# Patient Record
Sex: FEMALE | Race: WHITE | NOT HISPANIC OR LATINO | Employment: OTHER | URBAN - METROPOLITAN AREA
[De-identification: names, ages, dates, MRNs, and addresses within clinical notes are randomized per-mention and may not be internally consistent; named-entity substitution may affect disease eponyms.]

---

## 2017-01-12 ENCOUNTER — OFFICE VISIT (OUTPATIENT)
Dept: PSYCHIATRY | Facility: HOSPITAL | Age: 47
End: 2017-01-12

## 2017-01-12 DIAGNOSIS — F33.1 MAJOR DEPRESSIVE DISORDER, RECURRENT EPISODE, MODERATE (HCC): Primary | ICD-10-CM

## 2017-01-12 PROCEDURE — 90834 PSYTX W PT 45 MINUTES: CPT | Performed by: SOCIAL WORKER

## 2017-01-12 NOTE — PROGRESS NOTES
Session 10:00-10:45.  Pt reports the day after our last session her father in law .  It was very quick after his cancer diagnosis.  Pt reports the holidays were very stressful.  Discussed grief process and how to take care of herself, her  and their daughter.  Pt is overwhelmed by the negative things that have gone on in this past year.  She is hopeful this year will be better.  She hopes to feel better physically starting with possibly getting her knee surgery done.  Pt c/o not sleeping well.  Some is due to grieving.  Will continue to explore.

## 2017-01-18 RX ORDER — SERTRALINE HYDROCHLORIDE 100 MG/1
TABLET, FILM COATED ORAL
Qty: 30 TABLET | Refills: 1 | OUTPATIENT
Start: 2017-01-18

## 2017-01-23 ENCOUNTER — OFFICE VISIT (OUTPATIENT)
Dept: FAMILY MEDICINE CLINIC | Facility: CLINIC | Age: 47
End: 2017-01-23

## 2017-01-23 VITALS
SYSTOLIC BLOOD PRESSURE: 118 MMHG | WEIGHT: 290 LBS | HEIGHT: 64 IN | TEMPERATURE: 97.6 F | DIASTOLIC BLOOD PRESSURE: 68 MMHG | BODY MASS INDEX: 49.51 KG/M2 | HEART RATE: 84 BPM | OXYGEN SATURATION: 96 %

## 2017-01-23 DIAGNOSIS — R73.01 IMPAIRED FASTING GLUCOSE: ICD-10-CM

## 2017-01-23 DIAGNOSIS — F41.9 ANXIETY: ICD-10-CM

## 2017-01-23 DIAGNOSIS — I10 ESSENTIAL HYPERTENSION: Primary | ICD-10-CM

## 2017-01-23 DIAGNOSIS — E78.01 ESSENTIAL FAMILIAL HYPERCHOLESTEROLEMIA: ICD-10-CM

## 2017-01-23 DIAGNOSIS — F33.1 MODERATE EPISODE OF RECURRENT MAJOR DEPRESSIVE DISORDER (HCC): ICD-10-CM

## 2017-01-23 DIAGNOSIS — H57.02 ANISOCORIA: ICD-10-CM

## 2017-01-23 PROCEDURE — 99214 OFFICE O/P EST MOD 30 MIN: CPT | Performed by: PHYSICIAN ASSISTANT

## 2017-01-23 RX ORDER — NEBIVOLOL 5 MG/1
5 TABLET ORAL DAILY
Qty: 30 TABLET | Refills: 5 | Status: SHIPPED | OUTPATIENT
Start: 2017-01-23 | End: 2017-03-17 | Stop reason: SDUPTHER

## 2017-01-23 RX ORDER — AMLODIPINE AND VALSARTAN 10; 320 MG/1; MG/1
1 TABLET ORAL DAILY
Qty: 30 TABLET | Refills: 5 | Status: SHIPPED | OUTPATIENT
Start: 2017-01-23 | End: 2017-03-17 | Stop reason: SDUPTHER

## 2017-01-23 RX ORDER — SERTRALINE HYDROCHLORIDE 100 MG/1
TABLET, FILM COATED ORAL
Qty: 45 TABLET | Refills: 2 | Status: SHIPPED | OUTPATIENT
Start: 2017-01-23 | End: 2017-03-17 | Stop reason: SDUPTHER

## 2017-01-23 NOTE — PROGRESS NOTES
Subjective   Marisol Carrillo is a 47 y.o. female.     History of Present Illness   Marisol Carrillo 47 y.o. female who presents today for routine follow up check and medication refills.  she has a history of   Patient Active Problem List   Diagnosis   • Essential familial hypercholesterolemia   • Hypertension   • Hypothyroidism   • Adiposity   • Vitamin deficiency   • Impaired fasting glucose   • Vitamin D deficiency   • Lumbosacral radiculopathy   • Gastroesophageal reflux disease   • Constipation   • Diarrhea   • Dysphagia   • Fatigue   • Heartburn   • Lumbar disc herniation with radiculopathy   • Nocturnal cough   • Pain in extremity   • Regurgitation   • Vomiting   • Anxiety   • Depression   • Degeneration of lumbar intervertebral disc   • Spinal headache   .  Since the last visit, she has overall felt depressed.  She has Hypertenision and is well controlled on medication and hypothyroidism and is well controlled on Rx.  Labs are in desired treatment range.  she has been compliant with current medications have reviewed them.  The patient denies medication side effects.    PHQ-9 Depression Screening 1/23/2017   Little interest or pleasure in doing things 3   Feeling down, depressed, or hopeless 3   Trouble falling or staying asleep, or sleeping too much 3   Feeling tired or having little energy 3   Poor appetite or overeating 3   Feeling bad about yourself - or that you are a failure or have let yourself or your family down 3   Trouble concentrating on things, such as reading the newspaper or watching television 3   Moving or speaking so slowly that other people could have noticed. Or the opposite - being so fidgety or restless that you have been moving around a lot more than usual 3   Thoughts that you would be better off dead, or of hurting yourself in some way 3   PHQ-9 Total Score 27   If you checked off any problems, how difficult have these problems made it for you to do your work, take care of things at home,  or get along with other people? Extremely dIfficult     I added Bysolic last visit and bp has been controlled and at goal.  Average pulse <=90    Due for knee replacment soon.    Still needs labs   Marisol Carrillo female 47 y.o. who presents today for follow up of Depression and Anxiety.  She reports depressed mood, crying spells, anhedonia, impaired concentration, depressed mood and anxiety and sleep disturbance. Onset of symptoms was approximately several years ago.  She denies current suicidal and homicidal ideation. Risk factors are family history of anxiety and or depression, lifestyle of multiple roles and grief.  Previous treatment includes current Rx.  She complains of the following medication side effects: none.  The patient is currently in counseling..        The following portions of the patient's history were reviewed and updated as appropriate: allergies, current medications, past family history, past medical history, past social history, past surgical history and problem list.    Review of Systems   Constitutional: Negative for activity change, appetite change and unexpected weight change.   HENT: Negative for nosebleeds and trouble swallowing.    Eyes: Negative for pain and visual disturbance.   Respiratory: Negative for chest tightness, shortness of breath and wheezing.    Cardiovascular: Negative for chest pain and palpitations.   Gastrointestinal: Negative for abdominal pain and blood in stool.   Endocrine: Negative.    Genitourinary: Negative for difficulty urinating and hematuria.   Musculoskeletal: Positive for arthralgias and back pain. Negative for joint swelling.   Skin: Negative for color change and rash.   Allergic/Immunologic: Negative.    Neurological: Negative for syncope and speech difficulty.   Hematological: Negative for adenopathy.   Psychiatric/Behavioral: Positive for decreased concentration, dysphoric mood and sleep disturbance. Negative for agitation and confusion. The patient is  nervous/anxious.    All other systems reviewed and are negative.      Objective   Physical Exam   Constitutional: She is oriented to person, place, and time. She appears well-developed and well-nourished. No distress.   HENT:   Head: Normocephalic and atraumatic.   Eyes: Conjunctivae are normal. Right eye exhibits no discharge. Left eye exhibits no discharge. No scleral icterus.   Reactive to light;  Right pupil larger than left;  Need to look at old photos to see if new;  Consider opthalmology;      .   Neck: Normal range of motion. Neck supple. No tracheal deviation present. No thyromegaly present.   Cardiovascular: Normal rate, regular rhythm, normal heart sounds, intact distal pulses and normal pulses.  Exam reveals no gallop.    No murmur heard.  Pulmonary/Chest: Effort normal and breath sounds normal. No respiratory distress. She has no wheezes. She has no rales.   Musculoskeletal: Normal range of motion.   Neurological: She is alert and oriented to person, place, and time. She exhibits normal muscle tone. Coordination normal.   Skin: Skin is warm. No rash noted. No erythema. No pallor.   Psychiatric: She has a normal mood and affect. Her behavior is normal. Judgment and thought content normal.   Nursing note and vitals reviewed.      Assessment/Plan   Problems Addressed this Visit        Cardiovascular and Mediastinum    Essential familial hypercholesterolemia    Hypertension - Primary    Relevant Medications    nebivolol (BYSTOLIC) 5 MG tablet    amLODIPine-valsartan (EXFORGE)  MG per tablet       Endocrine    Impaired fasting glucose       Other    Anxiety    Depression    Relevant Medications    sertraline (ZOLOFT) 100 MG tablet      Other Visit Diagnoses     Anisocoria

## 2017-01-23 NOTE — PATIENT INSTRUCTIONS
Low glycemic index diet  Exercise 30 minutes most days of the week  Make sure you get results on any labs or tests we ordered today  We discussed medications and how to take them as prescribed  Sleep 6-8 hours each night if possible  If you have not signed up for [email or web address], please activate your code ASAP from your After Visit Summary today    LDL goal <100  LDL goal if heart disease <70  HDL goal >60  Triglyceride goal <150  BP goal =<130/80  Fasting glucose <100    Contact office if your depression worsens   Go to ER if start to develop feelings for suicide  Take medication as prescribed  If you are having trouble tolerating your medication, contact office before abruptly stopping it    She just had eye exam and will call and ask if right pupil is always slightly larger then left

## 2017-01-23 NOTE — MR AVS SNAPSHOT
Marisol Carrillo   1/23/2017 9:30 AM   Office Visit    Provider:  Tomeka Linares PA-C   Department:  [facility]   Dept Phone:  [phone]                Your Full Care Plan              Today's Medication Changes          These changes are accurate as of: 1/23/17 10:01 AM.  If you have any questions, ask your nurse or doctor.               Medication(s)that have changed:     amLODIPine-valsartan  MG per tablet   Commonly known as:  EXFORGE   Take 1 tablet by mouth Daily. For bp   What changed:  additional instructions   Changed by:  Tomeka Linares PA-C       sertraline 100 MG tablet   Commonly known as:  ZOLOFT   1.5 tabs PO daily for depression and anxiety (new dose)   What changed:  additional instructions   Changed by:  Tomeka Linares PA-C            Where to Get Your Medications      These medications were sent to [address] - [address] [facility] - [phone] [facility] [phone] [address] [address]    Hours:  24-hours Phone:  [phone]     amLODIPine-valsartan  MG per tablet    nebivolol 5 MG tablet    sertraline 100 MG tablet                  Your Updated Medication List          This list is accurate as of: 1/23/17 10:01 AM.  Always use your most recent med list.                amLODIPine-valsartan  MG per tablet   Commonly known as:  EXFORGE   Take 1 tablet by mouth Daily. For bp       atorvastatin 40 MG tablet   Commonly known as:  LIPITOR   Take 1 tablet by mouth Daily. For cholesterol       levothyroxine 50 MCG tablet   Commonly known as:  SYNTHROID, LEVOTHROID   Take 1 tablet by mouth daily. For thyroid (before breakfast)       MULTI VITAMIN DAILY PO       nebivolol 5 MG tablet   Commonly known as:  BYSTOLIC   Take 1 tablet by mouth Daily. Take with Exforge for BP       sertraline 100 MG tablet   Commonly known as:  ZOLOFT   1.5 tabs PO daily for depression  and anxiety (new dose)       Vitamin D (Cholecalciferol) 1000 UNITS capsule               You Were Diagnosed With        Codes Comments    Essential hypertension    -  Primary ICD-10-CM: I10  ICD-9-CM: 401.9     Impaired fasting glucose     ICD-10-CM: R73.01  ICD-9-CM: 790.21     Anxiety     ICD-10-CM: F41.9  ICD-9-CM: 300.00     Essential familial hypercholesterolemia     ICD-10-CM: E78.01  ICD-9-CM: 272.0     Moderate episode of recurrent major depressive disorder     ICD-10-CM: F33.1  ICD-9-CM: 296.32     Anisocoria     ICD-10-CM: H57.02  ICD-9-CM: 379.41       Instructions    Low glycemic index diet  Exercise 30 minutes most days of the week  Make sure you get results on any labs or tests we ordered today  We discussed medications and how to take them as prescribed  Sleep 6-8 hours each night if possible  If you have not signed up for [employer], please activate your code ASAP from your After Visit Summary today    LDL goal <100  LDL goal if heart disease <70  HDL goal >60  Triglyceride goal <150  BP goal =<130/80  Fasting glucose <100    Contact office if your depression worsens   Go to ER if start to develop feelings for suicide  Take medication as prescribed  If you are having trouble tolerating your medication, contact office before abruptly stopping it    She just had eye exam and will call and ask if right pupil is always slightly larger then left     Patient Instructions History      [employer] Signup     Our records indicate that you have an active [employer] account.    You can view your After Visit Summary by going to [employer] and logging in with your [employer] username and password.  If you don't have a [employer] username and password but a parent or guardian has access to your record, the parent or guardian should login with their own [employer] username and password and access your record to view the After Visit Summary.    If you have questions, you can email  [email or web address] or call [phone] to talk to our MyChart staff.  Remember, MyChart is NOT to be used for urgent needs.  For medical emergencies, dial 911.               Other Info from Your Visit           Your Appointments     Feb 09, 2017 10:00 AM EST   Office Visit with Nadine Elizalde LCSW   [facility] (--)    [address] [facility] [address] [phone]           Arrive 15 minutes prior to appointment.            Feb 10, 2017 10:30 AM EST   Follow Up with Rakesh Dumas MD   [facility] NEUROSURGERY (--)    [address]  [facility] [address] [phone]           Arrive 15 minutes prior to appointment.            Feb 23, 2017  9:30 AM EST   Follow Up with Tomeka Linares PA-C   [facility] (--)    [address]  [facility] [address] [phone]           Arrive 15 minutes prior to appointment.            Apr 10, 2017  9:30 AM EDT   Office Visit with Tomeka Linares PA-C   [facility] (--)    [address]  [facility] [address] [phone]           Arrive 15 minutes prior to appointment.              Allergies     Tirosint [Levothyroxine]  Itching    Ciprofloxacin      Lisinopril  Cough    Propacetamol      Propoxyphene-acetaminophen        Reason for Visit     Hypertension           Vital Signs     Blood Pressure Pulse Temperature Height Weight Last Menstrual Period    118/68 (BP Location: Left arm, Patient Position: Sitting, Cuff Size: Large Adult) 84 97.6 °F (36.4 °C) (Oral) 64\" (162.6 cm) 290 lb (132 kg) 01/02/2017    Oxygen Saturation Body Mass Index Smoking Status             96% 49.78 kg/m2 Never Smoker         Problems and Diagnoses Noted     Anxiety problem    Depression    High cholesterol    High blood pressure    Increased fasting blood sugar    Unequal pupils          "

## 2017-01-31 ENCOUNTER — OFFICE VISIT (OUTPATIENT)
Dept: PSYCHIATRY | Facility: HOSPITAL | Age: 47
End: 2017-01-31

## 2017-01-31 DIAGNOSIS — F33.1 MAJOR DEPRESSIVE DISORDER, RECURRENT EPISODE, MODERATE (HCC): Primary | ICD-10-CM

## 2017-01-31 PROCEDURE — 90834 PSYTX W PT 45 MINUTES: CPT | Performed by: SOCIAL WORKER

## 2017-02-01 NOTE — PROGRESS NOTES
Session 7:00-7:45.  As planned, pt was accompanied by her  to discuss how he can support her in her mental health goals.  Pt shared that she has been drinking alcohol a lot lately and he has asked her to stop.  She has not had any alcohol for the past one week.  She says she doesn't need it but admitted she was drinking a bottle of wine many nights of the week.  Also discussed her worry and anxiety.  Discussed cognitive strategies to address those things she has no control over.  Discussed how the couple can focus on their relationship and improve the intimacy.  Pt reported that she is excited that she is having her knee replacement surgery at the end of March.  She is hopeful that it will lead to her improving her physical health by getting back to her exercise routine.

## 2017-02-09 ENCOUNTER — OFFICE VISIT (OUTPATIENT)
Dept: PSYCHIATRY | Facility: HOSPITAL | Age: 47
End: 2017-02-09

## 2017-02-09 DIAGNOSIS — F33.1 MAJOR DEPRESSIVE DISORDER, RECURRENT EPISODE, MODERATE (HCC): Primary | ICD-10-CM

## 2017-02-09 PROCEDURE — 90834 PSYTX W PT 45 MINUTES: CPT | Performed by: SOCIAL WORKER

## 2017-02-09 NOTE — PROGRESS NOTES
Session 10:00-10:45.  Developed Treatment Plan and signed.  Pt is rating her anxiety and depression both an 8 out of 10 with 10 being worst.  She is looking forward to her knee replacement in late March.  Pt is considering filing for disability because she has been out of work for so long and it looks like it will continue to be longer before she is work ready.  Pt says she would like to be able to go back to work.  With her back pain, her knee pain and her depression and anxiety she is feeling very incapacitated.  Pt and  are doing their daily check ins.  Pt is going to the gym utilizing the pool as she prepares for her knee surgery.  Discussed addressing her lap band issues.  She might get a follow up appt with Dr. Hartman to discuss her options.

## 2017-02-10 ENCOUNTER — OFFICE VISIT (OUTPATIENT)
Dept: NEUROSURGERY | Facility: CLINIC | Age: 47
End: 2017-02-10

## 2017-02-10 VITALS
BODY MASS INDEX: 48.65 KG/M2 | WEIGHT: 285 LBS | SYSTOLIC BLOOD PRESSURE: 152 MMHG | HEIGHT: 64 IN | DIASTOLIC BLOOD PRESSURE: 89 MMHG | HEART RATE: 75 BPM

## 2017-02-10 DIAGNOSIS — M54.42 CHRONIC BILATERAL LOW BACK PAIN WITH BILATERAL SCIATICA: ICD-10-CM

## 2017-02-10 DIAGNOSIS — M54.41 CHRONIC BILATERAL LOW BACK PAIN WITH BILATERAL SCIATICA: ICD-10-CM

## 2017-02-10 DIAGNOSIS — M51.37 DISC DISEASE, DEGENERATIVE, LUMBAR OR LUMBOSACRAL: Primary | ICD-10-CM

## 2017-02-10 DIAGNOSIS — G89.29 CHRONIC BILATERAL LOW BACK PAIN WITH BILATERAL SCIATICA: ICD-10-CM

## 2017-02-10 PROBLEM — M51.379 DISC DISEASE, DEGENERATIVE, LUMBAR OR LUMBOSACRAL: Status: ACTIVE | Noted: 2017-02-10

## 2017-02-10 PROCEDURE — 99213 OFFICE O/P EST LOW 20 MIN: CPT | Performed by: NEUROLOGICAL SURGERY

## 2017-02-10 NOTE — PROGRESS NOTES
Subjective   Patient ID: Marisol Carrillo is a 47 y.o. female is here today for follow-up of back pain.    At her last visit, it was recommended that she proceed with blood patch for spinal headache and have a consult with Dr. Reeves for second opinion of her knee pain.    Today, she notes that after 2 blood patches, her headache finally resolved.    She notes that she was evaluated by Dr. Reeves and she is scheduled for left knee replacement on 3/29/17.    She notes that she has been favoring her right knee and now she may be facing surgery for that as well in the future.    Back Pain   This is a chronic problem. The current episode started more than 1 month ago. The problem occurs daily. The problem has been gradually improving since onset. Pertinent negatives include no fever.       The following portions of the patient's history were reviewed and updated as appropriate: allergies, current medications, past family history, past medical history, past social history, past surgical history and problem list.    Review of Systems   Constitutional: Negative for fever.   Musculoskeletal: Positive for back pain.   Neurological: Negative for syncope.   All other systems reviewed and are negative.    She spinal over the spinal headaches.  She got 2 blood patches and it helped.  She is going to have left knee replacement by Dr. Reeves.  Possibly also the right at some point.  She still has her back and referred leg pain.  From my standpoint nothing surgical.  We discussed this last time and I reinforced it.  Weight loss and exercise are going to be the best means for her to help her back.  Hopefully she'll be able to do that more once her knee surgeries are done.  I'll see her in 6 months.      Objective   Physical Exam   Constitutional: She is oriented to person, place, and time. She appears well-developed and well-nourished.   HENT:   Head: Normocephalic and atraumatic.   Eyes: Conjunctivae and EOM are normal. Pupils are  equal, round, and reactive to light.   Fundoscopic exam:       The right eye shows no papilledema. The right eye shows venous pulsations.        The left eye shows no papilledema. The left eye shows venous pulsations.   Neck: Carotid bruit is not present.   Neurological: She is oriented to person, place, and time. She has a normal Finger-Nose-Finger Test and a normal Heel to Shin Test. Gait normal.   Reflex Scores:       Tricep reflexes are 2+ on the right side and 2+ on the left side.       Bicep reflexes are 2+ on the right side and 2+ on the left side.       Brachioradialis reflexes are 2+ on the right side and 2+ on the left side.       Patellar reflexes are 2+ on the right side and 2+ on the left side.       Achilles reflexes are 2+ on the right side and 2+ on the left side.  Psychiatric: Her speech is normal.     Neurologic Exam     Mental Status   Oriented to person, place, and time.   Registration of memory: Good recent and remote memory.   Attention: normal. Concentration: normal.   Speech: speech is normal   Level of consciousness: alert  Knowledge: consistent with education.     Cranial Nerves     CN II   Visual fields full to confrontation.   Visual acuity: normal    CN III, IV, VI   Pupils are equal, round, and reactive to light.  Extraocular motions are normal.     CN V   Facial sensation intact.   Right corneal reflex: normal  Left corneal reflex: normal    CN VII   Facial expression full, symmetric.   Right facial weakness: none  Left facial weakness: none    CN VIII   Hearing: intact    CN IX, X   Palate: symmetric    CN XI   Right sternocleidomastoid strength: normal  Left sternocleidomastoid strength: normal    CN XII   Tongue: not atrophic  Tongue deviation: none    Motor Exam   Muscle bulk: normal  Right arm tone: normal  Left arm tone: normal  Right leg tone: normal  Left leg tone: normal    Strength   Strength 5/5 except as noted.        Using a cane.  Diminished range of motion in lumbar  flexion and extension     Sensory Exam   Light touch normal.     Gait, Coordination, and Reflexes     Gait  Gait: normal    Coordination   Finger to nose coordination: normal  Heel to shin coordination: normal    Reflexes   Right brachioradialis: 2+  Left brachioradialis: 2+  Right biceps: 2+  Left biceps: 2+  Right triceps: 2+  Left triceps: 2+  Right patellar: 2+  Left patellar: 2+  Right achilles: 2+  Left achilles: 2+  Right : 2+  Left : 2+      Assessment/Plan   Independent Review of Radiographic Studies:    I reviewed the myelogram done on 12/2/16 and again there is some mild disc bulge at L4-L5 and a fused segment L5-S1 but nothing over the dramatic.      Medical Decision Making:    She will get her knee surgery and come back and see us in 6 months.  The treatment from my standpoint really is more focusing on weight loss and therapy and exercise.  We can get back on track with that more next time.  I did fill out a disabled parking sticker application for her.      Marisol was seen today for back pain.    Diagnoses and all orders for this visit:    Disc disease, degenerative, lumbar or lumbosacral    Chronic bilateral low back pain with bilateral sciatica    Return in about 6 months (around 8/10/2017).

## 2017-02-10 NOTE — PATIENT INSTRUCTIONS
Obesity  Obesity is defined as having too much total body fat and a body mass index (BMI) of 30 or more. BMI is an estimate of body fat and is calculated from your height and weight. BMI is typically calculated by your health care provider during regular wellness visits. Obesity happens when you consume more calories than you can burn by exercising or performing daily physical tasks. Prolonged obesity can cause major illnesses or emergencies, such as:  · Stroke.  · Heart disease.  · Diabetes.  · Cancer.  · Arthritis.  · High blood pressure (hypertension).  · High cholesterol.  · Sleep apnea.  · Erectile dysfunction.  · Infertility problems.  CAUSES   · Regularly eating unhealthy foods.  · Physical inactivity.  · Certain disorders, such as an underactive thyroid (hypothyroidism), Cushing's syndrome, and polycystic ovarian syndrome.  · Certain medicines, such as steroids, some depression medicines, and antipsychotics.  · Genetics.  · Lack of sleep.  DIAGNOSIS  A health care provider can diagnose obesity after calculating your BMI. Obesity will be diagnosed if your BMI is 30 or higher.  There are other methods of measuring obesity levels. Some other methods include measuring your skinfold thickness, your waist circumference, and comparing your hip circumference to your waist circumference.  TREATMENT   A healthy treatment program includes some or all of the following:  · Long-term dietary changes.  · Exercise and physical activity.  · Behavioral and lifestyle changes.  · Medicine only under the supervision of your health care provider. Medicines may help, but only if they are used with diet and exercise programs.  If your BMI is 40 or higher, your health care provider may recommend specialized surgery or programs to help with weight loss.  An unhealthy treatment program includes:  · Fasting.  · Fad diets.  · Supplements and drugs.  These choices do not succeed in long-term weight control.  HOME CARE  INSTRUCTIONS  · Exercise and perform physical activity as directed by your health care provider. To increase physical activity, try the following:    Use stairs instead of elevators.    Park farther away from store entrances.    Garden, bike, or walk instead of watching television or using the computer.  · Eat healthy, low-calorie foods and drinks on a regular basis. Eat more fruits and vegetables. Use low-calorie cookbooks or take healthy cooking classes.  · Limit fast food, sweets, and processed snack foods.  · Eat smaller portions.  · Keep a daily journal of everything you eat. There are many free websites to help you with this. It may be helpful to measure your foods so you can determine if you are eating the correct portion sizes.  · Avoid drinking alcohol. Drink more water and drinks without calories.  · Take vitamins and supplements only as recommended by your health care provider.  · Weight-loss support groups, registered dietitians, counselors, and stress reduction education can also be very helpful.  SEEK IMMEDIATE MEDICAL CARE IF:  · You have chest pain or tightness.  · You have trouble breathing or feel short of breath.  · You have weakness or leg numbness.  · You feel confused or have trouble talking.  · You have sudden changes in your vision.     This information is not intended to replace advice given to you by your health care provider. Make sure you discuss any questions you have with your health care provider.     Document Released: 01/25/2006 Document Revised: 01/08/2016 Document Reviewed: 01/23/2013  [employer] Patient Education ©2016 [employer]

## 2017-02-28 ENCOUNTER — APPOINTMENT (OUTPATIENT)
Dept: PSYCHIATRY | Facility: HOSPITAL | Age: 47
End: 2017-02-28

## 2017-03-17 ENCOUNTER — OFFICE VISIT (OUTPATIENT)
Dept: FAMILY MEDICINE CLINIC | Facility: CLINIC | Age: 47
End: 2017-03-17

## 2017-03-17 VITALS
HEART RATE: 78 BPM | WEIGHT: 288 LBS | OXYGEN SATURATION: 99 % | RESPIRATION RATE: 16 BRPM | BODY MASS INDEX: 49.17 KG/M2 | SYSTOLIC BLOOD PRESSURE: 110 MMHG | DIASTOLIC BLOOD PRESSURE: 80 MMHG | HEIGHT: 64 IN | TEMPERATURE: 98.3 F

## 2017-03-17 DIAGNOSIS — E78.01 ESSENTIAL FAMILIAL HYPERCHOLESTEROLEMIA: ICD-10-CM

## 2017-03-17 DIAGNOSIS — E03.9 HYPOTHYROIDISM, UNSPECIFIED TYPE: ICD-10-CM

## 2017-03-17 DIAGNOSIS — F33.1 MODERATE EPISODE OF RECURRENT MAJOR DEPRESSIVE DISORDER (HCC): ICD-10-CM

## 2017-03-17 DIAGNOSIS — R73.01 IMPAIRED FASTING GLUCOSE: ICD-10-CM

## 2017-03-17 DIAGNOSIS — E55.9 VITAMIN D DEFICIENCY: ICD-10-CM

## 2017-03-17 DIAGNOSIS — I10 ESSENTIAL HYPERTENSION: Primary | ICD-10-CM

## 2017-03-17 DIAGNOSIS — F41.9 ANXIETY: ICD-10-CM

## 2017-03-17 PROCEDURE — 99213 OFFICE O/P EST LOW 20 MIN: CPT | Performed by: PHYSICIAN ASSISTANT

## 2017-03-17 RX ORDER — NEBIVOLOL 5 MG/1
5 TABLET ORAL DAILY
Qty: 30 TABLET | Refills: 5 | Status: SHIPPED | OUTPATIENT
Start: 2017-03-17 | End: 2017-08-10 | Stop reason: SDUPTHER

## 2017-03-17 RX ORDER — AMLODIPINE AND VALSARTAN 10; 320 MG/1; MG/1
1 TABLET ORAL DAILY
Qty: 30 TABLET | Refills: 5 | Status: SHIPPED | OUTPATIENT
Start: 2017-03-17 | End: 2017-08-10 | Stop reason: SDUPTHER

## 2017-03-17 RX ORDER — SERTRALINE HYDROCHLORIDE 100 MG/1
TABLET, FILM COATED ORAL
Qty: 60 TABLET | Refills: 5 | Status: SHIPPED | OUTPATIENT
Start: 2017-03-17 | End: 2017-08-10 | Stop reason: SDUPTHER

## 2017-03-17 NOTE — PATIENT INSTRUCTIONS
Low glycemic index diet  Exercise 30 minutes most days of the week  Make sure you get results on any labs or tests we ordered today  We discussed medications and how to take them as prescribed  Sleep 6-8 hours each night if possible  If you have not signed up for [employer], please activate your code ASAP from your After Visit Summary today    LDL goal <100  LDL goal if heart disease <70  HDL goal >60  Triglyceride goal <150  BP goal =<130/80  Fasting glucose <100    Raise dose Zoloft  Contact office if your depression worsens   Go to ER if start to develop feelings for suicide  Take medication as prescribed  If you are having trouble tolerating your medication, contact office before abruptly stopping it    Get labs

## 2017-03-21 ENCOUNTER — APPOINTMENT (OUTPATIENT)
Dept: PREADMISSION TESTING | Facility: HOSPITAL | Age: 47
End: 2017-03-21

## 2017-03-21 ENCOUNTER — HOSPITAL ENCOUNTER (OUTPATIENT)
Dept: GENERAL RADIOLOGY | Facility: HOSPITAL | Age: 47
Discharge: HOME | End: 2017-03-21
Admitting: ORTHOPAEDIC SURGERY

## 2017-03-21 VITALS
BODY MASS INDEX: 49 KG/M2 | HEIGHT: 64 IN | WEIGHT: 287 LBS | SYSTOLIC BLOOD PRESSURE: 124 MMHG | OXYGEN SATURATION: 100 % | DIASTOLIC BLOOD PRESSURE: 84 MMHG | HEART RATE: 70 BPM | TEMPERATURE: 98.3 F | RESPIRATION RATE: 16 BRPM

## 2017-03-21 DIAGNOSIS — E55.9 VITAMIN D DEFICIENCY: ICD-10-CM

## 2017-03-21 DIAGNOSIS — E78.01 ESSENTIAL FAMILIAL HYPERCHOLESTEROLEMIA: ICD-10-CM

## 2017-03-21 DIAGNOSIS — R73.01 IMPAIRED FASTING GLUCOSE: ICD-10-CM

## 2017-03-21 DIAGNOSIS — F33.1 MODERATE EPISODE OF RECURRENT MAJOR DEPRESSIVE DISORDER (HCC): ICD-10-CM

## 2017-03-21 DIAGNOSIS — E03.9 UNSPECIFIED HYPOTHYROIDISM: ICD-10-CM

## 2017-03-21 DIAGNOSIS — I10 ESSENTIAL HYPERTENSION: Primary | ICD-10-CM

## 2017-03-21 DIAGNOSIS — F41.9 ANXIETY: ICD-10-CM

## 2017-03-21 LAB
25(OH)D3 SERPL-MCNC: 26.6 NG/ML (ref 30–100)
ABO GROUP BLD: NORMAL
ALBUMIN SERPL-MCNC: 4.1 G/DL (ref 3.5–5.2)
ALBUMIN/GLOB SERPL: 1.1 G/DL
ALP SERPL-CCNC: 98 U/L (ref 39–117)
ALT SERPL W P-5'-P-CCNC: 21 U/L (ref 1–33)
ANION GAP SERPL CALCULATED.3IONS-SCNC: 14.2 MMOL/L
AST SERPL-CCNC: 24 U/L (ref 1–32)
BASOPHILS # BLD AUTO: 0.05 10*3/MM3 (ref 0–0.2)
BASOPHILS NFR BLD AUTO: 0.5 % (ref 0–1.5)
BILIRUB SERPL-MCNC: 0.5 MG/DL (ref 0.1–1.2)
BILIRUB UR QL STRIP: NEGATIVE
BLD GP AB SCN SERPL QL: NEGATIVE
BUN BLD-MCNC: 14 MG/DL (ref 6–20)
BUN/CREAT SERPL: 18.2 (ref 7–25)
CALCIUM SPEC-SCNC: 10.2 MG/DL (ref 8.6–10.5)
CHLORIDE SERPL-SCNC: 100 MMOL/L (ref 98–107)
CHOLEST SERPL-MCNC: 176 MG/DL (ref 0–200)
CLARITY UR: CLEAR
CO2 SERPL-SCNC: 24.8 MMOL/L (ref 22–29)
COLOR UR: YELLOW
CREAT BLD-MCNC: 0.77 MG/DL (ref 0.57–1)
DEPRECATED RDW RBC AUTO: 57.4 FL (ref 37–54)
EOSINOPHIL # BLD AUTO: 0.11 10*3/MM3 (ref 0–0.7)
EOSINOPHIL NFR BLD AUTO: 1.1 % (ref 0.3–6.2)
ERYTHROCYTE [DISTWIDTH] IN BLOOD BY AUTOMATED COUNT: 17 % (ref 11.7–13)
GFR SERPL CREATININE-BSD FRML MDRD: 81 ML/MIN/1.73
GLOBULIN UR ELPH-MCNC: 3.6 GM/DL
GLUCOSE BLD-MCNC: 120 MG/DL (ref 65–99)
GLUCOSE UR STRIP-MCNC: NEGATIVE MG/DL
HBA1C MFR BLD: 5.6 % (ref 4.8–5.6)
HCT VFR BLD AUTO: 42.9 % (ref 35.6–45.5)
HDLC SERPL-MCNC: 59 MG/DL (ref 40–60)
HGB BLD-MCNC: 13.8 G/DL (ref 11.9–15.5)
HGB UR QL STRIP.AUTO: NEGATIVE
IMM GRANULOCYTES # BLD: 0.02 10*3/MM3 (ref 0–0.03)
IMM GRANULOCYTES NFR BLD: 0.2 % (ref 0–0.5)
INR PPP: 1.08 (ref 0.9–1.1)
KETONES UR QL STRIP: NEGATIVE
LDLC SERPL CALC-MCNC: 91 MG/DL (ref 0–100)
LDLC/HDLC SERPL: 1.54 {RATIO}
LEUKOCYTE ESTERASE UR QL STRIP.AUTO: NEGATIVE
LYMPHOCYTES # BLD AUTO: 1.75 10*3/MM3 (ref 0.9–4.8)
LYMPHOCYTES NFR BLD AUTO: 17.7 % (ref 19.6–45.3)
MCH RBC QN AUTO: 29.5 PG (ref 26.9–32)
MCHC RBC AUTO-ENTMCNC: 32.2 G/DL (ref 32.4–36.3)
MCV RBC AUTO: 91.7 FL (ref 80.5–98.2)
MONOCYTES # BLD AUTO: 0.64 10*3/MM3 (ref 0.2–1.2)
MONOCYTES NFR BLD AUTO: 6.5 % (ref 5–12)
NEUTROPHILS # BLD AUTO: 7.33 10*3/MM3 (ref 1.9–8.1)
NEUTROPHILS NFR BLD AUTO: 74 % (ref 42.7–76)
NITRITE UR QL STRIP: NEGATIVE
PH UR STRIP.AUTO: 6 [PH] (ref 5–8)
PLATELET # BLD AUTO: 315 10*3/MM3 (ref 140–500)
PMV BLD AUTO: 9.9 FL (ref 6–12)
POTASSIUM BLD-SCNC: 4.8 MMOL/L (ref 3.5–5.2)
PROT SERPL-MCNC: 7.7 G/DL (ref 6–8.5)
PROT UR QL STRIP: NEGATIVE
PROTHROMBIN TIME: 13.6 SECONDS (ref 11.7–14.2)
RBC # BLD AUTO: 4.68 10*6/MM3 (ref 3.9–5.2)
RH BLD: POSITIVE
SODIUM BLD-SCNC: 139 MMOL/L (ref 136–145)
SP GR UR STRIP: 1.01 (ref 1–1.03)
T4 FREE SERPL-MCNC: 1.1 NG/DL (ref 0.93–1.7)
TRIGL SERPL-MCNC: 132 MG/DL (ref 0–150)
TSH SERPL DL<=0.05 MIU/L-ACNC: 1.79 MIU/ML (ref 0.27–4.2)
UROBILINOGEN UR QL STRIP: NORMAL
VLDLC SERPL-MCNC: 26.4 MG/DL (ref 5–40)
WBC NRBC COR # BLD: 9.9 10*3/MM3 (ref 4.5–10.7)

## 2017-03-21 PROCEDURE — 86901 BLOOD TYPING SEROLOGIC RH(D): CPT | Performed by: ORTHOPAEDIC SURGERY

## 2017-03-21 PROCEDURE — 71020 HC CHEST PA AND LATERAL: CPT

## 2017-03-21 PROCEDURE — 36415 COLL VENOUS BLD VENIPUNCTURE: CPT | Performed by: PHYSICIAN ASSISTANT

## 2017-03-21 PROCEDURE — 80053 COMPREHEN METABOLIC PANEL: CPT | Performed by: PHYSICIAN ASSISTANT

## 2017-03-21 PROCEDURE — 83036 HEMOGLOBIN GLYCOSYLATED A1C: CPT | Performed by: PHYSICIAN ASSISTANT

## 2017-03-21 PROCEDURE — 80061 LIPID PANEL: CPT | Performed by: PHYSICIAN ASSISTANT

## 2017-03-21 PROCEDURE — 93010 ELECTROCARDIOGRAM REPORT: CPT | Performed by: INTERNAL MEDICINE

## 2017-03-21 PROCEDURE — 93005 ELECTROCARDIOGRAM TRACING: CPT

## 2017-03-21 PROCEDURE — 86850 RBC ANTIBODY SCREEN: CPT | Performed by: ORTHOPAEDIC SURGERY

## 2017-03-21 PROCEDURE — 86900 BLOOD TYPING SEROLOGIC ABO: CPT | Performed by: ORTHOPAEDIC SURGERY

## 2017-03-21 PROCEDURE — 84443 ASSAY THYROID STIM HORMONE: CPT | Performed by: PHYSICIAN ASSISTANT

## 2017-03-21 PROCEDURE — 81003 URINALYSIS AUTO W/O SCOPE: CPT | Performed by: ORTHOPAEDIC SURGERY

## 2017-03-21 PROCEDURE — 82306 VITAMIN D 25 HYDROXY: CPT | Performed by: PHYSICIAN ASSISTANT

## 2017-03-21 PROCEDURE — 85025 COMPLETE CBC W/AUTO DIFF WBC: CPT | Performed by: PHYSICIAN ASSISTANT

## 2017-03-21 PROCEDURE — G8979 MOBILITY GOAL STATUS: HCPCS

## 2017-03-21 PROCEDURE — 84439 ASSAY OF FREE THYROXINE: CPT | Performed by: PHYSICIAN ASSISTANT

## 2017-03-21 PROCEDURE — 97161 PT EVAL LOW COMPLEX 20 MIN: CPT

## 2017-03-21 PROCEDURE — 85610 PROTHROMBIN TIME: CPT | Performed by: ORTHOPAEDIC SURGERY

## 2017-03-21 PROCEDURE — G8978 MOBILITY CURRENT STATUS: HCPCS

## 2017-03-21 PROCEDURE — G8980 MOBILITY D/C STATUS: HCPCS

## 2017-03-21 NOTE — DISCHARGE INSTRUCTIONS
Take the following medications the morning of surgery with a small sip of water. NONE        General Instructions:  • Do not eat or drink after midnight: includes water, mints, or gum. You may brush your teeth.  • Do not smoke, chew tobacco, or drink alcohol.  • The Pre-Admission Testing nurse will instruct you to bring medications if unable to obtain an accurate list in Pre-Admission Testing.    • If applicable bring your C-PAP/ BI-PAP machine.  • Bring any papers given to you in the doctor’s office.  • Wear clean comfortable clothes and socks.  • Do not wear contact lenses or make-up.  Bring a case for your glasses if applicable.   • Bring crutches or walker if applicable.  • Leave all other valuables and jewelry at home.    If you were given a blood bank ID arm band remember to bring it with you the day of surgery.    Preventing a Surgical Site Infection:  Shower on the morning of surgery using a fresh bar of anti-bacterial soap (such as Dial) and clean washcloth.  Dry with a clean towel and dress in clean clothing.  For 2 to 3 days before surgery, avoid shaving with a razor near where you will have surgery because the razor can irritate skin and make it easier to develop an infection  Ask your surgeon if you will be receiving antibiotics prior to surgery  Make sure you, your family, and all healthcare providers clean their hands with soap and water or an alcohol based hand  before caring for you or your wound  If at all possible, quit smoking as many days before surgery as you can.    Day of surgery: 3/29/2017. MAIN OR. ARRIVAL TIME 6AM  Upon arrival, a Pre-op nurse and Anesthesiologist will review your health history, obtain vital signs, and answer questions you may have.  The only belongings needed at this time will be your home medications and if applicable your C-PAP/BI-PAP machine.  If you are staying overnight your family can leave the rest of your belongings in the car and bring them to your room  later.  A Pre-op nurse will start an IV and you may receive medication in preparation for surgery, including something to help you relax.  Your family will be able to see you in the Pre-op area.  While you are in surgery your family should notify the waiting room  if they leave the waiting room area and provide a contact phone number.    Please be aware that surgery does come with discomfort.  We want to make every effort to control your discomfort so please discuss any uncontrolled symptoms with your nurse.   Your doctor will most likely have prescribed pain medications.          If you are staying overnight following surgery, you will be transported to your hospital room following the recovery period.  [facility] has all private rooms.    If you have any questions please call Pre-Admission Testing at [phone].  Deductibles and co-payments are collected on the day of service. Please be prepared to pay the required co-pay, deductible or deposit on the day of service as defined by your plan.          2% CHLORAHEXIDINE GLUCONATE* CLOTH  Preparing or “prepping” skin before surgery can reduce the risk of infection at the surgical site. To make the process easier, [facility] has chosen disposable cloths moistened with a rinse-free, 2% Chlorhexidine Gluconate (CHG) antiseptic solution. The steps below outline the prepping process and should be carefully followed.        Use the prep cloth on the area that is circled in the diagram             Directions Night before Surgery  1) Shower using a fresh bar of anti-bacterial soap (such as Dial) and clean washcloth.  Use a clean towel to completely dry your skin.  2) Do not use any lotions, oils or creams on your skin.  3) Open the package and remove 1 cloth, wipe your skin for 30 seconds in a circular motion.  Allow to dry for 3 minutes.  4) Repeat #3 with second cloth.  5) Do not touch your eyes, ears, or mouth with the prep  cloth.  6) Allow the wet prep solution to air dry.  7) Discard the prep cloth and wash your hands with soap and water.   8) Dress in clean bed clothes and sleep on fresh clean bed sheets.   9) You may experience some temporary itching after the prep.    Directions Day of Surgery  1) Repeat steps 1,2,3,4,5,6,7, and 9.   2) Dress in clean clothes before coming to the hospital.    BACTROBAN NASAL OINTMENT  There are many germs normally in your nose. Bactroban is an ointment that will help reduce these germs. Please follow these instructions for Bactroban use:        1____The day before surgery in the morning  Date__3/28______    2____The day before surgery in the evening              Date___3/28_____    _3___The day of surgery in the morning    Date__3/29______    **Squirt ½ package of Bactroban Ointment onto a cotton applicator and apply to inside of 1st nostril.  Squirt the remaining Bactroban and apply to the inside of the other nostril.

## 2017-03-21 NOTE — PROGRESS NOTES
Physical Therapy Outpatient Preoperative Total Joint Evaluation     Patient Name: Marisol Carrillo  : 1970  MRN: [id number]  Today's Date: 3/21/2017         Surgery Date: 17    Patient Active Problem List   Diagnosis   • Essential familial hypercholesterolemia   • Hypertension   • Hypothyroidism   • Adiposity   • Vitamin deficiency   • Impaired fasting glucose   • Vitamin D deficiency   • Lumbosacral radiculopathy   • Gastroesophageal reflux disease   • Constipation   • Diarrhea   • Dysphagia   • Fatigue   • Heartburn   • Lumbar disc herniation with radiculopathy   • Nocturnal cough   • Pain in extremity   • Regurgitation   • Vomiting   • Anxiety   • Depression   • Degeneration of lumbar intervertebral disc   • Spinal headache   • Chronic bilateral low back pain with bilateral sciatica   • Disc disease, degenerative, lumbar or lumbosacral        Past Medical History   Diagnosis Date   • Alopecia    • Anxiety and depression    • Arthritis      OSTEO   • Balance problem      FOOTDROP LEFT FOOT   • Bilateral knee pain    • Breast mass      RIGHT, MD WATCHING.   • Chronic low back pain    • Depression    • GERD (gastroesophageal reflux disease)    • Hyperlipidemia    • Hypertension    • Hypothyroidism    • Impaired fasting glucose    • Kidney calculus      HISTORY OF   • Pneumonia      2015 S/P LUMBAR FUSION   • Sciatica    • Spinal headache      AFTER MYELOGRAM. BLOOD PATCH X2   • Vitamin D deficiency         Past Surgical History   Procedure Laterality Date   •  section     • Dilation and curettage, diagnostic / therapeutic     • Lithotripsy     • Tonsillectomy     • Microdiscectomy lumbar     • Laparoscopic gastric banding     • Lumbar fusion       L5-S1. WITH HARDWARE   • Knee arthroscopy Left      REPAIR OF MENISUCS              TOTAL JOINT PREOP EVAL (last 72 hours)      Total Joint Preop Evaluation       17 3537                Preoperative Evaluation    Surgery TKR: Left  -TV         Surgery Date 03/29/17  -TV        Previous Total Joint No  -TV        Attended Class yes  -TV        Medical History HTN  -TV        Medical History Comment spinal fusion 1 year ago  -TV        Prior Activity Status    All Household independent  -TV        All Community independent  -TV        Gait independent  -TV        Transfers independent  -TV         Spouse  -TV        DC Plans Sub Acute   lai  -TV        Assist at home Spouse  -TV        Home Environment 1 story  -TV        Exterior steps --   0  -TV        Pain 0-10    Pain Level 3  -TV        Location knee pain; back 4  -TV        LE Measurements - ROM    Hip Flexion - Right AROM is WNL;Strength is grossly > or equal to 3/5  -TV        Hip Abduction - Right AROM is WNL;Strength is grossly > or equal to 3/5  -TV        Knee Flexion - Right Strength is grossly > or equal to 3/5   105  -TV        Knee Extension - Right Strength is grossly > or equal to 3/5   0  -TV        Hip Flexion - Left AROM is WNL;Strength is grossly > or equal to 3/5  -TV        Hip Abduction - Left AROM is WNL;Strength is grossly > or equal to 3/5  -TV        Knee Flexion - Left --   100; foot drop on left from back surgery;   -TV        Knee Extension - Left --   0  -TV        UE ROM/Strength    UE ROM/Strength adequate for walker use  -TV        Other Measurements    Sensation/Circulation altered (comment)  -TV        Sensation/Circulation Comment some numbness in both legs on lateral aspect  -TV        Gait Observation no device  -TV        Equipment    Equipment Patient Has Walker;Cane  -TV        ASSESSMENT    Goal Patient demonstrates good understanding of post-op P.T.;Exercises;Surgical Procedure  -TV        Goal Met? Yes  -TV        Anticipated Progress fair  -TV        Patient agrees with Plan of Treatment? Yes  -TV        Plan Will see for post op TJR protocol  -TV          User Key  (r) = Recorded By, (t) = Taken By, (c) = Cosigned By    Initials Name Effective  Dates    TV Lauren Gordon, PT 01/07/16 -              Time Calculation:          Therapy Charges for Today     Code Description Service Date Service Provider Modifiers Qty    48578080785 HC PT MOBILITY CURRENT 3/21/2017 Lauren Gordon, PT GP, CJ 1    44419192980 HC PT MOBILITY PROJECTED 3/21/2017 Lauren Gordon, PT GP, CJ 1    32556918040 HC PT MOBILITY DISCHARGE 3/21/2017 Lauren Gordon, PT GP, CJ 1    [id number] HC PAT-TOTAL JOINT CLASS 3/21/2017 Lauren Gordon, PT  1    [id number] HC PT EVAL LOW COMPLEXITY 1 3/21/2017 Lauren Gordon, PT GP 1            PT G-Codes  PT Professional Judgement Used?: Yes  Functional Limitation: Mobility: Walking and moving around  Mobility: Walking and Moving Around Current Status (): At least 20 percent but less than 40 percent impaired, limited or restricted  Mobility: Walking and Moving Around Goal Status (): At least 20 percent but less than 40 percent impaired, limited or restricted  Mobility: Walking and Moving Around Discharge Status (): At least 20 percent but less than 40 percent impaired, limited or restricted      Lauren Gordon, PT  3/21/2017

## 2017-03-22 ENCOUNTER — OFFICE VISIT (OUTPATIENT)
Dept: FAMILY MEDICINE CLINIC | Facility: CLINIC | Age: 47
End: 2017-03-22

## 2017-03-22 VITALS
BODY MASS INDEX: 48.49 KG/M2 | OXYGEN SATURATION: 96 % | DIASTOLIC BLOOD PRESSURE: 70 MMHG | SYSTOLIC BLOOD PRESSURE: 110 MMHG | RESPIRATION RATE: 16 BRPM | HEART RATE: 69 BPM | WEIGHT: 284 LBS | HEIGHT: 64 IN | TEMPERATURE: 98.1 F

## 2017-03-22 DIAGNOSIS — Z01.818 ENCOUNTER FOR PREOPERATIVE EXAMINATION FOR GENERAL SURGICAL PROCEDURE: Primary | ICD-10-CM

## 2017-03-22 PROCEDURE — 99213 OFFICE O/P EST LOW 20 MIN: CPT | Performed by: PHYSICIAN ASSISTANT

## 2017-03-22 NOTE — PATIENT INSTRUCTIONS
Low glycemic index diet  Exercise 30 minutes most days of the week  Make sure you get results on any labs or tests we ordered today  We discussed medications and how to take them as prescribed  Sleep 6-8 hours each night if possible  If you have not signed up for [employer], please activate your code ASAP from your After Visit Summary today    LDL goal <100  LDL goal if heart disease <70  HDL goal >60  Triglyceride goal <150  BP goal =<130/80  Fasting glucose <100    Clear for surgery

## 2017-03-22 NOTE — PROGRESS NOTES
Subjective   Marisol Carrillo is a 47 y.o. female.     History of Present Illness   Marisol Carrillo 47 y.o. female who presents today for surgical clearance.  I do now have her bp and pulse at tx goal.  She has listed Ketamine as an allergy and not to use d/t adverse effects last time.  Dr. Reeves is doing surgery.  I just reviewed preop EKG, CXR, labs and goals met for surgery.  ST Axis= 48 deg  SINUS RHYTHM  NO SIGNIFICANT CHANGE FROM PREVIOUS ECG  Electronically Signed by:  Ruben Bob ([facility]) 21-Mar-2017 20:06:51  Date and Time of Study: 2017-03-21 11:56:45      surgery is 3-29-17 left knee replacement    PA and lateral radiographic views of the chest demonstrate minor  atelectatic changes in the left base. Otherwise, the lungs are clear of  acute infiltrates. Lung volumes are relatively low. Cardiomediastinal  silhouette is unremarkable. Incidentally noted are degenerative  phenomena within the thoracic spine.    I did go over results with DR Coley    Lab Results   Component Value Date    GLUCOSE 120 (H) 03/21/2017    BUN 14 03/21/2017    CREATININE 0.77 03/21/2017    EGFRIFNONA 81 03/21/2017    BCR 18.2 03/21/2017    K 4.8 03/21/2017    CO2 24.8 03/21/2017    CALCIUM 10.2 03/21/2017    ALBUMIN 4.10 03/21/2017    LABIL2 1.1 03/21/2017    AST 24 03/21/2017    ALT 21 03/21/2017     Lab Results   Component Value Date    HGBA1C 5.60 03/21/2017     Lab Results   Component Value Date    WBC 9.90 03/21/2017    HGB 13.8 03/21/2017    HCT 42.9 03/21/2017    MCV 91.7 03/21/2017     03/21/2017   Thyroid labs are in range.  Vit D 26 and add 2,000 IU  Urine neg      she has a history of   Patient Active Problem List   Diagnosis   • Essential familial hypercholesterolemia   • Hypertension   • Hypothyroidism   • Adiposity   • Vitamin deficiency   • Impaired fasting glucose   • Vitamin D deficiency   • Lumbosacral radiculopathy   • Gastroesophageal reflux disease   • Constipation   • Diarrhea   • Dysphagia   •  Fatigue   • Heartburn   • Lumbar disc herniation with radiculopathy   • Nocturnal cough   • Pain in extremity   • Regurgitation   • Vomiting   • Anxiety   • Depression   • Degeneration of lumbar intervertebral disc   • Spinal headache   • Chronic bilateral low back pain with bilateral sciatica   • Disc disease, degenerative, lumbar or lumbosacral   .        The following portions of the patient's history were reviewed and updated as appropriate: allergies, current medications, past family history, past medical history, past social history, past surgical history and problem list.    Review of Systems   Constitutional: Positive for fatigue. Negative for activity change, appetite change and unexpected weight change.   HENT: Negative for nosebleeds and trouble swallowing.    Eyes: Negative for pain and visual disturbance.   Respiratory: Negative for chest tightness, shortness of breath and wheezing.    Cardiovascular: Negative for chest pain and palpitations.   Gastrointestinal: Negative for abdominal pain and blood in stool.   Endocrine: Negative.    Genitourinary: Negative for difficulty urinating and hematuria.   Musculoskeletal: Positive for arthralgias, back pain, gait problem and myalgias. Negative for joint swelling.   Skin: Negative for color change and rash.   Allergic/Immunologic: Negative.    Neurological: Negative for syncope and speech difficulty.   Hematological: Negative for adenopathy.   Psychiatric/Behavioral: Positive for decreased concentration, dysphoric mood and sleep disturbance. Negative for agitation and confusion. The patient is nervous/anxious.    All other systems reviewed and are negative.      Objective   Physical Exam   Constitutional: She is oriented to person, place, and time. She appears well-developed and well-nourished. No distress.   HENT:   Head: Normocephalic and atraumatic.   Eyes: Conjunctivae and EOM are normal. Pupils are equal, round, and reactive to light. Right eye exhibits  no discharge. Left eye exhibits no discharge. No scleral icterus.   Neck: Normal range of motion. Neck supple. No tracheal deviation present. No thyromegaly present.   Cardiovascular: Normal rate, regular rhythm, normal heart sounds, intact distal pulses and normal pulses.  Exam reveals no gallop.    No murmur heard.  Pulmonary/Chest: Effort normal and breath sounds normal. No respiratory distress. She has no wheezes. She has no rales.   Abdominal: Soft.   Musculoskeletal: Normal range of motion.   Neurological: She is alert and oriented to person, place, and time. She exhibits normal muscle tone. Coordination normal.   Skin: Skin is warm. No rash noted. No erythema. No pallor.   Psychiatric: She has a normal mood and affect. Her behavior is normal. Judgment and thought content normal.   Nursing note and vitals reviewed.      Assessment/Plan   Problems Addressed this Visit     None      Visit Diagnoses     Encounter for preoperative examination for general surgical procedure    -  Primary

## 2017-03-23 ENCOUNTER — OFFICE VISIT (OUTPATIENT)
Dept: PSYCHIATRY | Facility: HOSPITAL | Age: 47
End: 2017-03-23

## 2017-03-23 DIAGNOSIS — F33.1 MAJOR DEPRESSIVE DISORDER, RECURRENT EPISODE, MODERATE (HCC): Primary | ICD-10-CM

## 2017-03-23 PROCEDURE — 90834 PSYTX W PT 45 MINUTES: CPT | Performed by: SOCIAL WORKER

## 2017-03-23 NOTE — PROGRESS NOTES
Session 10:00-10:45.  Pt is very anxious about her upcoming knee replacement surgery.  Discussed events and how to mentally prepare.  Informed Pt of this office closing.  Pt will bring in a list of providers covered under her insurance and we will pick a couple together she can call.

## 2017-03-29 ENCOUNTER — ANESTHESIA (OUTPATIENT)
Dept: PERIOP | Facility: HOSPITAL | Age: 47
End: 2017-03-29

## 2017-03-29 ENCOUNTER — HOSPITAL ENCOUNTER (INPATIENT)
Facility: HOSPITAL | Age: 47
LOS: 3 days | Discharge: NURSING, REHAB OR LONG-TERM CARE | End: 2017-04-01
Attending: ORTHOPAEDIC SURGERY | Admitting: ORTHOPAEDIC SURGERY

## 2017-03-29 ENCOUNTER — ANESTHESIA EVENT (OUTPATIENT)
Dept: PERIOP | Facility: HOSPITAL | Age: 47
End: 2017-03-29

## 2017-03-29 DIAGNOSIS — I10 ESSENTIAL HYPERTENSION: ICD-10-CM

## 2017-03-29 DIAGNOSIS — M17.12 PRIMARY LOCALIZED OSTEOARTHRITIS OF LEFT KNEE: Primary | ICD-10-CM

## 2017-03-29 LAB
B-HCG UR QL: NEGATIVE
INTERNAL NEGATIVE CONTROL: NEGATIVE
INTERNAL POSITIVE CONTROL: POSITIVE
Lab: NORMAL

## 2017-03-29 PROCEDURE — 25010000002 HYDROMORPHONE PER 4 MG: Performed by: ANESTHESIOLOGY

## 2017-03-29 PROCEDURE — 97110 THERAPEUTIC EXERCISES: CPT

## 2017-03-29 PROCEDURE — C1776 JOINT DEVICE (IMPLANTABLE): HCPCS | Performed by: ORTHOPAEDIC SURGERY

## 2017-03-29 PROCEDURE — 25010000002 DIPHENHYDRAMINE PER 50 MG

## 2017-03-29 PROCEDURE — 25010000002 ONDANSETRON PER 1 MG: Performed by: ANESTHESIOLOGY

## 2017-03-29 PROCEDURE — 25010000002 FENTANYL CITRATE (PF) 100 MCG/2ML SOLUTION: Performed by: ANESTHESIOLOGY

## 2017-03-29 PROCEDURE — 25010000002 MIDAZOLAM PER 1 MG: Performed by: ANESTHESIOLOGY

## 2017-03-29 PROCEDURE — 25010000003 CEFAZOLIN IN DEXTROSE 2-4 GM/100ML-% SOLUTION: Performed by: ORTHOPAEDIC SURGERY

## 2017-03-29 PROCEDURE — 0SRD0J9 REPLACEMENT OF LEFT KNEE JOINT WITH SYNTHETIC SUBSTITUTE, CEMENTED, OPEN APPROACH: ICD-10-PCS | Performed by: ORTHOPAEDIC SURGERY

## 2017-03-29 PROCEDURE — 25010000002 DIPHENHYDRAMINE PER 50 MG: Performed by: ANESTHESIOLOGY

## 2017-03-29 PROCEDURE — 25010000002 ROPIVACAINE PER 1 MG: Performed by: ORTHOPAEDIC SURGERY

## 2017-03-29 PROCEDURE — C1713 ANCHOR/SCREW BN/BN,TIS/BN: HCPCS | Performed by: ORTHOPAEDIC SURGERY

## 2017-03-29 PROCEDURE — 25010000002 PROPOFOL 10 MG/ML EMULSION: Performed by: ANESTHESIOLOGY

## 2017-03-29 PROCEDURE — 25010000002 EPINEPHRINE PER 0.1 MG: Performed by: ORTHOPAEDIC SURGERY

## 2017-03-29 PROCEDURE — 25010000002 NEOSTIGMINE 10 MG/10ML SOLUTION: Performed by: ANESTHESIOLOGY

## 2017-03-29 PROCEDURE — 25010000002 KETOROLAC TROMETHAMINE PER 15 MG: Performed by: ORTHOPAEDIC SURGERY

## 2017-03-29 PROCEDURE — 25010000002 DEXAMETHASONE PER 1 MG: Performed by: ANESTHESIOLOGY

## 2017-03-29 PROCEDURE — 25010000003 CEFAZOLIN IN DEXTROSE 2-4 GM/100ML-% SOLUTION: Performed by: ANESTHESIOLOGY

## 2017-03-29 PROCEDURE — 97161 PT EVAL LOW COMPLEX 20 MIN: CPT

## 2017-03-29 PROCEDURE — 25010000002 CLONIDINE PER 1 MG: Performed by: ORTHOPAEDIC SURGERY

## 2017-03-29 DEVICE — P.F.C. SIGMA TIBIAL INSERT FIXED BEARING CURVED 4 (CVD) 10MM GVF
Type: IMPLANTABLE DEVICE | Site: KNEE | Status: FUNCTIONAL
Brand: P.F.C. SIGMA

## 2017-03-29 DEVICE — CMT BONE SIMPLEX/P 1/2DOSE 10PK: Type: IMPLANTABLE DEVICE | Site: KNEE | Status: FUNCTIONAL

## 2017-03-29 DEVICE — M.B.T. REVISION METAPHYSEAL SLEEVE POROUS 45MM: Type: IMPLANTABLE DEVICE | Site: KNEE | Status: FUNCTIONAL

## 2017-03-29 DEVICE — SIGMA FEMORAL CRUCIATE RETAINING POROCOAT SIZE 4N LEFT
Type: IMPLANTABLE DEVICE | Site: KNEE | Status: FUNCTIONAL
Brand: SIGMA

## 2017-03-29 DEVICE — P.F.C. SIGMA OVAL DOME PATELLA 3-PEG 35MM CEMENTED
Type: IMPLANTABLE DEVICE | Site: KNEE | Status: FUNCTIONAL
Brand: P.F.C. SIGMA

## 2017-03-29 DEVICE — TIBIAL TRAY ROTATING PLATFORM M.B.T. REVISION SIZE 3 CEMENTED: Type: IMPLANTABLE DEVICE | Site: KNEE | Status: FUNCTIONAL

## 2017-03-29 RX ORDER — PROMETHAZINE HYDROCHLORIDE 25 MG/1
25 SUPPOSITORY RECTAL ONCE AS NEEDED
Status: DISCONTINUED | OUTPATIENT
Start: 2017-03-29 | End: 2017-03-29 | Stop reason: HOSPADM

## 2017-03-29 RX ORDER — NEOSTIGMINE METHYLSULFATE 1 MG/ML
INJECTION, SOLUTION INTRAVENOUS AS NEEDED
Status: DISCONTINUED | OUTPATIENT
Start: 2017-03-29 | End: 2017-03-29 | Stop reason: SURG

## 2017-03-29 RX ORDER — FLUMAZENIL 0.1 MG/ML
0.2 INJECTION INTRAVENOUS AS NEEDED
Status: DISCONTINUED | OUTPATIENT
Start: 2017-03-29 | End: 2017-03-29 | Stop reason: HOSPADM

## 2017-03-29 RX ORDER — DIPHENOXYLATE HYDROCHLORIDE AND ATROPINE SULFATE 2.5; .025 MG/1; MG/1
1 TABLET ORAL EVERY EVENING
Status: DISCONTINUED | OUTPATIENT
Start: 2017-03-29 | End: 2017-04-01 | Stop reason: HOSPADM

## 2017-03-29 RX ORDER — AMLODIPINE BESYLATE 10 MG/1
10 TABLET ORAL
Status: DISCONTINUED | OUTPATIENT
Start: 2017-03-29 | End: 2017-04-01 | Stop reason: HOSPADM

## 2017-03-29 RX ORDER — CEFAZOLIN SODIUM 2 G/100ML
INJECTION, SOLUTION INTRAVENOUS AS NEEDED
Status: DISCONTINUED | OUTPATIENT
Start: 2017-03-29 | End: 2017-03-29 | Stop reason: SURG

## 2017-03-29 RX ORDER — LIDOCAINE HYDROCHLORIDE 20 MG/ML
INJECTION, SOLUTION INFILTRATION; PERINEURAL AS NEEDED
Status: DISCONTINUED | OUTPATIENT
Start: 2017-03-29 | End: 2017-03-29 | Stop reason: SURG

## 2017-03-29 RX ORDER — MIDAZOLAM HYDROCHLORIDE 1 MG/ML
2 INJECTION INTRAMUSCULAR; INTRAVENOUS
Status: DISCONTINUED | OUTPATIENT
Start: 2017-03-29 | End: 2017-03-29 | Stop reason: HOSPADM

## 2017-03-29 RX ORDER — LABETALOL HYDROCHLORIDE 5 MG/ML
5 INJECTION, SOLUTION INTRAVENOUS
Status: DISCONTINUED | OUTPATIENT
Start: 2017-03-29 | End: 2017-03-29 | Stop reason: HOSPADM

## 2017-03-29 RX ORDER — DIPHENHYDRAMINE HYDROCHLORIDE 50 MG/ML
INJECTION INTRAMUSCULAR; INTRAVENOUS
Status: COMPLETED
Start: 2017-03-29 | End: 2017-03-29

## 2017-03-29 RX ORDER — HYDROCODONE BITARTRATE AND ACETAMINOPHEN 7.5; 325 MG/1; MG/1
1 TABLET ORAL EVERY 4 HOURS PRN
Status: DISCONTINUED | OUTPATIENT
Start: 2017-03-29 | End: 2017-04-01 | Stop reason: HOSPADM

## 2017-03-29 RX ORDER — PROMETHAZINE HYDROCHLORIDE 25 MG/ML
12.5 INJECTION, SOLUTION INTRAMUSCULAR; INTRAVENOUS ONCE AS NEEDED
Status: DISCONTINUED | OUTPATIENT
Start: 2017-03-29 | End: 2017-03-29 | Stop reason: HOSPADM

## 2017-03-29 RX ORDER — FERROUS SULFATE 325(65) MG
325 TABLET ORAL
Status: DISCONTINUED | OUTPATIENT
Start: 2017-03-29 | End: 2017-04-01 | Stop reason: HOSPADM

## 2017-03-29 RX ORDER — SENNA AND DOCUSATE SODIUM 50; 8.6 MG/1; MG/1
2 TABLET, FILM COATED ORAL 2 TIMES DAILY
Status: DISCONTINUED | OUTPATIENT
Start: 2017-03-29 | End: 2017-04-01 | Stop reason: HOSPADM

## 2017-03-29 RX ORDER — HYDROMORPHONE HYDROCHLORIDE 1 MG/ML
0.5 INJECTION, SOLUTION INTRAMUSCULAR; INTRAVENOUS; SUBCUTANEOUS
Status: DISCONTINUED | OUTPATIENT
Start: 2017-03-29 | End: 2017-04-01 | Stop reason: HOSPADM

## 2017-03-29 RX ORDER — HYDROCODONE BITARTRATE AND ACETAMINOPHEN 7.5; 325 MG/1; MG/1
1 TABLET ORAL ONCE AS NEEDED
Status: DISCONTINUED | OUTPATIENT
Start: 2017-03-29 | End: 2017-03-29 | Stop reason: HOSPADM

## 2017-03-29 RX ORDER — SODIUM CHLORIDE, SODIUM LACTATE, POTASSIUM CHLORIDE, CALCIUM CHLORIDE 600; 310; 30; 20 MG/100ML; MG/100ML; MG/100ML; MG/100ML
100 INJECTION, SOLUTION INTRAVENOUS CONTINUOUS
Status: DISCONTINUED | OUTPATIENT
Start: 2017-03-29 | End: 2017-04-01 | Stop reason: HOSPADM

## 2017-03-29 RX ORDER — MELOXICAM 7.5 MG/1
7.5 TABLET ORAL DAILY
Status: DISCONTINUED | OUTPATIENT
Start: 2017-03-29 | End: 2017-04-01 | Stop reason: HOSPADM

## 2017-03-29 RX ORDER — SODIUM CHLORIDE 0.9 % (FLUSH) 0.9 %
1-10 SYRINGE (ML) INJECTION AS NEEDED
Status: DISCONTINUED | OUTPATIENT
Start: 2017-03-29 | End: 2017-04-01 | Stop reason: HOSPADM

## 2017-03-29 RX ORDER — FAMOTIDINE 10 MG/ML
20 INJECTION, SOLUTION INTRAVENOUS ONCE
Status: COMPLETED | OUTPATIENT
Start: 2017-03-29 | End: 2017-03-29

## 2017-03-29 RX ORDER — NALOXONE HCL 0.4 MG/ML
0.2 VIAL (ML) INJECTION AS NEEDED
Status: DISCONTINUED | OUTPATIENT
Start: 2017-03-29 | End: 2017-03-29 | Stop reason: HOSPADM

## 2017-03-29 RX ORDER — ATORVASTATIN CALCIUM 40 MG/1
40 TABLET, FILM COATED ORAL DAILY
Status: DISCONTINUED | OUTPATIENT
Start: 2017-03-29 | End: 2017-04-01 | Stop reason: HOSPADM

## 2017-03-29 RX ORDER — VALSARTAN 320 MG/1
320 TABLET ORAL
Status: DISCONTINUED | OUTPATIENT
Start: 2017-03-29 | End: 2017-04-01 | Stop reason: HOSPADM

## 2017-03-29 RX ORDER — OXYCODONE AND ACETAMINOPHEN 10; 325 MG/1; MG/1
2 TABLET ORAL EVERY 4 HOURS PRN
Status: DISCONTINUED | OUTPATIENT
Start: 2017-03-29 | End: 2017-04-01 | Stop reason: HOSPADM

## 2017-03-29 RX ORDER — DOCUSATE SODIUM 100 MG/1
100 CAPSULE, LIQUID FILLED ORAL 2 TIMES DAILY PRN
Status: DISCONTINUED | OUTPATIENT
Start: 2017-03-29 | End: 2017-04-01 | Stop reason: HOSPADM

## 2017-03-29 RX ORDER — CEFAZOLIN SODIUM IN 0.9 % NACL 3 G/100 ML
3 INTRAVENOUS SOLUTION, PIGGYBACK (ML) INTRAVENOUS ONCE
Status: DISCONTINUED | OUTPATIENT
Start: 2017-03-29 | End: 2017-03-29 | Stop reason: HOSPADM

## 2017-03-29 RX ORDER — FENTANYL CITRATE 50 UG/ML
INJECTION, SOLUTION INTRAMUSCULAR; INTRAVENOUS
Status: COMPLETED | OUTPATIENT
Start: 2017-03-29 | End: 2017-03-29

## 2017-03-29 RX ORDER — FENTANYL CITRATE 50 UG/ML
50 INJECTION, SOLUTION INTRAMUSCULAR; INTRAVENOUS
Status: DISCONTINUED | OUTPATIENT
Start: 2017-03-29 | End: 2017-03-29 | Stop reason: HOSPADM

## 2017-03-29 RX ORDER — DIPHENHYDRAMINE HCL 25 MG
25 CAPSULE ORAL EVERY 6 HOURS PRN
Status: DISCONTINUED | OUTPATIENT
Start: 2017-03-29 | End: 2017-04-01 | Stop reason: HOSPADM

## 2017-03-29 RX ORDER — HYDROMORPHONE HCL 110MG/55ML
PATIENT CONTROLLED ANALGESIA SYRINGE INTRAVENOUS AS NEEDED
Status: DISCONTINUED | OUTPATIENT
Start: 2017-03-29 | End: 2017-03-29 | Stop reason: SURG

## 2017-03-29 RX ORDER — TRANEXAMIC ACID 100 MG/ML
INJECTION, SOLUTION INTRAVENOUS AS NEEDED
Status: DISCONTINUED | OUTPATIENT
Start: 2017-03-29 | End: 2017-03-29 | Stop reason: SURG

## 2017-03-29 RX ORDER — PROMETHAZINE HYDROCHLORIDE 25 MG/1
12.5 TABLET ORAL ONCE AS NEEDED
Status: DISCONTINUED | OUTPATIENT
Start: 2017-03-29 | End: 2017-03-29 | Stop reason: HOSPADM

## 2017-03-29 RX ORDER — BISACODYL 5 MG/1
10 TABLET, DELAYED RELEASE ORAL DAILY PRN
Status: DISCONTINUED | OUTPATIENT
Start: 2017-03-29 | End: 2017-04-01 | Stop reason: HOSPADM

## 2017-03-29 RX ORDER — ONDANSETRON 2 MG/ML
4 INJECTION INTRAMUSCULAR; INTRAVENOUS ONCE AS NEEDED
Status: DISCONTINUED | OUTPATIENT
Start: 2017-03-29 | End: 2017-03-29 | Stop reason: HOSPADM

## 2017-03-29 RX ORDER — OMEGA-3S/DHA/EPA/FISH OIL/D3 300MG-1000
400 CAPSULE ORAL DAILY
Status: DISCONTINUED | OUTPATIENT
Start: 2017-03-29 | End: 2017-04-01 | Stop reason: HOSPADM

## 2017-03-29 RX ORDER — DIPHENHYDRAMINE HYDROCHLORIDE 50 MG/ML
12.5 INJECTION INTRAMUSCULAR; INTRAVENOUS
Status: COMPLETED | OUTPATIENT
Start: 2017-03-29 | End: 2017-03-29

## 2017-03-29 RX ORDER — PROPOFOL 10 MG/ML
VIAL (ML) INTRAVENOUS AS NEEDED
Status: DISCONTINUED | OUTPATIENT
Start: 2017-03-29 | End: 2017-03-29 | Stop reason: SURG

## 2017-03-29 RX ORDER — MIDAZOLAM HYDROCHLORIDE 5 MG/ML
INJECTION INTRAMUSCULAR; INTRAVENOUS
Status: COMPLETED | OUTPATIENT
Start: 2017-03-29 | End: 2017-03-29

## 2017-03-29 RX ORDER — SERTRALINE HYDROCHLORIDE 100 MG/1
200 TABLET, FILM COATED ORAL EVERY EVENING
Status: DISCONTINUED | OUTPATIENT
Start: 2017-03-29 | End: 2017-04-01 | Stop reason: HOSPADM

## 2017-03-29 RX ORDER — POVIDONE-IODINE 10 MG/G
OINTMENT TOPICAL AS NEEDED
Status: DISCONTINUED | OUTPATIENT
Start: 2017-03-29 | End: 2017-03-29 | Stop reason: HOSPADM

## 2017-03-29 RX ORDER — CEFAZOLIN SODIUM 2 G/100ML
2 INJECTION, SOLUTION INTRAVENOUS EVERY 8 HOURS
Status: COMPLETED | OUTPATIENT
Start: 2017-03-29 | End: 2017-03-29

## 2017-03-29 RX ORDER — ZOLPIDEM TARTRATE 5 MG/1
5 TABLET ORAL NIGHTLY PRN
Status: DISCONTINUED | OUTPATIENT
Start: 2017-03-29 | End: 2017-04-01 | Stop reason: HOSPADM

## 2017-03-29 RX ORDER — DEXAMETHASONE SODIUM PHOSPHATE 10 MG/ML
INJECTION INTRAMUSCULAR; INTRAVENOUS AS NEEDED
Status: DISCONTINUED | OUTPATIENT
Start: 2017-03-29 | End: 2017-03-29 | Stop reason: SURG

## 2017-03-29 RX ORDER — NALOXONE HCL 0.4 MG/ML
0.1 VIAL (ML) INJECTION
Status: DISCONTINUED | OUTPATIENT
Start: 2017-03-29 | End: 2017-04-01 | Stop reason: HOSPADM

## 2017-03-29 RX ORDER — OXYCODONE AND ACETAMINOPHEN 10; 325 MG/1; MG/1
1 TABLET ORAL EVERY 4 HOURS PRN
Status: DISCONTINUED | OUTPATIENT
Start: 2017-03-29 | End: 2017-04-01 | Stop reason: HOSPADM

## 2017-03-29 RX ORDER — HYDROMORPHONE HYDROCHLORIDE 1 MG/ML
0.5 INJECTION, SOLUTION INTRAMUSCULAR; INTRAVENOUS; SUBCUTANEOUS
Status: DISCONTINUED | OUTPATIENT
Start: 2017-03-29 | End: 2017-03-29 | Stop reason: HOSPADM

## 2017-03-29 RX ORDER — ASPIRIN 325 MG
325 TABLET ORAL ONCE
COMMUNITY
End: 2017-04-01 | Stop reason: HOSPADM

## 2017-03-29 RX ORDER — ASPIRIN 325 MG
325 TABLET, DELAYED RELEASE (ENTERIC COATED) ORAL 2 TIMES DAILY
Status: DISCONTINUED | OUTPATIENT
Start: 2017-03-29 | End: 2017-04-01 | Stop reason: HOSPADM

## 2017-03-29 RX ORDER — ONDANSETRON 2 MG/ML
INJECTION INTRAMUSCULAR; INTRAVENOUS AS NEEDED
Status: DISCONTINUED | OUTPATIENT
Start: 2017-03-29 | End: 2017-03-29 | Stop reason: SURG

## 2017-03-29 RX ORDER — MIDAZOLAM HYDROCHLORIDE 1 MG/ML
1 INJECTION INTRAMUSCULAR; INTRAVENOUS
Status: DISCONTINUED | OUTPATIENT
Start: 2017-03-29 | End: 2017-03-29 | Stop reason: HOSPADM

## 2017-03-29 RX ORDER — LEVOTHYROXINE SODIUM 0.05 MG/1
50 TABLET ORAL
Status: DISCONTINUED | OUTPATIENT
Start: 2017-03-29 | End: 2017-04-01 | Stop reason: HOSPADM

## 2017-03-29 RX ORDER — GLYCOPYRROLATE 0.2 MG/ML
INJECTION INTRAMUSCULAR; INTRAVENOUS AS NEEDED
Status: DISCONTINUED | OUTPATIENT
Start: 2017-03-29 | End: 2017-03-29 | Stop reason: SURG

## 2017-03-29 RX ORDER — NEBIVOLOL 5 MG/1
5 TABLET ORAL EVERY EVENING
Status: DISCONTINUED | OUTPATIENT
Start: 2017-03-29 | End: 2017-04-01 | Stop reason: HOSPADM

## 2017-03-29 RX ORDER — ACETAMINOPHEN 325 MG/1
325 TABLET ORAL EVERY 4 HOURS PRN
Status: DISCONTINUED | OUTPATIENT
Start: 2017-03-29 | End: 2017-04-01 | Stop reason: HOSPADM

## 2017-03-29 RX ORDER — FENTANYL CITRATE 50 UG/ML
INJECTION, SOLUTION INTRAMUSCULAR; INTRAVENOUS AS NEEDED
Status: DISCONTINUED | OUTPATIENT
Start: 2017-03-29 | End: 2017-03-29 | Stop reason: SURG

## 2017-03-29 RX ORDER — SODIUM CHLORIDE, SODIUM LACTATE, POTASSIUM CHLORIDE, CALCIUM CHLORIDE 600; 310; 30; 20 MG/100ML; MG/100ML; MG/100ML; MG/100ML
9 INJECTION, SOLUTION INTRAVENOUS CONTINUOUS
Status: DISCONTINUED | OUTPATIENT
Start: 2017-03-29 | End: 2017-03-29

## 2017-03-29 RX ORDER — ONDANSETRON 4 MG/1
4 TABLET, FILM COATED ORAL EVERY 6 HOURS PRN
Status: DISCONTINUED | OUTPATIENT
Start: 2017-03-29 | End: 2017-04-01 | Stop reason: HOSPADM

## 2017-03-29 RX ORDER — ROCURONIUM BROMIDE 10 MG/ML
INJECTION, SOLUTION INTRAVENOUS AS NEEDED
Status: DISCONTINUED | OUTPATIENT
Start: 2017-03-29 | End: 2017-03-29 | Stop reason: SURG

## 2017-03-29 RX ORDER — HYDRALAZINE HYDROCHLORIDE 20 MG/ML
5 INJECTION INTRAMUSCULAR; INTRAVENOUS
Status: DISCONTINUED | OUTPATIENT
Start: 2017-03-29 | End: 2017-03-29 | Stop reason: HOSPADM

## 2017-03-29 RX ORDER — BUPIVACAINE HYDROCHLORIDE AND EPINEPHRINE 5; 5 MG/ML; UG/ML
INJECTION, SOLUTION EPIDURAL; INTRACAUDAL; PERINEURAL AS NEEDED
Status: DISCONTINUED | OUTPATIENT
Start: 2017-03-29 | End: 2017-03-29 | Stop reason: SURG

## 2017-03-29 RX ORDER — HYDROXYZINE PAMOATE 25 MG/1
25 CAPSULE ORAL 4 TIMES DAILY PRN
Status: DISCONTINUED | OUTPATIENT
Start: 2017-03-29 | End: 2017-04-01 | Stop reason: HOSPADM

## 2017-03-29 RX ORDER — MAGNESIUM HYDROXIDE 1200 MG/15ML
LIQUID ORAL AS NEEDED
Status: DISCONTINUED | OUTPATIENT
Start: 2017-03-29 | End: 2017-03-29 | Stop reason: HOSPADM

## 2017-03-29 RX ORDER — PROMETHAZINE HYDROCHLORIDE 25 MG/1
25 TABLET ORAL ONCE AS NEEDED
Status: DISCONTINUED | OUTPATIENT
Start: 2017-03-29 | End: 2017-03-29 | Stop reason: HOSPADM

## 2017-03-29 RX ORDER — SODIUM CHLORIDE 0.9 % (FLUSH) 0.9 %
1-10 SYRINGE (ML) INJECTION AS NEEDED
Status: DISCONTINUED | OUTPATIENT
Start: 2017-03-29 | End: 2017-03-29 | Stop reason: HOSPADM

## 2017-03-29 RX ORDER — OXYCODONE AND ACETAMINOPHEN 7.5; 325 MG/1; MG/1
1 TABLET ORAL ONCE AS NEEDED
Status: DISCONTINUED | OUTPATIENT
Start: 2017-03-29 | End: 2017-03-29 | Stop reason: HOSPADM

## 2017-03-29 RX ADMIN — MELOXICAM 7.5 MG: 7.5 TABLET ORAL at 14:28

## 2017-03-29 RX ADMIN — CEFAZOLIN SODIUM 2 G: 2 INJECTION, SOLUTION INTRAVENOUS at 22:09

## 2017-03-29 RX ADMIN — CEFAZOLIN SODIUM 3 G: 2 INJECTION, SOLUTION INTRAVENOUS at 08:22

## 2017-03-29 RX ADMIN — SODIUM CHLORIDE, POTASSIUM CHLORIDE, SODIUM LACTATE AND CALCIUM CHLORIDE 100 ML/HR: 600; 310; 30; 20 INJECTION, SOLUTION INTRAVENOUS at 11:36

## 2017-03-29 RX ADMIN — OXYCODONE HYDROCHLORIDE AND ACETAMINOPHEN 2 TABLET: 10; 325 TABLET ORAL at 16:39

## 2017-03-29 RX ADMIN — HYDROMORPHONE HYDROCHLORIDE 0.4 MG: 2 INJECTION, SOLUTION INTRAMUSCULAR; INTRAVENOUS; SUBCUTANEOUS at 09:43

## 2017-03-29 RX ADMIN — HYDROMORPHONE HYDROCHLORIDE 0.4 MG: 2 INJECTION, SOLUTION INTRAMUSCULAR; INTRAVENOUS; SUBCUTANEOUS at 08:58

## 2017-03-29 RX ADMIN — FENTANYL CITRATE 100 MCG: 50 INJECTION INTRAMUSCULAR; INTRAVENOUS at 08:35

## 2017-03-29 RX ADMIN — SERTRALINE 200 MG: 100 TABLET, FILM COATED ORAL at 14:29

## 2017-03-29 RX ADMIN — DIPHENHYDRAMINE HYDROCHLORIDE 12.5 MG: 50 INJECTION INTRAMUSCULAR; INTRAVENOUS at 10:07

## 2017-03-29 RX ADMIN — SODIUM CHLORIDE, POTASSIUM CHLORIDE, SODIUM LACTATE AND CALCIUM CHLORIDE: 600; 310; 30; 20 INJECTION, SOLUTION INTRAVENOUS at 08:16

## 2017-03-29 RX ADMIN — CHOLECALCIFEROL TAB 10 MCG (400 UNIT) 400 UNITS: 10 TAB at 14:29

## 2017-03-29 RX ADMIN — FAMOTIDINE 20 MG: 10 INJECTION, SOLUTION INTRAVENOUS at 07:33

## 2017-03-29 RX ADMIN — HYDROMORPHONE HYDROCHLORIDE 0.4 MG: 2 INJECTION, SOLUTION INTRAMUSCULAR; INTRAVENOUS; SUBCUTANEOUS at 09:30

## 2017-03-29 RX ADMIN — OXYCODONE HYDROCHLORIDE AND ACETAMINOPHEN 1 TABLET: 10; 325 TABLET ORAL at 12:26

## 2017-03-29 RX ADMIN — ASPIRIN 325 MG: 325 TABLET, DELAYED RELEASE ORAL at 20:39

## 2017-03-29 RX ADMIN — HYDROMORPHONE HYDROCHLORIDE 0.4 MG: 2 INJECTION, SOLUTION INTRAMUSCULAR; INTRAVENOUS; SUBCUTANEOUS at 08:42

## 2017-03-29 RX ADMIN — CEFAZOLIN 3 G: 1 INJECTION, POWDER, FOR SOLUTION INTRAMUSCULAR; INTRAVENOUS; PARENTERAL at 07:00

## 2017-03-29 RX ADMIN — ASPIRIN 325 MG: 325 TABLET, DELAYED RELEASE ORAL at 12:26

## 2017-03-29 RX ADMIN — ZOLPIDEM TARTRATE 5 MG: 5 TABLET, FILM COATED ORAL at 23:23

## 2017-03-29 RX ADMIN — MIDAZOLAM HYDROCHLORIDE 2 MG: 1 INJECTION, SOLUTION INTRAMUSCULAR; INTRAVENOUS at 07:25

## 2017-03-29 RX ADMIN — NEOSTIGMINE METHYLSULFATE 2.5 MG: 1 INJECTION INTRAVENOUS at 09:24

## 2017-03-29 RX ADMIN — SODIUM CHLORIDE, POTASSIUM CHLORIDE, SODIUM LACTATE AND CALCIUM CHLORIDE 9 ML/HR: 600; 310; 30; 20 INJECTION, SOLUTION INTRAVENOUS at 07:36

## 2017-03-29 RX ADMIN — OXYCODONE HYDROCHLORIDE AND ACETAMINOPHEN 2 TABLET: 10; 325 TABLET ORAL at 20:40

## 2017-03-29 RX ADMIN — FENTANYL CITRATE 50 MCG: 50 INJECTION INTRAMUSCULAR; INTRAVENOUS at 07:26

## 2017-03-29 RX ADMIN — SODIUM CHLORIDE, POTASSIUM CHLORIDE, SODIUM LACTATE AND CALCIUM CHLORIDE 100 ML/HR: 600; 310; 30; 20 INJECTION, SOLUTION INTRAVENOUS at 22:10

## 2017-03-29 RX ADMIN — FENTANYL CITRATE 50 MCG: 50 INJECTION INTRAMUSCULAR; INTRAVENOUS at 10:25

## 2017-03-29 RX ADMIN — EPINEPHRINE: 1 INJECTION PARENTERAL at 08:00

## 2017-03-29 RX ADMIN — GLYCOPYRROLATE 0.3 MG: 0.2 INJECTION INTRAMUSCULAR; INTRAVENOUS at 09:24

## 2017-03-29 RX ADMIN — FENTANYL CITRATE 50 MCG: 50 INJECTION INTRAMUSCULAR; INTRAVENOUS at 10:49

## 2017-03-29 RX ADMIN — MIDAZOLAM HYDROCHLORIDE 2 MG: 5 INJECTION, SOLUTION INTRAMUSCULAR; INTRAVENOUS at 07:25

## 2017-03-29 RX ADMIN — NEBIVOLOL HYDROCHLORIDE 5 MG: 5 TABLET ORAL at 14:29

## 2017-03-29 RX ADMIN — DIPHENHYDRAMINE HYDROCHLORIDE 12.5 MG: 50 INJECTION INTRAMUSCULAR; INTRAVENOUS at 10:33

## 2017-03-29 RX ADMIN — HYDROMORPHONE HYDROCHLORIDE 0.5 MG: 1 INJECTION, SOLUTION INTRAMUSCULAR; INTRAVENOUS; SUBCUTANEOUS at 10:32

## 2017-03-29 RX ADMIN — DEXAMETHASONE SODIUM PHOSPHATE 4 MG: 10 INJECTION INTRAMUSCULAR; INTRAVENOUS at 08:36

## 2017-03-29 RX ADMIN — FENTANYL CITRATE 50 MCG: 50 INJECTION INTRAMUSCULAR; INTRAVENOUS at 07:27

## 2017-03-29 RX ADMIN — LEVOTHYROXINE SODIUM 50 MCG: 50 TABLET ORAL at 14:29

## 2017-03-29 RX ADMIN — BUPIVACAINE HYDROCHLORIDE AND EPINEPHRINE BITARTRATE 30 ML: 5; .0091 INJECTION, SOLUTION EPIDURAL; INTRACAUDAL; PERINEURAL at 07:30

## 2017-03-29 RX ADMIN — TRANEXAMIC ACID 1000 MG: 100 INJECTION, SOLUTION INTRAVENOUS at 09:13

## 2017-03-29 RX ADMIN — ROCURONIUM BROMIDE 50 MG: 10 INJECTION INTRAVENOUS at 08:16

## 2017-03-29 RX ADMIN — PROPOFOL 200 MG: 10 INJECTION, EMULSION INTRAVENOUS at 08:16

## 2017-03-29 RX ADMIN — Medication 1 TABLET: at 14:27

## 2017-03-29 RX ADMIN — DOCUSATE SODIUM,SENNOSIDES 2 TABLET: 50; 8.6 TABLET, FILM COATED ORAL at 14:27

## 2017-03-29 RX ADMIN — HYDROMORPHONE HYDROCHLORIDE 0.4 MG: 2 INJECTION, SOLUTION INTRAMUSCULAR; INTRAVENOUS; SUBCUTANEOUS at 08:45

## 2017-03-29 RX ADMIN — ATORVASTATIN CALCIUM 40 MG: 40 TABLET, FILM COATED ORAL at 14:30

## 2017-03-29 RX ADMIN — LIDOCAINE HYDROCHLORIDE 55 MG: 20 INJECTION, SOLUTION INFILTRATION; PERINEURAL at 08:16

## 2017-03-29 RX ADMIN — FENTANYL CITRATE 50 MCG: 50 INJECTION INTRAMUSCULAR; INTRAVENOUS at 07:25

## 2017-03-29 RX ADMIN — HYDROXYZINE PAMOATE 25 MG: 25 CAPSULE ORAL at 14:27

## 2017-03-29 RX ADMIN — ONDANSETRON 4 MG: 2 INJECTION INTRAMUSCULAR; INTRAVENOUS at 09:24

## 2017-03-29 RX ADMIN — HYDROXYZINE PAMOATE 25 MG: 25 CAPSULE ORAL at 20:39

## 2017-03-29 RX ADMIN — CEFAZOLIN SODIUM 2 G: 2 INJECTION, SOLUTION INTRAVENOUS at 14:38

## 2017-03-29 NOTE — ANESTHESIA POSTPROCEDURE EVALUATION
Patient: Marisol Carrillo    Procedure Summary     Date Anesthesia Start Anesthesia Stop Room / Location    03/29/17 0810 0953  [facility] / [facility]       Procedure Diagnosis Surgeon Provider    TOTAL KNEE ARTHROPLASTY (Left Knee) No diagnosis on file. MD Delmar Lim MD          Anesthesia Type: general, regional  Last vitals  /77 (03/29/17 1030)    Temp 36.8 °C (98.2 °F) (03/29/17 0950)    Pulse 97 (03/29/17 1030)   Resp 18 (03/29/17 1030)    SpO2 96 % (03/29/17 1030)      Post Anesthesia Care and Evaluation    Patient location during evaluation: bedside  Patient participation: complete - patient participated  Level of consciousness: awake  Pain management: adequate  Airway patency: patent  Anesthetic complications: No anesthetic complications    Cardiovascular status: acceptable  Respiratory status: acceptable  Hydration status: acceptable

## 2017-03-29 NOTE — ANESTHESIA PREPROCEDURE EVALUATION
Anesthesia Evaluation     Patient summary reviewed and Nursing notes reviewed   history of anesthetic complications (aggitation after ketamine, spinal headache after spinal):  NPO Status: > 8 hours   Airway   Mallampati: II  TM distance: >3 FB  Neck ROM: full  no difficulty expected  Dental - normal exam     Pulmonary - normal exam   Cardiovascular - normal exam  Exercise tolerance: good (4-7 METS)    ECG reviewed  PT is on anticoagulation therapy  Patient on routine beta blocker and Beta blocker given within 24 hours of surgery  Rhythm: regular  Rate: normal    (+) hypertension,       Neuro/Psych  (+) headaches, numbness, psychiatric history Anxiety and Depression,    GI/Hepatic/Renal/Endo    (+) morbid obesity, GERD, chronic renal disease stones, hypothyroidism,     Musculoskeletal     (+) back pain,   Abdominal  - normal exam   Substance History - negative use     OB/GYN          Other   (+) arthritis                                 Anesthesia Plan    ASA 3     general and regional     intravenous induction   Anesthetic plan and risks discussed with patient.

## 2017-03-29 NOTE — ANESTHESIA PROCEDURE NOTES
Peripheral Block    Patient location during procedure: pre-op  Reason for block: at surgeon's request and post-op pain management  Performed by  Anesthesiologist: CELSO BRENNER  Preanesthetic Checklist  Completed: patient identified, site marked, surgical consent, pre-op evaluation, timeout performed, IV checked, risks and benefits discussed and monitors and equipment checked  Peripheral Block Prep:  Sterile barriers:cap, gloves, sterile barriers and mask  Prep: ChloraPrep  Patient monitoring: continuous pulse oximetry, blood pressure monitoring and EKG  Peripheral Procedure  Sedation:yes  Guidance:ultrasound guided  Images:still images obtained    Laterality:left  Block Type:adductor canal block  Injection Technique:single-shot  Needle Type:echogenic  Needle Gauge:21 G  ULTRASOUND INTERPRETATION.  Using ultrasound guidance a 21 G gauge needle was placed in close proximity to the femoral nerve, at which point, under ultrasound guidance anesthetic was injected in the area of the nerve and spread of the anesthesia was seen on ultrasound in close proximity thereto.  There were no abnormalities seen on ultrasound; a digital image was taken; and the patient tolerated the procedure with no complications.   Medications  Local Injected:bupivacaine 0.5% with epinephrine Local Amount Injected:30mL  Post Assessment  Injection Assessment: negative aspiration for heme, no paresthesia on injection and incremental injection  Patient Tolerance:comfortable throughout block  Complications:no

## 2017-03-29 NOTE — ANESTHESIA PROCEDURE NOTES
Airway  Airway not difficult    General Information and Staff    Patient location during procedure: OR  Anesthesiologist: CELSO BRENNER    Indications and Patient Condition  Indications for airway management: airway protection    Preoxygenated: yes  MILS maintained throughout  Mask difficulty assessment: 2 - vent by mask + OA or adjuvant +/- NMBA    Final Airway Details  Final airway type: endotracheal airway      Successful airway: ETT  Cuffed: yes   Successful intubation technique: direct laryngoscopy  Facilitating devices/methods: cricoid pressure  Endotracheal tube insertion site: oral  Blade: Liza  Blade size: #3  ETT size: 7.5 mm  Cormack-Lehane Classification: grade IIa - partial view of glottis  Placement verified by: capnometry   Cuff volume (mL): 5  Measured from: lips  ETT to lips (cm): 20  Number of attempts at approach: 1

## 2017-03-30 PROBLEM — M17.12 PRIMARY LOCALIZED OSTEOARTHRITIS OF LEFT KNEE: Status: ACTIVE | Noted: 2017-03-30

## 2017-03-30 LAB
ANION GAP SERPL CALCULATED.3IONS-SCNC: 14 MMOL/L
BUN BLD-MCNC: 17 MG/DL (ref 6–20)
BUN/CREAT SERPL: 19.8 (ref 7–25)
CALCIUM SPEC-SCNC: 8.8 MG/DL (ref 8.6–10.5)
CHLORIDE SERPL-SCNC: 105 MMOL/L (ref 98–107)
CO2 SERPL-SCNC: 21 MMOL/L (ref 22–29)
CREAT BLD-MCNC: 0.86 MG/DL (ref 0.57–1)
GFR SERPL CREATININE-BSD FRML MDRD: 71 ML/MIN/1.73
GLUCOSE BLD-MCNC: 138 MG/DL (ref 65–99)
HCT VFR BLD AUTO: 34.2 % (ref 35.6–45.5)
HGB BLD-MCNC: 11 G/DL (ref 11.9–15.5)
POTASSIUM BLD-SCNC: 4.3 MMOL/L (ref 3.5–5.2)
SODIUM BLD-SCNC: 140 MMOL/L (ref 136–145)

## 2017-03-30 PROCEDURE — 85018 HEMOGLOBIN: CPT | Performed by: ORTHOPAEDIC SURGERY

## 2017-03-30 PROCEDURE — 85014 HEMATOCRIT: CPT | Performed by: ORTHOPAEDIC SURGERY

## 2017-03-30 PROCEDURE — 80048 BASIC METABOLIC PNL TOTAL CA: CPT | Performed by: ORTHOPAEDIC SURGERY

## 2017-03-30 PROCEDURE — 97110 THERAPEUTIC EXERCISES: CPT

## 2017-03-30 PROCEDURE — 63710000001 DIPHENHYDRAMINE PER 50 MG: Performed by: ORTHOPAEDIC SURGERY

## 2017-03-30 PROCEDURE — 25010000002 HYDROMORPHONE PER 4 MG: Performed by: ORTHOPAEDIC SURGERY

## 2017-03-30 PROCEDURE — 97150 GROUP THERAPEUTIC PROCEDURES: CPT

## 2017-03-30 RX ORDER — PANTOPRAZOLE SODIUM 40 MG/1
40 TABLET, DELAYED RELEASE ORAL
Status: DISCONTINUED | OUTPATIENT
Start: 2017-03-30 | End: 2017-04-01 | Stop reason: HOSPADM

## 2017-03-30 RX ADMIN — CHOLECALCIFEROL TAB 10 MCG (400 UNIT) 400 UNITS: 10 TAB at 10:16

## 2017-03-30 RX ADMIN — HYDROCODONE BITARTRATE AND ACETAMINOPHEN 1 TABLET: 7.5; 325 TABLET ORAL at 08:50

## 2017-03-30 RX ADMIN — PANTOPRAZOLE SODIUM 40 MG: 40 TABLET, DELAYED RELEASE ORAL at 10:17

## 2017-03-30 RX ADMIN — OXYCODONE HYDROCHLORIDE AND ACETAMINOPHEN 2 TABLET: 10; 325 TABLET ORAL at 13:15

## 2017-03-30 RX ADMIN — HYDROXYZINE PAMOATE 25 MG: 25 CAPSULE ORAL at 03:52

## 2017-03-30 RX ADMIN — ASPIRIN 325 MG: 325 TABLET, DELAYED RELEASE ORAL at 10:16

## 2017-03-30 RX ADMIN — HYDROCODONE BITARTRATE AND ACETAMINOPHEN 1 TABLET: 7.5; 325 TABLET ORAL at 08:56

## 2017-03-30 RX ADMIN — ZOLPIDEM TARTRATE 5 MG: 5 TABLET, FILM COATED ORAL at 23:28

## 2017-03-30 RX ADMIN — LEVOTHYROXINE SODIUM 50 MCG: 50 TABLET ORAL at 05:41

## 2017-03-30 RX ADMIN — HYDROXYZINE PAMOATE 25 MG: 25 CAPSULE ORAL at 21:07

## 2017-03-30 RX ADMIN — FERROUS SULFATE TAB 325 MG (65 MG ELEMENTAL FE) 325 MG: 325 (65 FE) TAB at 10:16

## 2017-03-30 RX ADMIN — AMLODIPINE BESYLATE 10 MG: 10 TABLET ORAL at 10:17

## 2017-03-30 RX ADMIN — MUPIROCIN: 20 OINTMENT TOPICAL at 09:00

## 2017-03-30 RX ADMIN — HYDROMORPHONE HYDROCHLORIDE 0.5 MG: 1 INJECTION, SOLUTION INTRAMUSCULAR; INTRAVENOUS; SUBCUTANEOUS at 03:52

## 2017-03-30 RX ADMIN — OXYCODONE HYDROCHLORIDE AND ACETAMINOPHEN 2 TABLET: 10; 325 TABLET ORAL at 06:00

## 2017-03-30 RX ADMIN — MELOXICAM 7.5 MG: 7.5 TABLET ORAL at 10:15

## 2017-03-30 RX ADMIN — OXYCODONE HYDROCHLORIDE AND ACETAMINOPHEN 2 TABLET: 10; 325 TABLET ORAL at 21:03

## 2017-03-30 RX ADMIN — MUPIROCIN: 20 OINTMENT TOPICAL at 10:17

## 2017-03-30 RX ADMIN — DIPHENHYDRAMINE HYDROCHLORIDE 25 MG: 25 CAPSULE ORAL at 10:17

## 2017-03-30 RX ADMIN — Medication 1 TABLET: at 16:45

## 2017-03-30 RX ADMIN — OXYCODONE HYDROCHLORIDE AND ACETAMINOPHEN 2 TABLET: 10; 325 TABLET ORAL at 00:39

## 2017-03-30 RX ADMIN — PANTOPRAZOLE SODIUM 40 MG: 40 TABLET, DELAYED RELEASE ORAL at 16:45

## 2017-03-30 RX ADMIN — OXYCODONE HYDROCHLORIDE AND ACETAMINOPHEN 2 TABLET: 10; 325 TABLET ORAL at 17:11

## 2017-03-30 RX ADMIN — ASPIRIN 325 MG: 325 TABLET, DELAYED RELEASE ORAL at 18:59

## 2017-03-30 RX ADMIN — HYDROMORPHONE HYDROCHLORIDE 0.5 MG: 1 INJECTION, SOLUTION INTRAMUSCULAR; INTRAVENOUS; SUBCUTANEOUS at 15:13

## 2017-03-30 RX ADMIN — DOCUSATE SODIUM,SENNOSIDES 2 TABLET: 50; 8.6 TABLET, FILM COATED ORAL at 10:14

## 2017-03-30 RX ADMIN — SERTRALINE 200 MG: 100 TABLET, FILM COATED ORAL at 16:45

## 2017-03-30 RX ADMIN — DOCUSATE SODIUM,SENNOSIDES 2 TABLET: 50; 8.6 TABLET, FILM COATED ORAL at 18:59

## 2017-03-30 RX ADMIN — MUPIROCIN: 20 OINTMENT TOPICAL at 18:59

## 2017-03-30 RX ADMIN — ATORVASTATIN CALCIUM 40 MG: 40 TABLET, FILM COATED ORAL at 10:16

## 2017-03-30 RX ADMIN — VALSARTAN 320 MG: 320 TABLET, FILM COATED ORAL at 10:17

## 2017-03-31 LAB
HCT VFR BLD AUTO: 32.6 % (ref 35.6–45.5)
HGB BLD-MCNC: 10 G/DL (ref 11.9–15.5)

## 2017-03-31 PROCEDURE — 25010000002 HYDROMORPHONE PER 4 MG: Performed by: ORTHOPAEDIC SURGERY

## 2017-03-31 PROCEDURE — 97150 GROUP THERAPEUTIC PROCEDURES: CPT

## 2017-03-31 PROCEDURE — 97110 THERAPEUTIC EXERCISES: CPT

## 2017-03-31 PROCEDURE — 85018 HEMOGLOBIN: CPT | Performed by: ORTHOPAEDIC SURGERY

## 2017-03-31 PROCEDURE — 63710000001 DIPHENHYDRAMINE PER 50 MG: Performed by: ORTHOPAEDIC SURGERY

## 2017-03-31 PROCEDURE — 85014 HEMATOCRIT: CPT | Performed by: ORTHOPAEDIC SURGERY

## 2017-03-31 RX ORDER — HYDROCODONE BITARTRATE AND ACETAMINOPHEN 10; 325 MG/1; MG/1
1 TABLET ORAL EVERY 4 HOURS PRN
Status: DISCONTINUED | OUTPATIENT
Start: 2017-03-31 | End: 2017-04-01 | Stop reason: HOSPADM

## 2017-03-31 RX ORDER — AMLODIPINE BESYLATE 10 MG/1
10 TABLET ORAL
Qty: 30 TABLET | Refills: 0 | Status: SHIPPED | OUTPATIENT
Start: 2017-03-31 | End: 2017-08-02

## 2017-03-31 RX ORDER — VALSARTAN 320 MG/1
320 TABLET ORAL
Qty: 30 TABLET | Refills: 0 | Status: SHIPPED | OUTPATIENT
Start: 2017-03-31 | End: 2017-08-02

## 2017-03-31 RX ORDER — OXYCODONE AND ACETAMINOPHEN 10; 325 MG/1; MG/1
2 TABLET ORAL EVERY 4 HOURS PRN
Qty: 56 TABLET | Refills: 0 | Status: SHIPPED | OUTPATIENT
Start: 2017-03-31 | End: 2017-04-08

## 2017-03-31 RX ADMIN — MELOXICAM 7.5 MG: 7.5 TABLET ORAL at 08:32

## 2017-03-31 RX ADMIN — ATORVASTATIN CALCIUM 40 MG: 40 TABLET, FILM COATED ORAL at 08:34

## 2017-03-31 RX ADMIN — OXYCODONE HYDROCHLORIDE AND ACETAMINOPHEN 2 TABLET: 10; 325 TABLET ORAL at 17:32

## 2017-03-31 RX ADMIN — PANTOPRAZOLE SODIUM 40 MG: 40 TABLET, DELAYED RELEASE ORAL at 17:33

## 2017-03-31 RX ADMIN — PANTOPRAZOLE SODIUM 40 MG: 40 TABLET, DELAYED RELEASE ORAL at 09:48

## 2017-03-31 RX ADMIN — OXYCODONE HYDROCHLORIDE AND ACETAMINOPHEN 2 TABLET: 10; 325 TABLET ORAL at 01:07

## 2017-03-31 RX ADMIN — HYDROMORPHONE HYDROCHLORIDE 0.5 MG: 1 INJECTION, SOLUTION INTRAMUSCULAR; INTRAVENOUS; SUBCUTANEOUS at 13:55

## 2017-03-31 RX ADMIN — CHOLECALCIFEROL TAB 10 MCG (400 UNIT) 400 UNITS: 10 TAB at 08:33

## 2017-03-31 RX ADMIN — OXYCODONE HYDROCHLORIDE AND ACETAMINOPHEN 2 TABLET: 10; 325 TABLET ORAL at 21:22

## 2017-03-31 RX ADMIN — ASPIRIN 325 MG: 325 TABLET, DELAYED RELEASE ORAL at 08:33

## 2017-03-31 RX ADMIN — SERTRALINE 200 MG: 100 TABLET, FILM COATED ORAL at 17:33

## 2017-03-31 RX ADMIN — LEVOTHYROXINE SODIUM 50 MCG: 50 TABLET ORAL at 05:08

## 2017-03-31 RX ADMIN — HYDROXYZINE PAMOATE 25 MG: 25 CAPSULE ORAL at 05:08

## 2017-03-31 RX ADMIN — OXYCODONE HYDROCHLORIDE AND ACETAMINOPHEN 2 TABLET: 10; 325 TABLET ORAL at 05:09

## 2017-03-31 RX ADMIN — DOCUSATE SODIUM,SENNOSIDES 2 TABLET: 50; 8.6 TABLET, FILM COATED ORAL at 17:34

## 2017-03-31 RX ADMIN — DOCUSATE SODIUM,SENNOSIDES 2 TABLET: 50; 8.6 TABLET, FILM COATED ORAL at 08:32

## 2017-03-31 RX ADMIN — ZOLPIDEM TARTRATE 5 MG: 5 TABLET, FILM COATED ORAL at 22:22

## 2017-03-31 RX ADMIN — DIPHENHYDRAMINE HYDROCHLORIDE 25 MG: 25 CAPSULE ORAL at 09:48

## 2017-03-31 RX ADMIN — HYDROXYZINE PAMOATE 25 MG: 25 CAPSULE ORAL at 21:22

## 2017-03-31 RX ADMIN — Medication 1 TABLET: at 17:34

## 2017-03-31 RX ADMIN — DIPHENHYDRAMINE HYDROCHLORIDE 25 MG: 25 CAPSULE ORAL at 17:39

## 2017-03-31 RX ADMIN — HYDROMORPHONE HYDROCHLORIDE 0.5 MG: 1 INJECTION, SOLUTION INTRAMUSCULAR; INTRAVENOUS; SUBCUTANEOUS at 20:01

## 2017-03-31 RX ADMIN — HYDROMORPHONE HYDROCHLORIDE 0.5 MG: 1 INJECTION, SOLUTION INTRAMUSCULAR; INTRAVENOUS; SUBCUTANEOUS at 08:10

## 2017-03-31 RX ADMIN — ASPIRIN 325 MG: 325 TABLET, DELAYED RELEASE ORAL at 17:34

## 2017-03-31 RX ADMIN — NEBIVOLOL HYDROCHLORIDE 5 MG: 5 TABLET ORAL at 17:34

## 2017-03-31 RX ADMIN — HYDROCODONE BITARTRATE AND ACETAMINOPHEN 1 TABLET: 10; 325 TABLET ORAL at 14:24

## 2017-03-31 RX ADMIN — FERROUS SULFATE TAB 325 MG (65 MG ELEMENTAL FE) 325 MG: 325 (65 FE) TAB at 08:32

## 2017-03-31 RX ADMIN — HYDROCODONE BITARTRATE AND ACETAMINOPHEN 1 TABLET: 10; 325 TABLET ORAL at 09:48

## 2017-04-01 VITALS
OXYGEN SATURATION: 96 % | RESPIRATION RATE: 16 BRPM | HEART RATE: 99 BPM | TEMPERATURE: 98.1 F | SYSTOLIC BLOOD PRESSURE: 111 MMHG | DIASTOLIC BLOOD PRESSURE: 69 MMHG | HEIGHT: 64 IN | BODY MASS INDEX: 48.65 KG/M2 | WEIGHT: 285 LBS

## 2017-04-01 LAB
HCT VFR BLD AUTO: 31 % (ref 35.6–45.5)
HGB BLD-MCNC: 9.8 G/DL (ref 11.9–15.5)

## 2017-04-01 PROCEDURE — 85014 HEMATOCRIT: CPT | Performed by: ORTHOPAEDIC SURGERY

## 2017-04-01 PROCEDURE — 25010000002 HYDROMORPHONE PER 4 MG: Performed by: ORTHOPAEDIC SURGERY

## 2017-04-01 PROCEDURE — 85018 HEMOGLOBIN: CPT | Performed by: ORTHOPAEDIC SURGERY

## 2017-04-01 PROCEDURE — 97110 THERAPEUTIC EXERCISES: CPT

## 2017-04-01 PROCEDURE — 97150 GROUP THERAPEUTIC PROCEDURES: CPT

## 2017-04-01 RX ADMIN — PANTOPRAZOLE SODIUM 40 MG: 40 TABLET, DELAYED RELEASE ORAL at 06:21

## 2017-04-01 RX ADMIN — HYDROXYZINE PAMOATE 25 MG: 25 CAPSULE ORAL at 06:21

## 2017-04-01 RX ADMIN — OXYCODONE HYDROCHLORIDE AND ACETAMINOPHEN 2 TABLET: 10; 325 TABLET ORAL at 01:30

## 2017-04-01 RX ADMIN — HYDROMORPHONE HYDROCHLORIDE 0.5 MG: 1 INJECTION, SOLUTION INTRAMUSCULAR; INTRAVENOUS; SUBCUTANEOUS at 09:27

## 2017-04-01 RX ADMIN — ATORVASTATIN CALCIUM 40 MG: 40 TABLET, FILM COATED ORAL at 08:09

## 2017-04-01 RX ADMIN — DOCUSATE SODIUM,SENNOSIDES 2 TABLET: 50; 8.6 TABLET, FILM COATED ORAL at 08:09

## 2017-04-01 RX ADMIN — OXYCODONE HYDROCHLORIDE AND ACETAMINOPHEN 2 TABLET: 10; 325 TABLET ORAL at 06:21

## 2017-04-01 RX ADMIN — MELOXICAM 7.5 MG: 7.5 TABLET ORAL at 08:09

## 2017-04-01 RX ADMIN — FERROUS SULFATE TAB 325 MG (65 MG ELEMENTAL FE) 325 MG: 325 (65 FE) TAB at 08:09

## 2017-04-01 RX ADMIN — OXYCODONE HYDROCHLORIDE AND ACETAMINOPHEN 2 TABLET: 10; 325 TABLET ORAL at 10:41

## 2017-04-01 RX ADMIN — HYDROMORPHONE HYDROCHLORIDE 0.5 MG: 1 INJECTION, SOLUTION INTRAMUSCULAR; INTRAVENOUS; SUBCUTANEOUS at 12:30

## 2017-04-01 RX ADMIN — OXYCODONE HYDROCHLORIDE AND ACETAMINOPHEN 2 TABLET: 10; 325 TABLET ORAL at 14:13

## 2017-04-01 RX ADMIN — ASPIRIN 325 MG: 325 TABLET, DELAYED RELEASE ORAL at 08:09

## 2017-04-01 RX ADMIN — CHOLECALCIFEROL TAB 10 MCG (400 UNIT) 400 UNITS: 10 TAB at 08:09

## 2017-04-01 RX ADMIN — LEVOTHYROXINE SODIUM 50 MCG: 50 TABLET ORAL at 06:32

## 2017-04-01 RX ADMIN — HYDROXYZINE PAMOATE 25 MG: 25 CAPSULE ORAL at 12:30

## 2017-04-01 NOTE — THERAPY DISCHARGE NOTE
Acute Care - Physical Therapy Discharge Summary  [facility]       Patient Name: Marisol Carrillo  : 1970  MRN: [id number]    Today's Date: 2017  Onset of Illness/Injury or Date of Surgery Date: 17              Admit Date: 3/29/2017      PT Recommendation and Plan    Visit Dx:    ICD-10-CM ICD-9-CM   1. Primary localized osteoarthritis of left knee M17.12 715.16   2. Essential hypertension I10 401.9             Outcome Measures       17 1100 17 1400 17 0838    How much help from another person do you currently need...    Turning from your back to your side while in flat bed without using bedrails? 3  -JANI 3  -KH (r) DR DANG dutta (t)) 3  -DANG (bhavesh) DR DANG dutta (t))    Moving from lying on back to sitting on the side of a flat bed without bedrails? 3  -JANI 3  -DANG (bhavesh) DR DANG dutta (t)) 3  -DANG (bhavesh) DR DANG dutta (t))    Moving to and from a bed to a chair (including a wheelchair)? 3  -JANI 3  -DANG (bhavesh) DR DANG dutta (t)) 3  -DANG peterson) DR DANG dutta (t))    Standing up from a chair using your arms (e.g., wheelchair, bedside chair)? 3  -JANI 3  -DANG (bhavesh) DR DANG dutta (t)) 3  -DANG peterson) DR DANG dutta (t))    Climbing 3-5 steps with a railing? 2  -JANI 2  -DANG (bhavesh) DR DANG dutta (t)) 2  -DANG (bhavesh) DR DANG dutta (t))    To walk in hospital room? 3  -JANI 3  -DANG (bhavesh) DR DANG dutta (t)) 3  -DANG (bhavesh) DR DANG dutta (t))    AM-PAC 6 Clicks Score 17  -JANI 17  -DANG ernst (r)) 17  -DANG ernst (r))    Functional Assessment    Outcome Measure Options AM-PAC 6 Clicks Basic Mobility (PT)  -JANI AM-PAC 6 Clicks Basic Mobility (PT)  -DANG dutta (r t)) AM-PAC 6 Clicks Basic Mobility (PT)  -DANG ernst (r)) KH (c)      17 1500 17 0853 17 1600    How much help from another person do you currently need...    Turning from your back to your side while in flat bed without using bedrails? 3  -DANG (bhavesh) DR ernst) DANG (josue) 3  -DANG (bhavesh) DR DANG dutta (t)) 3  -AR    Moving from lying on back to sitting on the side of a flat bed without bedrails? 3  -DANG (bhavesh) DR ernst) DANG dutta) 3  -DANG (bhavesh) DR ernst) DANG dutta) 3  -AR     Moving to and from a bed to a chair (including a wheelchair)? 3  -KH (r)  (t) DANG (c) 3  -DANG (r) DR RutherfordtMir dutta) 3  -AR    Standing up from a chair using your arms (e.g., wheelchair, bedside chair)? 3  -KH (r) DR Rutherfordt) DANG (josue) 3  -KH (r) DR Rutherfordt) DANG dutta) 3  -AR    Climbing 3-5 steps with a railing? 2  -DANG (bhavesh)  (t) DANG (josue) 2  -DANG (r) DR Rutherfordt) DANG dutta) 2  -AR    To walk in hospital room? 3  -DANG (r)  (t) DANG (josue) 3  -DANG (bhavesh)  (t) DANG (josue) 3  -AR    AM-PAC 6 Clicks Score 17  -DANG (bhavesh)  (t) 17  -DANG (bhavesh) DR ernst) 17  -AR    Functional Assessment    Outcome Measure Options AM-PAC 6 Clicks Basic Mobility (PT)  -DANG (bhavesh)  (t) DANG (josue) AM-PAC 6 Clicks Basic Mobility (PT)  -DANG (r)  (t) DANG (josue) AM-PAC 6 Clicks Basic Mobility (PT)  -AR      User Key  (r) = Recorded By, (t) = Taken By, (c) = Cosigned By    Initials Name Provider Type    DANG Aguilar, PT Physical Therapist    JANI Blanco, PT Physical Therapist    LETITIA Arvizu, PT Physical Therapist    DR You Kc, PT Student PT Student                PT Charges       04/01/17 1145          Time Calculation    Start Time 0930  -JANI      Stop Time 1030  -JANI      Time Calculation (min) 60 min  -JANI      PT Received On 04/01/17  -JANI        User Key  (r) = Recorded By, (t) = Taken By, (c) = Cosigned By    Initials Name Provider Type    JANI Blanco, PT Physical Therapist                  IP PT Goals       04/01/17 1329 03/29/17 1600       Bed Mobility PT LTG    Bed Mobility PT LTG, Date Established  03/29/17  -AR     Bed Mobility PT LTG, Time to Achieve  1 wk  -AR     Bed Mobility PT LTG, Activity Type  supine to sit/sit to supine  -AR     Bed Mobility PT LTG, [place] Level  supervision required  -AR     Bed Mobility PT LTG, Outcome goal not met  -JANI      Bed Mobility PT LTG, Reason Goal Not Met discharged from facility  -JANI      Transfer Training PT LTG    Transfer Training PT LTG, Date Established  03/29/17  -AR     Transfer Training PT LTG, Time to Achieve  1  wk  -AR     Transfer Training PT LTG, Activity Type  sit to stand/stand to sit;bed to chair /chair to bed  -AR     Transfer Training PT LTG, [place] Level  supervision required  -AR     Transfer Training PT LTG, Assist Device  walker, rolling  -AR     Transfer Training PT LTG, Outcome goal not met  -JANI      Transfer Training PT LTG, Reason Goal Not Met discharged from facility  -JANI      Gait Training PT LTG    Gait Training Goal PT LTG, Date Established  03/29/17  -AR     Gait Training Goal PT LTG, Time to Achieve  1 wk  -AR     Gait Training Goal PT LTG, [place] Level  supervision required  -AR     Gait Training Goal PT LTG, Assist Device  walker, rolling  -AR     Gait Training Goal PT LTG, Distance to Achieve  150  -AR     Gait Training Goal PT LTG, Outcome goal not met  -JANI      Gait Training Goal PT LTG, Reason Goal Not Met discharged from facility  -JANI      Range of Motion PT LTG    Range of Motion Goal PT LTG, Date Established  03/29/17  -AR     Range of Motion Goal PT LTG, Time to Achieve  1 wk  -AR     Range fo Motion Goal PT LTG, Joint  L knee  -AR     Range of Motion Goal PT LTG, AROM Measure  -5-90  -AR     Range of Motion Goal PT LTG, Outcome goal partially met  -JANI      Reason Goal Not Met (ROM) PT LTG discharged from facility  -        User Key  (r) = Recorded By, (t) = Taken By, (c) = Cosigned By    Initials Name Provider Type    JANI Blanco, PT Physical Therapist    LETITIA Arvizu PT Physical Therapist          Therapy Charges for Today     Code Description Service Date Service Provider Modifiers Qty    87523943233 HC PT THER PROC GROUP 4/1/2017 Heydi Blanco, PT GP 1    37115068727 HC PT THER PROC EA 15 MIN 4/1/2017 Heydi Blanco, PT GP 1          PT Discharge Summary  Reason for Discharge: Discharge from facility      Heydi Blanco PT   4/1/2017

## 2017-04-01 NOTE — PLAN OF CARE
Problem: Patient Care Overview (Adult)  Goal: Plan of Care Review  Outcome: Ongoing (interventions implemented as appropriate)    04/01/17 0257   Coping/Psychosocial Response Interventions   Plan Of Care Reviewed With patient;spouse   Outcome Evaluation   Outcome Summary/Follow up Plan PT was tearful at beginning of shift related to pain-rated at 10. Percocet given with effective results-pt reports feeling much better. Decreased pruritus also reported with administration of PRN Vistaril. Incision without s/sx complications. VSS. Requires assist of 2 staff for transfers and toileting. Moderately impaired ROM to Left knee continues. Neurovascular assessment WNL. Discharge to rehab pending precertifications.   Patient Care Overview   Progress improving         Problem: Perioperative Period (Adult)  Goal: Signs and Symptoms of Listed Potential Problems Will be Absent or Manageable (Perioperative Period)  Outcome: Ongoing (interventions implemented as appropriate)    04/01/17 0257   Perioperative Period   Problems Assessed (Perioperative Period) all   Problems Present (Perioperative Period) pain         Problem: Knee Replacement, Total (Adult)  Goal: Signs and Symptoms of Listed Potential Problems Will be Absent or Manageable (Knee Replacement, Total)  Outcome: Ongoing (interventions implemented as appropriate)    04/01/17 0257   Knee Replacement, Total   Problems Assessed (Total Knee Replacement) all   Problems Present (Total Knee Replacement) pain;decreased range of motion;functional decline/self care deficit         Problem: Fall Risk (Adult)  Goal: Absence of Falls  Outcome: Ongoing (interventions implemented as appropriate)    04/01/17 0257   Fall Risk (Adult)   Absence of Falls achieves outcome

## 2017-04-01 NOTE — DISCHARGE SUMMARY
Discharge Summary    Date of Admission: 3/29/2017  Date of Discharge:  4/1/2017    Discharge Diagnosis:   Primary localized osteoarthritis of left knee [M17.12]      PMHX:  Past Medical History:   Diagnosis Date   • Alopecia    • Anxiety and depression    • Arthritis     OSTEO   • Balance problem     FOOTDROP LEFT FOOT   • Bilateral knee pain    • Breast mass     RIGHT, MD WATCHING.   • Chronic low back pain    • Depression    • GERD (gastroesophageal reflux disease)    • Hyperlipidemia    • Hypertension    • Hypothyroidism    • Impaired fasting glucose    • Kidney calculus     HISTORY OF   • Pneumonia     2015 S/P LUMBAR FUSION   • Sciatica    • Spinal headache     AFTER MYELOGRAM. BLOOD PATCH X2   • Vitamin D deficiency        Discharge Disposition  Skilled Nursing Facility ([facility])    Procedures Performed  Procedure(s):  TOTAL KNEE ARTHROPLASTY       Indication for Admission  Patient is a 47 y.o. female admitted after undergoing the above surgical procedure. They were admitted for post-operative pain control, medical management and physical therapy.  They progressed with physical therapy.    [facility]      Consults:   Consults     No orders found from 2/28/2017 to 3/30/2017.          Discharge Instructions:  Patient is weight bearing as tolerated on the operative leg.  Patient is to progress range of motion and ambulation as tolerated.  Use walker as needed for stability and gait.  May progress to cane as tolerated.  The dressing should be changed daily until staples are removed.  Staples should be removed post-operative day 14.  Keep incision clean, dry and intact until staple removal then the patient may shower. The patient is being discharged on aspirin 325 mg bid for dvt prophylaxis.  Patient will follow-up in the office in 6 weeks.  Call the office at [phone] for any questions or concerns.      Discharge Medications   Marisol Carrillo  Medication Instructions [facility]:[id number]    Printed on:04/01/17 0950   Medication Information                      amLODIPine (NORVASC) 10 MG tablet  Take 1 tablet by mouth Daily.             amLODIPine-valsartan (EXFORGE)  MG per tablet  Take 1 tablet by mouth Daily. For bp             aspirin  MG EC tablet  Take 1 tablet by mouth 2 (Two) Times a Day.             atorvastatin (LIPITOR) 40 MG tablet  Take 1 tablet by mouth Daily. For cholesterol             levothyroxine (SYNTHROID, LEVOTHROID) 50 MCG tablet  Take 1 tablet by mouth daily. For thyroid (before breakfast)             Multiple Vitamin (MULTI VITAMIN DAILY PO)  Take 1 capsule by mouth Every Evening.             nebivolol (BYSTOLIC) 5 MG tablet  Take 1 tablet by mouth Daily. Take with Exforge for BP             oxyCODONE-acetaminophen (PERCOCET)  MG per tablet  Take 2 tablets by mouth Every 4 (Four) Hours As Needed for Severe Pain (7-10) (pain) for up to 8 days.             sertraline (ZOLOFT) 100 MG tablet  2  tabs PO daily for depression and anxiety (new dose)             valsartan (DIOVAN) 320 MG tablet  Take 1 tablet by mouth Daily.             Vitamin D, Cholecalciferol, 1000 UNITS capsule  Take 2,000 Units by mouth Daily.                 Discharge Diet:   Diet Instructions     Diet: Regular; Thin Liquids, No Restrictions       Discharge Diet:  Regular   Fluid Consistency:  Thin Liquids, No Restrictions                 Activity at Discharge:   Activity Instructions     Activity as Tolerated           Discharge Activity Restrictions       No driving until cleared by physician                 Follow-up Appointments  Future Appointments  Date Time Provider Department Center   4/12/2017 11:00 AM MD ZACHARY Heath Jr. [facility] None   4/27/2017 10:00 AM Nadine Elizalde LCSW  [facility] None   5/25/2017 10:00 AM Nadine Elizalde LCSW  [facility] None   6/22/2017 9:00 AM LEONIDAS Caal [facility] None    8/2/2017 8:45 AM Rakesh Dumas MD [facility] None         Test Results Pending at Discharge   Order Current Status    POC Pregnancy, Urine In process           Felix Weldon MD  04/01/17,  1:10 PM

## 2017-04-01 NOTE — PLAN OF CARE
Problem: Patient Care Overview (Adult)  Goal: Plan of Care Review  Outcome: Outcome(s) achieved Date Met:  04/01/17 04/01/17 1459   Coping/Psychosocial Response Interventions   Plan Of Care Reviewed With patient   Outcome Evaluation   Outcome Summary/Follow up Plan discharged via car (family) to [facility]   Patient Care Overview   Progress improving       Goal: Adult Individualization and Mutuality  Outcome: Outcome(s) achieved Date Met:  04/01/17  Goal: Discharge Needs Assessment  Outcome: Outcome(s) achieved Date Met:  04/01/17    Problem: Perioperative Period (Adult)  Goal: Signs and Symptoms of Listed Potential Problems Will be Absent or Manageable (Perioperative Period)  Outcome: Outcome(s) achieved Date Met:  04/01/17    Problem: Knee Replacement, Total (Adult)  Goal: Signs and Symptoms of Listed Potential Problems Will be Absent or Manageable (Knee Replacement, Total)  Outcome: Outcome(s) achieved Date Met:  04/01/17    Problem: Fall Risk (Adult)  Goal: Absence of Falls  Outcome: Outcome(s) achieved Date Met:  04/01/17

## 2017-04-01 NOTE — PROGRESS NOTES
Procedure(s):  TOTAL KNEE ARTHROPLASTY     LOS: 3 days     Subjective :   Complains of pain, moving slowly with P.T.    Objective :    Vital signs in last 24 hours:  Vitals:    03/31/17 2235 03/31/17 2236 04/01/17 0346 04/01/17 0700   BP: 109/67  100/61 108/65   BP Location: Right arm  Right arm Left arm   Patient Position: Lying  Lying Sitting   Pulse: 113 Comment: pt up to BSC just prior to vitals 98 100   Resp: 16  16 16   Temp: 98.9 °F (37.2 °C)  98.2 °F (36.8 °C) 98.6 °F (37 °C)   TempSrc: Oral  Oral Oral   SpO2: 98%  97% 95%   Weight:       Height:           PHYSICAL EXAM:  Patient is calm, in no acute distress, awake and oriented x 3.  Dressing is clean, dry and intact.  No signs of infection.  Swelling is appropriate in amount.  Ecchymosis is appropriate in amount.  Homans test is negative.  Patient is neurovascularly intact distally.    LABS:    Results from last 7 days  Lab Units 04/01/17  0341   HEMOGLOBIN g/dL 9.8*   HEMATOCRIT % 31.0*       Results from last 7 days  Lab Units 03/30/17  0324   SODIUM mmol/L 140   POTASSIUM mmol/L 4.3   CHLORIDE mmol/L 105   TOTAL CO2 mmol/L 21.0*   BUN mg/dL 17   CREATININE mg/dL 0.86   GLUCOSE mg/dL 138*   CALCIUM mg/dL 8.8           ASSESSMENT:  Status post Procedure(s):  TOTAL KNEE ARTHROPLASTY      Plan:  Continue Physical Therapy, increase mobility and range of motion as tolerated.  Continue SCDs, Continue Aspirin 325 mg bid for DVT prophylaxis.  Dispo planning for [facility] pending insurance precert.    Felix Weldon MD    Date: 4/1/2017  Time: 8:39 AM

## 2017-04-01 NOTE — PROGRESS NOTES
Acute Care - Physical Therapy Treatment Note  [facility]     Patient Name: Marisol Carrillo  : 1970  MRN: [id number]  Today's Date: 2017  Onset of Illness/Injury or Date of Surgery Date: 17          Admit Date: 3/29/2017    Visit Dx:    ICD-10-CM ICD-9-CM   1. Primary localized osteoarthritis of left knee M17.12 715.16   2. Essential hypertension I10 401.9     Patient Active Problem List   Diagnosis   • Essential familial hypercholesterolemia   • Hypertension   • Hypothyroidism   • Adiposity   • Vitamin deficiency   • Impaired fasting glucose   • Vitamin D deficiency   • Lumbosacral radiculopathy   • Gastroesophageal reflux disease   • Constipation   • Diarrhea   • Dysphagia   • Fatigue   • Heartburn   • Lumbar disc herniation with radiculopathy   • Nocturnal cough   • Pain in extremity   • Regurgitation   • Vomiting   • Anxiety   • Depression   • Degeneration of lumbar intervertebral disc   • Spinal headache   • Chronic bilateral low back pain with bilateral sciatica   • Disc disease, degenerative, lumbar or lumbosacral   • Primary localized osteoarthritis of left knee               Adult Rehabilitation Note       17 0930 17 1259 17 0900    Rehab Assessment/Intervention    Discipline physical therapist  -JANI physical therapist  -DR LEONG KH2 physical therapist  -DR LEONG KH2    Document Type therapy note (daily note)  -JANI therapy note (daily note)  -DR LEONG KH2 therapy note (daily note)  -DR ELONG KH2    Subjective Information agree to therapy  -JANI agree to therapy  -DR LEONG KH2 agree to therapy;complains of;pain  -DR LEONG KH2    Patient Effort, Rehab Treatment good  -JANI good  -DR LEONG KH2 good  -DR LEONG KH2    Precautions/Limitations fall precautions  -JANI fall precautions  -DR LEONG KH2 fall precautions  -DR LEONG KH2    Recorded by [JANI] Heydi Blanco, PT [DR LEONG KH2] Paula Aguilar, PT (r) You Kc, PT Student (t) Paula Aguilar, PT (c) [DR LEONG KH2] Paula Aguilar, PT (r)  You Kc, PT Student (t) Paula Aguilar, PT (c)    Pain Assessment    Pain Assessment 0-10  -JANI 0-10  -DR LEONG KH2 0-10  -DR LEONG KH2    Pain Score 7  -JANI 8  -DR LEONG KH2 9  -DR LEONG KH2    Pain Type  Acute pain;Surgical pain  -DR LEONG KH2 Acute pain;Surgical pain  -DR LEONG KH2    Pain Location Knee  -JANI Knee  -DR LEONG KH2 Knee  -DR LEONG KH2    Pain Orientation Left  -JANI Left  -DR LEONG KH2 Left  -DR LEONG KH2    Pain Intervention(s) Medication (See MAR);Cold applied;Repositioned  -JANI Ambulation/increased activity  -DR LEONG KH2 Ambulation/increased activity  -DR LEONG KH2    Response to Interventions  tolerated  -DR LEONG KH2 tolerated  -DR LEONG KH2    Recorded by [JANI] Heydi Blanco, PT [DR DANG,KH2] Paula Aguilar, PT (r) You Kc, PT Student (t) Paula Aguilar, PT (c) [DR DANG,KH2] Paula Aguilar, PT (r) You Kc PT Student (t) Paula Aguilar, PT (c)    Cognitive Assessment/Intervention    Current Cognitive/Communication Assessment functional  -JANI      Orientation Status oriented x 4  -JANI      Personal Safety WNL/WFL  -JANI      Personal Safety Interventions fall prevention program maintained;gait belt;muscle strengthening facilitated;nonskid shoes/slippers when out of bed  -JANI      Recorded by [JANI] Heydi Blanco, PT      ROM (Range of Motion)    General ROM Detail L knee: 15-97  -JANI WFL except L knee (3-92)  -DR LEONG KH2 WFL except L knee (3-92)  -DR DANG,DANG2    Recorded by [JANI] Heydi Blanco, PT [DR DANG,KH2] Paula Aguilar, PT (r) You Kc PT Student (t) Paula Aguilar, PT (c) [DR DANG,KH2] Paula Aguilar, PT (r) You Kc, PT Student (t) Paula Aguilar, PT (c)    Bed Mobility, Assessment/Treatment    Bed Mobility, Roll Left, [place]   not tested  -DR LEONG KH2    Bed Mob, Supine to Sit, [place]   not tested  -DR LEONG KH2    Bed Mob, Sit to Supine, [place]   not tested  -DR LEONG KH2    Bed Mobility, Comment up in chair  -JANI up in chair  -DR LEONG KH2 up in  chair  -DR DANG,DANG2    Recorded by [JANI] Heydi Blanco, PT [DR DANG,KH2] Paula Aguilar, PT (r) You Kc, PT Student (t) Paula Aguilar, PT (c) [DR DANG,KH2] Paula Aguilar, PT (r) You Kc, PT Student (t) Paula Aguilar, PT (c)    Transfer Assessment/Treatment    Transfers, Sit-Stand Dexter contact guard assist  -JANI contact guard assist  -DR LEONG KH2 contact guard assist  -DR LEONG KH2    Transfers, Stand-Sit Dexter contact guard assist  -JANI contact guard assist  -DR LEONG KH2 contact guard assist  -DR LEONG KH2    Transfers, Sit-Stand-Sit, Assist Device rolling walker  -JANI rolling walker  -DR LEONG KH2 rolling walker  -DR LEONG KH2    Recorded by [JANI] Heydi Blanco, PT [DR DANG,KH2] Paula Aguilar, PT (r) You Kc, PT Student (t) Paula Aguilar, PT (c) [DR DANG,KH2] Paula Aguilar, PT (r) You Kc, PT Student (t) Paula Aguilar, PT (c)    Gait Assessment/Treatment    Gait, Dexter Level contact guard assist;verbal cues required  -JANI contact guard assist;verbal cues required  -DR DANG,DANG2 contact guard assist;verbal cues required  -DR LEONG KH2    Gait, Assistive Device rolling walker  -JANI rolling walker  -DR LEONG KH2 rolling walker  -DR DANG,DANG2    Gait, Distance (Feet) 5  -JANI 12  -DR DANGKH2 8  -DR LEONG KH2    Gait, Gait Deviations left:;antalgic;elkin decreased;forward flexed posture  -JANI left:;antalgic;step length decreased;weight-shifting ability decreased  -DR DANG,DANG2 left:;antalgic;step length decreased;weight-shifting ability decreased  -DR DANG,DANG2    Gait, Safety Issues  step length decreased;weight-shifting ability decreased  -DR LEONG KH2 weight-shifting ability decreased;step length decreased  -DR LEONG KH2    Gait, Impairments  ROM decreased;strength decreased;pain  -DR LEONG KH2 pain;ROM decreased;strength decreased  -DR LEONG KH2    Gait, Comment  Pt has weight bearing on L LE.  -DR LEONG KH2     Recorded by [JANI] Heydi Blanco, PT [DR LEONG KH2] Paula Smith  Jeff, PT (r) You Kc, PT Student (t) Paula Aguilar, PT (c) [DR DANG,KH2] Paula Aguilar, PT (r) You Kc PT Student (t) Paula Aguilar, PT (c)    Therapy Exercises    Exercise Protocols total knee  -JANI total knee  -DR LEONG KH2 total knee  -DR LEONG KH2    Total Knee Exercises left:;30 reps;completed protocol  -JANI left:;30 reps;completed protocol  -DR DANG,DANG2 left:;25 reps;completed protocol  -DR LEONG KH2    Recorded by [JANI] Heydi Blanco, PT [DR DANG,KH2] Paula Aguilar, PT (r) You Kc PT Student (t) Paula Aguilar, PT (c) [DR DANG,KH2] Paula Aguilar, PT (r) You Kc PT Student (t) Paula Aguilar, PT (c)    Positioning and Restraints    Pre-Treatment Position sitting in chair/recliner  -JANI sitting in chair/recliner  -DR LEONG KH2 sitting in chair/recliner  -DR LEONG KH2    Post Treatment Position chair  -JANI chair  -DR LEONG KH2 chair  -DR LEONG KH2    In Chair reclined;call light within reach  -JANI reclined;sitting;call light within reach;encouraged to call for assist  -DR LEONG KH2 reclined;sitting;call light within reach;encouraged to call for assist  -DR LEONG KH2    Recorded by [JANI] Heydi Blanco, PT [DR DANG,KH2] Paula Aguilar, PT (r) You Kc PT Student (t) Paula Aguilar, PT (c) [DR DANG,KH2] Paula Aguilar, PT (r) You Kc PT Student (t) Paula Aguilar, PT (c)      03/30/17 1520 03/30/17 0853       Rehab Assessment/Intervention    Discipline physical therapist  -DR LEONG KH2 physical therapist  -DR LEONG KH2     Document Type therapy note (daily note)  -DR LEONG KH2 therapy note (daily note)  -DR LEONG KH2     Subjective Information agree to therapy;complains of;pain  -DR LEONG KH2 agree to therapy  -DR LEONG KH2     Patient Effort, Rehab Treatment good  -DR LEONG KH2 good  -DR LEONG KH2     Precautions/Limitations fall precautions  -DR LEONG KH2 fall precautions  -DR LEONG KH2     Recorded by [DR LEONG KH2] Paula Aguilar, PT (r) You Kc, PT  Student (t) Paula Aguilar, PT (c) [DR DANG,DANG2] Paula Aguilar, PT (r) You Kc, PT Student (t) Paula Aguilar, PT (c)     Pain Assessment    Pain Assessment 0-10  -DR LEONG KH2 0-10  -DR LEONG KH2     Pain Score 8  -DR LEONG KH2 6  -DR LEONG KH2     Pain Type Acute pain;Surgical pain  -DR LEONG KH2 Acute pain;Surgical pain  -DR LEONG KH2     Pain Location Knee  -DR LEONG KH2 Knee  -DR LEONG KH2     Pain Orientation Left  -DR LEONG KH2 Left  -DR LEONG KH2     Pain Intervention(s) Ambulation/increased activity  -DR LEONG KH2 Ambulation/increased activity  -DR LEONG KH2     Response to Interventions tolerated  -DR LEONG KH2 tolerated  -DR LEONG KH2     Recorded by [DR DANG,DANG2] Paula Aguilar, PT (r) You Kc, PT Student (t) Paula Aguilar, PT (c) [DR DANG,DANG2] Paula Aguilar, PT (r) You Kc, PT Student (t) Paula Aguilar, PT (c)     ROM (Range of Motion)    General ROM Detail  L knee (4-96)  -DR DANG,DANG2     Recorded by  [DR DANG,DANG2] Paula Aguilar, PT (r) You Kc, PT Student (t) Paula Aguilar, PT (c)     Bed Mobility, Assessment/Treatment    Bed Mobility, Roll Left, Gantt not tested  -DR LEONG KH2 not tested  -DR LEONG KH2     Bed Mob, Supine to Sit, Gantt not tested  -DR LEONG KH2 not tested  -DR LEONG KH2     Bed Mob, Sit to Supine, Gantt not tested  -DR LEONG KH2 not tested  -DR LEONG KH2     Bed Mobility, Comment up in chair  -DR LEONG KH2 up in chair  -DR LEONG KH2     Recorded by [DR DANG,DANG2] Paula Aguilar, PT (r) You Kc, PT Student (t) Paula Aguilar, PT (c) [DR DANG,DANG2] Paula Aguilar, PT (r) You Kc, PT Student (t) Paula Aguilar, PT (c)     Transfer Assessment/Treatment    Transfers, Sit-Stand Gantt contact guard assist;verbal cues required  -DR LEONG KH2 minimum assist (75% patient effort);verbal cues required  -DR LEONG KH2     Transfers, Stand-Sit Gantt contact guard assist;verbal cues required  -DR LEONG KH2 minimum assist (75%  patient effort);verbal cues required  -DR LEONG KH2     Transfers, Sit-Stand-Sit, Assist Device rolling walker  -DR LEONG KH2 rolling walker  -DR LEONG KH2     Transfer, Comment Pt required cues for sequencing of transfers with rolling walker.  -DR LEONG KH2 Pt required verbal cues for sequencing of transfers with rolling walker.  -DR LEONG KH2     Recorded by [DR LEONG KH2] Paula Aguilar, PT (r) You Kc PT Student (t) Paula Aguilar, PT (c) [DR LEONG KH2] Paula Aguilar, PT (r) You Kc PT Student (t) Paula Aguilar, PT (c)     Gait Assessment/Treatment    Gait, [place] Level contact guard assist;verbal cues required  -DR LEONG KH2 minimum assist (75% patient effort)  -DR LEONG KH2     Gait, Assistive Device rolling walker  -DR LEONG KH2 rolling walker  -DR LEONG KH2     Gait, Distance (Feet) 15  -DR LEONG KH2 8  -DR LEONG KH2     Gait, Gait Deviations left:;antalgic;elkin decreased;step length decreased  -DR LEONG KH2 left:;elkin decreased;step length decreased  -DR LEONG KH2     Gait, Safety Issues weight-shifting ability decreased;step length decreased  -DR LEONG KH2 weight-shifting ability decreased  -DR LEONG KH2     Gait, Impairments pain;ROM decreased;strength decreased  -DR LEONG KH2 pain;ROM decreased;strength decreased  -DR LEONG KH2     Gait, Comment Pt required maximal encouragement.  -DR LEONG KH2 Pt required cues for sequencing ambulation with rolling walker, and was limited by pain with weight bearing on L LE.  -DR LEONG KH2     Recorded by [DR LEONG KH2] Paula Aguilar, PT (r) You Kc PT Student (t) Paula Aguilar, PT (c) [DR LEONG KH2] Paula Aguilar, PT (r) You Kc PT Student (t) Paula Aguilar, PT (c)     Therapy Exercises    Exercise Protocols total knee  -DR LEONG KH2 total knee  -DR LEONG KH2     Total Knee Exercises left:;20 reps;completed protocol;with assist  -DR LEONG KH2 left:;15 reps;completed protocol  -DR LEONG KH2     Recorded by [DR LEONG KH2] Paula Aguilar, PT (r)  You Kc, PT Student (t) Paula Aguilar, PT (c) [DR DANG,KH2] Paula Aguilar, PT (r) You Kc, PT Student (t) Paula Aguilar, PT (c)     Positioning and Restraints    Pre-Treatment Position sitting in chair/recliner  -DR LEONG KH2 sitting in chair/recliner  -DR LEONG KH2     Post Treatment Position chair  -DR LEONG KH2 chair  -DR LEONG KH2     In Chair reclined;sitting;call light within reach;encouraged to call for assist;with family/caregiver;notified nsg  -DR DANG,DANG2 reclined;sitting;call light within reach;encouraged to call for assist;with family/caregiver  -DR DANG,DANG2     Recorded by [DR DANG,KH2] Paula Aguilar, PT (r) You Kc, PT Student (t) Paula Aguilar, PT (c) [DR DANG,KH2] Paula Aguilar, PT (r) You Kc, PT Student (t) Paula Aguilar, PT (c)       User Key  (r) = Recorded By, (t) = Taken By, (c) = Cosigned By    Initials Name Effective Dates     Paula Aguilar, PT 05/18/15 -     JANI Blanco, PT 10/06/15 -     DR You Kc, PT Student 03/07/17 -                 IP PT Goals       03/29/17 1600          Bed Mobility PT LTG    Bed Mobility PT LTG, Date Established 03/29/17  -AR      Bed Mobility PT LTG, Time to Achieve 1 wk  -AR      Bed Mobility PT LTG, Activity Type supine to sit/sit to supine  -AR      Bed Mobility PT LTG, [place] Level supervision required  -AR      Transfer Training PT LTG    Transfer Training PT LTG, Date Established 03/29/17  -AR      Transfer Training PT LTG, Time to Achieve 1 wk  -AR      Transfer Training PT LTG, Activity Type sit to stand/stand to sit;bed to chair /chair to bed  -AR      Transfer Training PT LTG, [place] Level supervision required  -AR      Transfer Training PT LTG, Assist Device walker, rolling  -AR      Gait Training PT LTG    Gait Training Goal PT LTG, Date Established 03/29/17  -AR      Gait Training Goal PT LTG, Time to Achieve 1 wk  -AR      Gait Training Goal PT LTG, [place]  Level supervision required  -AR      Gait Training Goal PT LTG, Assist Device walker, rolling  -AR      Gait Training Goal PT LTG, Distance to Achieve 150  -AR      Range of Motion PT LTG    Range of Motion Goal PT LTG, Date Established 03/29/17  -AR      Range of Motion Goal PT LTG, Time to Achieve 1 wk  -AR      Range fo Motion Goal PT LTG, Joint L knee  -AR      Range of Motion Goal PT LTG, AROM Measure -5-90  -AR        User Key  (r) = Recorded By, (t) = Taken By, (c) = Cosigned By    Initials Name Provider Type    AR Binta Arvizu PT Physical Therapist          Physical Therapy Education     Title: PT OT SLP Therapies (Done)     Topic: Physical Therapy (Done)     Point: Mobility training (Done)    Learning Progress Summary    Learner Readiness Method Response Comment Documented by Status   Patient Acceptance E VU,NR  JANI 04/01/17 1143 Done    Acceptance E VU   03/31/17 1427 Done    Acceptance E VU   03/31/17 1003 Done    Acceptance E VU   03/30/17 1537 Done    Acceptance E VU   03/30/17 1039 Done    Acceptance E VU  AR 03/29/17 1601 Done               Point: Home exercise program (Done)    Learning Progress Summary    Learner Readiness Method Response Comment Documented by Status   Patient Acceptance E VU,NR  JANI 04/01/17 1143 Done    Acceptance E VU   03/31/17 1427 Done    Acceptance E VU   03/31/17 1003 Done    Acceptance E VU   03/30/17 1537 Done    Acceptance E VU   03/30/17 1039 Done    Acceptance E VU  AR 03/29/17 1601 Done               Point: Body mechanics (Done)    Learning Progress Summary    Learner Readiness Method Response Comment Documented by Status   Patient Acceptance E VU   03/31/17 1427 Done    Acceptance E VU   03/31/17 1003 Done    Acceptance E VU   03/30/17 1537 Done    Acceptance E VU   03/30/17 1039 Done    Acceptance E VU  AR 03/29/17 1601 Done               Point: Precautions (Done)    Learning Progress Summary    Learner Readiness Method Response Comment  Documented by Status   Patient Acceptance E VU  DR 03/31/17 1427 Done    Acceptance E VU  DR 03/31/17 1003 Done    Acceptance E VU  DR 03/30/17 1537 Done    Acceptance E VU  DR 03/30/17 1039 Done    Acceptance E VU  AR 03/29/17 1601 Done                      User Key     Initials Effective Dates Name Provider Type Discipline    JANI 10/06/15 -  Heydi Blanco, PT Physical Therapist PT    AR 06/27/16 -  Binta Arvizu, PT Physical Therapist PT     03/07/17 -  You Kc, PT Student PT Student PT                    PT Recommendation and Plan  Anticipated Discharge Disposition: skilled nursing facility  Planned Therapy Interventions: balance training, bed mobility training, gait training, home exercise program, patient/family education, ROM (Range of Motion), stair training, strengthening  PT Frequency: 2 times/day  Plan of Care Review  Plan Of Care Reviewed With: patient  Progress: progress toward functional goals as expected          Outcome Measures       04/01/17 1100 03/31/17 1400 03/31/17 0838    How much help from another person do you currently need...    Turning from your back to your side while in flat bed without using bedrails? 3  -JANI 3  -KH (r) DR DANG dutta (t)) 3  -DANG peterson) DR DANG dutta (t))    Moving from lying on back to sitting on the side of a flat bed without bedrails? 3  -JANI 3  -KH (r) DR DANG dutta (t)) 3  -DANG dutta (r t))    Moving to and from a bed to a chair (including a wheelchair)? 3  -JANI 3  -KH (r) DR DANG dutta (t)) 3  -DANG dutta (r t))    Standing up from a chair using your arms (e.g., wheelchair, bedside chair)? 3  -JANI 3  -KH (r) DR DANG dutta (t)) 3  -DANG dutta (r t))    Climbing 3-5 steps with a railing? 2  -JANI 2  -KH (bhavesh) DR DANG dutta (t)) 2  -DANG dutta (r t))    To walk in hospital room? 3  -JANI 3  -KH (r) DR DANG dutta (t)) 3  -DANG (r) DR (t) KH (c)    AM-PAC 6 Clicks Score 17  -JANI 17  -DANG (r)  (t) 17  -DANG (r)  (t)    Functional Assessment    Outcome Measure Options AM-PAC 6 Clicks Basic Mobility  (PT)  -JANI AM-PAC 6 Clicks Basic Mobility (PT)  -DANG (r)  (t) DANG (c) AM-PAC 6 Clicks Basic Mobility (PT)  -DANG (r) DR Rutherfordt) DANG (josue)      03/30/17 1500 03/30/17 0853 03/29/17 1600    How much help from another person do you currently need...    Turning from your back to your side while in flat bed without using bedrails? 3  -KH (r)  (t) DANG (c) 3  -KH (r)  (t) DANG (josue) 3  -AR    Moving from lying on back to sitting on the side of a flat bed without bedrails? 3  -KH (r)  (t) DANG (c) 3  -DANG (r)  (t) DANG (josue) 3  -AR    Moving to and from a bed to a chair (including a wheelchair)? 3  -KH (r)  (t) DANG (c) 3  -DANG (r) DR Rutherfordt) DANG dutta) 3  -AR    Standing up from a chair using your arms (e.g., wheelchair, bedside chair)? 3  -KH (r)  (t) DANG (c) 3  -KH (r) DR Rutherfordt) DANG (josue) 3  -AR    Climbing 3-5 steps with a railing? 2  -KH (r)  (t) DANG (josue) 2  -KH (r) DR Rutherfordt) DANG dutta) 2  -AR    To walk in hospital room? 3  -KH (r)  (t) DANG (c) 3  -DANG (r)  (t) DANG (josue) 3  -AR    AM-PAC 6 Clicks Score 17  -DANG (r)  (t) 17  -DANG (bhavesh)  (t) 17  -AR    Functional Assessment    Outcome Measure Options AM-PAC 6 Clicks Basic Mobility (PT)  -DANG (bhavesh)  (t) DANG (c) AM-PAC 6 Clicks Basic Mobility (PT)  -DANG (r)  (t) DANG (josue) AM-PAC 6 Clicks Basic Mobility (PT)  -AR      User Key  (r) = Recorded By, (t) = Taken By, (c) = Cosigned By    Initials Name Provider Type    DANG Aguilar, PT Physical Therapist    JANI Blanco, PT Physical Therapist    LETITIA Arvizu, PT Physical Therapist    DR You Kc, PT Student PT Student           Time Calculation:         PT Charges       04/01/17 1145          Time Calculation    Start Time 0930  -JANI      Stop Time 1030  -JANI      Time Calculation (min) 60 min  -JANI      PT Received On 04/01/17  -JANI        User Key  (r) = Recorded By, (t) = Taken By, (c) = Cosigned By    Initials Name Provider Type    JANI Blanco, PT Physical Therapist          Therapy Charges for Today     Code Description Service  Date Service Provider Modifiers Qty    73417812207 HC PT THER PROC GROUP 4/1/2017 Heydi Blanco, PT GP 1    82330657002 HC PT THER PROC EA 15 MIN 4/1/2017 Heydi Blanco, PT GP 1          PT G-Codes  Outcome Measure Options: AM-PAC 6 Clicks Basic Mobility (PT)    Heydi Blanco, PT  4/1/2017

## 2017-04-01 NOTE — PLAN OF CARE
Problem: Inpatient Physical Therapy  Goal: Bed Mobility Goal LTG- PT  Outcome: Unable to achieve outcome(s) by discharge Date Met:  04/01/17 04/01/17 1329   Bed Mobility PT LTG   Bed Mobility PT LTG, Outcome goal not met   Bed Mobility PT LTG, Reason Goal Not Met discharged from facility       Goal: Transfer Training Goal 1 LTG- PT  Outcome: Unable to achieve outcome(s) by discharge Date Met:  04/01/17 04/01/17 1329   Transfer Training PT LTG   Transfer Training PT LTG, Outcome goal not met   Transfer Training PT LTG, Reason Goal Not Met discharged from facility       Goal: Gait Training Goal LTG- PT  Outcome: Unable to achieve outcome(s) by discharge Date Met:  04/01/17 04/01/17 1329   Gait Training PT LTG   Gait Training Goal PT LTG, Outcome goal not met   Gait Training Goal PT LTG, Reason Goal Not Met discharged from facility       Goal: Range of Motion Goal LTG- PT  Outcome: Unable to achieve outcome(s) by discharge Date Met:  04/01/17 04/01/17 1329   Range of Motion PT LTG   Range of Motion Goal PT LTG, Outcome goal partially met   Reason Goal Not Met (ROM) PT LTG discharged from facility

## 2017-04-01 NOTE — PLAN OF CARE
Problem: Patient Care Overview (Adult)  Goal: Plan of Care Review  Outcome: Ongoing (interventions implemented as appropriate)    03/31/17 1800   Coping/Psychosocial Response Interventions   Plan Of Care Reviewed With patient   Outcome Evaluation   Outcome Summary/Follow up Plan Dilaudid given prior to PT sessions. Tearful throughout the day. B/P meds jaylen this am due to B/P out of parameters. States pain tolerance at level 6,. Pain frequently at 6 or higher. Oral pain med given every 4 hrs. Benadryl given for itching X 2. Upset over not getting pre-cert for transfer tomorrow.   Patient Care Overview   Progress progress toward functional goals is gradual         Problem: Knee Replacement, Total (Adult)  Goal: Signs and Symptoms of Listed Potential Problems Will be Absent or Manageable (Knee Replacement, Total)  Outcome: Ongoing (interventions implemented as appropriate)    Problem: Fall Risk (Adult)  Goal: Absence of Falls  Outcome: Ongoing (interventions implemented as appropriate)

## 2017-04-01 NOTE — PLAN OF CARE
Problem: Patient Care Overview (Adult)  Goal: Plan of Care Review  Outcome: Ongoing (interventions implemented as appropriate)    04/01/17 1143   Coping/Psychosocial Response Interventions   Plan Of Care Reviewed With patient   Patient Care Overview   Progress progress toward functional goals as expected

## 2017-04-03 NOTE — PAYOR COMM NOTE
"Marisol Hernandez (47 y.o. Female)     Date of Birth Social Security Number Address Home Phone MRN    1970  [address] [place] [address] [phone] [id number]    Amish Marital Status          None Single       Admission Date Admission Type Admitting Provider Attending Provider Department, Room/Bed    3/29/17 Elective Zacarias Reeves MD  [address], P789/1    Discharge Date Discharge Disposition Discharge Destination        4/1/2017 Skilled Nursing Facility ([place] - [facility])             Attending Provider: (none)    Allergies:  Tirosint [Levothyroxine], Ciprofloxacin, Ketamine, Lisinopril, Propacetamol, Propoxyphene-acetaminophen    Isolation:  None   Infection:  None   Code Status:  Prior    Ht:  64\" (162.6 cm)   Wt:  285 lb (129 kg)    Admission Cmt:  None   Principal Problem:  Primary localized osteoarthritis of left knee [M17.12]                 Active Insurance as of 3/29/2017     Primary Coverage     Payor Plan Insurance Group Employer/Plan Group    ANTHEM BLUE CROSS ANTHEM BLUE CROSS BLUE [facility] PPO [id number]     Payor Plan Address Payor Plan Phone Number Effective From Effective To    [address] [phone] 3/1/2007     [place] [address]       Subscriber Name Subscriber Birth Date Member ID       NIK HERNANDEZ [birth year]/23/1972 [id number]                 Emergency Contacts      (Rel.) Home Phone Work Phone Mobile Phone    Nik Hernandez (Other) -- -- [phone]               Discharge Summary      Felix Weldon MD at 4/1/2017  1:09 PM              Discharge Summary    Date of Admission: 3/29/2017  Date of Discharge:  4/1/2017    Discharge Diagnosis:   Primary localized osteoarthritis of left knee [M17.12]      PMHX:  Past Medical History:   Diagnosis Date   • Alopecia    • Anxiety and depression    • Arthritis     OSTEO   • Balance problem     FOOTDROP LEFT FOOT   • Bilateral knee pain    • Breast mass     RIGHT, MD WATCHING.   • " Chronic low back pain    • Depression    • GERD (gastroesophageal reflux disease)    • Hyperlipidemia    • Hypertension    • Hypothyroidism    • Impaired fasting glucose    • Kidney calculus     HISTORY OF   • Pneumonia     2015 S/P LUMBAR FUSION   • Sciatica    • Spinal headache     AFTER MYELOGRAM. BLOOD PATCH X2   • Vitamin D deficiency        Discharge Disposition  Skilled Nursing Facility ([facility] - [facility])    Procedures Performed  Procedure(s):  TOTAL KNEE ARTHROPLASTY       Indication for Admission  Patient is a 47 y.o. female admitted after undergoing the above surgical procedure. They were admitted for post-operative pain control, medical management and physical therapy.  They progressed with physical therapy.    They were deemed stable for discharge to [facility].      Consults:   Consults     No orders found from 2/28/2017 to 3/30/2017.          Discharge Instructions:  Patient is weight bearing as tolerated on the operative leg.  Patient is to progress range of motion and ambulation as tolerated.  Use walker as needed for stability and gait.  May progress to cane as tolerated.  The dressing should be changed daily until staples are removed.  Staples should be removed post-operative day 14.  Keep incision clean, dry and intact until staple removal then the patient may shower. The patient is being discharged on aspirin 325 mg bid for dvt prophylaxis.  Patient will follow-up in the office in 6 weeks.  Call the office at [phone] for any questions or concerns.      Discharge Medications   Marisol Carrillo   Home Medication Instructions ELO:[id number]    Printed on:04/01/17 1310   Medication Information                      amLODIPine (NORVASC) 10 MG tablet  Take 1 tablet by mouth Daily.             amLODIPine-valsartan (EXFORGE)  MG per tablet  Take 1 tablet by mouth Daily. For bp             aspirin  MG EC tablet  Take 1 tablet by mouth 2 (Two) Times a Day.             atorvastatin  (LIPITOR) 40 MG tablet  Take 1 tablet by mouth Daily. For cholesterol             levothyroxine (SYNTHROID, LEVOTHROID) 50 MCG tablet  Take 1 tablet by mouth daily. For thyroid (before breakfast)             Multiple Vitamin (MULTI VITAMIN DAILY PO)  Take 1 capsule by mouth Every Evening.             nebivolol (BYSTOLIC) 5 MG tablet  Take 1 tablet by mouth Daily. Take with Exforge for BP             oxyCODONE-acetaminophen (PERCOCET)  MG per tablet  Take 2 tablets by mouth Every 4 (Four) Hours As Needed for Severe Pain (7-10) (pain) for up to 8 days.             sertraline (ZOLOFT) 100 MG tablet  2  tabs PO daily for depression and anxiety (new dose)             valsartan (DIOVAN) 320 MG tablet  Take 1 tablet by mouth Daily.             Vitamin D, Cholecalciferol, 1000 UNITS capsule  Take 2,000 Units by mouth Daily.                 Discharge Diet:   Diet Instructions     Diet: Regular; Thin Liquids, No Restrictions       Discharge Diet:  Regular   Fluid Consistency:  Thin Liquids, No Restrictions                 Activity at Discharge:   Activity Instructions     Activity as Tolerated           Discharge Activity Restrictions       No driving until cleared by physician                 Follow-up Appointments  Future Appointments  Date Time Provider Department Center   4/12/2017 11:00 AM MD ZACHARY Heath Jr. [facility] None   4/27/2017 10:00 AM Nadine Elizalde LCSW  [facility] None   5/25/2017 10:00 AM Nadine Elizalde LCSW  [facility] None   6/22/2017 9:00 AM LEONIDAS Caal [facility] None   8/2/2017 8:45 AM MD ZACHARY Dominguez [facility] None         Test Results Pending at Discharge   Order Current Status    POC Pregnancy, Urine In process           Felix Weldon MD  04/01/17,  1:10 PM                 Electronically signed by Felix Weldon MD at 4/1/2017  1:13 PM

## 2017-04-04 ENCOUNTER — HOSPITAL ENCOUNTER (OUTPATIENT)
Facility: HOSPITAL | Age: 47
Setting detail: SURGERY ADMIT
End: 2017-04-04
Attending: ORTHOPAEDIC SURGERY | Admitting: ORTHOPAEDIC SURGERY

## 2017-04-07 ENCOUNTER — APPOINTMENT (OUTPATIENT)
Dept: PREADMISSION TESTING | Facility: HOSPITAL | Age: 47
End: 2017-04-07

## 2017-04-10 ENCOUNTER — HOSPITAL ENCOUNTER (INPATIENT)
Dept: HOSPITAL 23 - CSUR | Age: 47
LOS: 4 days | Discharge: NURSING, REHAB OR LONG-TERM CARE | DRG: 467 | End: 2017-04-14
Admitting: ORTHOPAEDIC SURGERY
Payer: COMMERCIAL

## 2017-04-10 DIAGNOSIS — E03.9: ICD-10-CM

## 2017-04-10 DIAGNOSIS — Z82.49: ICD-10-CM

## 2017-04-10 DIAGNOSIS — E66.01: ICD-10-CM

## 2017-04-10 DIAGNOSIS — K21.9: ICD-10-CM

## 2017-04-10 DIAGNOSIS — Z87.442: ICD-10-CM

## 2017-04-10 DIAGNOSIS — E78.5: ICD-10-CM

## 2017-04-10 DIAGNOSIS — Z98.84: ICD-10-CM

## 2017-04-10 DIAGNOSIS — F32.9: ICD-10-CM

## 2017-04-10 DIAGNOSIS — T84.023A: Primary | ICD-10-CM

## 2017-04-10 DIAGNOSIS — F41.9: ICD-10-CM

## 2017-04-10 DIAGNOSIS — I10: ICD-10-CM

## 2017-04-10 DIAGNOSIS — T81.32XA: ICD-10-CM

## 2017-04-10 LAB
BASOPHIL#: 0.1 X10E3 (ref 0–0.3)
BASOPHIL%: 0.9 % (ref 0–2.5)
BLOOD UREA NITROGEN: 12 MG/DL (ref 9–23)
BUN/CREATININE RATIO: 17.14
CALCIUM SERUM: 9.6 MG/DL (ref 8.4–10.2)
CK MB SERPL-RTO: 18.8 % (ref 11–15.5)
CK MB SERPL-RTO: 32.2 G/DL (ref 30–36)
CREATININE SERUM: 0.7 MG/DL (ref 0.6–1.4)
DIFF IND: NO
EOSINOPHIL#: 0.3 X10E3 (ref 0–0.7)
EOSINOPHIL%: 2.5 % (ref 0–7)
GENTAMICIN PEAK SERPL-MCNC: NO MG/L
GLOM FILT RATE ESTIMATED: 103.9 ML/MIN (ref 60–?)
GLUCOSE FASTING: 119 MG/DL (ref 70–110)
HEMATOCRIT: 35.4 % (ref 35–45)
HEMOGLOBIN: 11.4 GM/DL (ref 12–16)
INR: 1
KETONES UR QL: 101 MMOL/L (ref 100–111)
KETONES UR QL: 27 MMOL/L (ref 22–31)
LYMPHOCYTE#: 1.8 X10E3 (ref 1–3.5)
LYMPHOCYTE%: 15.5 % (ref 17–45)
MEAN CELL VOLUME: 88.8 FL (ref 83–96)
MEAN CORPUSCULAR HEMOGLOBIN: 28.6 PG (ref 28–34)
MEAN PLATELET VOLUME: 6.7 FL (ref 6.5–11.5)
MONOCYTE#: 0.8 X10E3 (ref 0–1)
MONOCYTE%: 7.2 % (ref 3–12)
NEUTROPHIL#: 8.5 X10E3 (ref 1.5–7.1)
NEUTROPHIL%: 73.9 % (ref 40–75)
PLATELET COUNT: 511 X10E3 (ref 140–420)
POTASSIUM: 4.4 MMOL/L (ref 3.5–5.1)
PROTHROMBIN TIME (PATIENT): 10.7 SECONDS (ref 9.6–11.5)
RED BLOOD COUNT: 3.99 X10E (ref 3.9–5.3)
SODIUM: 139 MMOL/L (ref 135–145)
URINE APPEARANCE: CLEAR
URINE BILIRUBIN: (no result)
URINE BLOOD: (no result)
URINE COLOR: (no result)
URINE GLUCOSE: (no result) MG/DL
URINE KETONE: (no result)
URINE LEUKOCYTE ESTERASE: (no result)
URINE NITRATE: (no result)
URINE PH: 5.5 (ref 5–8)
URINE PROTEIN: (no result)
URINE SOURCE: (no result)
URINE SPECIFIC GRAVITY: 1.03 (ref 1–1.03)
URINE UROBILINOGEN: 1 MG/DL
WHITE BLOOD COUNT: 11.5 X10E3 (ref 4–10.5)

## 2017-04-10 PROCEDURE — 0SPU0JZ REMOVAL OF SYNTHETIC SUBSTITUTE FROM LEFT KNEE JOINT, FEMORAL SURFACE, OPEN APPROACH: ICD-10-PCS | Performed by: ORTHOPAEDIC SURGERY

## 2017-04-10 PROCEDURE — 0SRU0J9 REPLACEMENT OF LEFT KNEE JOINT, FEMORAL SURFACE WITH SYNTHETIC SUBSTITUTE, CEMENTED, OPEN APPROACH: ICD-10-PCS | Performed by: ORTHOPAEDIC SURGERY

## 2017-04-10 PROCEDURE — 0SPD09Z REMOVAL OF LINER FROM LEFT KNEE JOINT, OPEN APPROACH: ICD-10-PCS | Performed by: ORTHOPAEDIC SURGERY

## 2017-04-10 PROCEDURE — 0SUW09Z SUPPLEMENT LEFT KNEE JOINT, TIBIAL SURFACE WITH LINER, OPEN APPROACH: ICD-10-PCS | Performed by: ORTHOPAEDIC SURGERY

## 2017-04-10 PROCEDURE — C1776 JOINT DEVICE (IMPLANTABLE): HCPCS

## 2017-04-11 LAB
BASOPHIL#: 0 X10E3 (ref 0–0.3)
BASOPHIL%: 0.2 % (ref 0–2.5)
CK MB SERPL-RTO: 17.8 % (ref 11–15.5)
CK MB SERPL-RTO: 32.1 G/DL (ref 30–36)
DIFF IND: NO
EOSINOPHIL#: 0 X10E3 (ref 0–0.7)
EOSINOPHIL%: 0 % (ref 0–7)
HEMATOCRIT: 29.4 % (ref 35–45)
HEMOGLOBIN: 9.4 GM/DL (ref 12–16)
INR: 1.8
LYMPHOCYTE#: 0.7 X10E3 (ref 1–3.5)
LYMPHOCYTE%: 5.2 % (ref 17–45)
MEAN CELL VOLUME: 88.8 FL (ref 83–96)
MEAN CORPUSCULAR HEMOGLOBIN: 28.5 PG (ref 28–34)
MEAN PLATELET VOLUME: 6.8 FL (ref 6.5–11.5)
MONOCYTE#: 0.4 X10E3 (ref 0–1)
MONOCYTE%: 2.8 % (ref 3–12)
NEUTROPHIL#: 12.4 X10E3 (ref 1.5–7.1)
NEUTROPHIL%: 91.8 % (ref 40–75)
PLATELET COUNT: 451 X10E3 (ref 140–420)
PROTHROMBIN TIME (PATIENT): 19 SECONDS (ref 9.6–11.5)
RED BLOOD COUNT: 3.31 X10E (ref 3.9–5.3)
WHITE BLOOD COUNT: 13.5 X10E3 (ref 4–10.5)

## 2017-04-12 LAB
BASOPHIL#: 0 X10E3 (ref 0–0.3)
BASOPHIL%: 0.4 % (ref 0–2.5)
CK MB SERPL-RTO: 18 % (ref 11–15.5)
CK MB SERPL-RTO: 32 G/DL (ref 30–36)
DIFF IND: NO
EOSINOPHIL#: 0 X10E3 (ref 0–0.7)
EOSINOPHIL%: 0.3 % (ref 0–7)
HEMATOCRIT: 28 % (ref 35–45)
HEMOGLOBIN: 9 GM/DL (ref 12–16)
INR: 3.2
LYMPHOCYTE#: 0.9 X10E3 (ref 1–3.5)
LYMPHOCYTE%: 6.9 % (ref 17–45)
MEAN CELL VOLUME: 89 FL (ref 83–96)
MEAN CORPUSCULAR HEMOGLOBIN: 28.5 PG (ref 28–34)
MEAN PLATELET VOLUME: 6.6 FL (ref 6.5–11.5)
MONOCYTE#: 0.5 X10E3 (ref 0–1)
MONOCYTE%: 3.7 % (ref 3–12)
NEUTROPHIL#: 11.4 X10E3 (ref 1.5–7.1)
NEUTROPHIL%: 88.7 % (ref 40–75)
PLATELET COUNT: 385 X10E3 (ref 140–420)
PROTHROMBIN TIME (PATIENT): 34.7 SECONDS (ref 9.6–11.5)
RED BLOOD COUNT: 3.15 X10E (ref 3.9–5.3)
WHITE BLOOD COUNT: 12.8 X10E3 (ref 4–10.5)

## 2017-04-13 LAB
BASOPHIL#: 0.1 X10E3 (ref 0–0.3)
BASOPHIL%: 1 % (ref 0–2.5)
BLOOD UREA NITROGEN: 13 MG/DL (ref 9–23)
BUN/CREATININE RATIO: 21.66
CALCIUM SERUM: 8.7 MG/DL (ref 8.4–10.2)
CK MB SERPL-RTO: 18.1 % (ref 11–15.5)
CK MB SERPL-RTO: 32.3 G/DL (ref 30–36)
CREATININE SERUM: 0.6 MG/DL (ref 0.6–1.4)
DIFF IND: NO
EOSINOPHIL#: 0.1 X10E3 (ref 0–0.7)
EOSINOPHIL%: 1 % (ref 0–7)
GLOM FILT RATE ESTIMATED: 109.3 ML/MIN (ref 60–?)
GLUCOSE FASTING: 117 MG/DL (ref 70–110)
HEMATOCRIT: 24.7 % (ref 35–45)
HEMOGLOBIN: 8 GM/DL (ref 12–16)
INR: 2.4
KETONES UR QL: 105 MMOL/L (ref 100–111)
KETONES UR QL: 27 MMOL/L (ref 22–31)
LYMPHOCYTE#: 2.8 X10E3 (ref 1–3.5)
LYMPHOCYTE%: 23.8 % (ref 17–45)
MEAN CELL VOLUME: 89.1 FL (ref 83–96)
MEAN CORPUSCULAR HEMOGLOBIN: 28.8 PG (ref 28–34)
MEAN PLATELET VOLUME: 6.7 FL (ref 6.5–11.5)
MONOCYTE#: 1 X10E3 (ref 0–1)
MONOCYTE%: 8.3 % (ref 3–12)
NEUTROPHIL#: 7.7 X10E3 (ref 1.5–7.1)
NEUTROPHIL%: 65.9 % (ref 40–75)
PLATELET COUNT: 343 X10E3 (ref 140–420)
POTASSIUM: 3.9 MMOL/L (ref 3.5–5.1)
PROTHROMBIN TIME (PATIENT): 25.6 SECONDS (ref 9.6–11.5)
RED BLOOD COUNT: 2.77 X10E (ref 3.9–5.3)
SODIUM: 141 MMOL/L (ref 135–145)
WHITE BLOOD COUNT: 11.7 X10E3 (ref 4–10.5)

## 2017-04-14 LAB
BASOPHIL#: 0.1 X10E3 (ref 0–0.3)
BASOPHIL%: 1.1 % (ref 0–2.5)
CK MB SERPL-RTO: 18.1 % (ref 11–15.5)
CK MB SERPL-RTO: 31.6 G/DL (ref 30–36)
DIFF IND: NO
EOSINOPHIL#: 0.3 X10E3 (ref 0–0.7)
EOSINOPHIL%: 3.1 % (ref 0–7)
HEMATOCRIT: 26.5 % (ref 35–45)
HEMOGLOBIN: 8.4 GM/DL (ref 12–16)
INR: 1.8
LYMPHOCYTE#: 2.3 X10E3 (ref 1–3.5)
LYMPHOCYTE%: 24.1 % (ref 17–45)
MEAN CELL VOLUME: 88.6 FL (ref 83–96)
MEAN CORPUSCULAR HEMOGLOBIN: 28 PG (ref 28–34)
MEAN PLATELET VOLUME: 6.7 FL (ref 6.5–11.5)
MONOCYTE#: 0.8 X10E3 (ref 0–1)
MONOCYTE%: 8.6 % (ref 3–12)
NEUTROPHIL#: 5.9 X10E3 (ref 1.5–7.1)
NEUTROPHIL%: 63.1 % (ref 40–75)
PLATELET COUNT: 372 X10E3 (ref 140–420)
PROTHROMBIN TIME (PATIENT): 19.6 SECONDS (ref 9.6–11.5)
RED BLOOD COUNT: 2.99 X10E (ref 3.9–5.3)
WHITE BLOOD COUNT: 9.4 X10E3 (ref 4–10.5)

## 2017-04-27 ENCOUNTER — APPOINTMENT (OUTPATIENT)
Dept: PSYCHIATRY | Facility: HOSPITAL | Age: 47
End: 2017-04-27

## 2017-05-01 ENCOUNTER — TRANSCRIBE ORDERS (OUTPATIENT)
Dept: ADMINISTRATIVE | Facility: HOSPITAL | Age: 47
End: 2017-05-01

## 2017-05-01 ENCOUNTER — LAB (OUTPATIENT)
Dept: LAB | Facility: HOSPITAL | Age: 47
End: 2017-05-01
Attending: ORTHOPAEDIC SURGERY

## 2017-05-01 DIAGNOSIS — Z79.01 LONG TERM (CURRENT) USE OF ANTICOAGULANTS: ICD-10-CM

## 2017-05-01 DIAGNOSIS — Z79.01 ENCOUNTER FOR MONITORING COUMADIN THERAPY: ICD-10-CM

## 2017-05-01 DIAGNOSIS — Z51.81 ENCOUNTER FOR MONITORING COUMADIN THERAPY: ICD-10-CM

## 2017-05-01 DIAGNOSIS — Z79.01 LONG TERM (CURRENT) USE OF ANTICOAGULANTS: Primary | ICD-10-CM

## 2017-05-01 LAB
INR PPP: 1.62 (ref 0.9–1.1)
PROTHROMBIN TIME: 18.6 SECONDS (ref 11.7–14.2)

## 2017-05-01 PROCEDURE — 36415 COLL VENOUS BLD VENIPUNCTURE: CPT

## 2017-05-01 PROCEDURE — 85610 PROTHROMBIN TIME: CPT

## 2017-05-04 ENCOUNTER — LAB (OUTPATIENT)
Dept: LAB | Facility: HOSPITAL | Age: 47
End: 2017-05-04
Attending: ORTHOPAEDIC SURGERY

## 2017-05-04 DIAGNOSIS — Z79.01 LONG TERM (CURRENT) USE OF ANTICOAGULANTS: ICD-10-CM

## 2017-05-04 DIAGNOSIS — Z51.81 ENCOUNTER FOR MONITORING COUMADIN THERAPY: ICD-10-CM

## 2017-05-04 DIAGNOSIS — Z79.01 ENCOUNTER FOR MONITORING COUMADIN THERAPY: ICD-10-CM

## 2017-05-04 LAB
INR PPP: 3.27 (ref 0.9–1.1)
PROTHROMBIN TIME: 32.3 SECONDS (ref 11.7–14.2)

## 2017-05-04 PROCEDURE — 85610 PROTHROMBIN TIME: CPT

## 2017-05-04 PROCEDURE — 36415 COLL VENOUS BLD VENIPUNCTURE: CPT

## 2017-05-08 ENCOUNTER — TRANSCRIBE ORDERS (OUTPATIENT)
Dept: LAB | Facility: HOSPITAL | Age: 47
End: 2017-05-08

## 2017-05-08 ENCOUNTER — LAB (OUTPATIENT)
Dept: LAB | Facility: HOSPITAL | Age: 47
End: 2017-05-08
Attending: ORTHOPAEDIC SURGERY

## 2017-05-08 DIAGNOSIS — Z79.01 LONG TERM (CURRENT) USE OF ANTICOAGULANTS: ICD-10-CM

## 2017-05-08 DIAGNOSIS — Z79.01 LONG TERM (CURRENT) USE OF ANTICOAGULANTS: Primary | ICD-10-CM

## 2017-05-08 LAB
INR PPP: 2.22 (ref 0.9–1.1)
PROTHROMBIN TIME: 23.9 SECONDS (ref 11.7–14.2)

## 2017-05-08 PROCEDURE — 85610 PROTHROMBIN TIME: CPT

## 2017-05-08 PROCEDURE — 36415 COLL VENOUS BLD VENIPUNCTURE: CPT

## 2017-05-25 ENCOUNTER — APPOINTMENT (OUTPATIENT)
Dept: PSYCHIATRY | Facility: HOSPITAL | Age: 47
End: 2017-05-25

## 2017-06-08 ENCOUNTER — OFFICE VISIT (OUTPATIENT)
Dept: PSYCHIATRY | Facility: HOSPITAL | Age: 47
End: 2017-06-08

## 2017-06-08 DIAGNOSIS — F33.1 MAJOR DEPRESSIVE DISORDER, RECURRENT EPISODE, MODERATE (HCC): Primary | ICD-10-CM

## 2017-06-08 PROCEDURE — 90834 PSYTX W PT 45 MINUTES: CPT | Performed by: SOCIAL WORKER

## 2017-06-08 NOTE — PROGRESS NOTES
Session 2:00-2:45. Pt processed the closing of this office and her transferring to another therapist.  Gave her information to see Connie Rivera.  Pt discussed the negativity and shaming that creeps into her thinking.  Focusing on changing those negative thoughts and blocking judgements from others.

## 2017-08-02 ENCOUNTER — OFFICE VISIT (OUTPATIENT)
Dept: NEUROSURGERY | Facility: CLINIC | Age: 47
End: 2017-08-02

## 2017-08-02 VITALS
DIASTOLIC BLOOD PRESSURE: 66 MMHG | SYSTOLIC BLOOD PRESSURE: 138 MMHG | WEIGHT: 280 LBS | HEART RATE: 88 BPM | HEIGHT: 64 IN | BODY MASS INDEX: 47.8 KG/M2

## 2017-08-02 DIAGNOSIS — M51.36 DEGENERATION OF LUMBAR INTERVERTEBRAL DISC: Primary | ICD-10-CM

## 2017-08-02 DIAGNOSIS — Z98.1 HISTORY OF SPINAL FUSION: ICD-10-CM

## 2017-08-02 PROCEDURE — 99213 OFFICE O/P EST LOW 20 MIN: CPT | Performed by: NEUROLOGICAL SURGERY

## 2017-08-02 RX ORDER — OXYCODONE AND ACETAMINOPHEN 10; 325 MG/1; MG/1
TABLET ORAL
Refills: 0 | COMMUNITY
Start: 2017-05-04 | End: 2017-08-02

## 2017-08-02 RX ORDER — WARFARIN SODIUM 2 MG/1
TABLET ORAL
Refills: 0 | COMMUNITY
Start: 2017-04-27 | End: 2017-08-02

## 2017-08-02 RX ORDER — CELECOXIB 200 MG/1
CAPSULE ORAL
Refills: 0 | COMMUNITY
Start: 2017-04-27 | End: 2017-08-02

## 2017-08-02 RX ORDER — HYDROXYZINE HYDROCHLORIDE 25 MG/1
TABLET, FILM COATED ORAL
Refills: 0 | COMMUNITY
Start: 2017-04-27 | End: 2017-08-02

## 2017-08-02 NOTE — PROGRESS NOTES
Subjective   Patient ID: Marisol Carrillo is a 47 y.o. female is here today for follow-up of back pain.    At the patient's last visit, she was encouraged to proceed with her knee surgery and focus on weight loss.    Today, the patient notes that she has underwent two left knee surgeries since her last visit with Dr. Reeves.  She notes that after her first surgery, her knee cap became displaced and they had to go back in and re-do the surgery.    The patient notes that her back pain is persistent at this time. She notes that her gait is off because of her left knee pain and believes this contributes to her pain symptoms.  She notes persistent pain in both legs, but states that her left knee is worse, especially since her last knee surgery.     The patient notes that she was started on MDP over the weekend by Dr. Reeves for her knee pain.     Back Pain   This is a chronic problem. The current episode started more than 1 month ago. The problem occurs daily. The problem is unchanged. Pertinent negatives include no fever.       The following portions of the patient's history were reviewed and updated as appropriate: allergies, current medications, past family history, past medical history, past social history, past surgical history and problem list.    Review of Systems   Constitutional: Negative for fever.   Musculoskeletal: Positive for back pain.   Neurological: Negative for syncope.   All other systems reviewed and are negative.    She is with her daughter.  It is been about 6 months since I have seen her.  It is closing in on 2 years since her L5-S1 fusion.  She's had 2 knee surgeries since I last saw her.  She had a total knee replacement but there were some problems with require another operation.  She is in rehabilitation for the left knee.  She still continues to have back pain and radiating left buttock and leg pain which is from her spine problem.  She is quite discouraged but I encouraged her and instructed  her to continue working with Dr. Reeves.  We can try some aquatic therapy as long she clears up with Dr. Reeves.  As for the residual nerve pain, she could not tolerate gabapentin.  We discussed Topamax as an alternative.  She will think about it.  We also discussed the Lyrica but I think the former suction probably a better medication for her to try she's going to try anything for the persistent sciatic pain.  We'll see her in 6 months.      Objective   Physical Exam   Constitutional: She is oriented to person, place, and time. She appears well-developed and well-nourished.   HENT:   Head: Normocephalic and atraumatic.   Eyes: Conjunctivae and EOM are normal. Pupils are equal, round, and reactive to light.   Fundoscopic exam:       The right eye shows no papilledema. The right eye shows venous pulsations.        The left eye shows no papilledema. The left eye shows venous pulsations.   Neck: Carotid bruit is not present.   Neurological: She is oriented to person, place, and time. She has a normal Finger-Nose-Finger Test and a normal Heel to Shin Test. Gait normal.   Reflex Scores:       Tricep reflexes are 2+ on the right side and 2+ on the left side.       Bicep reflexes are 2+ on the right side and 2+ on the left side.       Brachioradialis reflexes are 2+ on the right side and 2+ on the left side.       Patellar reflexes are 2+ on the right side and 2+ on the left side.       Achilles reflexes are 2+ on the right side and 2+ on the left side.  Psychiatric: Her speech is normal.     Neurologic Exam     Mental Status   Oriented to person, place, and time.   Registration of memory: Good recent and remote memory.   Attention: normal. Concentration: normal.   Speech: speech is normal   Level of consciousness: alert  Knowledge: consistent with education.     Cranial Nerves     CN II   Visual fields full to confrontation.   Visual acuity: normal    CN III, IV, VI   Pupils are equal, round, and reactive to  light.  Extraocular motions are normal.     CN V   Facial sensation intact.   Right corneal reflex: normal  Left corneal reflex: normal    CN VII   Facial expression full, symmetric.   Right facial weakness: none  Left facial weakness: none    CN VIII   Hearing: intact    CN IX, X   Palate: symmetric    CN XI   Right sternocleidomastoid strength: normal  Left sternocleidomastoid strength: normal    CN XII   Tongue: not atrophic  Tongue deviation: none    Motor Exam   Muscle bulk: normal  Right arm tone: normal  Left arm tone: normal  Right leg tone: normal  Left leg tone: normal    Strength   Strength 5/5 except as noted.     Sensory Exam   Light touch normal.     Gait, Coordination, and Reflexes     Gait  Gait: normal    Coordination   Finger to nose coordination: normal  Heel to shin coordination: normal    Reflexes   Right brachioradialis: 2+  Left brachioradialis: 2+  Right biceps: 2+  Left biceps: 2+  Right triceps: 2+  Left triceps: 2+  Right patellar: 2+  Left patellar: 2+  Right achilles: 2+  Left achilles: 2+  Right : 2+  Left : 2+      Assessment/Plan   Independent Review of Radiographic Studies:    I reviewed the myelogram done 12/2/16 which shows a fused segment at L5-S1 with no recurrent compression.  Mild disc bulging at L4-L5 only.  Agree with the result.      Medical Decision Making:    She will continue working with the rehabilitation for the knee.  I gave her referral for aquatic therapy which may help some of her back symptoms.  But I told her to speak with Dr. Reeves about whether or not it's okay for her to do this.  I suspect it is.  We'll see her in 6 months.      Marisol was seen today for back pain.    Diagnoses and all orders for this visit:    Degeneration of lumbar intervertebral disc  -     Ambulatory Referral to Physical Therapy Aquatics    History of spinal fusion  -     Ambulatory Referral to Physical Therapy Aquatics    Return in about 6 months (around 2/2/2018).

## 2017-08-10 ENCOUNTER — OFFICE VISIT (OUTPATIENT)
Dept: FAMILY MEDICINE CLINIC | Facility: CLINIC | Age: 47
End: 2017-08-10

## 2017-08-10 VITALS
RESPIRATION RATE: 16 BRPM | SYSTOLIC BLOOD PRESSURE: 160 MMHG | BODY MASS INDEX: 48.14 KG/M2 | TEMPERATURE: 98.5 F | OXYGEN SATURATION: 98 % | HEART RATE: 93 BPM | HEIGHT: 64 IN | DIASTOLIC BLOOD PRESSURE: 100 MMHG | WEIGHT: 282 LBS

## 2017-08-10 DIAGNOSIS — I10 ESSENTIAL HYPERTENSION: Primary | ICD-10-CM

## 2017-08-10 DIAGNOSIS — E03.9 HYPOTHYROIDISM, UNSPECIFIED TYPE: ICD-10-CM

## 2017-08-10 DIAGNOSIS — F33.1 MODERATE EPISODE OF RECURRENT MAJOR DEPRESSIVE DISORDER (HCC): ICD-10-CM

## 2017-08-10 DIAGNOSIS — F41.9 ANXIETY: ICD-10-CM

## 2017-08-10 PROCEDURE — 99213 OFFICE O/P EST LOW 20 MIN: CPT | Performed by: PHYSICIAN ASSISTANT

## 2017-08-10 RX ORDER — AMLODIPINE AND VALSARTAN 10; 320 MG/1; MG/1
1 TABLET ORAL DAILY
Qty: 90 TABLET | Refills: 1 | Status: SHIPPED | OUTPATIENT
Start: 2017-08-10 | End: 2017-11-17 | Stop reason: SDUPTHER

## 2017-08-10 RX ORDER — ATORVASTATIN CALCIUM 40 MG/1
40 TABLET, FILM COATED ORAL EVERY EVENING
Qty: 90 TABLET | Refills: 3 | Status: SHIPPED | OUTPATIENT
Start: 2017-08-10 | End: 2017-11-17 | Stop reason: SDUPTHER

## 2017-08-10 RX ORDER — SERTRALINE HYDROCHLORIDE 100 MG/1
TABLET, FILM COATED ORAL
Qty: 180 TABLET | Refills: 3 | Status: SHIPPED | OUTPATIENT
Start: 2017-08-10 | End: 2017-11-17 | Stop reason: SDUPTHER

## 2017-08-10 RX ORDER — NEBIVOLOL 5 MG/1
5 TABLET ORAL EVERY EVENING
Qty: 90 TABLET | Refills: 1 | Status: SHIPPED | OUTPATIENT
Start: 2017-08-10 | End: 2017-11-17 | Stop reason: SDUPTHER

## 2017-08-10 RX ORDER — HYDROXYZINE PAMOATE 25 MG/1
25 CAPSULE ORAL 3 TIMES DAILY PRN
Qty: 45 CAPSULE | Refills: 2 | Status: SHIPPED | OUTPATIENT
Start: 2017-08-10 | End: 2017-11-17 | Stop reason: SDUPTHER

## 2017-08-10 RX ORDER — LEVOTHYROXINE SODIUM 0.05 MG/1
50 TABLET ORAL DAILY
Qty: 90 TABLET | Refills: 3 | Status: SHIPPED | OUTPATIENT
Start: 2017-08-10 | End: 2017-11-17 | Stop reason: SDUPTHER

## 2017-08-10 NOTE — PROGRESS NOTES
Subjective   Marisol Carrillo is a 47 y.o. female.     History of Present Illness   Marisol Carrillo 47 y.o. female who presents today for routine follow up check and medication refills.  she has a history of   Patient Active Problem List   Diagnosis   • Essential familial hypercholesterolemia   • Hypertension   • Hypothyroidism   • Adiposity   • Vitamin deficiency   • Impaired fasting glucose   • Vitamin D deficiency   • Lumbosacral radiculopathy   • Gastroesophageal reflux disease   • Constipation   • Diarrhea   • Dysphagia   • Fatigue   • Heartburn   • Lumbar disc herniation with radiculopathy   • Nocturnal cough   • Pain in extremity   • Regurgitation   • Vomiting   • Anxiety   • Depression   • Degeneration of lumbar intervertebral disc   • Spinal headache   • Chronic bilateral low back pain with bilateral sciatica   • Disc disease, degenerative, lumbar or lumbosacral   • Primary localized osteoarthritis of left knee   • History of spinal fusion   .  Since the last visit, she has overall felt tired.  She has been to ortho and see bp has been great..  she has been compliant with current medications but out of Bystolic and bp is up today.  Out for 10 days  The patient denies medication side effects.  She will restart Bystolic and email me results  Lab Results   Component Value Date    CHOL 176 03/21/2017     Lab Results   Component Value Date    TRIG 132 03/21/2017    TRIG 105 07/05/2016     Lab Results   Component Value Date    HDL 59 03/21/2017    HDL 82 (H) 07/05/2016     Lab Results   Component Value Date    LDLCALC 91 03/21/2017     Lab Results   Component Value Date    LDL 70 07/05/2016     No results found for: HDLLDLRATIO  No components found for: CHOLHDL  She is on Atorvastatin  Lab Results   Component Value Date    GLUCOSE 138 (H) 03/30/2017    BUN 17 03/30/2017    CREATININE 0.86 03/30/2017    EGFRIFNONA 71 03/30/2017    BCR 19.8 03/30/2017    K 4.3 03/30/2017    CO2 21.0 (L) 03/30/2017    CALCIUM 8.8  03/30/2017    ALBUMIN 4.10 03/21/2017    LABIL2 1.1 03/21/2017    AST 24 03/21/2017    ALT 21 03/21/2017       Lab Results   Component Value Date    TSH 1.790 03/21/2017     Has had 2 periods in 6mos and heavier    Marisol Lorena female 47 y.o. who presents today for follow up of Depression and Anxiety.  She reports medication is helping and this is her best med.  I will let her try Vistaril for anxiety and trouble sleeping.  She still has anxiety and depression. Onset of symptoms was approximately several years ago.  She denies current suicidal and homicidal ideation. Risk factors are family history of anxiety and or depression, lifestyle of multiple roles and chronic pain.  Previous treatment includes current Rx.  She complains of the following medication side effects: none.  The patient is currently in counseling..    Still having knee pain; getting second opinion;      The following portions of the patient's history were reviewed and updated as appropriate: allergies, current medications, past family history, past medical history, past social history, past surgical history and problem list.    Review of Systems   Constitutional: Positive for fatigue. Negative for activity change, appetite change and unexpected weight change.   HENT: Negative for nosebleeds and trouble swallowing.    Eyes: Negative for pain and visual disturbance.   Respiratory: Negative for chest tightness, shortness of breath and wheezing.    Cardiovascular: Negative for chest pain and palpitations.   Gastrointestinal: Negative for abdominal pain and blood in stool.   Endocrine: Negative.    Genitourinary: Negative for difficulty urinating and hematuria.   Musculoskeletal: Positive for arthralgias, back pain, gait problem, joint swelling and myalgias.   Skin: Negative for color change and rash.   Allergic/Immunologic: Negative.    Neurological: Negative for syncope and speech difficulty.   Hematological: Negative for adenopathy.    Psychiatric/Behavioral: Positive for agitation, decreased concentration, dysphoric mood and sleep disturbance. Negative for confusion. The patient is nervous/anxious.    All other systems reviewed and are negative.      Objective   Physical Exam   Constitutional: She is oriented to person, place, and time. She appears well-developed and well-nourished. No distress.   HENT:   Head: Normocephalic and atraumatic.   Eyes: Conjunctivae and EOM are normal. Pupils are equal, round, and reactive to light. Right eye exhibits no discharge. Left eye exhibits no discharge. No scleral icterus.   Neck: Normal range of motion. Neck supple. No tracheal deviation present. No thyromegaly present.   Cardiovascular: Normal rate, regular rhythm, normal heart sounds, intact distal pulses and normal pulses.  Exam reveals no gallop.    No murmur heard.  Pulmonary/Chest: Effort normal and breath sounds normal. No respiratory distress. She has no wheezes. She has no rales.   Musculoskeletal: Normal range of motion.   Neurological: She is alert and oriented to person, place, and time. She exhibits normal muscle tone. Coordination normal.   Skin: Skin is warm. No rash noted. No erythema. No pallor.   Psychiatric: She has a normal mood and affect. Her behavior is normal. Judgment and thought content normal.   Nursing note and vitals reviewed.      Assessment/Plan   Problems Addressed this Visit        Cardiovascular and Mediastinum    Hypertension - Primary    Relevant Medications    nebivolol (BYSTOLIC) 5 MG tablet    amLODIPine-valsartan (EXFORGE)  MG per tablet       Endocrine    Hypothyroidism    Relevant Medications    nebivolol (BYSTOLIC) 5 MG tablet    levothyroxine (SYNTHROID, LEVOTHROID) 50 MCG tablet       Other    Anxiety    Depression    Relevant Medications    hydrOXYzine (VISTARIL) 25 MG capsule    sertraline (ZOLOFT) 100 MG tablet

## 2017-08-10 NOTE — PATIENT INSTRUCTIONS
Low glycemic index diet  Exercise 30 minutes most days of the week  Make sure you get results on any labs or tests we ordered today  We discussed medications and how to take them as prescribed  Sleep 6-8 hours each night if possible  If you have not signed up for [employer], please activate your code ASAP from your After Visit Summary today    LDL goal <100  LDL goal if heart disease <70  HDL goal >60  Triglyceride goal <150  BP goal =<130/80  Fasting glucose <100

## 2017-08-29 ENCOUNTER — PREP FOR SURGERY (OUTPATIENT)
Dept: OTHER | Facility: HOSPITAL | Age: 47
End: 2017-08-29

## 2017-08-29 DIAGNOSIS — R10.13 EPIGASTRIC PAIN: Primary | ICD-10-CM

## 2017-08-30 ENCOUNTER — HOSPITAL ENCOUNTER (OUTPATIENT)
Dept: PHYSICAL THERAPY | Facility: HOSPITAL | Age: 47
Setting detail: THERAPIES SERIES
Discharge: HOME | End: 2017-08-30
Attending: NEUROLOGICAL SURGERY

## 2017-08-30 DIAGNOSIS — M54.5 CHRONIC BILATERAL LOW BACK PAIN, WITH SCIATICA PRESENCE UNSPECIFIED: ICD-10-CM

## 2017-08-30 DIAGNOSIS — R26.2 DIFFICULTY WALKING: Primary | ICD-10-CM

## 2017-08-30 DIAGNOSIS — G89.29 CHRONIC BILATERAL LOW BACK PAIN, WITH SCIATICA PRESENCE UNSPECIFIED: ICD-10-CM

## 2017-08-30 PROBLEM — R10.13 EPIGASTRIC PAIN: Status: ACTIVE | Noted: 2017-08-30

## 2017-08-30 PROCEDURE — 97110 THERAPEUTIC EXERCISES: CPT

## 2017-08-30 PROCEDURE — 97162 PT EVAL MOD COMPLEX 30 MIN: CPT

## 2017-09-01 ENCOUNTER — ANESTHESIA (OUTPATIENT)
Dept: GASTROENTEROLOGY | Facility: HOSPITAL | Age: 47
End: 2017-09-01

## 2017-09-01 ENCOUNTER — ANESTHESIA EVENT (OUTPATIENT)
Dept: GASTROENTEROLOGY | Facility: HOSPITAL | Age: 47
End: 2017-09-01

## 2017-09-01 ENCOUNTER — HOSPITAL ENCOUNTER (OUTPATIENT)
Facility: HOSPITAL | Age: 47
Setting detail: HOSPITAL OUTPATIENT SURGERY
Discharge: HOME | End: 2017-09-01
Attending: SURGERY | Admitting: SURGERY

## 2017-09-01 VITALS
WEIGHT: 282.38 LBS | SYSTOLIC BLOOD PRESSURE: 127 MMHG | DIASTOLIC BLOOD PRESSURE: 79 MMHG | OXYGEN SATURATION: 98 % | TEMPERATURE: 97.9 F | HEART RATE: 67 BPM | HEIGHT: 64 IN | RESPIRATION RATE: 16 BRPM | BODY MASS INDEX: 48.21 KG/M2

## 2017-09-01 DIAGNOSIS — R10.13 EPIGASTRIC PAIN: ICD-10-CM

## 2017-09-01 PROCEDURE — 25010000002 PROPOFOL 10 MG/ML EMULSION: Performed by: ANESTHESIOLOGY

## 2017-09-01 PROCEDURE — 88305 TISSUE EXAM BY PATHOLOGIST: CPT | Performed by: SURGERY

## 2017-09-01 PROCEDURE — 88312 SPECIAL STAINS GROUP 1: CPT | Performed by: SURGERY

## 2017-09-01 RX ORDER — PROPOFOL 10 MG/ML
VIAL (ML) INTRAVENOUS CONTINUOUS PRN
Status: DISCONTINUED | OUTPATIENT
Start: 2017-09-01 | End: 2017-09-01 | Stop reason: SURG

## 2017-09-01 RX ORDER — LIDOCAINE HYDROCHLORIDE 20 MG/ML
INJECTION, SOLUTION INFILTRATION; PERINEURAL AS NEEDED
Status: DISCONTINUED | OUTPATIENT
Start: 2017-09-01 | End: 2017-09-01 | Stop reason: SURG

## 2017-09-01 RX ORDER — SODIUM CHLORIDE 0.9 % (FLUSH) 0.9 %
3 SYRINGE (ML) INJECTION AS NEEDED
Status: DISCONTINUED | OUTPATIENT
Start: 2017-09-01 | End: 2017-09-01 | Stop reason: HOSPADM

## 2017-09-01 RX ORDER — PROPOFOL 10 MG/ML
VIAL (ML) INTRAVENOUS AS NEEDED
Status: DISCONTINUED | OUTPATIENT
Start: 2017-09-01 | End: 2017-09-01 | Stop reason: SURG

## 2017-09-01 RX ORDER — LIDOCAINE HYDROCHLORIDE 10 MG/ML
0.5 INJECTION, SOLUTION INFILTRATION; PERINEURAL ONCE AS NEEDED
Status: DISCONTINUED | OUTPATIENT
Start: 2017-09-01 | End: 2017-09-01 | Stop reason: HOSPADM

## 2017-09-01 RX ORDER — SODIUM CHLORIDE, SODIUM LACTATE, POTASSIUM CHLORIDE, CALCIUM CHLORIDE 600; 310; 30; 20 MG/100ML; MG/100ML; MG/100ML; MG/100ML
1000 INJECTION, SOLUTION INTRAVENOUS CONTINUOUS PRN
Status: DISCONTINUED | OUTPATIENT
Start: 2017-09-01 | End: 2017-09-01 | Stop reason: HOSPADM

## 2017-09-01 RX ADMIN — SODIUM CHLORIDE, POTASSIUM CHLORIDE, SODIUM LACTATE AND CALCIUM CHLORIDE 1000 ML: 600; 310; 30; 20 INJECTION, SOLUTION INTRAVENOUS at 07:14

## 2017-09-01 RX ADMIN — LIDOCAINE HYDROCHLORIDE 15 ML: 20 JELLY TOPICAL at 07:35

## 2017-09-01 RX ADMIN — PROPOFOL 100 MCG/KG/MIN: 10 INJECTION, EMULSION INTRAVENOUS at 07:37

## 2017-09-01 RX ADMIN — PROPOFOL 70 MG: 10 INJECTION, EMULSION INTRAVENOUS at 07:40

## 2017-09-01 RX ADMIN — PROPOFOL 100 MG: 10 INJECTION, EMULSION INTRAVENOUS at 07:37

## 2017-09-01 RX ADMIN — LIDOCAINE HYDROCHLORIDE 30 MG: 20 INJECTION, SOLUTION INFILTRATION; PERINEURAL at 07:36

## 2017-09-01 RX ADMIN — SODIUM CHLORIDE, POTASSIUM CHLORIDE, SODIUM LACTATE AND CALCIUM CHLORIDE: 600; 310; 30; 20 INJECTION, SOLUTION INTRAVENOUS at 07:35

## 2017-09-01 NOTE — ANESTHESIA PREPROCEDURE EVALUATION
Anesthesia Evaluation     Patient summary reviewed and Nursing notes reviewed   history of anesthetic complications:         Airway   Mallampati: I  TM distance: <3 FB  Neck ROM: full  no difficulty expected  Dental - normal exam     Pulmonary - normal exam   (+) pneumonia ,   Cardiovascular - normal exam    (+) hypertension, hyperlipidemia      Neuro/Psych  (+) headaches, numbness, psychiatric history,    GI/Hepatic/Renal/Endo    (+) obesity, morbid obesity, GERD, hypothyroidism,     Musculoskeletal     Abdominal  - normal exam    Bowel sounds: normal.   Substance History - negative use     OB/GYN negative ob/gyn ROS         Other   (+) arthritis                                     Anesthesia Plan    ASA 3     MAC     Anesthetic plan and risks discussed with patient.

## 2017-09-01 NOTE — PLAN OF CARE
Problem: Patient Care Overview (Adult)  Goal: Plan of Care Review  Outcome: Ongoing (interventions implemented as appropriate)    09/01/17 0705   Coping/Psychosocial Response Interventions   Plan Of Care Reviewed With patient   Patient Care Overview   Progress progress toward functional goals as expected       Goal: Adult Individualization and Mutuality  Outcome: Ongoing (interventions implemented as appropriate)    09/01/17 0705   Individualization   Patient Specific Preferences Marisol       Goal: Discharge Needs Assessment  Outcome: Ongoing (interventions implemented as appropriate)    09/01/17 0705   Discharge Needs Assessment   Concerns To Be Addressed denies needs/concerns at this time   Discharge Disposition home or self-care   Living Environment   Transportation Available car         Problem: GI Endoscopy (Adult)  Goal: Signs and Symptoms of Listed Potential Problems Will be Absent or Manageable (GI Endoscopy)  Outcome: Ongoing (interventions implemented as appropriate)    09/01/17 0705   GI Endoscopy   Problems Assessed (GI Endoscopy) all   Problems Present (GI Endoscopy) none

## 2017-09-01 NOTE — ANESTHESIA POSTPROCEDURE EVALUATION
Patient: Marisol Carrillo    Procedure Summary     Date Anesthesia Start Anesthesia Stop Room / Location    09/01/17 0733 0749  [facility] /  [facility]       Procedure Diagnosis Surgeon Provider    ESOPHAGOGASTRODUODENOSCOPY WITH BIOPSY (N/A Esophagus) Epigastric pain  (Epigastric pain [R10.13]) MD Felix Dixon MD          Anesthesia Type: MAC  Last vitals  BP   127/79 (09/01/17 0813)    Temp   36.6 °C (97.9 °F) (09/01/17 0748)    Pulse   67 (09/01/17 0813)   Resp   16 (09/01/17 0813)    SpO2   98 % (09/01/17 0813)      Post Anesthesia Care and Evaluation    Patient location during evaluation: PACU  Patient participation: complete - patient participated  Level of consciousness: awake and alert  Pain score: 0  Pain management: adequate  Airway patency: patent  Anesthetic complications: No anesthetic complications  PONV Status: none  Cardiovascular status: acceptable  Respiratory status: acceptable  Hydration status: acceptable

## 2017-09-05 LAB
CYTO UR: NORMAL
LAB AP CASE REPORT: NORMAL
Lab: NORMAL
PATH REPORT.FINAL DX SPEC: NORMAL
PATH REPORT.GROSS SPEC: NORMAL

## 2017-09-13 ENCOUNTER — TRANSCRIBE ORDERS (OUTPATIENT)
Dept: ADMINISTRATIVE | Facility: HOSPITAL | Age: 47
End: 2017-09-13

## 2017-09-13 DIAGNOSIS — R10.11 RUQ PAIN: Primary | ICD-10-CM

## 2017-09-14 ENCOUNTER — HOSPITAL ENCOUNTER (OUTPATIENT)
Dept: ULTRASOUND IMAGING | Facility: HOSPITAL | Age: 47
Discharge: HOME | End: 2017-09-14
Attending: SURGERY | Admitting: SURGERY

## 2017-09-14 DIAGNOSIS — R10.11 RUQ PAIN: ICD-10-CM

## 2017-09-14 PROCEDURE — 76705 ECHO EXAM OF ABDOMEN: CPT

## 2017-09-18 ENCOUNTER — TRANSCRIBE ORDERS (OUTPATIENT)
Dept: ADMINISTRATIVE | Facility: HOSPITAL | Age: 47
End: 2017-09-18

## 2017-09-18 DIAGNOSIS — R10.11 RIGHT UPPER QUADRANT ABDOMINAL PAIN: Primary | ICD-10-CM

## 2017-09-21 ENCOUNTER — APPOINTMENT (OUTPATIENT)
Dept: NUCLEAR MEDICINE | Facility: HOSPITAL | Age: 47
End: 2017-09-21
Attending: SURGERY

## 2017-09-23 RX ORDER — OMEPRAZOLE 40 MG/1
40 CAPSULE, DELAYED RELEASE ORAL DAILY
Qty: 30 CAPSULE | Refills: 1 | Status: SHIPPED | OUTPATIENT
Start: 2017-09-23 | End: 2017-11-17

## 2017-09-23 RX ORDER — SUCRALFATE 1 G/1
1 TABLET ORAL 4 TIMES DAILY
Qty: 120 TABLET | Refills: 1 | Status: SHIPPED | OUTPATIENT
Start: 2017-09-23 | End: 2017-11-17

## 2017-09-27 ENCOUNTER — APPOINTMENT (OUTPATIENT)
Dept: PHYSICAL THERAPY | Facility: HOSPITAL | Age: 47
End: 2017-09-27

## 2017-10-04 ENCOUNTER — HOSPITAL ENCOUNTER (OUTPATIENT)
Dept: PHYSICAL THERAPY | Facility: HOSPITAL | Age: 47
Setting detail: THERAPIES SERIES
Discharge: HOME | End: 2017-10-04

## 2017-10-04 DIAGNOSIS — M25.562 CHRONIC PAIN OF LEFT KNEE: ICD-10-CM

## 2017-10-04 DIAGNOSIS — R26.2 DIFFICULTY WALKING: Primary | ICD-10-CM

## 2017-10-04 DIAGNOSIS — M54.5 CHRONIC BILATERAL LOW BACK PAIN, WITH SCIATICA PRESENCE UNSPECIFIED: ICD-10-CM

## 2017-10-04 DIAGNOSIS — G89.29 CHRONIC BILATERAL LOW BACK PAIN, WITH SCIATICA PRESENCE UNSPECIFIED: ICD-10-CM

## 2017-10-04 DIAGNOSIS — M51.16 LUMBAR DISC HERNIATION WITH RADICULOPATHY: ICD-10-CM

## 2017-10-04 DIAGNOSIS — G89.29 CHRONIC PAIN OF LEFT KNEE: ICD-10-CM

## 2017-10-04 PROCEDURE — 97113 AQUATIC THERAPY/EXERCISES: CPT | Performed by: PHYSICAL THERAPIST

## 2017-10-04 NOTE — THERAPY PROGRESS REPORT/RE-CERT
Outpatient Physical Therapy Ortho Re-Assessment  [facility]     Patient Name: Marisol Carrillo  : 1970  MRN: [id number]  Today's Date: 10/4/2017      Visit Date: 10/04/2017    Patient Active Problem List   Diagnosis   • Essential familial hypercholesterolemia   • Hypertension   • Hypothyroidism   • Adiposity   • Vitamin deficiency   • Impaired fasting glucose   • Vitamin D deficiency   • Lumbosacral radiculopathy   • Gastroesophageal reflux disease   • Constipation   • Diarrhea   • Dysphagia   • Fatigue   • Heartburn   • Lumbar disc herniation with radiculopathy   • Nocturnal cough   • Pain in extremity   • Regurgitation   • Vomiting   • Anxiety   • Depression   • Degeneration of lumbar intervertebral disc   • Spinal headache   • Chronic bilateral low back pain with bilateral sciatica   • Disc disease, degenerative, lumbar or lumbosacral   • Primary localized osteoarthritis of left knee   • History of spinal fusion   • Epigastric pain        Past Medical History:   Diagnosis Date   • Alopecia    • Anxiety and depression    • Arthritis     OSTEO   • Balance problem     FOOTDROP LEFT FOOT   • Bilateral knee pain    • Breast mass     RIGHT, MD WATCHING.   • Chronic low back pain    • Depression    • GERD (gastroesophageal reflux disease)    • Hyperlipidemia    • Hypertension    • Hypothyroidism    • Impaired fasting glucose    • Kidney calculus     HISTORY OF   • Pneumonia     2015 S/P LUMBAR FUSION   • Sciatica    • Spinal headache     AFTER MYELOGRAM. BLOOD PATCH X2   • Vitamin D deficiency         Past Surgical History:   Procedure Laterality Date   •  SECTION     • DILATION AND CURETTAGE, DIAGNOSTIC / THERAPEUTIC     • ENDOSCOPY N/A 2017    Procedure: ESOPHAGOGASTRODUODENOSCOPY WITH BIOPSY;  Surgeon: Jam Ballard MD;  Location: [facility] ENDOSCOPY;  Service:    • KNEE ARTHROSCOPY Left     REPAIR OF MENISUCS   • LAPAROSCOPIC GASTRIC BANDING     • LITHOTRIPSY     • LUMBAR FUSION      L5-S1.  WITH HARDWARE   • MICRODISCECTOMY LUMBAR     • [place] TOTAL KNEE ARTHROPLASTY Left 3/29/2017    Procedure: TOTAL KNEE ARTHROPLASTY;  Surgeon: Zacarias Reeves MD;  Location: [facility] OR;  Service: Orthopedics   • TONSILLECTOMY         Visit Dx:     ICD-10-CM ICD-9-CM   1. Difficulty walking R26.2 719.7   2. Chronic bilateral low back pain, with sciatica presence unspecified M54.5 724.2    G89.29 338.29   3. Chronic pain of left knee M25.562 719.46    G89.29 338.29   4. Lumbar disc herniation with radiculopathy M51.16 722.10                                     PT OP Goals       10/04/17 1000       PT Short Term Goals    STG Date to Achieve 09/30/17  -JANI     STG 1 Pt to enter exit pool with rail and supervision.   -JANI     STG 1 Progress Met  -JANI     STG 2 Pt to tolerate 45 min aqua session without exacerbation of back or knee condition.   -JANI     STG 2 Progress Met  -JANI     STG 3 Pt to demonstrate basic flexibility ex in aqua to progress to self guided aqua ex outside of therapy 1x a week.  -JANI     STG 3 Progress Ongoing  -JANI     STG 4 Pt to progress to sleeping in bed 1 hour at night.   -JANI     STG 4 Progress Ongoing  -JANI     STG 5 Pt to improve to 30 sec sit to stant of 5 reps. (could only do 3 on eval/painful)   -JANI     STG 5 Progress Ongoing  -JANI     STG 5 Progress Comments Limited by ledt knee pain and weakness.  -JANI     Long Term Goals    LTG Date to Achieve 10/30/17  -JANI     LTG 1 Pt to progress to safe enter exit pool with rail/independently.   -JANI     LTG 1 Progress Met  -JANI     LTG 2 Pt to improve ability to don/socks/shoes without having to lift pants leg.   -JANI     LTG 2 Progress Ongoing  -JANI     LTG 3 pt to walk into therapy clinic with no seated rest.   -JANI     LTG 3 Progress Met  -JANI     LTG 4 Pt to advance to gait with cane spontaneously step through, 2 point and minimal limb.   -JANI     LTG 4 Progress Met  -JANI     LTG 5 Pt to improve modified oswestry to 65%   -JANI     LTG 5 Progress Ongoing  -JANI      LTG 5 Progress Comments 84% on 10/4/17  -JANI     LTG 6 Pt independent with self manage of condition with sefl guided aqua program.   -JANI     LTG 6 Progress Ongoing  -JANI       User Key  (r) = Recorded By, (t) = Taken By, (c) = Cosigned By    Initials Name Provider Type    JANI Carranza, PT Physical Therapist                PT Assessment/Plan       10/04/17 1641       PT Assessment    Functional Limitations Impaired locomotion;Limitations in community activities;Limitations in functional capacity and performance;Limitation in home management;Performance in self-care ADL;Decreased safety during functional activities;Impaired gait  -JANI     Impairments Balance;Gait;Pain;Muscle strength;Edema;Posture;Range of motion;Impaired muscle length;Impaired muscle endurance  -JANI     Assessment Comments Marisol was evaluated on 8/30/2017 for her back pain with an order fro aquatic PT.  She is here for her first aquatic session 5 weeks later. She was delayed with her scheduling as she requested a specific time frame and therapist.  At this time she has progressed from her walker to a cane.  Her walking tolerance has improved.  She is also concerned about her balance and her condition is complicated by L foot drop and weakness in the L LE following  2 knee surgeries.  Oswestry  score today is 84%.  -JANI     Please refer to paper survey for additional self-reported information Yes  -JANI     Rehab Potential Good  -JANI     Patient/caregiver participated in establishment of treatment plan and goals Yes  -JANI     Patient would benefit from skilled therapy intervention Yes  -JANI     PT Plan    PT Frequency 2x/week  -JANI     Predicted Duration of Therapy Intervention (days/wks) 4 weeks  -JANI     Planned CPT's? PT AQUATIC THERAPY EA 15 MIN: 61855  -JANI     PT Plan Comments Aquatic therapy for LBP.  -JANI       User Key  (r) = Recorded By, (t) = Taken By, (c) = Cosigned By    Initials Name Provider Type    JANI Carranza, PT Physical Therapist                   Exercises       10/04/17 1000          Subjective Comments    Subjective Comments My balance is still not good.  -JANI      Subjective Pain    Able to rate subjective pain? yes  -JANI      Pre-Treatment Pain Level 5  -JANI      Post-Treatment Pain Level 5  -JANI      Subjective Pain Comment Back pain with pain down both legs (laterally) to the ankles.  -JANI      Aquatics    Aquatics performed? Yes  -JANI      Aquatics LE    Water Walk forward;side;backward   100 ft each  -JANI      Stretch 1 Hamstrings 20 sec. X 2  -JANI      Stretch 2 Calf 20 sec X 3  -JANI      Stretch Other 1 Weight shifting Side/Side Fwd/Bck  -JANI      Clams Tuck Ups X 12  -JANI      Bicycle 2 min  -JANI      Exercise 1    Exercise Name 1 Re-assessment completed.  -JANI        User Key  (r) = Recorded By, (t) = Taken By, (c) = Cosigned By    Initials Name Provider Type    JANI Carranza, PT Physical Therapist                              Outcome Measures       10/04/17 1600          Modified Oswestry    Modified Oswestry Score/Comments 84%  -JANI        User Key  (r) = Recorded By, (t) = Taken By, (c) = Cosigned By    Initials Name Provider Type    JANI Carranza, PT Physical Therapist            Time Calculation:   Start Time: 0947  Stop Time: 1030  Time Calculation (min): 43 min     Therapy Charges for Today     Code Description Service Date Service Provider Modifiers Qty    95733369695  PT AQUATIC THERAPY EA 15 MIN 10/4/2017 Ivan Carranza, PT GP 3                    Ivan Carranza, PT  10/4/2017

## 2017-10-10 ENCOUNTER — HOSPITAL ENCOUNTER (OUTPATIENT)
Dept: PHYSICAL THERAPY | Facility: HOSPITAL | Age: 47
Setting detail: THERAPIES SERIES
Discharge: HOME | End: 2017-10-10

## 2017-10-10 DIAGNOSIS — G89.29 CHRONIC BILATERAL LOW BACK PAIN, WITH SCIATICA PRESENCE UNSPECIFIED: Primary | ICD-10-CM

## 2017-10-10 DIAGNOSIS — M25.562 CHRONIC PAIN OF LEFT KNEE: ICD-10-CM

## 2017-10-10 DIAGNOSIS — G89.29 CHRONIC PAIN OF LEFT KNEE: ICD-10-CM

## 2017-10-10 DIAGNOSIS — M54.5 CHRONIC BILATERAL LOW BACK PAIN, WITH SCIATICA PRESENCE UNSPECIFIED: Primary | ICD-10-CM

## 2017-10-10 DIAGNOSIS — R26.2 DIFFICULTY WALKING: ICD-10-CM

## 2017-10-10 DIAGNOSIS — M51.16 LUMBAR DISC HERNIATION WITH RADICULOPATHY: ICD-10-CM

## 2017-10-10 PROCEDURE — 97113 AQUATIC THERAPY/EXERCISES: CPT | Performed by: PHYSICAL THERAPIST

## 2017-10-10 NOTE — THERAPY TREATMENT NOTE
Outpatient Physical Therapy Ortho Treatment Note  [facility]     Patient Name: Marisol Carrillo  : 1970  MRN: [id number]  Today's Date: 10/10/2017      Visit Date: 10/10/2017    Visit Dx:    ICD-10-CM ICD-9-CM   1. Chronic bilateral low back pain, with sciatica presence unspecified M54.5 724.2    G89.29 338.29   2. Difficulty walking R26.2 719.7   3. Chronic pain of left knee M25.562 719.46    G89.29 338.29   4. Lumbar disc herniation with radiculopathy M51.16 722.10       Patient Active Problem List   Diagnosis   • Essential familial hypercholesterolemia   • Hypertension   • Hypothyroidism   • Adiposity   • Vitamin deficiency   • Impaired fasting glucose   • Vitamin D deficiency   • Lumbosacral radiculopathy   • Gastroesophageal reflux disease   • Constipation   • Diarrhea   • Dysphagia   • Fatigue   • Heartburn   • Lumbar disc herniation with radiculopathy   • Nocturnal cough   • Pain in extremity   • Regurgitation   • Vomiting   • Anxiety   • Depression   • Degeneration of lumbar intervertebral disc   • Spinal headache   • Chronic bilateral low back pain with bilateral sciatica   • Disc disease, degenerative, lumbar or lumbosacral   • Primary localized osteoarthritis of left knee   • History of spinal fusion   • Epigastric pain        Past Medical History:   Diagnosis Date   • Alopecia    • Anxiety and depression    • Arthritis     OSTEO   • Balance problem     FOOTDROP LEFT FOOT   • Bilateral knee pain    • Breast mass     RIGHT, MD WATCHING.   • Chronic low back pain    • Depression    • GERD (gastroesophageal reflux disease)    • Hyperlipidemia    • Hypertension    • Hypothyroidism    • Impaired fasting glucose    • Kidney calculus     HISTORY OF   • Pneumonia     2015 S/P LUMBAR FUSION   • Sciatica    • Spinal headache     AFTER MYELOGRAM. BLOOD PATCH X2   • Vitamin D deficiency         Past Surgical History:   Procedure Laterality Date   •  SECTION     • DILATION AND CURETTAGE, DIAGNOSTIC  / THERAPEUTIC     • ENDOSCOPY N/A 9/1/2017    Procedure: ESOPHAGOGASTRODUODENOSCOPY WITH BIOPSY;  Surgeon: Jam Ballard MD;  Location: [facility] ENDOSCOPY;  Service:    • KNEE ARTHROSCOPY Left     REPAIR OF MENISUCS   • LAPAROSCOPIC GASTRIC BANDING     • LITHOTRIPSY     • LUMBAR FUSION      L5-S1. WITH HARDWARE   • MICRODISCECTOMY LUMBAR     • [place] TOTAL KNEE ARTHROPLASTY Left 3/29/2017    Procedure: TOTAL KNEE ARTHROPLASTY;  Surgeon: Zacarias Reeves MD;  Location: [facility] MAIN OR;  Service: Orthopedics   • TONSILLECTOMY                               PT Assessment/Plan       10/10/17 1033       PT Assessment    Assessment Comments Marisol received some relief from left hip snapping with SLR after piriformis stretching.  Gait initially antalgic, which normalized by end of session ar least in the pool.  -JANI       User Key  (r) = Recorded By, (t) = Taken By, (c) = Cosigned By    Initials Name Provider Type    JANI Ivan Carranza, PT Physical Therapist                    Exercises       10/10/17 0900          Subjective Comments    Subjective Comments Sees knee surgeon today for follow up.  -JANI      Subjective Pain    Able to rate subjective pain? yes  -JANI      Pre-Treatment Pain Level 4  -JANI      Subjective Pain Comment LBP is rated a 4/10.  -JANI      Aquatics    Aquatics performed? Yes  -JANI      Aquatics LE    Water Walk forward;side;backward   100 ft each  -JANI      Stretch 1 Hamstrings 20 sec. X 2  -JANI      Stretch 2 Calf 20 sec X 3  -JANI      Stretch 3 Hip Sweeps SN X 10  -JANI      Stretch Other 1 Standing Piriformis 20 sec X 2 ea  -JANI      Stretch Other 2 Gentle Quad Stretch 20 sec X 2  -JANI      Vertical Traction Oblique Lg noodle pushdowns X 10 ea side  -JANI      Abdominals --   White foam dumbbells X 12  -JANI      Clams Tuck Ups X 20  -JANI      Hip Flex/Ext Flex X15  -JANI      Uni-Clock Paddle Rows/Stirs L 5, X20 ea  -JANI      Step Ups Kickboard Push Pulls X 20  -JANI      Bicycle 2 min  -JANI      Exercise 1    Exercise Name 1  Shoulder Abd/Add white foam dumbbells X 10  -JANI        User Key  (r) = Recorded By, (t) = Taken By, (c) = Cosigned By    Initials Name Provider Type    JANI Carranza, PT Physical Therapist                                       Time Calculation:   Start Time: 0947  Stop Time: 1030  Time Calculation (min): 43 min    Therapy Charges for Today     Code Description Service Date Service Provider Modifiers Qty    67827523753  PT AQUATIC THERAPY EA 15 MIN 10/10/2017 Ivan Carranza, PT GP 3                    Ivan Carranza, PT  10/10/2017

## 2017-10-17 ENCOUNTER — HOSPITAL ENCOUNTER (OUTPATIENT)
Dept: PHYSICAL THERAPY | Facility: HOSPITAL | Age: 47
Setting detail: THERAPIES SERIES
Discharge: HOME | End: 2017-10-17

## 2017-10-17 DIAGNOSIS — G89.29 CHRONIC BILATERAL LOW BACK PAIN, WITH SCIATICA PRESENCE UNSPECIFIED: Primary | ICD-10-CM

## 2017-10-17 DIAGNOSIS — M51.16 LUMBAR DISC HERNIATION WITH RADICULOPATHY: ICD-10-CM

## 2017-10-17 DIAGNOSIS — M25.562 CHRONIC PAIN OF LEFT KNEE: ICD-10-CM

## 2017-10-17 DIAGNOSIS — M54.5 CHRONIC BILATERAL LOW BACK PAIN, WITH SCIATICA PRESENCE UNSPECIFIED: Primary | ICD-10-CM

## 2017-10-17 DIAGNOSIS — G89.29 CHRONIC PAIN OF LEFT KNEE: ICD-10-CM

## 2017-10-17 DIAGNOSIS — R26.2 DIFFICULTY WALKING: ICD-10-CM

## 2017-10-17 PROCEDURE — 97113 AQUATIC THERAPY/EXERCISES: CPT | Performed by: PHYSICAL THERAPIST

## 2017-10-17 NOTE — THERAPY TREATMENT NOTE
Outpatient Physical Therapy Ortho Treatment Note  [facility]     Patient Name: Marisol Carrillo  : 1970  MRN: [id number]  Today's Date: 10/17/2017      Visit Date: 10/17/2017    Visit Dx:    ICD-10-CM ICD-9-CM   1. Chronic bilateral low back pain, with sciatica presence unspecified M54.5 724.2    G89.29 338.29   2. Difficulty walking R26.2 719.7   3. Chronic pain of left knee M25.562 719.46    G89.29 338.29   4. Lumbar disc herniation with radiculopathy M51.16 722.10       Patient Active Problem List   Diagnosis   • Essential familial hypercholesterolemia   • Hypertension   • Hypothyroidism   • Adiposity   • Vitamin deficiency   • Impaired fasting glucose   • Vitamin D deficiency   • Lumbosacral radiculopathy   • Gastroesophageal reflux disease   • Constipation   • Diarrhea   • Dysphagia   • Fatigue   • Heartburn   • Lumbar disc herniation with radiculopathy   • Nocturnal cough   • Pain in extremity   • Regurgitation   • Vomiting   • Anxiety   • Depression   • Degeneration of lumbar intervertebral disc   • Spinal headache   • Chronic bilateral low back pain with bilateral sciatica   • Disc disease, degenerative, lumbar or lumbosacral   • Primary localized osteoarthritis of left knee   • History of spinal fusion   • Epigastric pain        Past Medical History:   Diagnosis Date   • Alopecia    • Anxiety and depression    • Arthritis     OSTEO   • Balance problem     FOOTDROP LEFT FOOT   • Bilateral knee pain    • Breast mass     RIGHT, MD WATCHING.   • Chronic low back pain    • Depression    • GERD (gastroesophageal reflux disease)    • Hyperlipidemia    • Hypertension    • Hypothyroidism    • Impaired fasting glucose    • Kidney calculus     HISTORY OF   • Pneumonia     2015 S/P LUMBAR FUSION   • Sciatica    • Spinal headache     AFTER MYELOGRAM. BLOOD PATCH X2   • Vitamin D deficiency         Past Surgical History:   Procedure Laterality Date   •  SECTION     • DILATION AND CURETTAGE, DIAGNOSTIC  / THERAPEUTIC     • ENDOSCOPY N/A 9/1/2017    Procedure: ESOPHAGOGASTRODUODENOSCOPY WITH BIOPSY;  Surgeon: Jam Ballard MD;  Location: [facility] ENDOSCOPY;  Service:    • KNEE ARTHROSCOPY Left     REPAIR OF MENISUCS   • LAPAROSCOPIC GASTRIC BANDING     • LITHOTRIPSY     • LUMBAR FUSION      L5-S1. WITH HARDWARE   • MICRODISCECTOMY LUMBAR     • [place] TOTAL KNEE ARTHROPLASTY Left 3/29/2017    Procedure: TOTAL KNEE ARTHROPLASTY;  Surgeon: Zacarias Reeves MD;  Location: [facility] MAIN OR;  Service: Orthopedics   • TONSILLECTOMY                               PT Assessment/Plan       10/17/17 1025       PT Assessment    Assessment Comments Worked on technique with sit to stand today, providing verbal cues and demonstration as Marisol tends to compensate due to her back and knee pain.   -JANI       User Key  (r) = Recorded By, (t) = Taken By, (c) = Cosigned By    Initials Name Provider Type    JANI Ivan Carranza, PT Physical Therapist                    Exercises       10/17/17 0900          Subjective Pain    Able to rate subjective pain? yes  -JANI      Pre-Treatment Pain Level 4  -JANI      Post-Treatment Pain Level 4  -JANI      Subjective Pain Comment I am stiff after water therapy.  -JANI      Aquatics    Aquatics performed? Yes  -JANI      Aquatics LE    Water Walk forward;side;backward   100 ft each  -JANI      Stretch 1 Hamstrings 20 sec. X 2  -JANI      Stretch 2 Calf 20 sec X 3  -JANI      Stretch 3 Hip Sweeps LN X 10  -JANI      Stretch Other 1 Standing Piriformis 20 sec X 2 ea  -JANI      Stretch Other 2 Gentle Quad Stretch 20 sec X 2  -JANI      Vertical Traction Oblique Lg noodle pushdowns X 20 ea side  -JANI      Abdominals --   White foam dumbbells X 12  -JANI      Clams Tuck Ups X 15  -JANI      Hip Flex/Ext Flex X15  -JANI      Mini Squat Sit to Stands X 8  -JANI      Uni-Clock Paddle Rows/Stirs L 5, X20 ea  -JANI      Step Ups Kickboard Push Pulls X 20  -JANI      Bicycle 2 min  -JANI      Exercise 1    Exercise Name 1 Shoulder Abd/Add white foam  rosa X 10  -JANI        User Key  (r) = Recorded By, (t) = Taken By, (c) = Cosigned By    Initials Name Provider Type    JANI Carranza, PT Physical Therapist                                       Time Calculation:   Start Time: 0947  Stop Time: 1030  Time Calculation (min): 43 min    Therapy Charges for Today     Code Description Service Date Service Provider Modifiers Qty    51524670126 HC PT AQUATIC THERAPY EA 15 MIN 10/17/2017 Ivan Carranza, PT GP 3                    Ivan Carranza, PT  10/17/2017

## 2017-10-23 ENCOUNTER — PREP FOR SURGERY (OUTPATIENT)
Dept: OTHER | Facility: HOSPITAL | Age: 47
End: 2017-10-23

## 2017-10-23 DIAGNOSIS — K81.1 CHRONIC CHOLECYSTITIS: Primary | ICD-10-CM

## 2017-10-24 ENCOUNTER — HOSPITAL ENCOUNTER (OUTPATIENT)
Dept: PHYSICAL THERAPY | Facility: HOSPITAL | Age: 47
Setting detail: THERAPIES SERIES
Discharge: HOME | End: 2017-10-24

## 2017-10-24 DIAGNOSIS — R26.2 DIFFICULTY WALKING: ICD-10-CM

## 2017-10-24 DIAGNOSIS — G89.29 CHRONIC PAIN OF LEFT KNEE: ICD-10-CM

## 2017-10-24 DIAGNOSIS — M25.562 CHRONIC PAIN OF LEFT KNEE: ICD-10-CM

## 2017-10-24 DIAGNOSIS — M54.5 CHRONIC BILATERAL LOW BACK PAIN, WITH SCIATICA PRESENCE UNSPECIFIED: Primary | ICD-10-CM

## 2017-10-24 DIAGNOSIS — Z47.89 ORTHOPEDIC AFTERCARE: ICD-10-CM

## 2017-10-24 DIAGNOSIS — M51.16 LUMBAR DISC HERNIATION WITH RADICULOPATHY: ICD-10-CM

## 2017-10-24 DIAGNOSIS — G89.29 CHRONIC BILATERAL LOW BACK PAIN, WITH SCIATICA PRESENCE UNSPECIFIED: Primary | ICD-10-CM

## 2017-10-24 PROCEDURE — 97113 AQUATIC THERAPY/EXERCISES: CPT | Performed by: PHYSICAL THERAPIST

## 2017-10-24 NOTE — THERAPY TREATMENT NOTE
Outpatient Physical Therapy Ortho Treatment Note  [facility]     Patient Name: Marisol Carrillo  : 1970  MRN: [id number]  Today's Date: 10/24/2017      Visit Date: 10/24/2017    Visit Dx:    ICD-10-CM ICD-9-CM   1. Chronic bilateral low back pain, with sciatica presence unspecified M54.5 724.2    G89.29 338.29   2. Difficulty walking R26.2 719.7   3. Chronic pain of left knee M25.562 719.46    G89.29 338.29   4. Lumbar disc herniation with radiculopathy M51.16 722.10   5. Orthopedic aftercare Z47.89 V54.9       Patient Active Problem List   Diagnosis   • Essential familial hypercholesterolemia   • Hypertension   • Hypothyroidism   • Adiposity   • Vitamin deficiency   • Impaired fasting glucose   • Vitamin D deficiency   • Lumbosacral radiculopathy   • Gastroesophageal reflux disease   • Constipation   • Diarrhea   • Dysphagia   • Fatigue   • Heartburn   • Lumbar disc herniation with radiculopathy   • Nocturnal cough   • Pain in extremity   • Regurgitation   • Vomiting   • Anxiety   • Depression   • Degeneration of lumbar intervertebral disc   • Spinal headache   • Chronic bilateral low back pain with bilateral sciatica   • Disc disease, degenerative, lumbar or lumbosacral   • Primary localized osteoarthritis of left knee   • History of spinal fusion   • Epigastric pain        Past Medical History:   Diagnosis Date   • Alopecia    • Anxiety and depression    • Arthritis     OSTEO   • Balance problem     FOOTDROP LEFT FOOT   • Bilateral knee pain    • Breast mass     RIGHT, MD WATCHING.   • Chronic low back pain    • Depression    • GERD (gastroesophageal reflux disease)    • Hyperlipidemia    • Hypertension    • Hypothyroidism    • Impaired fasting glucose    • Kidney calculus     HISTORY OF   • Pneumonia     2015 S/P LUMBAR FUSION   • Sciatica    • Spinal headache     AFTER MYELOGRAM. BLOOD PATCH X2   • Vitamin D deficiency         Past Surgical History:   Procedure Laterality Date   •  SECTION      • DILATION AND CURETTAGE, DIAGNOSTIC / THERAPEUTIC     • ENDOSCOPY N/A 9/1/2017    Procedure: ESOPHAGOGASTRODUODENOSCOPY WITH BIOPSY;  Surgeon: Jam Ballard MD;  Location: [facility] ENDOSCOPY;  Service:    • KNEE ARTHROSCOPY Left     REPAIR OF MENISUCS   • LAPAROSCOPIC GASTRIC BANDING     • LITHOTRIPSY     • LUMBAR FUSION      L5-S1. WITH HARDWARE   • MICRODISCECTOMY LUMBAR     • [place] TOTAL KNEE ARTHROPLASTY Left 3/29/2017    Procedure: TOTAL KNEE ARTHROPLASTY;  Surgeon: Zacarias Reeves MD;  Location: [facility] MAIN OR;  Service: Orthopedics   • TONSILLECTOMY                               PT Assessment/Plan       10/24/17 1026       PT Assessment    Assessment Comments Added more balance training today that was challenging for Marisol, however she was able to improve tandem stand to 30 sec on each leg. Discussed warm up and stretching prior to water walking.  -JANI       User Key  (r) = Recorded By, (t) = Taken By, (c) = Cosigned By    Initials Name Provider Type    JANI Ivan Carranza, PT Physical Therapist                    Exercises       10/24/17 0900          Subjective Pain    Able to rate subjective pain? yes  -JANI      Pre-Treatment Pain Level 5  -JANI      Subjective Pain Comment Walking increases LBP and sciatic pain. Did more walking with cane over weekend, and that makes me sore the next day.  -JANI      Aquatics    Aquatics performed? Yes  -JANI      Aquatics LE    Water Walk forward;side;backward   100 ft each  -JANI      Stretch 1 Hamstrings 20 sec. X 2  -JANI      Stretch 2 Calf 20 sec X 3  -JANI      Stretch 3 Hip Sweeps LN X 10  -JANI      Stretch Other 1 Standing Piriformis 20 sec X 2 ea  -JANI      Stretch Other 2 Gentle Quad Stretch 20 sec X 2  -JANI      Vertical Traction Oblique Lg noodle pushdowns X 20 ea side  -JANI      Abdominals --   White foam dumbbells X 12  -JANI      Clams Tuck Ups X 15  -JANI      Hip Flex/Ext Flex X15  -JANI      March in Place March Wlk 25 ft X 2   -JANI      Mini Squat Sit to Stands X 8  -JANI       Uni-Squat Tandem Stand X 30 sec ea.  -JANI      Uni-Clock 10X ea  -JANI      Step Ups Kickboard Push Pulls X 20  -JANI      Bicycle 2 min  -JANI      Flutter/Scissor Leg Press with lg foam ring X 15  -JANI      Exercise 1    Exercise Name 1 Shldr Abd/Add with medium foam dumbbells in SLS X 10 ea  -JANI        User Key  (r) = Recorded By, (t) = Taken By, (c) = Cosigned By    Initials Name Provider Type    JANI Carranza, PT Physical Therapist                               PT OP Goals       10/24/17 1000       PT Short Term Goals    STG Date to Achieve 09/30/17  -JANI     STG 1 Pt to enter exit pool with rail and supervision.   -JANI     STG 1 Progress Met  -JANI     STG 2 Pt to tolerate 45 min aqua session without exacerbation of back or knee condition.   -JANI     STG 2 Progress Met  -JANI     STG 3 Pt to demonstrate basic flexibility ex in aqua to progress to self guided aqua ex outside of therapy 1x a week.  -JANI     STG 3 Progress Met  -JANI     STG 4 Pt to progress to sleeping in bed 1 hour at night.   -JANI     STG 4 Progress Progressing  -JANI     STG 5 Pt to improve to 30 sec sit to stant of 5 reps. (could only do 3 on eval/painful)   -JANI     STG 5 Progress Progressing  -JANI     STG 5 Progress Comments Able to do up to 10 in pool, will check on land next visit.  -JANI     Long Term Goals    LTG Date to Achieve 10/30/17  -JANI     LTG 1 Pt to progress to safe enter exit pool with rail/independently.   -JANI     LTG 1 Progress Met  -JANI     LTG 2 Pt to improve ability to don/socks/shoes without having to lift pants leg.   -JANI     LTG 2 Progress Ongoing  -JANI     LTG 3 pt to walk into therapy clinic with no seated rest.   -JANI     LTG 3 Progress Met  -JANI     LTG 4 Pt to advance to gait with cane spontaneously step through, 2 point and minimal limb.   -JANI     LTG 4 Progress Met  -JANI     LTG 5 Pt to improve modified oswestry to 65%   -JANI     LTG 5 Progress Ongoing  -JANI     LTG 6 Pt independent with self manage of condition with sefl guided aqua  program.   -JANI     LTG 6 Progress Ongoing  -JANI       User Key  (r) = Recorded By, (t) = Taken By, (c) = Cosigned By    Initials Name Provider Type    JANI Carranza, PT Physical Therapist                    Time Calculation:        Therapy Charges for Today     Code Description Service Date Service Provider Modifiers Qty    32243380545 HC PT AQUATIC THERAPY EA 15 MIN 10/24/2017 Ivan Carranza, PT GP 3                    Ivan Carranza, PT  10/24/2017

## 2017-10-25 PROBLEM — K81.1 CHRONIC CHOLECYSTITIS: Status: ACTIVE | Noted: 2017-10-25

## 2017-11-01 ENCOUNTER — HOSPITAL ENCOUNTER (OUTPATIENT)
Dept: PHYSICAL THERAPY | Facility: HOSPITAL | Age: 47
Setting detail: THERAPIES SERIES
Discharge: HOME | End: 2017-11-01

## 2017-11-01 DIAGNOSIS — R26.2 DIFFICULTY WALKING: ICD-10-CM

## 2017-11-01 DIAGNOSIS — M51.16 LUMBAR DISC HERNIATION WITH RADICULOPATHY: ICD-10-CM

## 2017-11-01 DIAGNOSIS — G89.29 CHRONIC PAIN OF LEFT KNEE: ICD-10-CM

## 2017-11-01 DIAGNOSIS — M25.562 CHRONIC PAIN OF LEFT KNEE: ICD-10-CM

## 2017-11-01 DIAGNOSIS — G89.29 CHRONIC BILATERAL LOW BACK PAIN, WITH SCIATICA PRESENCE UNSPECIFIED: Primary | ICD-10-CM

## 2017-11-01 DIAGNOSIS — M54.5 CHRONIC BILATERAL LOW BACK PAIN, WITH SCIATICA PRESENCE UNSPECIFIED: Primary | ICD-10-CM

## 2017-11-01 DIAGNOSIS — Z47.89 ORTHOPEDIC AFTERCARE: ICD-10-CM

## 2017-11-01 PROCEDURE — 97113 AQUATIC THERAPY/EXERCISES: CPT | Performed by: PHYSICAL THERAPIST

## 2017-11-01 NOTE — THERAPY TREATMENT NOTE
Outpatient Physical Therapy Ortho Treatment Note  [facility]     Patient Name: Marisol Carrillo  : 1970  MRN: [id number]  Today's Date: 2017      Visit Date: 2017    Visit Dx:    ICD-10-CM ICD-9-CM   1. Chronic bilateral low back pain, with sciatica presence unspecified M54.5 724.2    G89.29 338.29   2. Difficulty walking R26.2 719.7   3. Chronic pain of left knee M25.562 719.46    G89.29 338.29   4. Lumbar disc herniation with radiculopathy M51.16 722.10   5. Orthopedic aftercare Z47.89 V54.9       Patient Active Problem List   Diagnosis   • Essential familial hypercholesterolemia   • Hypertension   • Hypothyroidism   • Adiposity   • Vitamin deficiency   • Impaired fasting glucose   • Vitamin D deficiency   • Lumbosacral radiculopathy   • Gastroesophageal reflux disease   • Constipation   • Diarrhea   • Dysphagia   • Fatigue   • Heartburn   • Lumbar disc herniation with radiculopathy   • Nocturnal cough   • Pain in extremity   • Regurgitation   • Vomiting   • Anxiety   • Depression   • Degeneration of lumbar intervertebral disc   • Spinal headache   • Chronic bilateral low back pain with bilateral sciatica   • Disc disease, degenerative, lumbar or lumbosacral   • Primary localized osteoarthritis of left knee   • History of spinal fusion   • Epigastric pain   • Chronic cholecystitis        Past Medical History:   Diagnosis Date   • Alopecia    • Anxiety and depression    • Arthritis     OSTEO   • Balance problem     FOOTDROP LEFT FOOT   • Bilateral knee pain    • Breast mass     RIGHT, MD WATCHING.   • Chronic low back pain    • Depression    • GERD (gastroesophageal reflux disease)    • Hyperlipidemia    • Hypertension    • Hypothyroidism    • Impaired fasting glucose    • Kidney calculus     HISTORY OF   • Pneumonia     2015 S/P LUMBAR FUSION   • Sciatica    • Spinal headache     AFTER MYELOGRAM. BLOOD PATCH X2   • Vitamin D deficiency         Past Surgical History:   Procedure Laterality  Date   •  SECTION     • DILATION AND CURETTAGE, DIAGNOSTIC / THERAPEUTIC     • ENDOSCOPY N/A 2017    Procedure: ESOPHAGOGASTRODUODENOSCOPY WITH BIOPSY;  Surgeon: Jam Ballard MD;  Location: [facility] ENDOSCOPY;  Service:    • KNEE ARTHROSCOPY Left     REPAIR OF MENISUCS   • LAPAROSCOPIC GASTRIC BANDING     • LITHOTRIPSY     • LUMBAR FUSION      L5-S1. WITH HARDWARE   • MICRODISCECTOMY LUMBAR     • [place] TOTAL KNEE ARTHROPLASTY Left 3/29/2017    Procedure: TOTAL KNEE ARTHROPLASTY;  Surgeon: Zacarias Reeves MD;  Location: [facility] MAIN OR;  Service: Orthopedics   • TONSILLECTOMY                               PT Assessment/Plan       17 1703       PT Assessment    Assessment Comments Continue to advance balance training to improve gait and confidence with walking.  Suggested increasing reps on core strengthening exercises and leg press.  -JANI       User Key  (r) = Recorded By, (t) = Taken By, (c) = Cosigned By    Initials Name Provider Type    JANI Carranza, PT Physical Therapist                    Exercises       17 1600          Subjective Comments    Subjective Comments Started the leg press in land therapy at 40 pounds.  I wish I could walk better and not be afraid of falling.  -JANI      Subjective Pain    Able to rate subjective pain? yes  -JANI      Pre-Treatment Pain Level 5  -JANI      Subjective Pain Comment I'm sore.   -JANI      Aquatics    Aquatics performed? Yes  -JANI      Aquatics LE    Water Walk forward;side;backward   100 ft each  -JANI      Stretch 1 Hamstrings 20 sec. X 2  -JANI      Stretch 2 Calf 20 sec X 3  -JANI      Stretch 3 Hip Sweeps LN X 10  -JANI      Stretch Other 1 Standing Piriformis 20 sec X 2 ea  -JANI      Stretch Other 2 Gentle Quad Stretch 20 sec X 2  -JANI      Vertical Traction Oblique Lg noodle pushdowns X 15 ea side  -JANI      Abdominals --   White foam dumbbells X 30  -JANI      Clams Tuck Ups X 15  -JANI      Hip Abd/Add Single Leg stance on foam   -JANI      Hip Flex/Ext Flex X15   -JANI      March in Place March Wlk 25 ft X 2   -JANI      Mini Squat Sit to Stands X 8  -JANI      Toe/Heel Raises Floating crunches X 10  -JANI      Uni-Squat Tandem Stand, eyes closed 30 sec ea.  -JANI      Uni-Clock 10X ea  -JANI      Step Ups Kickboard Push Pulls X 20  -JANI      Bicycle 2 min  -AJNI      Flutter/Scissor Leg Press with lg foam ring 2X 15  -JANI      Exercise 1    Exercise Name 1 Tandem Walk with foam dumbbell support 25 ft X 2  -JANI        User Key  (r) = Recorded By, (t) = Taken By, (c) = Cosigned By    Initials Name Provider Type    JANI Carranza, PT Physical Therapist                                       Time Calculation:   Start Time: 1606  Stop Time: 1655  Time Calculation (min): 49 min    Therapy Charges for Today     Code Description Service Date Service Provider Modifiers Qty    49638827636 HC PT AQUATIC THERAPY EA 15 MIN 11/1/2017 Ivan Carranza, PT GP 3                    Ivan Carranza, PT  11/1/2017

## 2017-11-08 ENCOUNTER — HOSPITAL ENCOUNTER (OUTPATIENT)
Dept: PHYSICAL THERAPY | Facility: HOSPITAL | Age: 47
Setting detail: THERAPIES SERIES
Discharge: HOME | End: 2017-11-08

## 2017-11-08 DIAGNOSIS — M51.16 LUMBAR DISC HERNIATION WITH RADICULOPATHY: ICD-10-CM

## 2017-11-08 DIAGNOSIS — M25.562 CHRONIC PAIN OF LEFT KNEE: ICD-10-CM

## 2017-11-08 DIAGNOSIS — G89.29 CHRONIC PAIN OF LEFT KNEE: ICD-10-CM

## 2017-11-08 DIAGNOSIS — G89.29 CHRONIC BILATERAL LOW BACK PAIN, WITH SCIATICA PRESENCE UNSPECIFIED: Primary | ICD-10-CM

## 2017-11-08 DIAGNOSIS — Z47.89 ORTHOPEDIC AFTERCARE: ICD-10-CM

## 2017-11-08 DIAGNOSIS — R26.2 DIFFICULTY WALKING: ICD-10-CM

## 2017-11-08 DIAGNOSIS — M54.5 CHRONIC BILATERAL LOW BACK PAIN, WITH SCIATICA PRESENCE UNSPECIFIED: Primary | ICD-10-CM

## 2017-11-08 PROCEDURE — 97113 AQUATIC THERAPY/EXERCISES: CPT | Performed by: PHYSICAL THERAPIST

## 2017-11-08 NOTE — THERAPY TREATMENT NOTE
Outpatient Physical Therapy Ortho Re-Assessment  [facility]     Patient Name: Marisol Carrillo  : 1970  MRN: [id number]  Today's Date: 2017      Visit Date: 2017    Patient Active Problem List   Diagnosis   • Essential familial hypercholesterolemia   • Hypertension   • Hypothyroidism   • Adiposity   • Vitamin deficiency   • Impaired fasting glucose   • Vitamin D deficiency   • Lumbosacral radiculopathy   • Gastroesophageal reflux disease   • Constipation   • Diarrhea   • Dysphagia   • Fatigue   • Heartburn   • Lumbar disc herniation with radiculopathy   • Nocturnal cough   • Pain in extremity   • Regurgitation   • Vomiting   • Anxiety   • Depression   • Degeneration of lumbar intervertebral disc   • Spinal headache   • Chronic bilateral low back pain with bilateral sciatica   • Disc disease, degenerative, lumbar or lumbosacral   • Primary localized osteoarthritis of left knee   • History of spinal fusion   • Epigastric pain   • Chronic cholecystitis        Past Medical History:   Diagnosis Date   • Alopecia    • Anxiety and depression    • Arthritis     OSTEO   • Balance problem     FOOTDROP LEFT FOOT   • Bilateral knee pain    • Breast mass     RIGHT, MD WATCHING.   • Chronic low back pain    • Depression    • GERD (gastroesophageal reflux disease)    • Hyperlipidemia    • Hypertension    • Hypothyroidism    • Impaired fasting glucose    • Kidney calculus     HISTORY OF   • Pneumonia     2015 S/P LUMBAR FUSION   • Sciatica    • Spinal headache     AFTER MYELOGRAM. BLOOD PATCH X2   • Vitamin D deficiency         Past Surgical History:   Procedure Laterality Date   •  SECTION     • DILATION AND CURETTAGE, DIAGNOSTIC / THERAPEUTIC     • ENDOSCOPY N/A 2017    Procedure: ESOPHAGOGASTRODUODENOSCOPY WITH BIOPSY;  Surgeon: Jam Ballard MD;  Location: [facility];  Service:    • KNEE ARTHROSCOPY Left     REPAIR OF MENISUCS   • LAPAROSCOPIC GASTRIC BANDING     • LITHOTRIPSY     •  LUMBAR FUSION      L5-S1. WITH HARDWARE   • MICRODISCECTOMY LUMBAR     • [place] TOTAL KNEE ARTHROPLASTY Left 3/29/2017    Procedure: TOTAL KNEE ARTHROPLASTY;  Surgeon: Zacarias Reeves MD;  Location: [facility] OR;  Service: Orthopedics   • TONSILLECTOMY         Visit Dx:     ICD-10-CM ICD-9-CM   1. Chronic bilateral low back pain, with sciatica presence unspecified M54.5 724.2    G89.29 338.29   2. Difficulty walking R26.2 719.7   3. Chronic pain of left knee M25.562 719.46    G89.29 338.29   4. Lumbar disc herniation with radiculopathy M51.16 722.10   5. Orthopedic aftercare Z47.89 V54.9                                 Therapy Education       11/08/17 1826          Therapy Education    Education Details Mindfulness meditation education  -JANI      Program New   Hip flexor stretch  -JANI      How Provided Verbal;Demonstration  -JANI      Provided to Patient  -JANI      Level of Understanding Teach back education performed  -JANI        User Key  (r) = Recorded By, (t) = Taken By, (c) = Cosigned By    Initials Name Provider Type    JANI Carranza, PT Physical Therapist                PT OP Goals       11/08/17 1000       PT Short Term Goals    STG Date to Achieve 09/30/17  -JANI     STG 1 Pt to enter exit pool with rail and supervision.   -JANI     STG 1 Progress Met  -JANI     STG 2 Pt to tolerate 45 min aqua session without exacerbation of back or knee condition.   -JANI     STG 2 Progress Met  -JANI     STG 3 Pt to demonstrate basic flexibility ex in aqua to progress to self guided aqua ex outside of therapy 1x a week.  -JANI     STG 3 Progress Met  -JANI     STG 4 Pt to progress to sleeping in bed 1 hour at night.   -JANI     STG 4 Progress Met  -JANI     STG 5 Pt to improve to 30 sec sit to stant of 5 reps. (could only do 3 on eval/painful)   -JANI     STG 5 Progress Met  -JANI     Long Term Goals    LTG Date to Achieve 10/30/17  -JANI     LTG 1 Pt to progress to safe enter exit pool with rail/independently.   -JANI     LTG 1 Progress Met  -JANI      LTG 2 Pt to improve ability to don/socks/shoes without having to lift pants leg.   -JANI     LTG 2 Progress Ongoing  -JANI     LTG 2 Progress Comments Has daughter assist with socks and wears slip on shoes.  -JANI     LTG 3 pt to walk into therapy clinic with no seated rest.   -JANI     LTG 3 Progress Met  -JANI     LTG 4 Pt to advance to gait with cane spontaneously step through, 2 point and minimal limb.   -JANI     LTG 4 Progress Met  -JANI     LTG 5 Pt to improve modified oswestry to 65%   -JANI     LTG 5 Progress Partially Met  -JANI     LTG 5 Progress Comments 68%  -JANI     LTG 6 Pt independent with self manage of condition with sefl guided aqua program.   -JANI     LTG 6 Progress Partially Met  -JANI       User Key  (r) = Recorded By, (t) = Taken By, (c) = Cosigned By    Initials Name Provider Type    JANI Carranza, PT Physical Therapist                PT Assessment/Plan       11/08/17 1830       PT Assessment    Functional Limitations Impaired locomotion;Limitations in community activities;Limitations in functional capacity and performance;Limitation in home management;Performance in self-care ADL;Decreased safety during functional activities;Impaired gait  -JANI     Impairments Balance;Gait;Pain;Muscle strength;Edema;Posture;Range of motion;Impaired muscle length;Impaired muscle endurance  -JANI     Assessment Comments Marisol has been treated in aquatic PT for LBP with B sciatica for 6 sessions (she attends 1X/week).  In addition she continues dry land therapy at another facility for knee rehab following L TKA ( March 2017) with second knee surgery 10 days later.  Marisol does complete aquatic exercise on her own in addition to skilled PT for a total of ~ 4 days/week.  She is ambulating with a cane.  She reports great difficulty with sleeping and is currently trying to wean from the recliner to her bed, however reports this has intensified her leg pain.  Oswestry score is 68% today. compared to 84% a month  ago.  Marisol is  making slow  progress and would benefit from additional skilled PT.  She states she is scheduled for gall bladder surgery the beginning of December  which will interrupt her therapy.   -JANI     Please refer to paper survey for additional self-reported information Yes  -JANI     Rehab Potential Good  -JANI     Patient/caregiver participated in establishment of treatment plan and goals Yes  -JANI     Patient would benefit from skilled therapy intervention Yes  -JANI     PT Plan    PT Frequency 1x/week  -JANI     Predicted Duration of Therapy Intervention (days/wks) one month  -JANI     Planned CPT's? PT AQUATIC THERAPY EA 15 MIN: 25210  -JANI     PT Plan Comments Aquatics for core strength and mobility training.  -JANI       User Key  (r) = Recorded By, (t) = Taken By, (c) = Cosigned By    Initials Name Provider Type    JANI Ivan Carranza, PT Physical Therapist                  Exercises       11/08/17 0900          Subjective Comments    Subjective Comments I am having gall bladder surgery the beginning of December. I am afraid because of my history with surgeries. I will need to stay out of the water for 10 days.  -JANI      Subjective Pain    Able to rate subjective pain? yes  -JANI      Pre-Treatment Pain Level 5  -JANI      Subjective Pain Comment Having more leg pain from my back. I have been trying to sleep more in the bed and less in the recliner, but in the bed I get stabbing, throbbing pains in my legs (lateral legs). I would really like to be able to sleep in the bed.  -JANI      Aquatics    Aquatics performed? Yes  -JANI      Aquatics LE    Water Walk forward;side;backward  -JANI      Stretch 1 Hamstrings 20 sec. X 2  -JANI      Stretch 2 Calf 20 sec X 3  -JANI      Stretch Other 2 Hip Flexor stretch with and without small noodle assist, 20 sec X 2  -JANI      Clams Decompression X 5 min.  -JANI      Mini Squat 10X  -JANI      Flutter/Scissor Leg Press with lg foam ring 2X 15  -JANI      Exercise 1    Exercise Name 1 Tandem Walk with foam dumbbell  supports 25 Ft X 2  -JANI        User Key  (r) = Recorded By, (t) = Taken By, (c) = Cosigned By    Initials Name Provider Type    JANI Carranza, PT Physical Therapist                              Time Calculation:   Start Time: 0950  Stop Time: 1030  Time Calculation (min): 40 min     Therapy Charges for Today     Code Description Service Date Service Provider Modifiers Qty    25404777836 HC PT AQUATIC THERAPY EA 15 MIN 11/8/2017 Ivan Carranza, PT GP 3                    Ivan Carranza, PT  11/8/2017

## 2017-11-15 ENCOUNTER — HOSPITAL ENCOUNTER (OUTPATIENT)
Dept: PHYSICAL THERAPY | Facility: HOSPITAL | Age: 47
Setting detail: THERAPIES SERIES
Discharge: HOME | End: 2017-11-15

## 2017-11-15 DIAGNOSIS — R26.2 DIFFICULTY WALKING: ICD-10-CM

## 2017-11-15 DIAGNOSIS — M51.16 LUMBAR DISC HERNIATION WITH RADICULOPATHY: ICD-10-CM

## 2017-11-15 DIAGNOSIS — M54.5 CHRONIC BILATERAL LOW BACK PAIN, WITH SCIATICA PRESENCE UNSPECIFIED: Primary | ICD-10-CM

## 2017-11-15 DIAGNOSIS — G89.29 CHRONIC PAIN OF LEFT KNEE: ICD-10-CM

## 2017-11-15 DIAGNOSIS — Z47.89 ORTHOPEDIC AFTERCARE: ICD-10-CM

## 2017-11-15 DIAGNOSIS — G89.29 CHRONIC BILATERAL LOW BACK PAIN, WITH SCIATICA PRESENCE UNSPECIFIED: Primary | ICD-10-CM

## 2017-11-15 DIAGNOSIS — M25.562 CHRONIC PAIN OF LEFT KNEE: ICD-10-CM

## 2017-11-15 PROCEDURE — 97113 AQUATIC THERAPY/EXERCISES: CPT | Performed by: PHYSICAL THERAPIST

## 2017-11-15 NOTE — THERAPY TREATMENT NOTE
Outpatient Physical Therapy Ortho Treatment Note  [facility]     Patient Name: Marisol Carrillo  : 1970  MRN: [id number]  Today's Date: 11/15/2017      Visit Date: 11/15/2017    Visit Dx:    ICD-10-CM ICD-9-CM   1. Chronic bilateral low back pain, with sciatica presence unspecified M54.5 724.2    G89.29 338.29   2. Difficulty walking R26.2 719.7   3. Chronic pain of left knee M25.562 719.46    G89.29 338.29   4. Lumbar disc herniation with radiculopathy M51.16 722.10   5. Orthopedic aftercare Z47.89 V54.9       Patient Active Problem List   Diagnosis   • Essential familial hypercholesterolemia   • Hypertension   • Hypothyroidism   • Adiposity   • Vitamin deficiency   • Impaired fasting glucose   • Vitamin D deficiency   • Lumbosacral radiculopathy   • Gastroesophageal reflux disease   • Constipation   • Diarrhea   • Dysphagia   • Fatigue   • Heartburn   • Lumbar disc herniation with radiculopathy   • Nocturnal cough   • Pain in extremity   • Regurgitation   • Vomiting   • Anxiety   • Depression   • Degeneration of lumbar intervertebral disc   • Spinal headache   • Chronic bilateral low back pain with bilateral sciatica   • Disc disease, degenerative, lumbar or lumbosacral   • Primary localized osteoarthritis of left knee   • History of spinal fusion   • Epigastric pain   • Chronic cholecystitis        Past Medical History:   Diagnosis Date   • Alopecia    • Anxiety and depression    • Arthritis     OSTEO   • Balance problem     FOOTDROP LEFT FOOT   • Bilateral knee pain    • Breast mass     RIGHT, MD WATCHING.   • Chronic low back pain    • Depression    • GERD (gastroesophageal reflux disease)    • Hyperlipidemia    • Hypertension    • Hypothyroidism    • Impaired fasting glucose    • Kidney calculus     HISTORY OF   • Pneumonia     2015 S/P LUMBAR FUSION   • Sciatica    • Spinal headache     AFTER MYELOGRAM. BLOOD PATCH X2   • Vitamin D deficiency         Past Surgical History:   Procedure Laterality  Date   •  SECTION     • DILATION AND CURETTAGE, DIAGNOSTIC / THERAPEUTIC     • ENDOSCOPY N/A 2017    Procedure: ESOPHAGOGASTRODUODENOSCOPY WITH BIOPSY;  Surgeon: Jam Ballard MD;  Location: [facility] ENDOSCOPY;  Service:    • KNEE ARTHROSCOPY Left     REPAIR OF MENISUCS   • LAPAROSCOPIC GASTRIC BANDING     • LITHOTRIPSY     • LUMBAR FUSION      L5-S1. WITH HARDWARE   • MICRODISCECTOMY LUMBAR     • [place] TOTAL KNEE ARTHROPLASTY Left 3/29/2017    Procedure: TOTAL KNEE ARTHROPLASTY;  Surgeon: Zacarias Reeves MD;  Location: [facility] MAIN OR;  Service: Orthopedics   • TONSILLECTOMY                               PT Assessment/Plan       11/15/17 1243       PT Assessment    Assessment Comments Still with difficulty sleeping in bed due to B leg pain and difficulty with walking due to knee pain.  Marisol puts forth good effort with her independent aquatic program.  Today gave more options for stretching that she feels is helpful.  Next visit will work more on stair navigation.  -JANI       User Key  (r) = Recorded By, (t) = Taken By, (c) = Cosigned By    Initials Name Provider Type    JANI Carranza, PT Physical Therapist                    Exercises       11/15/17 1000          Subjective Comments    Subjective Comments I really like the Ammy you recommended for relaxation and meditation.  -JANI      Subjective Pain    Able to rate subjective pain? yes  -JANI      Pre-Treatment Pain Level 5  -JANI      Subjective Pain Comment Still having the pain in my legs when trying to sleep. I tried pillow support, but they still hurt. I start out in my bed and then move to the recliner. I just wish I could walk without such knee pain and without the cane.  -JANI      Aquatics    Aquatics performed? Yes  -JANI      Aquatics LE    Stretch 1 Hamstring stretch in long sit X 20 sec and in standing X 2  -JANI      Stretch 2 Piriformis stretch in standing and seated 2 X 20 sec ea.  -JANI      Stretch Other 2 Hip flexor stretch with noodle assist  20 sec X 3  -JANI      Toe/Heel Raises Floating crunch- independent.  -JANI      Step Ups 4 inch step ups X 10  -JANI        User Key  (r) = Recorded By, (t) = Taken By, (c) = Cosigned By    Initials Name Provider Type    JANI Carranza, PT Physical Therapist                                   Therapy Education       11/15/17 1034          Therapy Education    Education Details Discussed possible options for pain management that included TENS unit to back and knee (currently uses TENS only after land PT, but not during the day), possible injections to address leg pain (she will inquire with surgeon), use of hot pool.  -JANI      Given Symptoms/condition management  -JANI      How Provided Verbal  -JANI        User Key  (r) = Recorded By, (t) = Taken By, (c) = Cosigned By    Initials Name Provider Type    JANI Carranza, PT Physical Therapist                Time Calculation:   Start Time: 0952  Stop Time: 1030  Time Calculation (min): 38 min    Therapy Charges for Today     Code Description Service Date Service Provider Modifiers Qty    14702010645 HC PT AQUATIC THERAPY EA 15 MIN 11/15/2017 Ivan Carranza, PT GP 3                    Ivan Carranza, PT  11/15/2017

## 2017-11-17 ENCOUNTER — APPOINTMENT (OUTPATIENT)
Dept: PREADMISSION TESTING | Facility: HOSPITAL | Age: 47
End: 2017-11-17

## 2017-11-17 VITALS
WEIGHT: 291.4 LBS | OXYGEN SATURATION: 98 % | HEIGHT: 64 IN | HEART RATE: 75 BPM | TEMPERATURE: 98.2 F | RESPIRATION RATE: 16 BRPM | SYSTOLIC BLOOD PRESSURE: 140 MMHG | BODY MASS INDEX: 49.75 KG/M2 | DIASTOLIC BLOOD PRESSURE: 90 MMHG

## 2017-11-17 LAB
ANION GAP SERPL CALCULATED.3IONS-SCNC: 12.7 MMOL/L
BUN BLD-MCNC: 10 MG/DL (ref 6–20)
BUN/CREAT SERPL: 13.9 (ref 7–25)
CALCIUM SPEC-SCNC: 9.4 MG/DL (ref 8.6–10.5)
CHLORIDE SERPL-SCNC: 101 MMOL/L (ref 98–107)
CO2 SERPL-SCNC: 25.3 MMOL/L (ref 22–29)
CREAT BLD-MCNC: 0.72 MG/DL (ref 0.57–1)
DEPRECATED RDW RBC AUTO: 46.6 FL (ref 37–54)
ERYTHROCYTE [DISTWIDTH] IN BLOOD BY AUTOMATED COUNT: 14.3 % (ref 11.7–13)
GFR SERPL CREATININE-BSD FRML MDRD: 87 ML/MIN/1.73
GLUCOSE BLD-MCNC: 115 MG/DL (ref 65–99)
HCT VFR BLD AUTO: 43.4 % (ref 35.6–45.5)
HGB BLD-MCNC: 13.7 G/DL (ref 11.9–15.5)
MCH RBC QN AUTO: 28.1 PG (ref 26.9–32)
MCHC RBC AUTO-ENTMCNC: 31.6 G/DL (ref 32.4–36.3)
MCV RBC AUTO: 88.9 FL (ref 80.5–98.2)
PLATELET # BLD AUTO: 398 10*3/MM3 (ref 140–500)
PMV BLD AUTO: 9.7 FL (ref 6–12)
POTASSIUM BLD-SCNC: 4.4 MMOL/L (ref 3.5–5.2)
RBC # BLD AUTO: 4.88 10*6/MM3 (ref 3.9–5.2)
SODIUM BLD-SCNC: 139 MMOL/L (ref 136–145)
WBC NRBC COR # BLD: 10.36 10*3/MM3 (ref 4.5–10.7)

## 2017-11-17 PROCEDURE — 93010 ELECTROCARDIOGRAM REPORT: CPT | Performed by: INTERNAL MEDICINE

## 2017-11-17 PROCEDURE — 36415 COLL VENOUS BLD VENIPUNCTURE: CPT

## 2017-11-17 PROCEDURE — 80048 BASIC METABOLIC PNL TOTAL CA: CPT | Performed by: SURGERY

## 2017-11-17 PROCEDURE — 85027 COMPLETE CBC AUTOMATED: CPT | Performed by: SURGERY

## 2017-11-17 PROCEDURE — 93005 ELECTROCARDIOGRAM TRACING: CPT

## 2017-11-17 RX ORDER — AMLODIPINE AND VALSARTAN 10; 320 MG/1; MG/1
1 TABLET ORAL NIGHTLY
COMMUNITY
End: 2018-02-01 | Stop reason: SDUPTHER

## 2017-11-17 RX ORDER — HYDROXYZINE HYDROCHLORIDE 25 MG/1
25 TABLET, FILM COATED ORAL 3 TIMES DAILY PRN
COMMUNITY
End: 2018-02-01 | Stop reason: SDUPTHER

## 2017-11-17 RX ORDER — SERTRALINE HYDROCHLORIDE 100 MG/1
200 TABLET, FILM COATED ORAL NIGHTLY
COMMUNITY
End: 2018-02-01 | Stop reason: SDUPTHER

## 2017-11-17 RX ORDER — NEBIVOLOL 5 MG/1
5 TABLET ORAL NIGHTLY
COMMUNITY
End: 2018-02-01 | Stop reason: SDUPTHER

## 2017-11-17 RX ORDER — LEVOTHYROXINE SODIUM 0.05 MG/1
50 TABLET ORAL DAILY
COMMUNITY
End: 2018-02-01 | Stop reason: SDUPTHER

## 2017-11-17 RX ORDER — ATORVASTATIN CALCIUM 40 MG/1
40 TABLET, FILM COATED ORAL NIGHTLY
COMMUNITY
End: 2018-02-01 | Stop reason: SDUPTHER

## 2017-11-17 NOTE — DISCHARGE INSTRUCTIONS
Take the following medications the morning of surgery with a small sip of water:  NONE    ARRIVE AT 1000.        General Instructions:  • Do not eat solid food after midnight the night before surgery.  • You may drink clear liquids day of surgery but must stop at least one hour before your hospital arrival time.  • It is beneficial for you to have a clear drink that contains carbohydrates the day of surgery.  We suggest a 12 to 20 ounce bottle of Gatorade or Powerade for non-diabetic patients or a 12 to 20 ounce bottle of G2 or Powerade Zero for diabetic patients. (Pediatric patients, are not advised to drink a 12 to 20 ounce carbohydrate drink)    Clear liquids are liquids you can see through.  Nothing red in color.     Plain water                               Sports drinks  Sodas                                   Gelatin (Jell-O)  Fruit juices without pulp such as white grape juice and apple juice  Popsicles that contain no fruit or yogurt  Tea or coffee (no cream or milk added)  Gatorade / Powerade  G2 / Powerade Zero    • Infants may have breast milk up to four hours before surgery.  • Infants drinking formula may drink formula up to six hours before surgery.   • Patients who avoid smoking, chewing tobacco and alcohol for 4 weeks prior to surgery have a reduced risk of post-operative complications.  Quit smoking as many days before surgery as you can.  • Do not smoke, use chewing tobacco or drink alcohol the day of surgery.   • If applicable bring your C-PAP/ BI-PAP machine.  • Bring any papers given to you in the doctor’s office.  • Wear clean comfortable clothes and socks.  • Do not wear contact lenses or make-up.  Bring a case for your glasses.   • Bring crutches or walker if applicable.  • Remove all piercings.  Leave jewelry and any other valuables at home.  • The Pre-Admission Testing nurse will instruct you to bring medications if unable to obtain an accurate list in Pre-Admission Testing.        If you  were given a blood bank ID arm band remember to bring it with you the day of surgery.    Preventing a Surgical Site Infection:  • For 2 to 3 days before surgery, avoid shaving with a razor because the razor can irritate skin and make it easier to develop an infection.  • The night prior to surgery sleep in a clean bed with clean clothing.  Do not allow pets to sleep with you.  • Shower on the morning of surgery using a fresh bar of anti-bacterial soap (such as Dial) and clean washcloth.  Dry with a clean towel and dress in clean clothing.  • Ask your surgeon if you will be receiving antibiotics prior to surgery.  • Make sure you, your family, and all healthcare providers clean their hands with soap and water or an alcohol based hand  before caring for you or your wound.    Day of surgery:  Upon arrival, a Pre-op nurse and Anesthesiologist will review your health history, obtain vital signs, and answer questions you may have.  The only belongings needed at this time will be your home medications and if applicable your C-PAP/BI-PAP machine.  If you are staying overnight your family can leave the rest of your belongings in the car and bring them to your room later.  A Pre-op nurse will start an IV and you may receive medication in preparation for surgery, including something to help you relax.  Your family will be able to see you in the Pre-op area.  While you are in surgery your family should notify the waiting room  if they leave the waiting room area and provide a contact phone number.    Please be aware that surgery does come with discomfort.  We want to make every effort to control your discomfort so please discuss any uncontrolled symptoms with your nurse.   Your doctor will most likely have prescribed pain medications.      If you are going home after surgery you will receive individualized written care instructions before being discharged.  A responsible adult must drive you to and from the  hospital on the day of your surgery and stay with you for 24 hours.    If you are staying overnight following surgery, you will be transported to your hospital room following the recovery period.  [facility] has all private rooms.    If you have any questions please call Pre-Admission Testing at [phone].  Deductibles and co-payments are collected on the day of service. Please be prepared to pay the required co-pay, deductible or deposit on the day of service as defined by your plan.

## 2017-11-21 ENCOUNTER — HOSPITAL ENCOUNTER (OUTPATIENT)
Dept: PHYSICAL THERAPY | Facility: HOSPITAL | Age: 47
Setting detail: THERAPIES SERIES
Discharge: HOME | End: 2017-11-21

## 2017-11-21 DIAGNOSIS — G89.29 CHRONIC PAIN OF LEFT KNEE: ICD-10-CM

## 2017-11-21 DIAGNOSIS — M54.5 CHRONIC BILATERAL LOW BACK PAIN, WITH SCIATICA PRESENCE UNSPECIFIED: Primary | ICD-10-CM

## 2017-11-21 DIAGNOSIS — Z47.89 ORTHOPEDIC AFTERCARE: ICD-10-CM

## 2017-11-21 DIAGNOSIS — R26.2 DIFFICULTY WALKING: ICD-10-CM

## 2017-11-21 DIAGNOSIS — M25.562 CHRONIC PAIN OF LEFT KNEE: ICD-10-CM

## 2017-11-21 DIAGNOSIS — G89.29 CHRONIC BILATERAL LOW BACK PAIN, WITH SCIATICA PRESENCE UNSPECIFIED: Primary | ICD-10-CM

## 2017-11-21 DIAGNOSIS — M51.16 LUMBAR DISC HERNIATION WITH RADICULOPATHY: ICD-10-CM

## 2017-11-21 PROCEDURE — 97113 AQUATIC THERAPY/EXERCISES: CPT | Performed by: PHYSICAL THERAPIST

## 2017-11-21 NOTE — THERAPY TREATMENT NOTE
Outpatient Physical Therapy Ortho Treatment Note  [facility]     Patient Name: Marisol Carrillo  : 1970  MRN: [id number]  Today's Date: 2017      Visit Date: 2017    Visit Dx:    ICD-10-CM ICD-9-CM   1. Chronic bilateral low back pain, with sciatica presence unspecified M54.5 724.2    G89.29 338.29   2. Difficulty walking R26.2 719.7   3. Chronic pain of left knee M25.562 719.46    G89.29 338.29   4. Lumbar disc herniation with radiculopathy M51.16 722.10   5. Orthopedic aftercare Z47.89 V54.9       Patient Active Problem List   Diagnosis   • Essential familial hypercholesterolemia   • Hypertension   • Hypothyroidism   • Adiposity   • Vitamin deficiency   • Impaired fasting glucose   • Vitamin D deficiency   • Lumbosacral radiculopathy   • Gastroesophageal reflux disease   • Constipation   • Diarrhea   • Dysphagia   • Fatigue   • Heartburn   • Lumbar disc herniation with radiculopathy   • Nocturnal cough   • Pain in extremity   • Regurgitation   • Vomiting   • Anxiety   • Depression   • Degeneration of lumbar intervertebral disc   • Spinal headache   • Chronic bilateral low back pain with bilateral sciatica   • Disc disease, degenerative, lumbar or lumbosacral   • Primary localized osteoarthritis of left knee   • History of spinal fusion   • Epigastric pain   • Chronic cholecystitis        Past Medical History:   Diagnosis Date   • Alopecia    • Anxiety and depression    • Arthritis     OSTEO   • Balance problem     FOOTDROP LEFT FOOT   • Bilateral knee pain    • Breast mass     RIGHT, MD WATCHING.   • Chronic low back pain    • Depression    • GERD (gastroesophageal reflux disease)    • Hyperlipidemia    • Hypertension    • Hypothyroidism    • Impaired fasting glucose    • Kidney calculus     HISTORY OF   • Pneumonia     2015 S/P LUMBAR FUSION   • Sciatica    • Spinal headache     AFTER MYELOGRAM. BLOOD PATCH X2   • Vitamin D deficiency         Past Surgical History:   Procedure Laterality  Date   •  SECTION     • DILATION AND CURETTAGE, DIAGNOSTIC / THERAPEUTIC     • ENDOSCOPY N/A 2017    Procedure: ESOPHAGOGASTRODUODENOSCOPY WITH BIOPSY;  Surgeon: Jam Ballard MD;  Location: [facility] ENDOSCOPY;  Service:    • JOINT REPLACEMENT Left     KNEE   • KNEE ARTHROSCOPY Left     REPAIR OF MENISUCS   • LAPAROSCOPIC GASTRIC BANDING     • LITHOTRIPSY     • LUMBAR FUSION      L5-S1. WITH HARDWARE   • MICRODISCECTOMY LUMBAR     • DE TOTAL KNEE ARTHROPLASTY Left 3/29/2017    Procedure: TOTAL KNEE ARTHROPLASTY;  Surgeon: Zacarias Reeves MD;  Location: [facility] MAIN OR;  Service: Orthopedics   • TONSILLECTOMY                               PT Assessment/Plan       17 1036       PT Assessment    Assessment Comments No change with B lateral leg pain when trying to sleep in bed. Instructed patient in home stretching and added hip adductor stretch to aquatic program.  -JANI       User Key  (r) = Recorded By, (t) = Taken By, (c) = Cosigned By    Initials Name Provider Type    JANI Carranza, PT Physical Therapist                    Exercises       17 0900          Subjective Pain    Able to rate subjective pain? yes  -JANI      Pre-Treatment Pain Level 5  -JANI      Subjective Pain Comment Same trouble with sleeping in bed- very difficult due to B leg pain.  -JANI      Aquatics    Aquatics performed? Yes  -JANI      Aquatics LE    Stretch 3 Hip Flexor stretch 30 sec X 3  -JANI      Stretch Other 1 Hip Adductor stretch 20 sec X 3  -JANI      Stretch Other 2 SKTC X 2  -JANI      Clams Tuck Ups with side leg push out for lateral trunk stretch X 4  -JANI        User Key  (r) = Recorded By, (t) = Taken By, (c) = Cosigned By    Initials Name Provider Type    JANI Carranza, PT Physical Therapist                                   Therapy Education       17 1035          Therapy Education    Given HEP   Home stretches - SKTC with towel, hamstring stretch, piriformis stretch with towel and inner thigh stretch.  -JANI       How Provided Verbal;Demonstration  -JANI      Provided to Patient  -JANI      Level of Understanding Verbalized  -JANI        User Key  (r) = Recorded By, (t) = Taken By, (c) = Cosigned By    Initials Name Provider Type    JANI Ivan Carranza, PT Physical Therapist                Time Calculation:   Start Time: 0950  Stop Time: 1032  Time Calculation (min): 42 min    Therapy Charges for Today     Code Description Service Date Service Provider Modifiers Qty    89506817495  PT AQUATIC THERAPY EA 15 MIN 11/21/2017 Ivan Carranza, PT GP 3                    Ivan Carranza, PT  11/21/2017

## 2017-11-30 ENCOUNTER — HOSPITAL ENCOUNTER (OUTPATIENT)
Dept: PHYSICAL THERAPY | Facility: HOSPITAL | Age: 47
Setting detail: THERAPIES SERIES
Discharge: HOME | End: 2017-11-30

## 2017-11-30 DIAGNOSIS — M51.16 LUMBAR DISC HERNIATION WITH RADICULOPATHY: ICD-10-CM

## 2017-11-30 DIAGNOSIS — G89.29 CHRONIC PAIN OF LEFT KNEE: ICD-10-CM

## 2017-11-30 DIAGNOSIS — G89.29 CHRONIC BILATERAL LOW BACK PAIN, WITH SCIATICA PRESENCE UNSPECIFIED: Primary | ICD-10-CM

## 2017-11-30 DIAGNOSIS — M54.5 CHRONIC BILATERAL LOW BACK PAIN, WITH SCIATICA PRESENCE UNSPECIFIED: Primary | ICD-10-CM

## 2017-11-30 DIAGNOSIS — M25.562 CHRONIC PAIN OF LEFT KNEE: ICD-10-CM

## 2017-11-30 DIAGNOSIS — R26.2 DIFFICULTY WALKING: ICD-10-CM

## 2017-11-30 PROCEDURE — 97113 AQUATIC THERAPY/EXERCISES: CPT | Performed by: PHYSICAL THERAPIST

## 2017-12-01 ENCOUNTER — ANESTHESIA (OUTPATIENT)
Dept: PERIOP | Facility: HOSPITAL | Age: 47
End: 2017-12-01

## 2017-12-01 ENCOUNTER — ANESTHESIA EVENT (OUTPATIENT)
Dept: PERIOP | Facility: HOSPITAL | Age: 47
End: 2017-12-01

## 2017-12-01 ENCOUNTER — HOSPITAL ENCOUNTER (OUTPATIENT)
Facility: HOSPITAL | Age: 47
Setting detail: HOSPITAL OUTPATIENT SURGERY
Discharge: HOME | End: 2017-12-01
Attending: SURGERY | Admitting: SURGERY

## 2017-12-01 VITALS
RESPIRATION RATE: 16 BRPM | SYSTOLIC BLOOD PRESSURE: 142 MMHG | TEMPERATURE: 98 F | OXYGEN SATURATION: 96 % | DIASTOLIC BLOOD PRESSURE: 93 MMHG | HEART RATE: 76 BPM

## 2017-12-01 DIAGNOSIS — K81.1 CHRONIC CHOLECYSTITIS: ICD-10-CM

## 2017-12-01 PROCEDURE — 88304 TISSUE EXAM BY PATHOLOGIST: CPT | Performed by: SURGERY

## 2017-12-01 PROCEDURE — 25010000002 DEXAMETHASONE PER 1 MG: Performed by: NURSE ANESTHETIST, CERTIFIED REGISTERED

## 2017-12-01 PROCEDURE — 25010000002 HYDRALAZINE PER 20 MG: Performed by: ANESTHESIOLOGY

## 2017-12-01 PROCEDURE — 25010000002 SUCCINYLCHOLINE PER 20 MG: Performed by: NURSE ANESTHETIST, CERTIFIED REGISTERED

## 2017-12-01 PROCEDURE — 63710000001 PROMETHAZINE PER 25 MG: Performed by: ANESTHESIOLOGY

## 2017-12-01 PROCEDURE — 25010000002 NEOSTIGMINE PER 0.5 MG: Performed by: NURSE ANESTHETIST, CERTIFIED REGISTERED

## 2017-12-01 PROCEDURE — 25010000002 FENTANYL CITRATE (PF) 100 MCG/2ML SOLUTION: Performed by: NURSE ANESTHETIST, CERTIFIED REGISTERED

## 2017-12-01 PROCEDURE — 25010000002 ONDANSETRON PER 1 MG: Performed by: NURSE ANESTHETIST, CERTIFIED REGISTERED

## 2017-12-01 PROCEDURE — 25010000002 DIPHENHYDRAMINE PER 50 MG: Performed by: ANESTHESIOLOGY

## 2017-12-01 PROCEDURE — 25010000002 ONDANSETRON PER 1 MG: Performed by: ANESTHESIOLOGY

## 2017-12-01 PROCEDURE — 25010000002 PROPOFOL 10 MG/ML EMULSION: Performed by: NURSE ANESTHETIST, CERTIFIED REGISTERED

## 2017-12-01 PROCEDURE — 25010000002 MIDAZOLAM PER 1 MG: Performed by: ANESTHESIOLOGY

## 2017-12-01 PROCEDURE — 25010000002 KETOROLAC TROMETHAMINE PER 15 MG: Performed by: NURSE ANESTHETIST, CERTIFIED REGISTERED

## 2017-12-01 PROCEDURE — 25010000002 FENTANYL CITRATE (PF) 100 MCG/2ML SOLUTION: Performed by: ANESTHESIOLOGY

## 2017-12-01 RX ORDER — KETOROLAC TROMETHAMINE 30 MG/ML
INJECTION, SOLUTION INTRAMUSCULAR; INTRAVENOUS AS NEEDED
Status: DISCONTINUED | OUTPATIENT
Start: 2017-12-01 | End: 2017-12-01 | Stop reason: SURG

## 2017-12-01 RX ORDER — OXYCODONE HYDROCHLORIDE AND ACETAMINOPHEN 5; 325 MG/1; MG/1
1-2 TABLET ORAL EVERY 4 HOURS PRN
Qty: 30 TABLET | Refills: 0 | Status: SHIPPED | OUTPATIENT
Start: 2017-12-01 | End: 2018-02-01

## 2017-12-01 RX ORDER — ROCURONIUM BROMIDE 10 MG/ML
INJECTION, SOLUTION INTRAVENOUS AS NEEDED
Status: DISCONTINUED | OUTPATIENT
Start: 2017-12-01 | End: 2017-12-01 | Stop reason: SURG

## 2017-12-01 RX ORDER — GLYCOPYRROLATE 0.2 MG/ML
INJECTION INTRAMUSCULAR; INTRAVENOUS AS NEEDED
Status: DISCONTINUED | OUTPATIENT
Start: 2017-12-01 | End: 2017-12-01 | Stop reason: SURG

## 2017-12-01 RX ORDER — HYDRALAZINE HYDROCHLORIDE 20 MG/ML
5 INJECTION INTRAMUSCULAR; INTRAVENOUS
Status: DISCONTINUED | OUTPATIENT
Start: 2017-12-01 | End: 2017-12-01 | Stop reason: HOSPADM

## 2017-12-01 RX ORDER — NALOXONE HCL 0.4 MG/ML
0.2 VIAL (ML) INJECTION AS NEEDED
Status: DISCONTINUED | OUTPATIENT
Start: 2017-12-01 | End: 2017-12-01 | Stop reason: HOSPADM

## 2017-12-01 RX ORDER — PROMETHAZINE HYDROCHLORIDE 25 MG/1
25 SUPPOSITORY RECTAL ONCE AS NEEDED
Status: COMPLETED | OUTPATIENT
Start: 2017-12-01 | End: 2017-12-01

## 2017-12-01 RX ORDER — SODIUM CHLORIDE 0.9 % (FLUSH) 0.9 %
1-10 SYRINGE (ML) INJECTION AS NEEDED
Status: DISCONTINUED | OUTPATIENT
Start: 2017-12-01 | End: 2017-12-01 | Stop reason: HOSPADM

## 2017-12-01 RX ORDER — FLUMAZENIL 0.1 MG/ML
0.2 INJECTION INTRAVENOUS AS NEEDED
Status: DISCONTINUED | OUTPATIENT
Start: 2017-12-01 | End: 2017-12-01 | Stop reason: HOSPADM

## 2017-12-01 RX ORDER — HYDROCODONE BITARTRATE AND ACETAMINOPHEN 7.5; 325 MG/1; MG/1
1 TABLET ORAL ONCE AS NEEDED
Status: DISCONTINUED | OUTPATIENT
Start: 2017-12-01 | End: 2017-12-01 | Stop reason: HOSPADM

## 2017-12-01 RX ORDER — PROPOFOL 10 MG/ML
VIAL (ML) INTRAVENOUS AS NEEDED
Status: DISCONTINUED | OUTPATIENT
Start: 2017-12-01 | End: 2017-12-01 | Stop reason: SURG

## 2017-12-01 RX ORDER — FAMOTIDINE 10 MG/ML
20 INJECTION, SOLUTION INTRAVENOUS ONCE
Status: COMPLETED | OUTPATIENT
Start: 2017-12-01 | End: 2017-12-01

## 2017-12-01 RX ORDER — PROMETHAZINE HYDROCHLORIDE 25 MG/1
25 TABLET ORAL ONCE AS NEEDED
Status: COMPLETED | OUTPATIENT
Start: 2017-12-01 | End: 2017-12-01

## 2017-12-01 RX ORDER — EPHEDRINE SULFATE 50 MG/ML
5 INJECTION, SOLUTION INTRAVENOUS ONCE AS NEEDED
Status: DISCONTINUED | OUTPATIENT
Start: 2017-12-01 | End: 2017-12-01 | Stop reason: HOSPADM

## 2017-12-01 RX ORDER — DEXAMETHASONE SODIUM PHOSPHATE 10 MG/ML
INJECTION INTRAMUSCULAR; INTRAVENOUS AS NEEDED
Status: DISCONTINUED | OUTPATIENT
Start: 2017-12-01 | End: 2017-12-01 | Stop reason: SURG

## 2017-12-01 RX ORDER — DIPHENHYDRAMINE HYDROCHLORIDE 50 MG/ML
12.5 INJECTION INTRAMUSCULAR; INTRAVENOUS
Status: COMPLETED | OUTPATIENT
Start: 2017-12-01 | End: 2017-12-01

## 2017-12-01 RX ORDER — MIDAZOLAM HYDROCHLORIDE 1 MG/ML
1 INJECTION INTRAMUSCULAR; INTRAVENOUS
Status: DISCONTINUED | OUTPATIENT
Start: 2017-12-01 | End: 2017-12-01 | Stop reason: HOSPADM

## 2017-12-01 RX ORDER — ONDANSETRON 2 MG/ML
4 INJECTION INTRAMUSCULAR; INTRAVENOUS ONCE AS NEEDED
Status: COMPLETED | OUTPATIENT
Start: 2017-12-01 | End: 2017-12-01

## 2017-12-01 RX ORDER — ONDANSETRON 2 MG/ML
INJECTION INTRAMUSCULAR; INTRAVENOUS AS NEEDED
Status: DISCONTINUED | OUTPATIENT
Start: 2017-12-01 | End: 2017-12-01 | Stop reason: SURG

## 2017-12-01 RX ORDER — PROMETHAZINE HYDROCHLORIDE 25 MG/ML
12.5 INJECTION, SOLUTION INTRAMUSCULAR; INTRAVENOUS ONCE AS NEEDED
Status: COMPLETED | OUTPATIENT
Start: 2017-12-01 | End: 2017-12-01

## 2017-12-01 RX ORDER — LABETALOL HYDROCHLORIDE 5 MG/ML
5 INJECTION, SOLUTION INTRAVENOUS
Status: DISCONTINUED | OUTPATIENT
Start: 2017-12-01 | End: 2017-12-01 | Stop reason: HOSPADM

## 2017-12-01 RX ORDER — FENTANYL CITRATE 50 UG/ML
50 INJECTION, SOLUTION INTRAMUSCULAR; INTRAVENOUS
Status: DISCONTINUED | OUTPATIENT
Start: 2017-12-01 | End: 2017-12-01 | Stop reason: HOSPADM

## 2017-12-01 RX ORDER — BUPIVACAINE HYDROCHLORIDE AND EPINEPHRINE 5; 5 MG/ML; UG/ML
INJECTION, SOLUTION PERINEURAL AS NEEDED
Status: DISCONTINUED | OUTPATIENT
Start: 2017-12-01 | End: 2017-12-01 | Stop reason: HOSPADM

## 2017-12-01 RX ORDER — PROMETHAZINE HYDROCHLORIDE 25 MG/1
12.5 TABLET ORAL ONCE AS NEEDED
Status: DISCONTINUED | OUTPATIENT
Start: 2017-12-01 | End: 2017-12-01 | Stop reason: HOSPADM

## 2017-12-01 RX ORDER — SODIUM CHLORIDE, SODIUM LACTATE, POTASSIUM CHLORIDE, CALCIUM CHLORIDE 600; 310; 30; 20 MG/100ML; MG/100ML; MG/100ML; MG/100ML
9 INJECTION, SOLUTION INTRAVENOUS CONTINUOUS
Status: DISCONTINUED | OUTPATIENT
Start: 2017-12-01 | End: 2017-12-01 | Stop reason: HOSPADM

## 2017-12-01 RX ORDER — LIDOCAINE HYDROCHLORIDE 20 MG/ML
INJECTION, SOLUTION INFILTRATION; PERINEURAL AS NEEDED
Status: DISCONTINUED | OUTPATIENT
Start: 2017-12-01 | End: 2017-12-01 | Stop reason: SURG

## 2017-12-01 RX ORDER — MAGNESIUM HYDROXIDE 1200 MG/15ML
LIQUID ORAL AS NEEDED
Status: DISCONTINUED | OUTPATIENT
Start: 2017-12-01 | End: 2017-12-01 | Stop reason: HOSPADM

## 2017-12-01 RX ORDER — SUCCINYLCHOLINE CHLORIDE 20 MG/ML
INJECTION INTRAMUSCULAR; INTRAVENOUS AS NEEDED
Status: DISCONTINUED | OUTPATIENT
Start: 2017-12-01 | End: 2017-12-01 | Stop reason: SURG

## 2017-12-01 RX ORDER — PROMETHAZINE HCL 50 MG
25 TABLET ORAL EVERY 6 HOURS PRN
Qty: 15 TABLET | Refills: 0 | Status: SHIPPED | OUTPATIENT
Start: 2017-12-01 | End: 2017-12-16

## 2017-12-01 RX ORDER — MIDAZOLAM HYDROCHLORIDE 1 MG/ML
2 INJECTION INTRAMUSCULAR; INTRAVENOUS
Status: DISCONTINUED | OUTPATIENT
Start: 2017-12-01 | End: 2017-12-01 | Stop reason: HOSPADM

## 2017-12-01 RX ORDER — OXYCODONE HYDROCHLORIDE AND ACETAMINOPHEN 5; 325 MG/1; MG/1
2 TABLET ORAL ONCE AS NEEDED
Status: DISCONTINUED | OUTPATIENT
Start: 2017-12-01 | End: 2017-12-01 | Stop reason: HOSPADM

## 2017-12-01 RX ORDER — OXYCODONE AND ACETAMINOPHEN 7.5; 325 MG/1; MG/1
1 TABLET ORAL ONCE AS NEEDED
Status: DISCONTINUED | OUTPATIENT
Start: 2017-12-01 | End: 2017-12-01 | Stop reason: HOSPADM

## 2017-12-01 RX ORDER — FENTANYL CITRATE 50 UG/ML
INJECTION, SOLUTION INTRAMUSCULAR; INTRAVENOUS AS NEEDED
Status: DISCONTINUED | OUTPATIENT
Start: 2017-12-01 | End: 2017-12-01 | Stop reason: SURG

## 2017-12-01 RX ADMIN — PROMETHAZINE HYDROCHLORIDE 25 MG: 25 TABLET ORAL at 14:34

## 2017-12-01 RX ADMIN — SUCCINYLCHOLINE CHLORIDE 120 MG: 20 INJECTION, SOLUTION INTRAMUSCULAR; INTRAVENOUS; PARENTERAL at 12:20

## 2017-12-01 RX ADMIN — SODIUM CHLORIDE, POTASSIUM CHLORIDE, SODIUM LACTATE AND CALCIUM CHLORIDE 9 ML/HR: 600; 310; 30; 20 INJECTION, SOLUTION INTRAVENOUS at 11:22

## 2017-12-01 RX ADMIN — ROCURONIUM BROMIDE 5 MG: 10 INJECTION INTRAVENOUS at 12:20

## 2017-12-01 RX ADMIN — FENTANYL CITRATE 50 MCG: 50 INJECTION INTRAMUSCULAR; INTRAVENOUS at 12:50

## 2017-12-01 RX ADMIN — LIDOCAINE HYDROCHLORIDE 100 MG: 20 INJECTION, SOLUTION INFILTRATION; PERINEURAL at 12:20

## 2017-12-01 RX ADMIN — DIPHENHYDRAMINE HYDROCHLORIDE 12.5 MG: 50 INJECTION, SOLUTION INTRAMUSCULAR; INTRAVENOUS at 13:07

## 2017-12-01 RX ADMIN — ONDANSETRON 4 MG: 2 INJECTION INTRAMUSCULAR; INTRAVENOUS at 12:40

## 2017-12-01 RX ADMIN — KETOROLAC TROMETHAMINE 30 MG: 30 INJECTION, SOLUTION INTRAMUSCULAR; INTRAVENOUS at 12:40

## 2017-12-01 RX ADMIN — FAMOTIDINE 20 MG: 10 INJECTION INTRAVENOUS at 11:22

## 2017-12-01 RX ADMIN — PROPOFOL 200 MG: 10 INJECTION, EMULSION INTRAVENOUS at 12:20

## 2017-12-01 RX ADMIN — FENTANYL CITRATE 100 MCG: 50 INJECTION INTRAMUSCULAR; INTRAVENOUS at 12:20

## 2017-12-01 RX ADMIN — ONDANSETRON 4 MG: 2 INJECTION INTRAMUSCULAR; INTRAVENOUS at 13:42

## 2017-12-01 RX ADMIN — MIDAZOLAM 2 MG: 1 INJECTION INTRAMUSCULAR; INTRAVENOUS at 11:22

## 2017-12-01 RX ADMIN — GLYCOPYRROLATE 0.8 MG: 0.2 INJECTION INTRAMUSCULAR; INTRAVENOUS at 12:42

## 2017-12-01 RX ADMIN — OXYCODONE HYDROCHLORIDE AND ACETAMINOPHEN 2 TABLET: 5; 325 TABLET ORAL at 13:44

## 2017-12-01 RX ADMIN — FENTANYL CITRATE 50 MCG: 50 INJECTION, SOLUTION INTRAMUSCULAR; INTRAVENOUS at 13:08

## 2017-12-01 RX ADMIN — FENTANYL CITRATE 50 MCG: 50 INJECTION INTRAMUSCULAR; INTRAVENOUS at 12:35

## 2017-12-01 RX ADMIN — FENTANYL CITRATE 50 MCG: 50 INJECTION INTRAMUSCULAR; INTRAVENOUS at 12:44

## 2017-12-01 RX ADMIN — NEOSTIGMINE METHYLSULFATE 5 MG: 1 INJECTION INTRAMUSCULAR; INTRAVENOUS; SUBCUTANEOUS at 12:42

## 2017-12-01 RX ADMIN — DIPHENHYDRAMINE HYDROCHLORIDE 12.5 MG: 50 INJECTION, SOLUTION INTRAMUSCULAR; INTRAVENOUS at 13:20

## 2017-12-01 RX ADMIN — HYDRALAZINE HYDROCHLORIDE 5 MG: 20 INJECTION INTRAMUSCULAR; INTRAVENOUS at 14:02

## 2017-12-01 RX ADMIN — DEXAMETHASONE SODIUM PHOSPHATE 4 MG: 10 INJECTION INTRAMUSCULAR; INTRAVENOUS at 12:40

## 2017-12-01 NOTE — PLAN OF CARE
Problem: Patient Care Overview (Adult)  Goal: Adult Individualization and Mutuality  Outcome: Outcome(s) achieved Date Met:  12/01/17

## 2017-12-01 NOTE — PLAN OF CARE
Problem: Perioperative Period (Adult)  Goal: Signs and Symptoms of Listed Potential Problems Will be Absent or Manageable (Perioperative Period)  Outcome: Ongoing (interventions implemented as appropriate)    12/01/17 1019   Perioperative Period   Problems Assessed (Perioperative Period) all   Problems Present (Perioperative Period) none

## 2017-12-01 NOTE — ANESTHESIA PROCEDURE NOTES
Airway  Urgency: elective    Airway not difficult    General Information and Staff    Patient location during procedure: OR  Anesthesiologist: NERISSA BETH  CRNA: QUE HANNON    Indications and Patient Condition  Indications for airway management: airway protection    Preoxygenated: yes  MILS not maintained throughout  Mask difficulty assessment: 1 - vent by mask    Final Airway Details  Final airway type: endotracheal airway      Successful airway: ETT  Cuffed: yes   Successful intubation technique: direct laryngoscopy  Facilitating devices/methods: intubating stylet  Endotracheal tube insertion site: oral  Blade: Vargas  Blade size: #2  ETT size: 7.0 mm  Cormack-Lehane Classification: grade I - full view of glottis  Placement verified by: chest auscultation and capnometry   Cuff volume (mL): 8  Measured from: lips  Number of attempts at approach: 1    Additional Comments  Ett placed easily, appears atraumatic, dentition intact

## 2017-12-01 NOTE — PLAN OF CARE
Problem: Patient Care Overview (Adult)  Goal: Plan of Care Review    12/01/17 1019   Coping/Psychosocial Response Interventions   Plan Of Care Reviewed With patient   Patient Care Overview   Progress improving       Goal: Adult Individualization and Mutuality  Outcome: Ongoing (interventions implemented as appropriate)  Goal: Discharge Needs Assessment  Outcome: Ongoing (interventions implemented as appropriate)    12/01/17 1019   Discharge Needs Assessment   Concerns To Be Addressed no discharge needs identified

## 2017-12-01 NOTE — PLAN OF CARE
Problem: Perioperative Period (Adult)  Goal: Signs and Symptoms of Listed Potential Problems Will be Absent or Manageable (Perioperative Period)  Outcome: Ongoing (interventions implemented as appropriate)    12/01/17 1335   Perioperative Period   Problems Assessed (Perioperative Period) all   Problems Present (Perioperative Period) none

## 2017-12-01 NOTE — OP NOTE
December 1, 2017    Marsiol Carrillo  [id number]    PROCEDURE:  Laparoscopic cholecystectomy.    PREOPERATIVE DIAGNOSIS:  chronic cholecystitis.    POSTOPERATIVE DIAGNOSIS:  Same.     SURGEON:  Jam Ballard M.D.    ASSISTANT:  None.    ANESTHESIA:  GETA.    ESTIMATED BLOOD LOSS:  Minimal.    SPECIMENS: Gallbladder.    FINDINGS:  Moderate pericholecystic adhesions consisting of omentum nearly completely encasing the gallbladder.    INDICATIONS:  Patient presents today with a recently diagnosed chronic cholecystitis. After seeing the patient and reviewing the studies, I recommended consideration for laparoscopic cholecystectomy.  The procedure, risks, benefits and alternatives were discussed including but not limited to bleeding, infection, opening, internal organ injury as well as the need for further surgery acutely or chronically down the line.  They expressed agreement and understanding.  Questions were answered to their satisfaction.    PROCEDURE:  Patient was taken to the operating room and placed supine on the operating room table. After establishment of adequate general endotracheal anesthesia, the abdomen was prepped and draped in normal sterile fashion.  The procedure was then initiated by making a 5 mm transverse infraumbilical incision through which a bladeless was trocar was inserted into the abdominal cavity.  After insufflation a camera was inserted and the abdomen cavity was inspected.  With no contraindications, I then placed under direct vision 3 subcostal trochars, an 11 mm subxiphoid trocar, and 2 lateral 5 mm trochars. Two blunt gallbladder graspers were then inserted and the gallbladder was dissected free and elevated.  I grabbed the fundus first and retracted superiorly and the Melendez's pouch and retracted laterally.  This opened up the triangle of Calot.  Both blunt and sharp dissection were then used to define the length of the gallbladder, the Melendez's pouch and the triangle of Calot.   Once in the triangle, I then dissected out the cystic duct and artery.  Both were identified, completely cleared, clipped and divided.  With both structures divided and no other structures identified, and the anatomic relationships confirmed, I then began removing the gallbladder from its hepatic attachments using cautery and countertraction.  Once the gallbladder was completely  from the liver bed, it was then removed through the subxiphoid port without much difficulty.  The right upper quadrant was then irrigated with saline and inspected.  Once no evidence of continued bleeding or signs of bile leak were noted, instruments and trocars were removed and counted correctly.  Local anesthetic solution was injected into all the incisions which were then closed with 4-0 Vicryl subcuticular suture and then covered by steristrips and Band-Aids.  Anesthesia was reversed and the patient was taken recovery in satisfactory condition.    Jam Ballard M.D.

## 2017-12-01 NOTE — ANESTHESIA POSTPROCEDURE EVALUATION
Patient: Marisol Carrillo    Procedure Summary     Date Anesthesia Start Anesthesia Stop Room / Location    12/01/17 1217 1254  [facility]  /  [facility]       Procedure Diagnosis Surgeon Provider    CHOLECYSTECTOMY LAPAROSCOPIC (N/A Abdomen) Chronic cholecystitis  (Chronic cholecystitis [K81.1]) MD Coleman Dixon MD          Anesthesia Type: general  Last vitals  BP   146/99 (12/01/17 1021)   Temp       Pulse   66 (12/01/17 1021)   Resp   16 (12/01/17 1021)     SpO2   98 % (12/01/17 1021)     Post Anesthesia Care and Evaluation    Patient location during evaluation: bedside  Patient participation: complete - patient participated  Level of consciousness: awake  Pain score: 2  Pain management: adequate  Airway patency: patent  Anesthetic complications: No anesthetic complications    Cardiovascular status: acceptable  Respiratory status: acceptable  Hydration status: acceptable    Comments: /99  Pulse 66  Resp 16  LMP 11/12/2017  SpO2 98%

## 2017-12-01 NOTE — PLAN OF CARE
Problem: Patient Care Overview (Adult)  Goal: Plan of Care Review  Outcome: Ongoing (interventions implemented as appropriate)    12/01/17 8189   Coping/Psychosocial Response Interventions   Plan Of Care Reviewed With patient   Patient Care Overview   Progress improving

## 2017-12-01 NOTE — ANESTHESIA PREPROCEDURE EVALUATION
Anesthesia Evaluation     Patient summary reviewed and Nursing notes reviewed   history of anesthetic complications:  NPO Solid Status: > 8 hours  NPO Liquid Status: > 2 hours     Airway   Mallampati: II  TM distance: >3 FB  Neck ROM: full  Dental - normal exam     Pulmonary - normal exam   (+) pneumonia ,   Cardiovascular - normal exam    (+) hypertension, hyperlipidemia      Neuro/Psych  (+) numbness, psychiatric history Anxiety and Depression,    GI/Hepatic/Renal/Endo    (+) morbid obesity, GERD, hypothyroidism,     Musculoskeletal     (+) back pain, radiculopathy  Abdominal    Substance History      OB/GYN          Other   (+) arthritis                                     Anesthesia Plan    ASA 3     general     Anesthetic plan and risks discussed with patient.

## 2017-12-01 NOTE — H&P
Subjective   Marisol Carrillo is a 47 y.o. female who presents today for a laparoscopic cholecystectomy.  She has had biliary colic complaints and has multiple risk factors for gallbladder disease.  Her work up has not be completely diagnostic but she has not responded to conservative or medical therapy.    Past Medical History:   Diagnosis Date   • Alopecia    • Anxiety and depression    • Arthritis     OSTEO   • Balance problem     FOOTDROP LEFT FOOT   • Bilateral knee pain    • Breast mass     RIGHT, MD WATCHING.   • Chronic low back pain    • Depression    • GERD (gastroesophageal reflux disease)    • Hyperlipidemia    • Hypertension    • Hypothyroidism    • Impaired fasting glucose    • Kidney calculus     HISTORY OF   • Pneumonia     2015 S/P LUMBAR FUSION   • Sciatica    • Spinal headache     AFTER MYELOGRAM. BLOOD PATCH X2   • Vitamin D deficiency        PHYSICAL EXAM  Pt is in no distress.  Heart regular.  Chest clear.  Abdomen soft.  Rectal deferred to endoscopy.      Assessment/Plan      Plan a laparoscopic cholecystectomy today.  Risks and benefits were discussed including but not limited to incomplete symptom relief and internal organ injury.  Patient is agreeable.  Final recommendations will follow depending on the results.

## 2017-12-01 NOTE — H&P
H&P    PARMINDER LOUIE  YOB: 1953  DATE OF SERVICE:  12/1/2017        CONSULTING PHYSICIAN:  Jam Ballard MD    REQUESTING PHYSICIAN:  Bárbara Orozco MD    REASON FOR CONSULTATION:  Left inguinal hernia.    HISTORY:  The patient is a 63-year-old young man, who apparently has a brother that I took care of with a left inguinal hernia, who now comes in to discuss the same.  He apparently has had it for quite some time if not years and only recently noticed that it got big enough that he ought to probably have something done about it, his doctor agreed.  He denies any trauma in the area.  He denied any GI or  complaints and he never had a hernia fixed elsewhere before.    PAST HISTORY:  Essentially negative.  He had some knee surgery and takes some supplements, but otherwise he is very healthy for his age.    PHYSICAL EXAMINATION:  GENERAL:  Reveals a thin, healthy-appearing young man, in no acute distress, alert, oriented, and afebrile.  HEENT:  Sclerae nonicteric.  NECK:  Supple without thyromegaly.  CHEST:  Clear with good respiratory effort bilaterally.  HEART:  Regular without murmur.  ABDOMEN:  Soft without masses or hernias.  Bilateral groin exam reveals a left inguinal hernia, right side weak, but no definite defect.  Testicles normal.  EXTREMITIES:  Without edema.  SKIN:  Negative.  RECTAL:  Deferred.    IMPRESSION:  Left inguinal hernia.    PLAN:  I recommended consideration for a preperitoneal left inguinal hernia repair with mesh to be done laparoscopically and the risks and benefits were discussed including, but not limited to, bleeding, infection, opening, nerve and/or testicle injury, as well as recurrence and he is agreeable.    Letter to Bárbara Orozco MD.        Jam Hitchcock M.D.  HOLLI: birgit/vss

## 2017-12-12 ENCOUNTER — HOSPITAL ENCOUNTER (OUTPATIENT)
Dept: PHYSICAL THERAPY | Facility: HOSPITAL | Age: 47
Setting detail: THERAPIES SERIES
Discharge: HOME | End: 2017-12-12

## 2017-12-12 DIAGNOSIS — Z47.89 ORTHOPEDIC AFTERCARE: ICD-10-CM

## 2017-12-12 DIAGNOSIS — G89.29 CHRONIC PAIN OF LEFT KNEE: ICD-10-CM

## 2017-12-12 DIAGNOSIS — G89.29 CHRONIC BILATERAL LOW BACK PAIN, WITH SCIATICA PRESENCE UNSPECIFIED: Primary | ICD-10-CM

## 2017-12-12 DIAGNOSIS — R26.2 DIFFICULTY WALKING: ICD-10-CM

## 2017-12-12 DIAGNOSIS — M51.16 LUMBAR DISC HERNIATION WITH RADICULOPATHY: ICD-10-CM

## 2017-12-12 DIAGNOSIS — M25.562 CHRONIC PAIN OF LEFT KNEE: ICD-10-CM

## 2017-12-12 DIAGNOSIS — M54.5 CHRONIC BILATERAL LOW BACK PAIN, WITH SCIATICA PRESENCE UNSPECIFIED: Primary | ICD-10-CM

## 2017-12-12 PROCEDURE — 97113 AQUATIC THERAPY/EXERCISES: CPT | Performed by: PHYSICAL THERAPIST

## 2017-12-12 NOTE — THERAPY PROGRESS REPORT/RE-CERT
Outpatient Physical Therapy Ortho Progress Note  [facility]     Patient Name: Marisol Carrillo  : 1970  MRN: [id number]  Today's Date: 2017      Visit Date: 2017    Patient Active Problem List   Diagnosis   • Essential familial hypercholesterolemia   • Hypertension   • Hypothyroidism   • Adiposity   • Vitamin deficiency   • Impaired fasting glucose   • Vitamin D deficiency   • Lumbosacral radiculopathy   • Gastroesophageal reflux disease   • Constipation   • Diarrhea   • Dysphagia   • Fatigue   • Heartburn   • Lumbar disc herniation with radiculopathy   • Nocturnal cough   • Pain in extremity   • Regurgitation   • Vomiting   • Anxiety   • Depression   • Degeneration of lumbar intervertebral disc   • Spinal headache   • Chronic bilateral low back pain with bilateral sciatica   • Disc disease, degenerative, lumbar or lumbosacral   • Primary localized osteoarthritis of left knee   • History of spinal fusion   • Epigastric pain        Past Medical History:   Diagnosis Date   • Alopecia    • Anxiety and depression    • Arthritis     OSTEO   • Balance problem     FOOTDROP LEFT FOOT   • Bilateral knee pain    • Breast mass     RIGHT, MD WATCHING.   • Chronic low back pain    • Depression    • GERD (gastroesophageal reflux disease)    • Hyperlipidemia    • Hypertension    • Hypothyroidism    • Impaired fasting glucose    • Kidney calculus     HISTORY OF   • Pneumonia     2015 S/P LUMBAR FUSION   • Sciatica    • Spinal headache     AFTER MYELOGRAM. BLOOD PATCH X2   • Vitamin D deficiency         Past Surgical History:   Procedure Laterality Date   •  SECTION     • CHOLECYSTECTOMY N/A 2017    Procedure: CHOLECYSTECTOMY LAPAROSCOPIC;  Surgeon: Jam Ballard MD;  Location: [facility] OR [facility];  Service:    • DILATION AND CURETTAGE, DIAGNOSTIC / THERAPEUTIC     • ENDOSCOPY N/A 2017    Procedure: ESOPHAGOGASTRODUODENOSCOPY WITH BIOPSY;  Surgeon: Jam Ballard MD;  Location: [facility] ENDOSCOPY;   Service:    • JOINT REPLACEMENT Left     KNEE   • KNEE ARTHROSCOPY Left     REPAIR OF MENISUCS   • LAPAROSCOPIC GASTRIC BANDING     • LITHOTRIPSY     • LUMBAR FUSION      L5-S1. WITH HARDWARE   • MICRODISCECTOMY LUMBAR     • IN TOTAL KNEE ARTHROPLASTY Left 3/29/2017    Procedure: TOTAL KNEE ARTHROPLASTY;  Surgeon: Zacarias Reeves MD;  Location: [facility] OR;  Service: Orthopedics   • TONSILLECTOMY         Visit Dx:     ICD-10-CM ICD-9-CM   1. Chronic bilateral low back pain, with sciatica presence unspecified M54.5 724.2    G89.29 338.29   2. Difficulty walking R26.2 719.7   3. Chronic pain of left knee M25.562 719.46    G89.29 338.29   4. Lumbar disc herniation with radiculopathy M51.16 722.10   5. Orthopedic aftercare Z47.89 V54.9                                       PT OP Goals       12/12/17 0900       PT Short Term Goals    STG Date to Achieve 09/30/17  -JANI     STG 1 Pt to enter exit pool with rail and supervision.   -JANI     STG 1 Progress Met  -JANI     STG 2 Pt to tolerate 45 min aqua session without exacerbation of back or knee condition.   -JANI     STG 2 Progress Met  -JANI     STG 3 Pt to demonstrate basic flexibility ex in aqua to progress to self guided aqua ex outside of therapy 1x a week.  -JANI     STG 3 Progress Met  -JANI     STG 4 Pt to progress to sleeping in bed 1 hour at night.   -JANI     STG 4 Progress Met  -JANI     STG 5 Pt to improve to 30 sec sit to stant of 5 reps. (could only do 3 on eval/painful)   -JANI     STG 5 Progress Met  -JANI     Long Term Goals    LTG Date to Achieve 10/30/17  -JANI     LTG 1 Pt to progress to safe enter exit pool with rail/independently.   -JANI     LTG 1 Progress Met  -JANI     LTG 2 Pt to improve ability to don/socks/shoes without having to lift pants leg.   -JANI     LTG 2 Progress Ongoing  -JANI     LTG 2 Progress Comments Appears to be hindered by hip ROM limitations  -JANI     LTG 3 pt to walk into therapy clinic with no seated rest.   -JANI     LTG 3 Progress Met  -JANI     LTG 4  Pt to advance to gait with cane spontaneously step through, 2 point and minimal limb.   -JANI     LTG 4 Progress Met  -JANI     LTG 5 Pt to improve modified oswestry to 65%   -JANI     LTG 5 Progress Ongoing  -JANI     LTG 5 Progress Comments 70%  -JANI     LTG 6 Pt independent with self manage of condition with sefl guided aqua program.   -JANI     LTG 6 Progress Met  -JANI     LTG 6 Progress Comments However program is continual being progressed.  -JANI       User Key  (r) = Recorded By, (t) = Taken By, (c) = Cosigned By    Initials Name Provider Type    JANI Carranza, PT Physical Therapist                PT Assessment/Plan       12/12/17 1242       PT Assessment    Assessment Comments Marisol returns to PT after ~ 2 week lapse in treatment due to having gall bladder surgery on Dec 1st.  She has returned to sleeping in a recliner due to having increased R LE pain when in the bed.  Her Oswestry score today is 70%.  She continues physical therapy at another facility for her knee (s/p TKA with revision).  Marisol is ambulating with a cane.  She is limited in donning socks and shoes due to restrictions in hip ROM and has help from her daughter or uses elastic laces or slip on shoes. Marisol reports limitations in sitting or standing > 10 minutes due to back pain.    -JANI     Please refer to paper survey for additional self-reported information Yes  -JANI     Rehab Potential Good  -JANI     Patient/caregiver participated in establishment of treatment plan and goals Yes  -JANI     Patient would benefit from skilled therapy intervention Yes  -JANI     PT Plan    PT Frequency --   Will see pt next week and then change frequency to once every 2 weeks.  -JANI     Predicted Duration of Therapy Intervention (days/wks) one month  -JANI     Planned CPT's? PT AQUATIC THERAPY EA 15 MIN: 26330  -JANI     PT Plan Comments Continue with Dmitriy Ragaz techniques. stretching and core strengthening.   -JANI       User Key  (r) = Recorded By, (t) = Taken By, (c) =  Cosigned By    Initials Name Provider Type    JANI Carranza, PT Physical Therapist                  Exercises       12/12/17 0900          Subjective Comments    Subjective Comments Had gall bladder removed 12 days ago. That has helped those issues, and the rest seemed to help my back some.  -JANI      Subjective Pain    Able to rate subjective pain? yes  -JANI      Pre-Treatment Pain Level 5  -JANI      Aquatics    Aquatics performed? Yes  -JANI      Aquatics LE    Water Walk --   Independent  -JANI      Stretch 1 Supine float with floatation support with deep breathing.  -JANI      Stretch 2 Hamstrings, hip sweeps and piriformis stretching.  -JANI      Stretch 3 Bad Ragaz technique in supine float for gentle trunk Sidebend stretch assisted by therapist X10, leg press with feet hooked under rail X 10, hip Abd/Add X 12, shoulder Abd/add X 10.  -JANI      Stretch Other 1 Elementary back stroke, 20 ft X 6, with SBA.  -JANI      Clams Tuck Up x 15  -JANI      Uni-Clock Tuck Ups X 15  -JANI        User Key  (r) = Recorded By, (t) = Taken By, (c) = Cosigned By    Initials Name Provider Type    JANI Carranza, PT Physical Therapist                                  Time Calculation:   Start Time: 0949  Stop Time: 1030  Time Calculation (min): 41 min     Therapy Charges for Today     Code Description Service Date Service Provider Modifiers Qty    [id number]  PT AQUATIC THERAPY EA 15 MIN 12/12/2017 Ivan Carranza, PT GP 3                    Ivan Carranza, PT  12/12/2017

## 2017-12-19 ENCOUNTER — APPOINTMENT (OUTPATIENT)
Dept: PHYSICAL THERAPY | Facility: HOSPITAL | Age: 47
End: 2017-12-19

## 2018-01-04 ENCOUNTER — APPOINTMENT (OUTPATIENT)
Dept: PHYSICAL THERAPY | Facility: HOSPITAL | Age: 48
End: 2018-01-04

## 2018-01-08 RX ORDER — OMEPRAZOLE 40 MG/1
40 CAPSULE, DELAYED RELEASE ORAL DAILY
Qty: 30 CAPSULE | Refills: 5 | Status: SHIPPED | OUTPATIENT
Start: 2018-01-08 | End: 2018-09-10 | Stop reason: SDUPTHER

## 2018-01-18 ENCOUNTER — HOSPITAL ENCOUNTER (OUTPATIENT)
Dept: PHYSICAL THERAPY | Facility: HOSPITAL | Age: 48
Setting detail: THERAPIES SERIES
Discharge: HOME | End: 2018-01-18

## 2018-01-18 DIAGNOSIS — M54.5 CHRONIC BILATERAL LOW BACK PAIN, WITH SCIATICA PRESENCE UNSPECIFIED: Primary | ICD-10-CM

## 2018-01-18 DIAGNOSIS — G89.29 CHRONIC BILATERAL LOW BACK PAIN, WITH SCIATICA PRESENCE UNSPECIFIED: Primary | ICD-10-CM

## 2018-01-18 DIAGNOSIS — M51.16 LUMBAR DISC HERNIATION WITH RADICULOPATHY: ICD-10-CM

## 2018-01-18 DIAGNOSIS — G89.29 CHRONIC PAIN OF LEFT KNEE: ICD-10-CM

## 2018-01-18 DIAGNOSIS — M25.562 CHRONIC PAIN OF LEFT KNEE: ICD-10-CM

## 2018-01-18 DIAGNOSIS — R26.2 DIFFICULTY WALKING: ICD-10-CM

## 2018-01-18 PROCEDURE — 97113 AQUATIC THERAPY/EXERCISES: CPT | Performed by: PHYSICAL THERAPIST

## 2018-01-18 NOTE — THERAPY PROGRESS REPORT/RE-CERT
Outpatient Physical Therapy Ortho Progress Note  [facility]     Patient Name: Marisol Carrillo  : 1970  MRN: [id number]  Today's Date: 2018      Visit Date: 2018    Patient Active Problem List   Diagnosis   • Essential familial hypercholesterolemia   • Hypertension   • Hypothyroidism   • Adiposity   • Vitamin deficiency   • Impaired fasting glucose   • Vitamin D deficiency   • Lumbosacral radiculopathy   • Gastroesophageal reflux disease   • Constipation   • Diarrhea   • Dysphagia   • Fatigue   • Heartburn   • Lumbar disc herniation with radiculopathy   • Nocturnal cough   • Pain in extremity   • Regurgitation   • Vomiting   • Anxiety   • Depression   • Degeneration of lumbar intervertebral disc   • Spinal headache   • Chronic bilateral low back pain with bilateral sciatica   • Disc disease, degenerative, lumbar or lumbosacral   • Primary localized osteoarthritis of left knee   • History of spinal fusion   • Epigastric pain        Past Medical History:   Diagnosis Date   • Alopecia    • Anxiety and depression    • Arthritis     OSTEO   • Balance problem     FOOTDROP LEFT FOOT   • Bilateral knee pain    • Breast mass     RIGHT, MD WATCHING.   • Chronic low back pain    • Depression    • GERD (gastroesophageal reflux disease)    • Hyperlipidemia    • Hypertension    • Hypothyroidism    • Impaired fasting glucose    • Kidney calculus     HISTORY OF   • Pneumonia     2015 S/P LUMBAR FUSION   • Sciatica    • Spinal headache     AFTER MYELOGRAM. BLOOD PATCH X2   • Vitamin D deficiency         Past Surgical History:   Procedure Laterality Date   •  SECTION     • CHOLECYSTECTOMY N/A 2017    Procedure: CHOLECYSTECTOMY LAPAROSCOPIC;  Surgeon: Jam Ballard MD;  [place] [facility] [place] [facility] [place]:    • DILATION AND CURETTAGE, DIAGNOSTIC / THERAPEUTIC     • ENDOSCOPY N/A 2017    Procedure: ESOPHAGOGASTRODUODENOSCOPY WITH BIOPSY;  Surgeon: Jam Ballard MD;  Location: [facility] ENDOSCOPY;   Service:    • JOINT REPLACEMENT Left     KNEE   • KNEE ARTHROSCOPY Left     REPAIR OF MENISUCS   • LAPAROSCOPIC GASTRIC BANDING     • LITHOTRIPSY     • LUMBAR FUSION      L5-S1. WITH HARDWARE   • MICRODISCECTOMY LUMBAR     • VA TOTAL KNEE ARTHROPLASTY Left 3/29/2017    Procedure: TOTAL KNEE ARTHROPLASTY;  Surgeon: Zacarias Reeves MD;  Location: [facility] OR;  Service: Orthopedics   • TONSILLECTOMY         Visit Dx:     ICD-10-CM ICD-9-CM   1. Chronic bilateral low back pain, with sciatica presence unspecified M54.5 724.2    G89.29 338.29   2. Difficulty walking R26.2 719.7   3. Chronic pain of left knee M25.562 719.46    G89.29 338.29   4. Lumbar disc herniation with radiculopathy M51.16 722.10                                       PT OP Goals       01/18/18 1000       PT Short Term Goals    STG Date to Achieve 09/30/17  -JANI     STG 1 Pt to enter exit pool with rail and supervision.   -JANI     STG 1 Progress Met  -JANI     STG 2 Pt to tolerate 45 min aqua session without exacerbation of back or knee condition.   -JANI     STG 2 Progress Met  -JANI     STG 3 Pt to demonstrate basic flexibility ex in aqua to progress to self guided aqua ex outside of therapy 1x a week.  -JANI     STG 3 Progress Met  -JANI     STG 4 Pt to progress to sleeping in bed 1 hour at night.   -JANI     STG 4 Progress Met  -JANI     STG 5 Pt to improve to 30 sec sit to stant of 5 reps. (could only do 3 on eval/painful)   -JANI     STG 5 Progress Met  -JANI     Long Term Goals    LTG Date to Achieve 10/30/17  -JANI     LTG 1 Pt to progress to safe enter exit pool with rail/independently.   -JANI     LTG 1 Progress Met  -JANI     LTG 2 Pt to improve ability to don/socks/shoes without having to lift pants leg.   -JANI     LTG 2 Progress Ongoing  -JANI     LTG 3 pt to walk into therapy clinic with no seated rest.   -JANI     LTG 3 Progress Met  -JANI     LTG 4 Pt to advance to gait with cane spontaneously step through, 2 point and minimal limb.   -JANI     LTG 4 Progress Met   -JANI     LTG 5 Pt to improve modified oswestry to 65%   -JANI     LTG 5 Progress Ongoing  -JANI     LTG 6 Pt independent with self manage of condition with sefl guided aqua program.   -JANI     LTG 6 Progress Met  -JANI       User Key  (r) = Recorded By, (t) = Taken By, (c) = Cosigned By    Initials Name Provider Type    JANI Ivan Carranza, PT Physical Therapist                PT Assessment/Plan       01/18/18 1238       PT Assessment    Functional Limitations Impaired locomotion;Limitations in community activities;Limitations in functional capacity and performance;Limitation in home management;Performance in self-care ADL;Decreased safety during functional activities;Impaired gait  -JANI     Impairments Balance;Gait;Pain;Muscle strength;Edema;Posture;Range of motion;Impaired muscle length;Impaired muscle endurance  -JANI     Assessment Comments Marisol returns to aquatic PT after 5 week lapse due to having the flu as well as decreasing frequency of PT.  Since her last visit she reports she is no longer having the severe pain down her R LE at night and therefore she is now able to sleep in her bed instead of the recliner.  She continues to report LBP that radiates into B LEs.  She describes tenderness in the low back and difficulty walking due to her pain.  She is also returning to her L knee PT (goes to another facility for this). Marisol reports tripping over her daughter's gymnastics mat last night and fell onto her hands and knees.  She states she was able to pull herself up using the couch and did not injure herself.  Exercise was limited on this date. Marisol was given information on Feldenkrais treatment  that she plans to pursue.  Will see Marisol one more visit in 2 weeks,  plan to discharge at that time.  -JANI     Rehab Potential Fair  -JANI     Patient/caregiver participated in establishment of treatment plan and goals Yes  -JANI     Patient would benefit from skilled therapy intervention Yes  -JANI     PT Plan    PT Frequency  --   once in 2 weeks  -JANI     Planned CPT's? PT AQUATIC THERAPY EA 15 MIN: [id number]  -JANI     PT Plan Comments Review aquatic exercises, revisit [place] techniques if necessary, follow up on referral to [facility] practitioner.  -JANI       User Key  (r) = Recorded By, (t) = Taken By, (c) = Cosigned By    Initials Name Provider Type    JANI Carranza, PT Physical Therapist                  Exercises       01/18/18 0900          Subjective Comments    Subjective Comments I missed last appt because I had the flu.  I fell last night when I tripped on a mat with right foot. Landed on both knees and hands. I don’t feel like I hurt anything. I was able to pull myself up using the couch.  -JANI      Subjective Pain    Able to rate subjective pain? yes  -JANI      Pre-Treatment Pain Level 5  -JANI      Post-Treatment Pain Level 5  -JANI      Subjective Pain Comment Had Right knee flare up 2 weeks ago. Saw orthopedic Dr and Xrays show inflammation behind the knee cap.  The horrible pain that kept me up at night in the aright leg has gone away and I can sleep in my bed now.  -JANI      Aquatics    Aquatics performed? Yes  -JANI      Aquatics LE    Water Walk forward   5 min.  -JANI      Vertical Traction Decompression X 5 min  -JANI      Bicycle Seated and suspended X 3-4 min.  -JANI        User Key  (r) = Recorded By, (t) = Taken By, (c) = Cosigned By    Initials Name Provider Type    JANI Carranza, PT Physical Therapist                                  Time Calculation:   Start Time: 0948  Stop Time: 1015  Time Calculation (min): 27 min     Therapy Charges for Today     Code Description Service Date Service Provider Modifiers Qty    [id number]  PT AQUATIC THERAPY EA 15 MIN 1/18/2018 Ivan Carranza, PT GP 2                    Ivan Carranza, PT  1/18/2018

## 2018-02-01 ENCOUNTER — OFFICE VISIT (OUTPATIENT)
Dept: FAMILY MEDICINE CLINIC | Facility: CLINIC | Age: 48
End: 2018-02-01

## 2018-02-01 ENCOUNTER — APPOINTMENT (OUTPATIENT)
Dept: PHYSICAL THERAPY | Facility: HOSPITAL | Age: 48
End: 2018-02-01

## 2018-02-01 VITALS
SYSTOLIC BLOOD PRESSURE: 120 MMHG | TEMPERATURE: 97.8 F | RESPIRATION RATE: 16 BRPM | DIASTOLIC BLOOD PRESSURE: 80 MMHG | OXYGEN SATURATION: 99 % | HEART RATE: 66 BPM | WEIGHT: 293 LBS | HEIGHT: 64 IN | BODY MASS INDEX: 50.02 KG/M2

## 2018-02-01 DIAGNOSIS — E55.9 VITAMIN D DEFICIENCY: ICD-10-CM

## 2018-02-01 DIAGNOSIS — R11.0 NAUSEA: ICD-10-CM

## 2018-02-01 DIAGNOSIS — F41.9 ANXIETY: ICD-10-CM

## 2018-02-01 DIAGNOSIS — E03.9 ACQUIRED HYPOTHYROIDISM: ICD-10-CM

## 2018-02-01 DIAGNOSIS — R73.01 IMPAIRED FASTING GLUCOSE: ICD-10-CM

## 2018-02-01 DIAGNOSIS — R10.11 RUQ ABDOMINAL PAIN: ICD-10-CM

## 2018-02-01 DIAGNOSIS — R10.13 EPIGASTRIC PAIN: ICD-10-CM

## 2018-02-01 DIAGNOSIS — I10 ESSENTIAL HYPERTENSION: Primary | ICD-10-CM

## 2018-02-01 DIAGNOSIS — K21.00 GASTROESOPHAGEAL REFLUX DISEASE WITH ESOPHAGITIS: ICD-10-CM

## 2018-02-01 PROCEDURE — 99214 OFFICE O/P EST MOD 30 MIN: CPT | Performed by: PHYSICIAN ASSISTANT

## 2018-02-01 RX ORDER — SERTRALINE HYDROCHLORIDE 100 MG/1
200 TABLET, FILM COATED ORAL NIGHTLY
Qty: 60 TABLET | Refills: 5 | Status: SHIPPED | OUTPATIENT
Start: 2018-02-01 | End: 2018-09-10 | Stop reason: SDUPTHER

## 2018-02-01 RX ORDER — ATORVASTATIN CALCIUM 40 MG/1
40 TABLET, FILM COATED ORAL NIGHTLY
Qty: 30 TABLET | Refills: 11 | Status: SHIPPED | OUTPATIENT
Start: 2018-02-01 | End: 2019-02-04 | Stop reason: SDUPTHER

## 2018-02-01 RX ORDER — LEVOTHYROXINE SODIUM 0.05 MG/1
50 TABLET ORAL DAILY
Qty: 30 TABLET | Refills: 11 | Status: SHIPPED | OUTPATIENT
Start: 2018-02-01 | End: 2018-02-04

## 2018-02-01 RX ORDER — SUCRALFATE 1 G/1
1 TABLET ORAL 4 TIMES DAILY
COMMUNITY
End: 2018-09-10 | Stop reason: SDUPTHER

## 2018-02-01 RX ORDER — HYDROXYZINE HYDROCHLORIDE 25 MG/1
25 TABLET, FILM COATED ORAL 3 TIMES DAILY PRN
Qty: 60 TABLET | Refills: 5 | Status: SHIPPED | OUTPATIENT
Start: 2018-02-01 | End: 2018-09-10 | Stop reason: SDUPTHER

## 2018-02-01 RX ORDER — NEBIVOLOL 5 MG/1
5 TABLET ORAL NIGHTLY
Qty: 30 TABLET | Refills: 5 | Status: SHIPPED | OUTPATIENT
Start: 2018-02-01 | End: 2018-03-20

## 2018-02-01 RX ORDER — AMLODIPINE AND VALSARTAN 10; 320 MG/1; MG/1
1 TABLET ORAL NIGHTLY
Qty: 30 TABLET | Refills: 5 | Status: SHIPPED | OUTPATIENT
Start: 2018-02-01 | End: 2018-09-10

## 2018-02-01 NOTE — PATIENT INSTRUCTIONS
Low glycemic index diet  Exercise 30 minutes most days of the week  Make sure you get results on any labs or tests we ordered today  We discussed medications and how to take them as prescribed  Sleep 6-8 hours each night if possible  If you have not signed up for [employer], please activate your code ASAP from your After Visit Summary today    LDL goal <100  LDL goal if heart disease <70  HDL goal >60  Triglyceride goal <150  BP goal =<130/80  Fasting glucose <100    F/u DR Ballard

## 2018-02-01 NOTE — PROGRESS NOTES
Subjective   Marisol Carrillo is a 48 y.o. female.     History of Present Illness   Marisol Carrillo 48 y.o. female who presents today for routine follow up check and medication refills.  she has a history of   Patient Active Problem List   Diagnosis   • Essential familial hypercholesterolemia   • Hypertension   • Hypothyroidism   • Adiposity   • Vitamin deficiency   • Impaired fasting glucose   • Vitamin D deficiency   • Lumbosacral radiculopathy   • Gastroesophageal reflux disease   • Constipation   • Diarrhea   • Dysphagia   • Fatigue   • Heartburn   • Lumbar disc herniation with radiculopathy   • Nocturnal cough   • Pain in extremity   • Regurgitation   • Vomiting   • Anxiety   • Depression   • Degeneration of lumbar intervertebral disc   • Spinal headache   • Chronic bilateral low back pain with bilateral sciatica   • Disc disease, degenerative, lumbar or lumbosacral   • Primary localized osteoarthritis of left knee   • History of spinal fusion   • Epigastric pain   .  Since the last visit, she has overall felt tired.  She has Hypertenision and is well controlled on medication, Impaired fasting glucose and will continue close lab follow up to watch for DMII, Hyperlipidemia and is well controlled on medication, Hypothyroidism and is well controlled on Rx.  Labs are in desired treatment range and Vitamin D deficiency and well controlled on medication and labs at goal >30.  she has been compliant with current medications have reviewed them.  The patient denies medication side effects.    Results for orders placed or performed during the hospital encounter of 12/01/17   POC Urine Pregnancy Test   Result Value Ref Range    HCG, Urine, QL Negative Negative    Lot Number [id number]     Internal Positive Control Positive     Internal Negative Control Negative    Tissue Pathology Exam - Tissue, Gallbladder   Result Value Ref Range    Case Report       Surgical Pathology Report                         Case: [id number]         "                          Authorizing Provider:  Jam Ballard MD         Collected:           12/01/2017 12:30 PM          Ordering Location:     [facility]  Received:            12/01/2017 02:01 PM                                 [facility] OR                                                                       Pathologist:           Shmuel Castano MD                                                       Specimen:    Gallbladder                                                                                Final Diagnosis       GALLBLADDER:        EXTENSIVE CHOLESTEROLOSIS.        PATCHY MILD CHRONIC CHOLECYSTITIS.       NO STONES IDENTIFIED.    THM/th    CPT CODES:  1. 97601          Gross Description       Received in formalin labeled \"gallbladder\" is a 5.7 x 3.0 x 2.0 cm smooth green to pink-tan gallbladder opened prior to receipt. The wall is slightly edematous and up to 0.8 cm. The mucosa is green to yellow and strawberry in appearance. The lumen contains a moderate amount of green-tan viscous bile and no calculi. Representative sections of the gallbladder wall and margin of the cystic duct are submitted in a single block labeled 1A.  CC/[facility]/        Microscopic Description       Performed, incorporated in diagnosis.         Embedded Images       Lab Results   Component Value Date    GLUCOSE 115 (H) 11/17/2017    BUN 10 11/17/2017    CREATININE 0.72 11/17/2017    EGFRIFNONA 87 11/17/2017    BCR 13.9 11/17/2017    K 4.4 11/17/2017    CO2 25.3 11/17/2017    CALCIUM 9.4 11/17/2017    ALBUMIN 4.10 03/21/2017    LABIL2 1.1 03/21/2017    AST 24 03/21/2017    ALT 21 03/21/2017     Lab Results   Component Value Date    TSH 1.790 03/21/2017     Lab Results   Component Value Date    CHOL 176 03/21/2017    CHLPL 173 07/05/2016    TRIG 132 03/21/2017    HDL 59 03/21/2017    LDLCALC 91 03/21/2017    LDL 70 07/05/2016     Marisol Carrillo 48 y.o. female who presents for evaluation of nausea, GERD and " epigastric pain. Symptoms have been present for several months .  The condition is aggravated by laying down and empty stomach . she is experiencing nausea.  Alleviating factors are eating with some help, but still symptoms . Patient denies fever, bright red blood in stool, vomiting her past medical history is notable for GERD and GB disease.  Patient denies recent travel.    Pain is in same area and worse    Some dysphagia  Had EGD Sept and had HH and duodenitis, gastritis    I do want her to f/u DR. Elio Carrillo female 48 y.o. who presents today for follow up of Depression and Anxiety.  She reports medication is working well, patient desires to continue on Rx, and needs refill. Onset of symptoms was approximately several years ago.  She denies current suicidal and homicidal ideation. Risk factors are family history of anxiety and or depression, lifestyle of multiple roles and chronic pain.  Previous treatment includes current Rx.  She complains of the following medication side effects: none.  The patient has previously been in counseling..      The following portions of the patient's history were reviewed and updated as appropriate: allergies, current medications, past family history, past medical history, past social history, past surgical history and problem list.    Review of Systems   Constitutional: Positive for appetite change. Negative for activity change and unexpected weight change.   HENT: Negative for nosebleeds and trouble swallowing.    Eyes: Negative for pain and visual disturbance.   Respiratory: Negative for chest tightness, shortness of breath and wheezing.    Cardiovascular: Negative for chest pain and palpitations.   Gastrointestinal: Positive for abdominal pain and nausea. Negative for blood in stool.   Endocrine: Negative.    Genitourinary: Negative for difficulty urinating and hematuria.   Musculoskeletal: Positive for arthralgias, back pain, gait problem and myalgias. Negative  for joint swelling.   Skin: Negative for color change and rash.   Allergic/Immunologic: Negative.    Neurological: Negative for syncope and speech difficulty.   Hematological: Negative for adenopathy.   Psychiatric/Behavioral: Positive for decreased concentration and sleep disturbance. Negative for agitation and confusion. The patient is nervous/anxious.    All other systems reviewed and are negative.      Objective   Physical Exam   Constitutional: She is oriented to person, place, and time. She appears well-developed and well-nourished. No distress.   HENT:   Head: Normocephalic and atraumatic.   Eyes: Conjunctivae and EOM are normal. Pupils are equal, round, and reactive to light. Right eye exhibits no discharge. Left eye exhibits no discharge. No scleral icterus.   Neck: Normal range of motion. Neck supple. No tracheal deviation present. No thyromegaly present.   Cardiovascular: Normal rate, regular rhythm, normal heart sounds, intact distal pulses and normal pulses.  Exam reveals no gallop.    No murmur heard.  Pulmonary/Chest: Effort normal and breath sounds normal. No respiratory distress. She has no wheezes. She has no rales.   Abdominal: Soft. Bowel sounds are normal. She exhibits no distension and no mass. There is tenderness (epigastric and RUQ). There is no rebound and no guarding. No hernia.   Musculoskeletal: Normal range of motion.   Neurological: She is alert and oriented to person, place, and time. She exhibits normal muscle tone. Coordination normal.   Skin: Skin is warm. No rash noted. No erythema. No pallor.   Psychiatric: She has a normal mood and affect. Her behavior is normal. Judgment and thought content normal.   Nursing note and vitals reviewed.      Assessment/Plan   Marisol was seen today for hypertension.    Diagnoses and all orders for this visit:    Essential hypertension  -     Cancel: Comprehensive metabolic panel  -     Cancel: Lipid panel  -     Cancel: CBC and Differential  -      Cancel: TSH  -     Cancel: T4, Free  -     Cancel: Vitamin D 25 Hydroxy  -     Cancel: Vitamin B12  -     Cancel: Folate  -     Cancel: Amylase  -     Cancel: Lipase  -     Comprehensive metabolic panel  -     Lipid panel  -     CBC and Differential  -     TSH  -     Hemoglobin A1c  -     T4, Free  -     Urinalysis With Microscopic - Urine, Clean Catch  -     Urine Culture - Urine, Urine, Clean Catch  -     Vitamin B12  -     Folate  -     Amylase  -     Lipase  -     T3, Free  -     CT Abdomen Pelvis With Contrast; Future    Acquired hypothyroidism  -     Cancel: Comprehensive metabolic panel  -     Cancel: Lipid panel  -     Cancel: CBC and Differential  -     Cancel: TSH  -     Cancel: T4, Free  -     Cancel: Vitamin D 25 Hydroxy  -     Cancel: Vitamin B12  -     Cancel: Folate  -     Cancel: Amylase  -     Cancel: Lipase  -     Comprehensive metabolic panel  -     Lipid panel  -     CBC and Differential  -     TSH  -     Hemoglobin A1c  -     T4, Free  -     Urinalysis With Microscopic - Urine, Clean Catch  -     Urine Culture - Urine, Urine, Clean Catch  -     Vitamin B12  -     Folate  -     Amylase  -     Lipase  -     T3, Free  -     CT Abdomen Pelvis With Contrast; Future    Impaired fasting glucose  -     Cancel: Comprehensive metabolic panel  -     Cancel: Lipid panel  -     Cancel: CBC and Differential  -     Cancel: TSH  -     Cancel: T4, Free  -     Cancel: Vitamin D 25 Hydroxy  -     Cancel: Vitamin B12  -     Cancel: Folate  -     Cancel: Amylase  -     Cancel: Lipase  -     Comprehensive metabolic panel  -     Lipid panel  -     CBC and Differential  -     TSH  -     Hemoglobin A1c  -     T4, Free  -     Urinalysis With Microscopic - Urine, Clean Catch  -     Urine Culture - Urine, Urine, Clean Catch  -     Vitamin B12  -     Folate  -     Amylase  -     Lipase  -     T3, Free  -     CT Abdomen Pelvis With Contrast; Future    Gastroesophageal reflux disease with esophagitis  -     Cancel:  Comprehensive metabolic panel  -     Cancel: Lipid panel  -     Cancel: CBC and Differential  -     Cancel: TSH  -     Cancel: T4, Free  -     Cancel: Vitamin D 25 Hydroxy  -     Cancel: Vitamin B12  -     Cancel: Folate  -     Cancel: Amylase  -     Cancel: Lipase  -     Comprehensive metabolic panel  -     Lipid panel  -     CBC and Differential  -     TSH  -     Hemoglobin A1c  -     T4, Free  -     Urinalysis With Microscopic - Urine, Clean Catch  -     Urine Culture - Urine, Urine, Clean Catch  -     Vitamin B12  -     Folate  -     Amylase  -     Lipase  -     T3, Free  -     CT Abdomen Pelvis With Contrast; Future    Anxiety  -     Cancel: Comprehensive metabolic panel  -     Cancel: Lipid panel  -     Cancel: CBC and Differential  -     Cancel: TSH  -     Cancel: T4, Free  -     Cancel: Vitamin D 25 Hydroxy  -     Cancel: Vitamin B12  -     Cancel: Folate  -     Cancel: Amylase  -     Cancel: Lipase  -     Comprehensive metabolic panel  -     Lipid panel  -     CBC and Differential  -     TSH  -     Hemoglobin A1c  -     T4, Free  -     Urinalysis With Microscopic - Urine, Clean Catch  -     Urine Culture - Urine, Urine, Clean Catch  -     Vitamin B12  -     Folate  -     Amylase  -     Lipase  -     T3, Free  -     CT Abdomen Pelvis With Contrast; Future    Vitamin D deficiency  -     Cancel: Comprehensive metabolic panel  -     Cancel: Lipid panel  -     Cancel: CBC and Differential  -     Cancel: TSH  -     Cancel: T4, Free  -     Cancel: Vitamin D 25 Hydroxy  -     Cancel: Vitamin B12  -     Cancel: Folate  -     Cancel: Amylase  -     Cancel: Lipase  -     Comprehensive metabolic panel  -     Lipid panel  -     CBC and Differential  -     TSH  -     Hemoglobin A1c  -     T4, Free  -     Urinalysis With Microscopic - Urine, Clean Catch  -     Urine Culture - Urine, Urine, Clean Catch  -     Vitamin B12  -     Folate  -     Amylase  -     Lipase  -     T3, Free  -     CT Abdomen Pelvis With Contrast;  Future    Epigastric pain  -     Cancel: Comprehensive metabolic panel  -     Cancel: Lipid panel  -     Cancel: CBC and Differential  -     Cancel: TSH  -     Cancel: T4, Free  -     Cancel: Vitamin D 25 Hydroxy  -     Cancel: Vitamin B12  -     Cancel: Folate  -     Cancel: Amylase  -     Cancel: Lipase  -     Comprehensive metabolic panel  -     Lipid panel  -     CBC and Differential  -     TSH  -     Hemoglobin A1c  -     T4, Free  -     Urinalysis With Microscopic - Urine, Clean Catch  -     Urine Culture - Urine, Urine, Clean Catch  -     Vitamin B12  -     Folate  -     Amylase  -     Lipase  -     T3, Free  -     CT Abdomen Pelvis With Contrast; Future    RUQ abdominal pain    Nausea    Other orders  -     amLODIPine-valsartan (EXFORGE)  MG per tablet; Take 1 tablet by mouth Every Night. For BP  -     atorvastatin (LIPITOR) 40 MG tablet; Take 1 tablet by mouth Every Night. For cholesterol  -     levothyroxine (SYNTHROID, LEVOTHROID) 50 MCG tablet; Take 1 tablet by mouth Daily. For thyroid  -     nebivolol (BYSTOLIC) 5 MG tablet; Take 1 tablet by mouth Every Night. For BP  -     sertraline (ZOLOFT) 100 MG tablet; Take 2 tablets by mouth Every Night. For stress  -     hydrOXYzine (ATARAX) 25 MG tablet; Take 1 tablet by mouth 3 (Three) Times a Day As Needed for Anxiety.

## 2018-02-03 LAB
ALBUMIN SERPL-MCNC: 4.3 G/DL (ref 3.5–5.2)
ALBUMIN/GLOB SERPL: 1.3 G/DL
ALP SERPL-CCNC: 113 U/L (ref 39–117)
ALT SERPL-CCNC: 16 U/L (ref 1–33)
AMYLASE SERPL-CCNC: 78 U/L (ref 28–100)
APPEARANCE UR: (no result)
AST SERPL-CCNC: 17 U/L (ref 1–32)
BACTERIA #/AREA URNS HPF: ABNORMAL /HPF
BACTERIA UR CULT: NORMAL
BACTERIA UR CULT: NORMAL
BASOPHILS # BLD AUTO: 0.08 10*3/MM3 (ref 0–0.2)
BASOPHILS NFR BLD AUTO: 0.7 % (ref 0–1.5)
BILIRUB SERPL-MCNC: 0.4 MG/DL (ref 0.1–1.2)
BILIRUB UR QL STRIP: NEGATIVE
BUN SERPL-MCNC: 13 MG/DL (ref 6–20)
BUN/CREAT SERPL: 18.1 (ref 7–25)
CALCIUM SERPL-MCNC: 10.1 MG/DL (ref 8.6–10.5)
CASTS URNS MICRO: ABNORMAL
CHLORIDE SERPL-SCNC: 101 MMOL/L (ref 98–107)
CHOLEST SERPL-MCNC: 268 MG/DL (ref 0–200)
CO2 SERPL-SCNC: 26.6 MMOL/L (ref 22–29)
COLOR UR: YELLOW
CREAT SERPL-MCNC: 0.72 MG/DL (ref 0.57–1)
EOSINOPHIL # BLD AUTO: 0.18 10*3/MM3 (ref 0–0.7)
EOSINOPHIL NFR BLD AUTO: 1.6 % (ref 0.3–6.2)
EPI CELLS #/AREA URNS HPF: ABNORMAL /HPF
ERYTHROCYTE [DISTWIDTH] IN BLOOD BY AUTOMATED COUNT: 16 % (ref 11.7–13)
FOLATE SERPL-MCNC: 7.44 NG/ML (ref 4.78–24.2)
GFR SERPLBLD CREATININE-BSD FMLA CKD-EPI: 105 ML/MIN/1.73
GFR SERPLBLD CREATININE-BSD FMLA CKD-EPI: 87 ML/MIN/1.73
GLOBULIN SER CALC-MCNC: 3.4 GM/DL
GLUCOSE SERPL-MCNC: 110 MG/DL (ref 65–99)
GLUCOSE UR QL: NEGATIVE
HBA1C MFR BLD: 6.13 % (ref 4.8–5.6)
HCT VFR BLD AUTO: 42.6 % (ref 35.6–45.5)
HDLC SERPL-MCNC: 68 MG/DL (ref 40–60)
HGB BLD-MCNC: 13.7 G/DL (ref 11.9–15.5)
HGB UR QL STRIP: NEGATIVE
IMM GRANULOCYTES # BLD: 0.03 10*3/MM3 (ref 0–0.03)
IMM GRANULOCYTES NFR BLD: 0.3 % (ref 0–0.5)
KETONES UR QL STRIP: NEGATIVE
LDLC SERPL CALC-MCNC: 172 MG/DL (ref 0–100)
LEUKOCYTE ESTERASE UR QL STRIP: NEGATIVE
LIPASE SERPL-CCNC: 21 U/L (ref 13–60)
LYMPHOCYTES # BLD AUTO: 2.61 10*3/MM3 (ref 0.9–4.8)
LYMPHOCYTES NFR BLD AUTO: 23.8 % (ref 19.6–45.3)
MCH RBC QN AUTO: 28.7 PG (ref 26.9–32)
MCHC RBC AUTO-ENTMCNC: 32.2 G/DL (ref 32.4–36.3)
MCV RBC AUTO: 89.1 FL (ref 80.5–98.2)
MONOCYTES # BLD AUTO: 0.62 10*3/MM3 (ref 0.2–1.2)
MONOCYTES NFR BLD AUTO: 5.7 % (ref 5–12)
NEUTROPHILS # BLD AUTO: 7.45 10*3/MM3 (ref 1.9–8.1)
NEUTROPHILS NFR BLD AUTO: 67.9 % (ref 42.7–76)
NITRITE UR QL STRIP: NEGATIVE
PH UR STRIP: 6 [PH] (ref 5–8)
PLATELET # BLD AUTO: 414 10*3/MM3 (ref 140–500)
POTASSIUM SERPL-SCNC: 4.7 MMOL/L (ref 3.5–5.2)
PROT SERPL-MCNC: 7.7 G/DL (ref 6–8.5)
PROT UR QL STRIP: NEGATIVE
RBC # BLD AUTO: 4.78 10*6/MM3 (ref 3.9–5.2)
RBC #/AREA URNS HPF: ABNORMAL /HPF
SODIUM SERPL-SCNC: 143 MMOL/L (ref 136–145)
SP GR UR: 1.02 (ref 1–1.03)
T3FREE SERPL-MCNC: 2.5 PG/ML (ref 2–4.4)
T4 FREE SERPL-MCNC: 1.1 NG/DL (ref 0.93–1.7)
TRIGL SERPL-MCNC: 140 MG/DL (ref 0–150)
TSH SERPL DL<=0.005 MIU/L-ACNC: 3.43 MIU/ML (ref 0.27–4.2)
UROBILINOGEN UR STRIP-MCNC: (no result) MG/DL
VIT B12 SERPL-MCNC: 468 PG/ML (ref 211–946)
VLDLC SERPL CALC-MCNC: 28 MG/DL (ref 5–40)
WBC # BLD AUTO: 10.97 10*3/MM3 (ref 4.5–10.7)
WBC #/AREA URNS HPF: ABNORMAL /HPF

## 2018-02-04 RX ORDER — LANOLIN ALCOHOL/MO/W.PET/CERES
400 CREAM (GRAM) TOPICAL DAILY
Qty: 30 TABLET | Refills: 11 | Status: SHIPPED | OUTPATIENT
Start: 2018-02-04 | End: 2018-03-09

## 2018-02-04 RX ORDER — LEVOTHYROXINE SODIUM 88 UG/1
88 TABLET ORAL DAILY
Qty: 30 TABLET | Refills: 11 | Status: SHIPPED | OUTPATIENT
Start: 2018-02-04 | End: 2019-02-04 | Stop reason: SDUPTHER

## 2018-02-08 ENCOUNTER — HOSPITAL ENCOUNTER (OUTPATIENT)
Dept: CT IMAGING | Facility: HOSPITAL | Age: 48
Discharge: HOME | End: 2018-02-08
Admitting: PHYSICIAN ASSISTANT

## 2018-02-08 DIAGNOSIS — R10.13 EPIGASTRIC PAIN: ICD-10-CM

## 2018-02-08 DIAGNOSIS — F41.9 ANXIETY: ICD-10-CM

## 2018-02-08 DIAGNOSIS — N28.1 COMPLEX RENAL CYST: Primary | ICD-10-CM

## 2018-02-08 DIAGNOSIS — K21.00 GASTROESOPHAGEAL REFLUX DISEASE WITH ESOPHAGITIS: ICD-10-CM

## 2018-02-08 DIAGNOSIS — E55.9 VITAMIN D DEFICIENCY: ICD-10-CM

## 2018-02-08 DIAGNOSIS — R73.01 IMPAIRED FASTING GLUCOSE: ICD-10-CM

## 2018-02-08 DIAGNOSIS — I10 ESSENTIAL HYPERTENSION: ICD-10-CM

## 2018-02-08 DIAGNOSIS — E03.9 ACQUIRED HYPOTHYROIDISM: ICD-10-CM

## 2018-02-08 PROCEDURE — 82565 ASSAY OF CREATININE: CPT

## 2018-02-08 PROCEDURE — 0 DIATRIZOATE MEGLUMINE & SODIUM PER 1 ML: Performed by: PHYSICIAN ASSISTANT

## 2018-02-08 PROCEDURE — 74177 CT ABD & PELVIS W/CONTRAST: CPT

## 2018-02-08 PROCEDURE — 0 IOPAMIDOL 61 % SOLUTION: Performed by: PHYSICIAN ASSISTANT

## 2018-02-08 RX ADMIN — DIATRIZOATE MEGLUMINE AND DIATRIZOATE SODIUM 30 ML: 660; 100 LIQUID ORAL; RECTAL at 09:30

## 2018-02-08 RX ADMIN — IOPAMIDOL 85 ML: 612 INJECTION, SOLUTION INTRAVENOUS at 10:35

## 2018-02-09 ENCOUNTER — OFFICE VISIT (OUTPATIENT)
Dept: NEUROSURGERY | Facility: CLINIC | Age: 48
End: 2018-02-09

## 2018-02-09 VITALS
SYSTOLIC BLOOD PRESSURE: 125 MMHG | WEIGHT: 293 LBS | BODY MASS INDEX: 50.02 KG/M2 | HEIGHT: 64 IN | DIASTOLIC BLOOD PRESSURE: 87 MMHG | HEART RATE: 90 BPM

## 2018-02-09 DIAGNOSIS — M51.36 DEGENERATION OF LUMBAR INTERVERTEBRAL DISC: ICD-10-CM

## 2018-02-09 DIAGNOSIS — G89.29 CHRONIC BILATERAL LOW BACK PAIN WITH BILATERAL SCIATICA: ICD-10-CM

## 2018-02-09 DIAGNOSIS — Z98.1 HISTORY OF SPINAL FUSION: Primary | ICD-10-CM

## 2018-02-09 DIAGNOSIS — M54.42 CHRONIC BILATERAL LOW BACK PAIN WITH BILATERAL SCIATICA: ICD-10-CM

## 2018-02-09 DIAGNOSIS — M54.41 CHRONIC BILATERAL LOW BACK PAIN WITH BILATERAL SCIATICA: ICD-10-CM

## 2018-02-09 LAB — CREAT BLDA-MCNC: 0.7 MG/DL (ref 0.6–1.3)

## 2018-02-09 PROCEDURE — 99213 OFFICE O/P EST LOW 20 MIN: CPT | Performed by: NEUROLOGICAL SURGERY

## 2018-02-09 RX ORDER — TOPIRAMATE 25 MG/1
25 TABLET ORAL 2 TIMES DAILY
Qty: 60 TABLET | Refills: 5 | Status: SHIPPED | OUTPATIENT
Start: 2018-02-09 | End: 2018-07-17 | Stop reason: SDUPTHER

## 2018-02-09 NOTE — PROGRESS NOTES
Subjective   Patient ID: Marisol Carrillo is a 48 y.o. female is here today for follow-up on back pain.    At the patient's last visit she was referred to aquatic therapy. She reported persistent back pain and bilateral leg pain.    Today the patient reports that she is still having some stabbing back pain. She reports that the leg pain is worsening. She states that she has went to aquatic therapy and got relief while she was in the water but the pain returned once she got out. She has been referred to Rosalinda Arevalo for her chronic pain.    Back Pain   This is a chronic problem. The current episode started more than 1 month ago. The problem occurs daily. The problem has been gradually worsening since onset. The pain is present in the lumbar spine. The quality of the pain is described as stabbing. Associated symptoms include leg pain and weakness. Pertinent negatives include no bladder incontinence, bowel incontinence, numbness or tingling.       The following portions of the patient's history were reviewed and updated as appropriate: allergies, current medications, past family history, past medical history, past social history, past surgical history and problem list.    Review of Systems   Gastrointestinal: Negative for bowel incontinence.   Genitourinary: Negative for bladder incontinence.   Musculoskeletal: Positive for back pain.   Neurological: Positive for weakness. Negative for tingling and numbness.   All other systems reviewed and are negative.    It has been about 2 years since her L5-S1 fusion. She still continues to have knee problems, particularly on the left. She is now being seen by Dr. Sierra. She went through aquatic therapy which helped her while she was in the pool. 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 She is supposed to see another physical therapist named Vickie Arevalo, which I think is reasonable. She continues to have back pain and  bilateral buttock and leg pain in addition to the knee problems. She is applying for Social Security Disability. I think this is a legitimate situation in which she is not able to return to the workplace. She used to work as an . I told her I would fill out some paperwork for her attempt to get Social Security Disability. She is also willing to try some Topamax, which we will discuss next time. She is also willing to see a pain doctor to try other invasive pain management techniques such as epidural blocks, medial branch blocks, and a possible radiofrequency ablation. I do not think another surgery in her spine is going to be necessary at this point. I also urged her to talk to Dr. Hartman again about reactivating her Lap Band to help her lose weight. She was reluctant to do so, but I encouraged her to follow up with him.       Objective   Physical Exam   Constitutional: She is oriented to person, place, and time. She appears well-developed and well-nourished.   HENT:   Head: Normocephalic and atraumatic.   Eyes: Conjunctivae and EOM are normal. Pupils are equal, round, and reactive to light.   Fundoscopic exam:       The right eye shows no papilledema. The right eye shows venous pulsations.        The left eye shows no papilledema. The left eye shows venous pulsations.   Neck: Carotid bruit is not present.   Neurological: She is oriented to person, place, and time. She has a normal Finger-Nose-Finger Test and a normal Heel to Shin Test. Gait normal.   Reflex Scores:       Tricep reflexes are 2+ on the right side and 2+ on the left side.       Bicep reflexes are 2+ on the right side and 2+ on the left side.       Brachioradialis reflexes are 2+ on the right side and 2+ on the left side.       Patellar reflexes are 2+ on the right side and 2+ on the left side.       Achilles reflexes are 2+ on the right side and 2+ on the left side.  Psychiatric: Her speech is normal.     Neurologic Exam     Mental  Status   Oriented to person, place, and time.   Registration of memory: Good recent and remote memory.   Attention: normal. Concentration: normal.   Speech: speech is normal   Level of consciousness: alert  Knowledge: consistent with education.     Cranial Nerves     CN II   Visual fields full to confrontation.   Visual acuity: normal    CN III, IV, VI   Pupils are equal, round, and reactive to light.  Extraocular motions are normal.     CN V   Facial sensation intact.   Right corneal reflex: normal  Left corneal reflex: normal    CN VII   Facial expression full, symmetric.   Right facial weakness: none  Left facial weakness: none    CN VIII   Hearing: intact    CN IX, X   Palate: symmetric    CN XI   Right sternocleidomastoid strength: normal  Left sternocleidomastoid strength: normal    CN XII   Tongue: not atrophic  Tongue deviation: none    Motor Exam   Muscle bulk: normal  Right arm tone: normal  Left arm tone: normal  Right leg tone: normal  Left leg tone: normal    Strength   Strength 5/5 except as noted.     Sensory Exam   Light touch normal.     Gait, Coordination, and Reflexes     Gait  Gait: normal    Coordination   Finger to nose coordination: normal  Heel to shin coordination: normal    Reflexes   Right brachioradialis: 2+  Left brachioradialis: 2+  Right biceps: 2+  Left biceps: 2+  Right triceps: 2+  Left triceps: 2+  Right patellar: 2+  Left patellar: 2+  Right achilles: 2+  Left achilles: 2+  Right : 2+  Left : 2+      Assessment/Plan   Independent Review of Radiographic Studies:    I again reviewed the myelogram done 12/02/2016 which shows a good fusion at L5-S1 and some minimal disc bulging at L4-L5. Agree with the report.       Medical Decision Making:    We will start some Topamax at 25 mg p.o. b.i.d. and send her to the pain doctors for possible epidural blocks, medial branch blocks, and a possible radiofrequency ablation. 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 I told her I would fill out the paperwork for her application for Social Security Disability and will see her in 6 months.       Marisol was seen today for back pain.    Diagnoses and all orders for this visit:    History of spinal fusion  -     Ambulatory Referral to Pain Management    Chronic bilateral low back pain with bilateral sciatica  -     Ambulatory Referral to Pain Management    Degeneration of lumbar intervertebral disc  -     Ambulatory Referral to Pain Management    Other orders  -     topiramate (TOPAMAX) 25 MG tablet; Take 1 tablet by mouth 2 (Two) Times a Day.    Return in about 6 months (around 8/9/2018).

## 2018-02-19 DIAGNOSIS — R10.9 ABDOMINAL PAIN, UNSPECIFIED ABDOMINAL LOCATION: Primary | ICD-10-CM

## 2018-03-09 ENCOUNTER — OFFICE VISIT (OUTPATIENT)
Dept: PAIN MEDICINE | Facility: CLINIC | Age: 48
End: 2018-03-09

## 2018-03-09 VITALS
OXYGEN SATURATION: 97 % | BODY MASS INDEX: 49.99 KG/M2 | HEIGHT: 64 IN | HEART RATE: 79 BPM | WEIGHT: 292.8 LBS | RESPIRATION RATE: 18 BRPM | DIASTOLIC BLOOD PRESSURE: 89 MMHG | SYSTOLIC BLOOD PRESSURE: 128 MMHG | TEMPERATURE: 98 F

## 2018-03-09 DIAGNOSIS — M54.17 LUMBOSACRAL RADICULOPATHY: ICD-10-CM

## 2018-03-09 DIAGNOSIS — M54.41 CHRONIC BILATERAL LOW BACK PAIN WITH BILATERAL SCIATICA: Primary | ICD-10-CM

## 2018-03-09 DIAGNOSIS — G89.29 CHRONIC BILATERAL LOW BACK PAIN WITH BILATERAL SCIATICA: Primary | ICD-10-CM

## 2018-03-09 DIAGNOSIS — M54.42 CHRONIC BILATERAL LOW BACK PAIN WITH BILATERAL SCIATICA: Primary | ICD-10-CM

## 2018-03-09 DIAGNOSIS — M43.06 LUMBAR SPONDYLOLYSIS: ICD-10-CM

## 2018-03-09 LAB
POC AMPHETAMINES: NEGATIVE
POC BARBITURATES: NEGATIVE
POC BENZODIAZEPHINES: NEGATIVE
POC COCAINE: NEGATIVE
POC METHADONE: NEGATIVE
POC METHAMPHETAMINE SCREEN URINE: NEGATIVE
POC OPIATES: NEGATIVE
POC OXYCODONE: NEGATIVE
POC PHENCYCLIDINE: NEGATIVE
POC PROPOXYPHENE: NEGATIVE
POC THC: NEGATIVE
POC TRICYCLIC ANTIDEPRESSANTS: NEGATIVE

## 2018-03-09 PROCEDURE — 80305 DRUG TEST PRSMV DIR OPT OBS: CPT | Performed by: PAIN MEDICINE

## 2018-03-09 PROCEDURE — 99204 OFFICE O/P NEW MOD 45 MIN: CPT | Performed by: PAIN MEDICINE

## 2018-03-09 RX ORDER — LIDOCAINE 50 MG/G
1 PATCH TOPICAL EVERY 24 HOURS
Qty: 30 PATCH | Refills: 3 | Status: SHIPPED | OUTPATIENT
Start: 2018-03-09 | End: 2018-06-05 | Stop reason: SDUPTHER

## 2018-03-09 NOTE — PATIENT INSTRUCTIONS
Facet Joint Block  The facet joints connect the bones of the spine (vertebrae). They make it possible for you to bend, twist, and make other movements with your spine. They also keep you from bending too far, twisting too far, and making other excessive movements.  A facet joint block is a procedure where a numbing medicine (anesthetic) is injected into a facet joint. Often, a type of anti-inflammatory medicine called a steroid is also injected. A facet joint block may be done to diagnose neck or back pain. If the pain gets better after a facet joint block, it means the pain is probably coming from the facet joint. If the pain does not get better, it means the pain is probably not coming from the facet joint. A facet joint block may also be done to relieve neck or back pain caused by an inflamed facet joint. A facet joint block is only done to relieve pain if the pain does not improve with other methods, such as medicine, exercise programs, and physical therapy.  Tell a health care provider about:  · Any allergies you have.  · All medicines you are taking, including vitamins, herbs, eye drops, creams, and over-the-counter medicines.  · Any problems you or family members have had with anesthetic medicines.  · Any blood disorders you have.  · Any surgeries you have had.  · Any medical conditions you have.  · Whether you are pregnant or may be pregnant.  What are the risks?  Generally, this is a safe procedure. However, problems may occur, including:  · Bleeding.  · Injury to a nerve near the injection site.  · Pain at the injection site.  · Weakness or numbness in areas controlled by nerves near the injection site.  · Infection.  · Temporary fluid retention.  · Allergic reactions to medicines or dyes.  · Injury to other structures or organs near the injection site.  What happens before the procedure?  · Follow instructions from your health care provider about eating or drinking restrictions.  · Ask your health care  provider about:  ¨ Changing or stopping your regular medicines. This is especially important if you are taking diabetes medicines or blood thinners.  ¨ Taking medicines such as aspirin and ibuprofen. These medicines can thin your blood. Do not take these medicines before your procedure if your health care provider instructs you not to.  · Do not take any new dietary supplements or medicines without asking your health care provider first.  · Plan to have someone take you home after the procedure.  What happens during the procedure?  · You may need to remove your clothing and dress in an open-back gown.  · The procedure will be done while you are lying on an X-ray table. You will most likely be asked to lie on your stomach, but you may be asked to lie in a different position if an injection will be made in your neck.  · Machines will be used to monitor your oxygen levels, heart rate, and blood pressure.  · If an injection will be made in your neck, an IV tube will be inserted into one of your veins. Fluids and medicine will flow directly into your body through the IV tube.  · The area over the facet joint where the injection will be made will be cleaned with soap. The surrounding skin will be covered with clean drapes.  · A numbing medicine (local anesthetic) will be applied to your skin. Your skin may sting or burn for a moment.  · A video X-ray machine (fluoroscopy) will be used to locate the joint. In some cases, a CT scan may be used.  · A contrast dye may be injected into the facet joint area to help locate the joint.  · When the joint is located, an anesthetic will be injected into the joint through the needle.  · Your health care provider will ask you whether you feel pain relief. If you do feel relief, a steroid may be injected to provide pain relief for a longer period of time. If you do not feel relief or feel only partial relief, additional injections of an anesthetic may be made in other facet  joints.  · The needle will be removed.  · Your skin will be cleaned.  · A bandage (dressing) will be applied over each injection site.  The procedure may vary among health care providers and hospitals.  What happens after the procedure?  · You will be observed for 15-30 minutes before being allowed to go home.  This information is not intended to replace advice given to you by your health care provider. Make sure you discuss any questions you have with your health care provider.  Document Released: 05/08/2008 Document Revised: 01/18/2017 Document Reviewed: 09/12/2016  [employer] Interactive Patient Education © 2017 [employer]    Radiofrequency Lesioning  Radiofrequency lesioning is a procedure that is performed to relieve pain. The procedure is often used for back, neck, or arm pain. Radiofrequency lesioning involves the use of a machine that creates radio waves to make heat. During the procedure, the heat is applied to the nerve that carries the pain signal. The heat damages the nerve and interferes with the pain signal. Pain relief usually starts about 2 weeks after the procedure and lasts for 6 months to 1 year.  Tell a health care provider about:  · Any allergies you have.  · All medicines you are taking, including vitamins, herbs, eye drops, creams, and over-the-counter medicines.  · Any problems you or family members have had with anesthetic medicines.  · Any blood disorders you have.  · Any surgeries you have had.  · Any medical conditions you have.  · Whether you are pregnant or may be pregnant.  What are the risks?  Generally, this is a safe procedure. However, problems may occur, including:  · Pain or soreness at the injection site.  · Infection at the injection site.  · Damage to nerves or blood vessels.  What happens before the procedure?  · Ask your health care provider about:  ¨ Changing or stopping your regular medicines. This is especially important if you are taking diabetes medicines or blood  thinners.  ¨ Taking medicines such as aspirin and ibuprofen. These medicines can thin your blood. Do not take these medicines before your procedure if your health care provider instructs you not to.  · Follow instructions from your health care provider about eating or drinking restrictions.  · Plan to have someone take you home after the procedure.  · If you go home right after the procedure, plan to have someone with you for 24 hours.  What happens during the procedure?  · You will be given one or more of the following:  ¨ A medicine to help you relax (sedative).  ¨ A medicine to numb the area (local anesthetic).  · You will be awake during the procedure. You will need to be able to talk with the health care provider during the procedure.  · With the help of a type of X-ray (fluoroscopy), the health care provider will insert a radiofrequency needle into the area to be treated.  · Next, a wire that carries the radio waves (electrode) will be put through the radiofrequency needle. An electrical pulse will be sent through the electrode to verify the correct nerve. You will feel a tingling sensation, and you may have muscle twitching.  · Then, the tissue that is around the needle tip will be heated by an electric current that is passed using the radiofrequency machine. This will numb the nerves.  · A bandage (dressing) will be put on the insertion area after the procedure is done.  The procedure may vary among health care providers and hospitals.  What happens after the procedure?  · Your blood pressure, heart rate, breathing rate, and blood oxygen level will be monitored often until the medicines you were given have worn off.  · Return to your normal activities as directed by your health care provider.  This information is not intended to replace advice given to you by your health care provider. Make sure you discuss any questions you have with your health care provider.  Document Released: 08/15/2012 Document  "Revised: 05/25/2017 Document Reviewed: 01/25/2016  [employer] Interactive Patient Education © 2017 [employer]    Education about Medial Branch Blockade and RF Therapy:     This medial branch blockade (MBB) suggested is intended for diagnostic purposes, with the intent of offering the patient Radiofrequency thermal rhizotomy (RF) if the MBB is diagnostically effective. The diagnostic blockade is necessary to determine the likelihood that RF therapy could be efficacious in providing long term relief to the patient.     Medial branches are sensory nerve branches that connect to a facet joint and transmit sensations & pain signals from that joint. Facet is a term for the type of joints found in the spine. Medial branches are the nerves that go to a facet, and therefore are also sometimes called \"facet joint nerves\" (FJNs).      In a medial branch blockade procedure, xray fluoroscopy is used to verify the locations of the outside of the joint lines which are being targeted. Under xray guidance, needles are placed to these areas. Contrast dye is injected to confirm proper placement, with dye flowing over the joint area, and to ensure that the dye does not flow into unintended areas such as a vein. When this is confirmed, local anesthetic is injected to block the medial branch at that joint level.      If MBBs are diagnostically successful in blocking pain, then the patient is most likely a great candidate for Radiofrequency of those facet joint nerves. In the RF procedure, needles are placed to the joint lines in the same fashion, and after testing, the needle tips are heated to thermally treat the nerves, blocking the nerves by in essence damaging the nerves with the heat treatment.       Medically, a successful RF procedure should provide a patient with 50% pain relief or more for at least 6 months. Clinical experience suggests that successful patients receive relief more in the range of 12 months on average. We also " discussed that a fortunate minority of patients receive therapeutic success from the MBB, and may not require RF ablation. If a patient receives more than 8 weeks of relief from MBB, then occasional repeat MBB for therapeutic purposes is a very reasonable alternative therapy. This course of therapy is consistent with our LCDs according to our CMS  in the area, and therefore other insurance providers should follow accordingly. We will monitor our patients to screen for these therapeutic responders and will offer RF therapy only when necessary.         We discussed that MBB & RF are not without risks. Guidelines regarding anticoagulant use & neuraxial procedures will be respected. Patients that are ill or otherwise may be at risk for sepsis will not have their spines accessed by neuraxial injections of any type. This patient will not be offered these therapies if there is an increased risk. We discussed that there is a risk of postprocedural pain and also a risk of worsening of clinical picture with these procedures as with any neuraxial procedure.

## 2018-03-09 NOTE — PROGRESS NOTES
CHIEF COMPLAINT: Back Pain    Marisol Carrillo is a 48 y.o. female.   He was referred here by Rakesh Dumas MD. He presents to the office for evaluation and treatment of Back Pain    HPI  Back Pain  Started in 2012 but improved for over 1 year with series of LESI.  Returned suddenly in Aug 2015. She states that she had 2 back surgeries done by Dr. Dumas one in Aug 2015 and the other Nov 2017 with continued pain.  She states that she hs recently seen Dr. Dumas who told her she didn't need back surgery right now and placed referral to try injections again. Recommended trying Topamax but she has not started yet. Recommended temporarily stopping aquatic therapy but is still going to land PT. recent referral to urologist for a kidney cyst and incidental kidney stone found.    The patient states their pain is a 5 on a scale of 1-10.  The patient describes this pain as constant ache, shooting, stabbing, throbbing, burning, stinging, cutting, cramping, soreness, heavy and tender, tingling in LLE. Has left sided foot drop which is not new- from previous surgeries. The pain is located in bilateral low back and does radiate lateral aspect of bilateral leg equally down to ankles. This painful problem is aggravated by physical activity, twisting and house work and is alleviated by relaxation and reclining.    Underwent bariatric surgery but has gained #80 since 2015. 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     Past pain medications:   Gabapentin - mental side effects    Current pain medications:   nothing    Past therapies:  Physical Therapy: yes (back pain and currently for left knee)  Chiropractor: no  Massage Therapy: no  TENS: yes - for back and knee pain ( currently for knee pain)  Neck or back surgery: yes x2 with Dr. Dumas- microdiscectomy- Aug 2015 and lumbar fusion in Nov 2015  Past pain management: no  Aqua Therapy- helped some    Previous Injections: 3 lumbar epidurals  around 2012 or 2013  Effect of Injection (%): helped a lot  Length of Relief: about 1 year     PEG Assessment   What number best describes your pain on average in the past week? 6  What number best describes how, during the past week, pain has interfered with your enjoyment of life? 9  What number best describes how, during the past week, pain has interfered with your general activity? 7      Current Outpatient Prescriptions:   •  amLODIPine-valsartan (EXFORGE)  MG per tablet, Take 1 tablet by mouth Every Night. For BP, Disp: 30 tablet, Rfl: 5  •  atorvastatin (LIPITOR) 40 MG tablet, Take 1 tablet by mouth Every Night. For cholesterol, Disp: 30 tablet, Rfl: 11  •  Cholecalciferol (VITAMIN D-3) 5000 units tablet, Take 1 tablet by mouth Daily., Disp: , Rfl:   •  hydrOXYzine (ATARAX) 25 MG tablet, Take 1 tablet by mouth 3 (Three) Times a Day As Needed for Anxiety., Disp: 60 tablet, Rfl: 5  •  levothyroxine (SYNTHROID) 88 MCG tablet, Take 1 tablet by mouth Daily. For thyroid; new dose, Disp: 30 tablet, Rfl: 11  •  Multiple Vitamin (MULTI VITAMIN DAILY PO), Take 1 capsule by mouth Every Evening., Disp: , Rfl:   •  nebivolol (BYSTOLIC) 5 MG tablet, Take 1 tablet by mouth Every Night. For BP, Disp: 30 tablet, Rfl: 5  •  omeprazole (PRILOSEC) 40 MG capsule, Take 1 capsule by mouth Daily., Disp: 30 capsule, Rfl: 5  •  sertraline (ZOLOFT) 100 MG tablet, Take 2 tablets by mouth Every Night. For stress, Disp: 60 tablet, Rfl: 5  •  sucralfate (CARAFATE) 1 g tablet, Take 1 g by mouth 4 (Four) Times a Day., Disp: , Rfl:   •  diclofenac (FLECTOR) 1.3 % patch patch, Apply 1 patch topically 2 (Two) Times a Day., Disp: 60 patch, Rfl: 2  •  lidocaine (LIDODERM) 5 %, Place 1 patch on the skin Daily. Remove & Discard patch within 12 hours or as directed by MD, Disp: 30 patch, Rfl: 3  •  topiramate (TOPAMAX) 25 MG tablet, Take 1 tablet by mouth 2 (Two) Times a Day., Disp: 60 tablet, Rfl: 5    REVIEW OF PERTINENT MEDICAL DATA  Chart  reviewed and summarization of all medical records up to new patient visit performed.  Surgery notes reviewed.  Patient has been in both aquatic therapy milestones and land-based therapy.  Recent Topamax ordered.  Recent referral to GI.  Patient has recently seen a urologist for her kidney cyst and found a kidney stone.    IMAGING  LUMBAR MYELOGRAM AND CT LUMBAR SPINE WITH CONTRAST WITH SAGITTAL  RECONSTRUCTIONS: - 12/2016  There is no evidence of disc bulge or herniation.  L1-L2: There is no evidence of disc bulge or herniation  L2-L3: There is no evidence of disc bulge or herniation  L3-L4: There is a very mild central disc bulge with no evidence of  herniation. Mild facet degenerative disease is present bilaterally.  L4-L5: There is mild central canal stenosis secondary to mild facet and  ligamentum flavum hypertrophy and a mild broad-based disc osteophyte  complex.  L5-S1: A decompressive laminectomy is noted. There is no evidence of  loosening of the screws, of spinal stenosis or of neural foraminal  compromise.  IMPRESSION:  Fusion from L5 to S1. There is mild canal stenosis at L4-L5  secondary to a mild broad-based disc osteophytic complex and mild facet  and ligamentum flavum hypertrophy. There is no evidence of a focal  herniation. If indicated, further evaluation could be performed with a  MRI examination of the lumbar spine.    PFSH:  The following portions of the patient's history were reviewed and updated as appropriate: problem list, past medical history, past surgery history, social history, family history, medications, and allergies    Review of Systems   Constitutional: Positive for fatigue.   HENT: Positive for congestion.    Eyes: Negative for visual disturbance.   Respiratory: Positive for cough. Negative for shortness of breath and wheezing.    Cardiovascular: Negative.    Gastrointestinal: Negative for constipation and diarrhea.   Genitourinary: Negative for difficulty urinating.  "  Musculoskeletal: Positive for arthralgias (bilateral knees) and back pain.   Skin: Negative for rash and wound.   Allergic/Immunologic: Negative for immunocompromised state.   Neurological: Positive for weakness (bilateral legs). Negative for numbness.   Hematological: Does not bruise/bleed easily.   Psychiatric/Behavioral: Positive for sleep disturbance. Negative for suicidal ideas. The patient is nervous/anxious.    All other systems reviewed and are negative.      Vitals:    03/09/18 0957   BP: 128/89   Pulse: 79   Resp: 18   Temp: 98 °F (36.7 °C)   SpO2: 97%   Weight: 133 kg (292 lb 12.8 oz)   Height: 162.6 cm (64\")   PainSc:   5   PainLoc: Back       Physical Exam   Constitutional: She appears well-developed and well-nourished. No distress.   HENT:   Head: Normocephalic and atraumatic.   Nose: Nose normal.   Mouth/Throat: Oropharynx is clear and moist.   Eyes: Conjunctivae and EOM are normal.   Neck: Normal range of motion. Neck supple.   Cardiovascular: Normal rate, regular rhythm and normal heart sounds.    Pulmonary/Chest: Effort normal and breath sounds normal. No stridor. No respiratory distress.   Abdominal: Soft. Bowel sounds are normal. She exhibits no distension. There is no tenderness.   Musculoskeletal:        Lumbar back: She exhibits decreased range of motion, tenderness and pain.   +bilateral lumbar facet tenderness  +bilateral lumbar facet loading    Neurological: She is alert. She has normal strength. No cranial nerve deficit or sensory deficit. Coordination and gait normal.   Skin: Skin is warm and dry. No rash noted. She is not diaphoretic.   Psychiatric: She has a normal mood and affect. Her speech is normal and behavior is normal.   Nursing note and vitals reviewed.    Ortho Exam  Neurologic Exam     Mental Status   Speech: speech is normal     Cranial Nerves     CN III, IV, VI   Extraocular motions are normal.     Motor Exam     Strength   Strength 5/5 throughout.       Lab Results "   Component Value Date    POCMETH Negative 03/09/2018    POCAMPHET Negative 03/09/2018    POCBARBITUR Negative 03/09/2018    POCBENZO Negative 03/09/2018    POCCOCAINE Negative 03/09/2018    POCMETHADO Negative 03/09/2018    POCOPIATES Negative 03/09/2018    POCOXYCODO Negative 03/09/2018    POCPHENCYC Negative 03/09/2018    POCPROPOXY Negative 03/09/2018    POCTHC Negative 03/09/2018    POCTRICYC Negative 03/09/2018       Comments: Reviewed POC today - no medication     Date of last JOON reviewed : 03/09/18   Comments: Consistent       Assessment/Plan   Marisol was seen today for back pain.    Diagnoses and all orders for this visit:    Chronic bilateral low back pain with bilateral sciatica  -     Case Request  -     diclofenac (FLECTOR) 1.3 % patch patch; Apply 1 patch topically 2 (Two) Times a Day.  -     lidocaine (LIDODERM) 5 %; Place 1 patch on the skin Daily. Remove & Discard patch within 12 hours or as directed by MD  -     Urine Drug Screen Confirmation - Urine, Urine, Clean Catch; Future  -     POC Urine Drug Screen, Triage    Lumbosacral radiculopathy  -     Case Request  -     diclofenac (FLECTOR) 1.3 % patch patch; Apply 1 patch topically 2 (Two) Times a Day.  -     lidocaine (LIDODERM) 5 %; Place 1 patch on the skin Daily. Remove & Discard patch within 12 hours or as directed by MD  -     Urine Drug Screen Confirmation - Urine, Urine, Clean Catch; Future  -     POC Urine Drug Screen, Triage    Lumbar spondylolysis  -     Case Request  -     diclofenac (FLECTOR) 1.3 % patch patch; Apply 1 patch topically 2 (Two) Times a Day.  -     lidocaine (LIDODERM) 5 %; Place 1 patch on the skin Daily. Remove & Discard patch within 12 hours or as directed by MD  -     Urine Drug Screen Confirmation - Urine, Urine, Clean Catch; Future  -     POC Urine Drug Screen, Triage      Requested Prescriptions     Signed Prescriptions Disp Refills   • diclofenac (FLECTOR) 1.3 % patch patch 60 patch 2     Sig: Apply 1 patch  topically 2 (Two) Times a Day.   • lidocaine (LIDODERM) 5 % 30 patch 3     Sig: Place 1 patch on the skin Daily. Remove & Discard patch within 12 hours or as directed by MD     - Imaging reviewed with patient.  Past fusion seen along with some facet disease.  Given pain is worse with facet loading Will do a trial of lumbar medial branch blocks.  - Discussed with the patient regarding the etiology of their pain. Informed them that they would likely benefit from a bilateral medial branch block from L3 to sacral ala.  The procedure was described in detail and the risks, benefits and alternatives were discussed with the patient (including but not limited to: bleeding, infection, nerve damage, worsening of pain, inability to perform injection, paralysis, seizures, and death) who agreed to proceed.     - Will perform two injections approx 1 month apart.  - Patient also has bilateral L5 radiculopathies.  Could trial bilateral L5 transforaminal epidural steroid injections to see if she could get any pain relief.  Given her chronic axial low back pain is her worst pain will start with medial branch blocks.  If her radicular pain continues we will perform transforaminal epidurals in the future.  - Given the patient has a current kidney stone do not recommend her starting the Topamax at this time.  Once her kidney stones have cleared she can trial in the future with increasing her water intake to try to prevent any future kidney stones.  Patient cannot tolerate any gabapentin and does not want to be on any other neuropathic agents that are similar to gabapentin so we'll hold off on Lyrica at this time.  - Patient is trying to avoid all oral medication if possible.  We will order her both Lidoderm and diclofenac patches to trial given they have minimal systemic absorption.  - Continue home exercise program.   - Continue with physical therapy and weight loss.    Wt Readings from Last 3 Encounters:   03/09/18 133 kg (292 lb 12.8  oz)   02/09/18 134 kg (295 lb)   02/01/18 134 kg (295 lb)     Body mass index is 50.26 kg/(m^2). Patient counseled on the importance of weight loss to help with overall health and pain control. Patient instructed to attempt weight loss.   Plan: Calorie counting increase water intake, plan meals and have 3 meals a day    Follow-up in 2 months.      Manda Askew MD  Pain Management

## 2018-03-14 ENCOUNTER — RESULTS ENCOUNTER (OUTPATIENT)
Dept: PAIN MEDICINE | Facility: CLINIC | Age: 48
End: 2018-03-14

## 2018-03-14 DIAGNOSIS — M54.17 LUMBOSACRAL RADICULOPATHY: ICD-10-CM

## 2018-03-14 DIAGNOSIS — G89.29 CHRONIC BILATERAL LOW BACK PAIN WITH BILATERAL SCIATICA: ICD-10-CM

## 2018-03-14 DIAGNOSIS — M54.42 CHRONIC BILATERAL LOW BACK PAIN WITH BILATERAL SCIATICA: ICD-10-CM

## 2018-03-14 DIAGNOSIS — M54.41 CHRONIC BILATERAL LOW BACK PAIN WITH BILATERAL SCIATICA: ICD-10-CM

## 2018-03-14 DIAGNOSIS — M43.06 LUMBAR SPONDYLOLYSIS: ICD-10-CM

## 2018-03-20 RX ORDER — CARVEDILOL 12.5 MG/1
12.5 TABLET ORAL 2 TIMES DAILY WITH MEALS
Qty: 60 TABLET | Refills: 5 | Status: SHIPPED | OUTPATIENT
Start: 2018-03-20 | End: 2018-09-10 | Stop reason: SDUPTHER

## 2018-04-09 ENCOUNTER — DOCUMENTATION (OUTPATIENT)
Dept: PHYSICAL THERAPY | Facility: HOSPITAL | Age: 48
End: 2018-04-09

## 2018-04-09 DIAGNOSIS — M25.562 CHRONIC PAIN OF LEFT KNEE: ICD-10-CM

## 2018-04-09 DIAGNOSIS — R26.2 DIFFICULTY WALKING: ICD-10-CM

## 2018-04-09 DIAGNOSIS — G89.29 CHRONIC BILATERAL LOW BACK PAIN, WITH SCIATICA PRESENCE UNSPECIFIED: Primary | ICD-10-CM

## 2018-04-09 DIAGNOSIS — M54.5 CHRONIC BILATERAL LOW BACK PAIN, WITH SCIATICA PRESENCE UNSPECIFIED: Primary | ICD-10-CM

## 2018-04-09 DIAGNOSIS — Z47.89 ORTHOPEDIC AFTERCARE: ICD-10-CM

## 2018-04-09 DIAGNOSIS — G89.29 CHRONIC PAIN OF LEFT KNEE: ICD-10-CM

## 2018-04-09 DIAGNOSIS — M51.16 LUMBAR DISC HERNIATION WITH RADICULOPATHY: ICD-10-CM

## 2018-04-09 NOTE — THERAPY DISCHARGE NOTE
Outpatient Physical Therapy Discharge Summary         Patient Name: Marisol Carrillo  : 1970  MRN: [id number]    Today's Date: 2018    Visit Dx:    ICD-10-CM ICD-9-CM   1. Chronic bilateral low back pain, with sciatica presence unspecified M54.5 724.2    G89.29 338.29   2. Difficulty walking R26.2 719.7   3. Chronic pain of left knee M25.562 719.46    G89.29 338.29   4. Lumbar disc herniation with radiculopathy M51.16 722.10   5. Orthopedic aftercare Z47.89 V54.9             PT OP Goals     Row Name 18 1000          PT Short Term Goals    STG Date to Achieve 17  -JANI     STG 1 Pt to enter exit pool with rail and supervision.   -JANI     STG 1 Progress Met  -JANI     STG 2 Pt to tolerate 45 min aqua session without exacerbation of back or knee condition.   -JANI     STG 2 Progress Met  -JANI     STG 3 Pt to demonstrate basic flexibility ex in aqua to progress to self guided aqua ex outside of therapy 1x a week.  -JANI     STG 3 Progress Met  -JANI     STG 4 Pt to progress to sleeping in bed 1 hour at night.   -JANI     STG 4 Progress Met  -JANI     STG 5 Pt to improve to 30 sec sit to stant of 5 reps. (could only do 3 on eval/painful)   -JANI     STG 5 Progress Met  -JANI        Long Term Goals    LTG Date to Achieve 10/30/17  -JANI     LTG 1 Pt to progress to safe enter exit pool with rail/independently.   -JANI     LTG 1 Progress Met  -JANI     LTG 2 Pt to improve ability to don/socks/shoes without having to lift pants leg.   -JANI     LTG 2 Progress Not Met  -JANI     LTG 3 pt to walk into therapy clinic with no seated rest.   -JANI     LTG 3 Progress Met  -JANI     LTG 4 Pt to advance to gait with cane spontaneously step through, 2 point and minimal limb.   -JANI     LTG 4 Progress Met  -JANI     LTG 5 Pt to improve modified oswestry to 65%   -JANI     LTG 5 Progress Not Met  -JANI     LTG 6 Pt independent with self manage of condition with sefl guided aqua program.   -JANI     LTG 6 Progress Met  -JANI       User Key  (r) = Recorded  By, (t) = Taken By, (c) = Cosigned By    Initials Name Provider Type    JANI Ivan Carranza, PT Physical Therapist          OP PT Discharge Summary  Date of Discharge: 03/01/18  Reason for Discharge: Independent  Outcomes Achieved: Patient able to partially acheive established goals  Discharge Destination: Home with home program  Discharge Instructions/Additional Comments: Marisol was treated for 11 sessions of aquatic PT.  She is a [facility] of [facility] and does complete indepedent workouts in the pool incorporating what she has learned in PT.  She did meet all the short term goals and all but 2 of the long term goals.  Marisol continues to report pain in both the knee and LB that affects her walking and transfers.  In addition she has limited ROM that impairs her ability to terrence socks shoes without compensation.        Time Calculation:                    Ivan Carranza, PT  4/9/2018

## 2018-05-02 ENCOUNTER — DOCUMENTATION (OUTPATIENT)
Dept: PAIN MEDICINE | Facility: CLINIC | Age: 48
End: 2018-05-02

## 2018-05-02 ENCOUNTER — OUTSIDE FACILITY SERVICE (OUTPATIENT)
Dept: PAIN MEDICINE | Facility: CLINIC | Age: 48
End: 2018-05-02

## 2018-05-02 PROCEDURE — 64494 INJ PARAVERT F JNT L/S 2 LEV: CPT | Performed by: PAIN MEDICINE

## 2018-05-02 PROCEDURE — 64493 INJ PARAVERT F JNT L/S 1 LEV: CPT | Performed by: PAIN MEDICINE

## 2018-05-02 PROCEDURE — 64495 INJ PARAVERT F JNT L/S 3 LEV: CPT | Performed by: PAIN MEDICINE

## 2018-05-02 NOTE — PROGRESS NOTES
Bilateral L2-5 Lumbar Medial Branch Blockade  [facility]    PREOPERATIVE DIAGNOSIS:  Lumbar spondylosis without myelopathy    POSTOPERATIVE DIAGNOSIS:  Lumbar spondylosis without myelopathy    PROCEDURE:   Diagnostic Bilateral Lumbar Medial Branch Nerve Blockades, with fluoroscopy:  L2, L3, L4, and L5 nerves (at the L3, L4, L5 transverse processes and the sacral alar groove) to block facet joints L3-4, L4-5, and L5-S1  1. 84108-56 -- Bilateral Lumbar Facet blocks, 1st Level  2. 19439-81 -- Bilateral Lumbar Facet blocks, 2nd  Level  3. 23094-38 -- Bilateral Lumbar Facet blocks, 3rd Level    PRE-PROCEDURE DISCUSSION WITH PATIENT:    Risks and complications were discussed with the patient prior to starting the procedure and informed consent was obtained.      SURGEON:  Manda Askew MD    REASON FOR PROCEDURE:    Diagnostic injection at this level is needed    SEDATION:  Patient declined administration of moderate sedation    ANESTHETIC:  Marcaine 0.25%  STEROID:  Methylprednisolone (DEPO MEDROL) 80mg/ml  TOTAL VOLUME OF SOLUTION: 8ml    DESCRIPTON OF PROCEDURE:  After obtaining informed consent, IV access was not obtained in the preoperative area.   The patient was taken to the operating room.  The patient was placed in the prone position with a pillow under the abdomen. All pressure points were well padded.  EKG, blood pressure, and pulse oximeter were monitored.  The patient was monitored and sedated by the RN under my direction. The lumbosacral area was prepped with Chloraprep and draped in a sterile fashion. Under fluoroscopic guidance the transverse processes of the L3, L4, and L5 vertebrae at the junctions of the superior articular processes were identified on the right. Also identified was the groove between the ala and the superior articular process of the sacrum on the ipsilateral side.  Skin and subcutaneous tissue were anesthetized with 1% lidocaine above each of these points. A  22-gauge spinal needle was introduced under fluoroscopic guidance at the above junctions. Aspiration was negative for blood and CSF.  After confirming the position of the needle with fluoroscope in all views, 1 mL of the anesthetic solution noted above was injected at each of these points.  Needles were removed intact from each of the areas.  A similar procedure was repeated to block the L2, L3, L4, and L5 nerves on the contralateral side.   Onset of analgesia was noted.  Vital signs remained stable throughout.      ESTIMATED BLOOD LOSS:  <5 mL  SPECIMENS:  none    COMPLICATIONS:   No complications were noted.    TOLERANCE & DISCHARGE CONDITION:    The patient tolerated the procedure well.  The patient was transported to the recovery area without difficulties.  The patient was discharged to home under the care of family in stable and satisfactory condition.    PLAN OF CARE:  1. The patient was given our standard instruction sheet.  2. We discussed that Lumbar Medial Branch Blockade is a diagnostic procedure in consideration for radiofrequency ablation if two diagnostic procedures prove to be positive for significant benefit.  If sustained relief of 6 to eight weeks is obtained, then an alternative plan could be therapeutic lumbar branch blockades.  3. The patient is asked to keep a pain log each hour for 8 hours after the procedure today.  4. The patient will  Repeat injection 2-4 wks.  5. The patient will resume all medications as per the medication reconciliation sheet.

## 2018-05-23 ENCOUNTER — OUTSIDE FACILITY SERVICE (OUTPATIENT)
Dept: PAIN MEDICINE | Facility: CLINIC | Age: 48
End: 2018-05-23

## 2018-05-23 ENCOUNTER — DOCUMENTATION (OUTPATIENT)
Dept: PAIN MEDICINE | Facility: CLINIC | Age: 48
End: 2018-05-23

## 2018-05-23 PROCEDURE — 64494 INJ PARAVERT F JNT L/S 2 LEV: CPT | Performed by: PAIN MEDICINE

## 2018-05-23 PROCEDURE — 64493 INJ PARAVERT F JNT L/S 1 LEV: CPT | Performed by: PAIN MEDICINE

## 2018-05-23 PROCEDURE — 64495 INJ PARAVERT F JNT L/S 3 LEV: CPT | Performed by: PAIN MEDICINE

## 2018-05-23 NOTE — PROGRESS NOTES
Bilateral L2-5 Lumbar Medial Branch Blockade  [facility]    PREOPERATIVE DIAGNOSIS:  Lumbar spondylosis without myelopathy    POSTOPERATIVE DIAGNOSIS:  Lumbar spondylosis without myelopathy    PROCEDURE:   Diagnostic Bilateral Lumbar Medial Branch Nerve Blockades, with fluoroscopy:  L2, L3, L4, and L5 nerves (at the L3, L4, L5 transverse processes and the sacral alar groove) to block facet joints L3-4, L4-5, and L5-S1  1. 66066-17 -- Bilateral Lumbar Facet blocks, 1st Level  2. 71404-19 -- Bilateral Lumbar Facet blocks, 2nd  Level  3. 67564-62 -- Bilateral Lumbar Facet blocks, 3rd Level    PRE-PROCEDURE DISCUSSION WITH PATIENT:    Risks and complications were discussed with the patient prior to starting the procedure and informed consent was obtained.      SURGEON:  Manda Askew MD    REASON FOR PROCEDURE:   The patient complains of pain that seems to have a significant axial component, Painful area identified on exam under fluoroscopy, Increased back pain on range of motion exams, Pain on extension of the lumbar spine and Positive lumbar facet loading maneuver    SEDATION:  Patient declined administration of moderate sedation    ANESTHETIC:  Marcaine 0.25%  STEROID:  Methylprednisolone (DEPO MEDROL) 80mg/ml  TOTAL VOLUME OF SOLUTION: 8ml    DESCRIPTON OF PROCEDURE:  After obtaining informed consent, IV access was obtained in the preoperative area.   The patient was taken to the operating room.  The patient was placed in the prone position with a pillow under the abdomen. All pressure points were well padded.  EKG, blood pressure, and pulse oximeter were monitored.  The patient was monitored and sedated by the RN under my direction. The lumbosacral area was prepped with Chloraprep and draped in a sterile fashion. Under fluoroscopic guidance the transverse processes of the L3, L4, and L5 vertebrae at the junctions of the superior articular processes were identified on the right. Also  identified was the groove between the ala and the superior articular process of the sacrum on the ipsilateral side.  Skin and subcutaneous tissue were anesthetized with 1% lidocaine above each of these points. A 22-gauge spinal needle was introduced under fluoroscopic guidance at the above junctions. Aspiration was negative for blood and CSF.  After confirming the position of the needle with fluoroscope in all views,  1 mL of the anesthetic solution noted above was injected at each of these points.  Needles were removed intact from each of the areas.  A similar procedure was repeated to block the L2, L3, L4, and L5 nerves on the contralateral side.   Onset of analgesia was noted.  Vital signs remained stable throughout.      ESTIMATED BLOOD LOSS:  <5 mL  SPECIMENS:  none    COMPLICATIONS:   No complications were noted.    TOLERANCE & DISCHARGE CONDITION:    The patient tolerated the procedure well.  The patient was transported to the recovery area without difficulties.  The patient was discharged to home under the care of family in stable and satisfactory condition.    PLAN OF CARE:  1. The patient was given our standard instruction sheet.  2. We discussed that Lumbar Medial Branch Blockade is a diagnostic procedure in consideration for radiofrequency ablation if two diagnostic procedures prove to be positive for significant benefit.  If sustained relief of 6 to eight weeks is obtained, then an alternative plan could be therapeutic lumbar branch blockades.  3. The patient is asked to keep a pain log each hour for 8 hours after the procedure today.  4. The patient will  Return to clinic 4 wks.  5. The patient will resume all medications as per the medication reconciliation sheet.

## 2018-06-05 ENCOUNTER — OFFICE VISIT (OUTPATIENT)
Dept: PAIN MEDICINE | Facility: CLINIC | Age: 48
End: 2018-06-05

## 2018-06-05 VITALS
SYSTOLIC BLOOD PRESSURE: 137 MMHG | TEMPERATURE: 98 F | WEIGHT: 290.8 LBS | HEIGHT: 64 IN | DIASTOLIC BLOOD PRESSURE: 82 MMHG | HEART RATE: 86 BPM | RESPIRATION RATE: 18 BRPM | BODY MASS INDEX: 49.64 KG/M2 | OXYGEN SATURATION: 97 %

## 2018-06-05 DIAGNOSIS — M54.17 LUMBOSACRAL RADICULOPATHY: ICD-10-CM

## 2018-06-05 DIAGNOSIS — M54.42 CHRONIC BILATERAL LOW BACK PAIN WITH BILATERAL SCIATICA: Primary | ICD-10-CM

## 2018-06-05 DIAGNOSIS — M54.41 CHRONIC BILATERAL LOW BACK PAIN WITH BILATERAL SCIATICA: Primary | ICD-10-CM

## 2018-06-05 DIAGNOSIS — G89.29 CHRONIC BILATERAL LOW BACK PAIN WITH BILATERAL SCIATICA: Primary | ICD-10-CM

## 2018-06-05 DIAGNOSIS — M43.06 LUMBAR SPONDYLOLYSIS: ICD-10-CM

## 2018-06-05 PROCEDURE — 99213 OFFICE O/P EST LOW 20 MIN: CPT | Performed by: PAIN MEDICINE

## 2018-06-05 RX ORDER — LIDOCAINE 50 MG/G
1 PATCH TOPICAL EVERY 24 HOURS
Qty: 30 PATCH | Refills: 3 | Status: SHIPPED | OUTPATIENT
Start: 2018-06-05 | End: 2019-03-21

## 2018-06-05 NOTE — PROGRESS NOTES
CHIEF COMPLAINT: Back Pain    HPI  Marisol Carrillo is a 48 y.o. female.  She is here to follow up for Back Pain    Marisol Carrillo is a 48 y.o. female  who presents to the office for follow-up.  She completed a Bilateral L2-5 Lumbar Medial Branch Blockade on 5/2/18 and 5/23/18. Patient reports 20% relief from the 1st procedure. Since last visit their pain has remain unchanged. Discouraged. Did not receive 2 patches from last visit. Wants to try. Low back pain is still worse pain. Leg pain is 2nd to back pain. Low back pain is constant and leg pain is intermittent. The patient states their pain is a 6 on a scale of 1-10.  The patient describes this pain as constant dull and ache.  The pain is located in bilateral low back and does radiate lateral bilateral leg down to lateral ankle. This painful problem is aggravated by physical activity, standing and walking and is alleviated by pain medication and relaxation.    Past pain medications:   Gabapentin - mental side effects  topamax - didn't try due to kidney stones at time     Current pain medications:   nothing     Past therapies:  Physical Therapy: yes (back pain and currently for left knee)  Chiropractor: no  Massage Therapy: no  TENS: yes - for back and knee pain ( currently for knee pain)  Neck or back surgery: yes x2 with Dr. Dumas- microdiscectomy- Aug 2015 and lumbar fusion in Nov 2015  Past pain management: no  [facility]- helped some    Previous Injection: Bilateral L2-5 Lumbar Medial Branch Blockade on 5/2/18 and 5/23/18  Effect of Injection (%): 20% relief from the 1st injection and no relief from the 2nd  Length of Relief: 2 day for the 1st injection      Previous Injections: 3 lumbar epidurals around 2012 or 2013  Effect of Injection (%): helped a lot  Length of Relief: about 1 year     PEG Assessment   What number best describes your pain on average in the past week? 7  What number best describes how, during the past week, pain has interfered with  your enjoyment of life? 9  What number best describes how, during the past week, pain has interfered with your general activity? 9      Current Outpatient Prescriptions:   •  amLODIPine-valsartan (EXFORGE)  MG per tablet, Take 1 tablet by mouth Every Night. For BP, Disp: 30 tablet, Rfl: 5  •  atorvastatin (LIPITOR) 40 MG tablet, Take 1 tablet by mouth Every Night. For cholesterol, Disp: 30 tablet, Rfl: 11  •  carvedilol (COREG) 12.5 MG tablet, Take 1 tablet by mouth 2 (Two) Times a Day With Meals. For BP and stop Bystolic, Disp: 60 tablet, Rfl: 5  •  Cholecalciferol (VITAMIN D-3) 5000 units tablet, Take 1 tablet by mouth Daily., Disp: , Rfl:   •  diclofenac (FLECTOR) 1.3 % patch patch, Apply 1 patch topically 2 (Two) Times a Day., Disp: 60 patch, Rfl: 3  •  hydrOXYzine (ATARAX) 25 MG tablet, Take 1 tablet by mouth 3 (Three) Times a Day As Needed for Anxiety., Disp: 60 tablet, Rfl: 5  •  levothyroxine (SYNTHROID) 88 MCG tablet, Take 1 tablet by mouth Daily. For thyroid; new dose, Disp: 30 tablet, Rfl: 11  •  lidocaine (LIDODERM) 5 %, Place 1 patch on the skin Daily. Remove & Discard patch within 12 hours or as directed by MD, Disp: 30 patch, Rfl: 3  •  Multiple Vitamin (MULTI VITAMIN DAILY PO), Take 1 capsule by mouth Every Evening., Disp: , Rfl:   •  omeprazole (PRILOSEC) 40 MG capsule, Take 1 capsule by mouth Daily., Disp: 30 capsule, Rfl: 5  •  sertraline (ZOLOFT) 100 MG tablet, Take 2 tablets by mouth Every Night. For stress, Disp: 60 tablet, Rfl: 5  •  sucralfate (CARAFATE) 1 g tablet, Take 1 g by mouth 4 (Four) Times a Day., Disp: , Rfl:   •  topiramate (TOPAMAX) 25 MG tablet, Take 1 tablet by mouth 2 (Two) Times a Day., Disp: 60 tablet, Rfl: 5    IMAGING  LUMBAR MYELOGRAM AND CT LUMBAR SPINE WITH CONTRAST WITH SAGITTAL  RECONSTRUCTIONS: - 12/2016  There is no evidence of disc bulge or herniation.  L1-L2: There is no evidence of disc bulge or herniation  L2-L3: There is no evidence of disc bulge or  "herniation  L3-L4: There is a very mild central disc bulge with no evidence of  herniation. Mild facet degenerative disease is present bilaterally.  L4-L5: There is mild central canal stenosis secondary to mild facet and  ligamentum flavum hypertrophy and a mild broad-based disc osteophyte  complex.  L5-S1: A decompressive laminectomy is noted. There is no evidence of  loosening of the screws, of spinal stenosis or of neural foraminal  compromise.  IMPRESSION:  Fusion from L5 to S1. There is mild canal stenosis at L4-L5  secondary to a mild broad-based disc osteophytic complex and mild facet  and ligamentum flavum hypertrophy. There is no evidence of a focal  herniation. If indicated, further evaluation could be performed with a  MRI examination of the lumbar spine.    Imaging last reviewed: 06/05/18     PFSH:  The following portions of the patient's history were reviewed and updated as appropriate: problem list, past medical history, past surgery history, social history, family history, medications, and allergies    Review of Systems   Constitutional: Positive for fatigue.   HENT: Negative for congestion.    Eyes: Negative for visual disturbance.   Respiratory: Negative for cough, shortness of breath and wheezing.    Cardiovascular: Negative.    Gastrointestinal: Negative for constipation and diarrhea.   Genitourinary: Negative for difficulty urinating.   Musculoskeletal: Positive for back pain.   Neurological: Positive for weakness. Negative for numbness.   Psychiatric/Behavioral: Positive for sleep disturbance. Negative for suicidal ideas. The patient is nervous/anxious.    All other systems reviewed and are negative.      Vitals:    06/05/18 0935   BP: 137/82   Pulse: 86   Resp: 18   Temp: 98 °F (36.7 °C)   SpO2: 97%   Weight: 132 kg (290 lb 12.8 oz)   Height: 162.6 cm (64\")   PainSc:   6   PainLoc: Back       Physical Exam   Constitutional: She appears well-developed and well-nourished. No distress.   HENT: "   Head: Normocephalic and atraumatic.   Nose: Nose normal.   Mouth/Throat: Oropharynx is clear and moist.   Eyes: Conjunctivae and EOM are normal.   Neck: Normal range of motion. Neck supple.   Pulmonary/Chest: No stridor. No respiratory distress.   Musculoskeletal:        Lumbar back: She exhibits decreased range of motion, tenderness and pain.   +bilateral lumbar facet tenderness  +bilateral lumbar facet loading    Neurological: She is alert. She has normal strength. No cranial nerve deficit or sensory deficit. Coordination and gait normal.   Skin: Skin is warm and dry. No rash noted. She is not diaphoretic.   Psychiatric: She has a normal mood and affect. Her speech is normal and behavior is normal.   Nursing note and vitals reviewed.    Ortho Exam  Neurologic Exam     Mental Status   Speech: speech is normal     Cranial Nerves     CN III, IV, VI   Extraocular motions are normal.     Motor Exam     Strength   Strength 5/5 throughout.       Lab Results   Component Value Date    POCMETH Negative 03/09/2018    POCAMPHET Negative 03/09/2018    POCBARBITUR Negative 03/09/2018    POCBENZO Negative 03/09/2018    POCCOCAINE Negative 03/09/2018    POCMETHADO Negative 03/09/2018    POCOPIATES Negative 03/09/2018    POCOXYCODO Negative 03/09/2018    POCPHENCYC Negative 03/09/2018    POCPROPOXY Negative 03/09/2018    POCTHC Negative 03/09/2018    POCTRICYC Negative 03/09/2018     Last UDS results reviewed: 06/05/18   Last UDS: 3/9/2018  Comments: no medication - Consistent     Date of last JOON reviewed : 06/05/18   Comments: Consistent         Marisol was seen today for back pain.    Diagnoses and all orders for this visit:    Chronic bilateral low back pain with bilateral sciatica  -     diclofenac (FLECTOR) 1.3 % patch patch; Apply 1 patch topically 2 (Two) Times a Day.  -     lidocaine (LIDODERM) 5 %; Place 1 patch on the skin Daily. Remove & Discard patch within 12 hours or as directed by MD    Lumbar spondylolysis  -      diclofenac (FLECTOR) 1.3 % patch patch; Apply 1 patch topically 2 (Two) Times a Day.  -     lidocaine (LIDODERM) 5 %; Place 1 patch on the skin Daily. Remove & Discard patch within 12 hours or as directed by MD    Lumbosacral radiculopathy  -     diclofenac (FLECTOR) 1.3 % patch patch; Apply 1 patch topically 2 (Two) Times a Day.  -     lidocaine (LIDODERM) 5 %; Place 1 patch on the skin Daily. Remove & Discard patch within 12 hours or as directed by MD      Requested Prescriptions     Signed Prescriptions Disp Refills   • diclofenac (FLECTOR) 1.3 % patch patch 60 patch 3     Sig: Apply 1 patch topically 2 (Two) Times a Day.   • lidocaine (LIDODERM) 5 % 30 patch 3     Sig: Place 1 patch on the skin Daily. Remove & Discard patch within 12 hours or as directed by MD     - Imaging reviewed with patient.  Past fusion seen along with some facet disease.  Minimal benefit with two bilateral LMBB L3-Sa. Will not repeat at this time.   - Given lumbar radiculopathy follows the bilateral L5 dermatome distribution, patient would likely benefit from a bilateral L5 transforaminal epidural injection.  The procedure was described in detail and the risks, benefits and alternatives were discussed with the patient (including but not limited to: bleeding, infection, nerve damage, worsening of pain, inability to perform injection, paralysis, seizures, and death) who agreed to proceed.    - will perform one injection and then reassess.   - good relief with gabapentin in past but side effects. Discussion about trying lyrica but she is nervous the same SE may reoccur.   - Patient is trying to avoid all oral medication if possible.  We will order her both Lidoderm and diclofenac patches to trial given they have minimal systemic absorption.  - Continue home exercise program.   - Continue with physical therapy and weight loss.    Wt Readings from Last 3 Encounters:   06/05/18 132 kg (290 lb 12.8 oz)   03/09/18 133 kg (292 lb 12.8 oz)    02/09/18 134 kg (295 lb)     Body mass index is 49.92 kg/m². Patient counseled on the importance of weight loss to help with overall health and pain control. Patient instructed to attempt weight loss.   Plan: Calorie counting  reduce screen time, reduce portion size and reduce fast food intake    Follow-up in 4 weeks.    Manda Askew MD  Pain Management

## 2018-07-02 ENCOUNTER — DOCUMENTATION (OUTPATIENT)
Dept: PAIN MEDICINE | Facility: CLINIC | Age: 48
End: 2018-07-02

## 2018-07-02 ENCOUNTER — OUTSIDE FACILITY SERVICE (OUTPATIENT)
Dept: PAIN MEDICINE | Facility: CLINIC | Age: 48
End: 2018-07-02

## 2018-07-02 PROCEDURE — 64483 NJX AA&/STRD TFRM EPI L/S 1: CPT | Performed by: PAIN MEDICINE

## 2018-07-02 PROCEDURE — 64484 NJX AA&/STRD TFRM EPI L/S EA: CPT | Performed by: PAIN MEDICINE

## 2018-07-02 NOTE — PROGRESS NOTES
Bilateral L5 Lumbar Transforaminal Epidural Steroid Injection  [facility]    PREOPERATIVE DIAGNOSIS:  Lumbar Radiculopathy and Chronic Back Pain    POSTOPERATIVE DIAGNOSIS:  Same as preop diagnosis    PROCEDURE:  CPT 72007(-50) --  Diagnostic & Therapeutic Transforaminal Epidural Steroid Injection at the L5 level, bilaterally    PRE-PROCEDURE DISCUSSION WITH PATIENT:    Risks and complications were discussed with the patient prior to starting the procedure and informed consent was obtained.  We discussed various topics including but not limited to bleeding, infection, injury, nerve injury, paralysis, coma, death, postprocedural painful flare-up, postprocedural site soreness, and a lack of pain relief.  We discussed the diagnostic aspect of transforaminal epidural / selective nerve root blockade.    SURGEON:  Manda Askew MD    REASON FOR PROCEDURE:    Diagnostic injection at this level is needed and Stenotic area is noted, and is likely contributing to this chronic &/or recurrent pain.     SEDATION:  Patient declined administration of moderate sedation    ANESTHETIC:  NONE  STEROID:  Methylprednisolone (DEPO MEDROL) 80mg/ml    DESCRIPTON OF PROCEDURE:  After obtaining informed consent, an I.V. was not started in the preoperative area. The patient taken to the operating room and was placed in the prone position with a pillow under the abdomen.  All pressure points were well padded.  EKG, blood pressure, and pulse oximeter were monitored.  The lumbosacral area was prepped with Chloraprep and draped in a sterile fashion. Under fluoroscopic guidance in an oblique dimension, the transverse process of the aforementioned vertebra at the junction of the body at 6 o'clock position on one side was identified. Skin and subcutaneous tissue was anesthetized with 1% lidocaine. A 22-gauge spinal needle was introduced under fluoroscopic guidance at the above junction into the foramen without parasthesias  and into the epidural space. After confirming the position of the needle with PA, lateral, and oblique fluoroscopic views, aspiration was checked and was clear of blood or CSF.  Next, 1 mL of Omnipaque was injected. After seeing adequate spread on the corresponding nerve root, a total volume 2mL of injectate containing 0 ml of the above mentioned local anesthetic, 1 ml saline,  and half of the above mentioned corticosteroid was injected into the epidural space.    The needle was removed intact.      Next, the same vertebral level and corresponding foramen on the contralateral side was visualized with fluoroscopy in an oblique view, and a similar approach was used to place the spinal needle in that foramen.  After confirming the position of the needle with PA, lateral, and oblique fluoroscopic views, aspiration was checked and was clear of blood or CSF.  Next, 1 mL of Omnipaque was injected. After seeing adequate spread on the corresponding nerve root, a total volume 2mL of injectate containing 0 ml of the above mentioned local anesthetic, 1 ml saline, and half of the above mentioned corticosteroid was injected into the epidural space. The needle was removed intact.  Vital signs were stable throughout.      ESTIMATED BLOOD LOSS:  <5 mL  SPECIMENS:  none    COMPLICATIONS:   No complications were noted. and There was no indication of vascular uptake on live injection of contrast dye.    TOLERANCE & DISCHARGE CONDITION:    The patient tolerated the procedure well.  The patient was transported to the recovery area without difficulties.  The patient was discharged to home under the care of family in stable and satisfactory condition.    PLAN OF CARE:  1. The patient was given our standard instruction sheet.  2. The patient will Return to clinic 4 wks.  3. The patient will resume all medications as per the medication reconciliation sheet.

## 2018-07-17 ENCOUNTER — OFFICE VISIT (OUTPATIENT)
Dept: PAIN MEDICINE | Facility: CLINIC | Age: 48
End: 2018-07-17

## 2018-07-17 VITALS
HEART RATE: 90 BPM | TEMPERATURE: 98.6 F | OXYGEN SATURATION: 96 % | BODY MASS INDEX: 49.89 KG/M2 | RESPIRATION RATE: 16 BRPM | DIASTOLIC BLOOD PRESSURE: 88 MMHG | HEIGHT: 64 IN | SYSTOLIC BLOOD PRESSURE: 113 MMHG | WEIGHT: 292.2 LBS

## 2018-07-17 DIAGNOSIS — M54.42 CHRONIC BILATERAL LOW BACK PAIN WITH BILATERAL SCIATICA: ICD-10-CM

## 2018-07-17 DIAGNOSIS — M54.17 LUMBOSACRAL RADICULOPATHY: Primary | ICD-10-CM

## 2018-07-17 DIAGNOSIS — M43.06 LUMBAR SPONDYLOLYSIS: ICD-10-CM

## 2018-07-17 DIAGNOSIS — M51.36 DEGENERATION OF LUMBAR INTERVERTEBRAL DISC: ICD-10-CM

## 2018-07-17 DIAGNOSIS — M54.41 CHRONIC BILATERAL LOW BACK PAIN WITH BILATERAL SCIATICA: ICD-10-CM

## 2018-07-17 DIAGNOSIS — G89.29 CHRONIC BILATERAL LOW BACK PAIN WITH BILATERAL SCIATICA: ICD-10-CM

## 2018-07-17 DIAGNOSIS — M51.16 LUMBAR DISC HERNIATION WITH RADICULOPATHY: ICD-10-CM

## 2018-07-17 PROCEDURE — 99214 OFFICE O/P EST MOD 30 MIN: CPT | Performed by: PAIN MEDICINE

## 2018-07-17 RX ORDER — TOPIRAMATE 25 MG/1
25 TABLET ORAL 2 TIMES DAILY
Qty: 60 TABLET | Refills: 1 | Status: SHIPPED | OUTPATIENT
Start: 2018-07-17 | End: 2019-03-21

## 2018-07-17 RX ORDER — TRAMADOL HYDROCHLORIDE 50 MG/1
50 TABLET ORAL 2 TIMES DAILY PRN
Qty: 40 TABLET | Refills: 0 | Status: SHIPPED | OUTPATIENT
Start: 2018-07-17 | End: 2018-12-20 | Stop reason: SDUPTHER

## 2018-07-17 RX ORDER — TOPIRAMATE 25 MG/1
25 TABLET ORAL 2 TIMES DAILY
Qty: 60 TABLET | Refills: 1 | Status: SHIPPED | OUTPATIENT
Start: 2018-07-17 | End: 2018-07-17 | Stop reason: SDUPTHER

## 2018-07-17 NOTE — PROGRESS NOTES
CHIEF COMPLAINT: Back Pain (f/u injection-patient says it did not help)    HPI  Marisol Carrillo is a 48 y.o. female.  She is here to follow up for Back Pain (f/u injection-patient says it did not help)    Marisol Carrillo is a 48 y.o. female  who presents to the office for follow-up.  She completed a BILATERAL L5 ALE TFESI on 7/2/2018. Felt increased pressure along the same distrubution her pain is with injection.  Patient reports NO relief from the procedure. Since last visit their pain has remained unchanged.   The patient states their pain is a 8 on a scale of 1-10.  The patient describes this pain as constant dull, ache, pressure and throbbing.  The pain is located in bilateral low back and does radiate lateral bilateral leg. This painful problem is aggravated by physical activity and is alleviated by pain medication.  She is very reluctant to take medication due to possible SE but will try to try something mild as she is getting desperate for some pain relief.     Past pain medications:   Gabapentin -helped but mental side effects (extreme)     Current pain medications:   nothing     Past therapies:  Physical Therapy: yes (back pain and currently for left knee)  Chiropractor: no  Massage Therapy: no  TENS: yes - for back and knee pain ( currently for knee pain)  Neck or back surgery: yes x2 with Dr. Dumas- microdiscectomy- Aug 2015 and lumbar fusion in Nov 2015  Past pain management: no  [facility] Therapy- helped some    Previous Injection: bilateral L5 TFESI x 1 - 7/2/2018  Effect of Injection (%): minimal help    Previous Injections: 3 lumbar epidurals around 2012 or 2013  Effect of Injection (%): helped a lot  Length of Relief: about 1 year     PEG Assessment   What number best describes your pain on average in the past week? 8  What number best describes how, during the past week, pain has interfered with your enjoyment of life? 8  What number best describes how, during the past week, pain has interfered with  your general activity? 8      Current Outpatient Prescriptions:   •  amLODIPine-valsartan (EXFORGE)  MG per tablet, Take 1 tablet by mouth Every Night. For BP, Disp: 30 tablet, Rfl: 5  •  atorvastatin (LIPITOR) 40 MG tablet, Take 1 tablet by mouth Every Night. For cholesterol, Disp: 30 tablet, Rfl: 11  •  carvedilol (COREG) 12.5 MG tablet, Take 1 tablet by mouth 2 (Two) Times a Day With Meals. For BP and stop Bystolic, Disp: 60 tablet, Rfl: 5  •  Cholecalciferol (VITAMIN D-3) 5000 units tablet, Take 1 tablet by mouth Daily., Disp: , Rfl:   •  hydrOXYzine (ATARAX) 25 MG tablet, Take 1 tablet by mouth 3 (Three) Times a Day As Needed for Anxiety., Disp: 60 tablet, Rfl: 5  •  levothyroxine (SYNTHROID) 88 MCG tablet, Take 1 tablet by mouth Daily. For thyroid; new dose, Disp: 30 tablet, Rfl: 11  •  lidocaine (LIDODERM) 5 %, Place 1 patch on the skin Daily. Remove & Discard patch within 12 hours or as directed by MD, Disp: 30 patch, Rfl: 3  •  Multiple Vitamin (MULTI VITAMIN DAILY PO), Take 1 capsule by mouth Every Evening., Disp: , Rfl:   •  omeprazole (PRILOSEC) 40 MG capsule, Take 1 capsule by mouth Daily., Disp: 30 capsule, Rfl: 5  •  sertraline (ZOLOFT) 100 MG tablet, Take 2 tablets by mouth Every Night. For stress, Disp: 60 tablet, Rfl: 5  •  sucralfate (CARAFATE) 1 g tablet, Take 1 g by mouth 4 (Four) Times a Day., Disp: , Rfl:   •  topiramate (TOPAMAX) 25 MG tablet, Take 1 tablet by mouth 2 (Two) Times a Day., Disp: 60 tablet, Rfl: 1  •  traMADol (ULTRAM) 50 MG tablet, Take 1 tablet by mouth 2 (Two) Times a Day As Needed for Moderate Pain  or Severe Pain ., Disp: 40 tablet, Rfl: 0    IMAGING  LUMBAR MYELOGRAM AND CT LUMBAR SPINE WITH CONTRAST WITH SAGITTAL  RECONSTRUCTIONS: - 12/2016  There is no evidence of disc bulge or herniation.  L1-L2: There is no evidence of disc bulge or herniation  L2-L3: There is no evidence of disc bulge or herniation  L3-L4: There is a very mild central disc bulge with no evidence  "of  herniation. Mild facet degenerative disease is present bilaterally.  L4-L5: There is mild central canal stenosis secondary to mild facet and  ligamentum flavum hypertrophy and a mild broad-based disc osteophyte  complex.  L5-S1: A decompressive laminectomy is noted. There is no evidence of  loosening of the screws, of spinal stenosis or of neural foraminal  compromise.  IMPRESSION:  Fusion from L5 to S1. There is mild canal stenosis at L4-L5  secondary to a mild broad-based disc osteophytic complex and mild facet  and ligamentum flavum hypertrophy. There is no evidence of a focal  herniation. If indicated, further evaluation could be performed with a  MRI examination of the lumbar spine.    Imaging last reviewed: 07/19/18     PFSH:  The following portions of the patient's history were reviewed and updated as appropriate: problem list, past medical history, past surgery history, social history, family history, medications, and allergies    Review of Systems   Constitutional: Positive for activity change (NO CHANGE) and fatigue.   HENT: Negative for congestion.    Eyes: Negative for visual disturbance.   Respiratory: Negative for cough, shortness of breath and wheezing.    Cardiovascular: Negative.    Gastrointestinal: Negative for constipation and diarrhea.   Genitourinary: Negative for difficulty urinating.   Musculoskeletal: Positive for back pain.   Neurological: Positive for weakness. Negative for numbness.   Psychiatric/Behavioral: Positive for sleep disturbance. Negative for suicidal ideas. The patient is nervous/anxious.    All other systems reviewed and are negative.      Vitals:    07/17/18 0946   BP: 113/88   Pulse: 90   Resp: 16   Temp: 98.6 °F (37 °C)   TempSrc: Oral   SpO2: 96%   Weight: 133 kg (292 lb 3.2 oz)   Height: 162.6 cm (64.02\")   PainSc: 8  Comment: ALE TFESI INJECTION ON 7/2/2018 DID NOT HELP       Physical Exam   Constitutional: She appears well-developed and well-nourished. No distress. "   HENT:   Head: Normocephalic and atraumatic.   Nose: Nose normal.   Mouth/Throat: Oropharynx is clear and moist.   Eyes: Conjunctivae and EOM are normal.   Neck: Normal range of motion. Neck supple.   Pulmonary/Chest: No stridor. No respiratory distress.   Musculoskeletal:        Lumbar back: She exhibits decreased range of motion, tenderness and pain.   +bilateral lumbar facet tenderness  +bilateral lumbar facet loading    Neurological: She is alert. She has normal strength. No cranial nerve deficit or sensory deficit. Coordination and gait normal.   Skin: Skin is warm and dry. No rash noted. She is not diaphoretic.   Psychiatric: She has a normal mood and affect. Her speech is normal and behavior is normal.   Nursing note and vitals reviewed.    Ortho Exam  Neurologic Exam     Mental Status   Speech: speech is normal     Cranial Nerves     CN III, IV, VI   Extraocular motions are normal.     Motor Exam     Strength   Strength 5/5 throughout.       Lab Results   Component Value Date    POCMETH Negative 03/09/2018    POCAMPHET Negative 03/09/2018    POCBARBITUR Negative 03/09/2018    POCBENZO Negative 03/09/2018    POCCOCAINE Negative 03/09/2018    POCMETHADO Negative 03/09/2018    POCOPIATES Negative 03/09/2018    POCOXYCODO Negative 03/09/2018    POCPHENCYC Negative 03/09/2018    POCPROPOXY Negative 03/09/2018    POCTHC Negative 03/09/2018    POCTRICYC Negative 03/09/2018     Last UDS results reviewed: 07/19/18   Last UDS: 3/9/2018  Comments: no medication - Consistent     Date of last JOON reviewed : 07/19/18   Comments: Consistent         Marisol was seen today for back pain.    Diagnoses and all orders for this visit:    Lumbosacral radiculopathy    Lumbar disc herniation with radiculopathy    Chronic bilateral low back pain with bilateral sciatica    Degeneration of lumbar intervertebral disc    Lumbar spondylolysis    Other orders  -     Discontinue: topiramate (TOPAMAX) 25 MG tablet; Take 1 tablet by  mouth 2 (Two) Times a Day.  -     topiramate (TOPAMAX) 25 MG tablet; Take 1 tablet by mouth 2 (Two) Times a Day.  -     traMADol (ULTRAM) 50 MG tablet; Take 1 tablet by mouth 2 (Two) Times a Day As Needed for Moderate Pain  or Severe Pain .      Requested Prescriptions     Signed Prescriptions Disp Refills   • topiramate (TOPAMAX) 25 MG tablet 60 tablet 1     Sig: Take 1 tablet by mouth 2 (Two) Times a Day.   • traMADol (ULTRAM) 50 MG tablet 40 tablet 0     Sig: Take 1 tablet by mouth 2 (Two) Times a Day As Needed for Moderate Pain  or Severe Pain .     - Has failed both LMBB and LESI at this point.   - trial of topamax by MARY LOU but patient never tried. I discouraged until trying injections as her history of kidney stones. But given she failed injections, we are left with little options.   - she wants to avoid narcotic therapy and is afraid of any medication close to gabapentin due to the severe SE in past.    good relief with gabapentin in past but side effects. Discussion about trying lyrica but she is nervous the same SE may reoccur.     - try topamax for a few weeks until her apt with MARY LOU. If no benefit she will let him know.   - can start tramadol for severe pain. Will give #40 as see how much she uses over next month or two.     - Imaging reviewed with patient.  Past fusion seen along with some facet disease.    - Continue home exercise program.   - Continue with physical therapy and weight loss.  - This was a 25 minute face to face visit with 15 minutes spent counseling on medication, usage and treatment plan.       Patient counseled on the importance of weight loss to help with overall health and pain control. Patient instructed to attempt weight loss.   Plan: Calorie counting  reduce portion size, cut out extra servings and reduce fast food intake    Follow-up as needed for pain     Manda Askew MD  Pain Management

## 2018-08-10 NOTE — PROGRESS NOTES
Subjective   Patient ID: Marisol Carrillo is a 48 y.o. female is here today for follow-up back pain.    At the last visit the patient reported stabbing pain in her low back with worsening bilateral leg pain that goes down to the tops of the feet. She stated that she received short term relief from aquatic therapy. The patient was referred to pain management and started on Topamax 25 mg BID at the last visit.    Today the patient reports that she has had 2 MBB since the last visit and she has started the Topamax. She states that she did not receive any relief from the injections and has not noticed any relief from the Topamax which was started three weeks ago.    Back Pain   This is a chronic problem. The current episode started more than 1 month ago. The problem occurs daily. The problem has been gradually worsening since onset. The pain is present in the lumbar spine. The pain radiates to the right thigh, right knee, left thigh, right foot, left knee and left foot. Associated symptoms include leg pain and weakness. Pertinent negatives include no bladder incontinence, bowel incontinence, numbness or tingling.       The following portions of the patient's history were reviewed and updated as appropriate: allergies, current medications, past family history, past medical history, past social history, past surgical history and problem list.    Review of Systems   Gastrointestinal: Negative for bowel incontinence.   Genitourinary: Negative for bladder incontinence.   Musculoskeletal: Positive for back pain.   Neurological: Positive for weakness. Negative for tingling and numbness.   All other systems reviewed and are negative.     It has been about 6 months since I have seen her. Unfortunately, the medial branch blocks and the transforaminal epidural blocks really did not help her. She continues to have problem with her left knee and is not planning to see Dr. Sierra any longer. We talked about getting another opinion about  this complicated left knee problem, and she is willing to see another orthopedic surgeon. I suggested Dr. Chavez first and I can refer her to him. We can also considering having her see Dr. Skip Soto or Dr. De Berger afterwards but will start with Dr. Chavez first. She is using a cane today and continues to have low back pain and bilateral buttock and leg pain, left worse than right. I have not brought up spinal cord stimulation to her before, and I did discuss it today. I think it is a reasonable option assuming that we are not dealing with any structural problems that might need to be readdressed. We have not re-imaged her since 2016, so this time will send her to the MRI scanner and get flexion and extension x-rays and have her come back. Dr. Askew did convince her to try some Topamax at 25 mg b.i.d. She has not noticed any difference right now but she is very concerned about taking those medications because of her adverse reaction to gabapentin. I can understand that but I asked her to stay on it for right now and will discuss the MRI when she returns.      Objective   Physical Exam   Constitutional: She is oriented to person, place, and time. She appears well-developed and well-nourished.   HENT:   Head: Normocephalic and atraumatic.   Eyes: Pupils are equal, round, and reactive to light. Conjunctivae and EOM are normal.   Fundoscopic exam:       The right eye shows no papilledema. The right eye shows venous pulsations.        The left eye shows no papilledema. The left eye shows venous pulsations.   Neck: Carotid bruit is not present.   Neurological: She is oriented to person, place, and time. She has a normal Finger-Nose-Finger Test and a normal Heel to Shin Test. Gait normal.   Reflex Scores:       Tricep reflexes are 2+ on the right side and 2+ on the left side.       Bicep reflexes are 2+ on the right side and 2+ on the left side.       Brachioradialis reflexes are 2+ on the right side and 2+ on  the left side.       Patellar reflexes are 2+ on the right side and 2+ on the left side.       Achilles reflexes are 2+ on the right side and 2+ on the left side.  Psychiatric: Her speech is normal.     Neurologic Exam     Mental Status   Oriented to person, place, and time.   Registration of memory: Good recent and remote memory.   Attention: normal. Concentration: normal.   Speech: speech is normal   Level of consciousness: alert  Knowledge: consistent with education.     Cranial Nerves     CN II   Visual fields full to confrontation.   Visual acuity: normal    CN III, IV, VI   Pupils are equal, round, and reactive to light.  Extraocular motions are normal.     CN V   Facial sensation intact.   Right corneal reflex: normal  Left corneal reflex: normal    CN VII   Facial expression full, symmetric.   Right facial weakness: none  Left facial weakness: none    CN VIII   Hearing: intact    CN IX, X   Palate: symmetric    CN XI   Right sternocleidomastoid strength: normal  Left sternocleidomastoid strength: normal    CN XII   Tongue: not atrophic  Tongue deviation: none    Motor Exam   Muscle bulk: normal  Right arm tone: normal  Left arm tone: normal  Right leg tone: normal  Left leg tone: normal    Strength   Strength 5/5 except as noted.     Sensory Exam   Light touch normal.     Gait, Coordination, and Reflexes     Gait  Gait: normal    Coordination   Finger to nose coordination: normal  Heel to shin coordination: normal    Reflexes   Right brachioradialis: 2+  Left brachioradialis: 2+  Right biceps: 2+  Left biceps: 2+  Right triceps: 2+  Left triceps: 2+  Right patellar: 2+  Left patellar: 2+  Right achilles: 2+  Left achilles: 2+  Right : 2+  Left : 2+      Assessment/Plan   Independent Review of Radiographic Studies:    I reviewed the myelogram done 12/2/16 which did show fusion from L5 to S1 and mild adjacent level canal stenosis at L4-L5.  Agree with the report.      Medical Decision Making:    We'll  make the referral to see Dr. Chavez for a second opinion about the left knee.  As far as the low back goes, I think spinal cord stimulation is probably a decent option for her but I like to check a new imaging we'll get an MRI and plain x-rays and have her come back to discuss it.  If the spinal cord stimulator is not to be potentially helpful, then we'll need to send her back to Dr. Askew for a spinal cord stimulator trial.  She will continue her Topamax in the meantime.      Marisol was seen today for back pain.    Diagnoses and all orders for this visit:    Degeneration of lumbar intervertebral disc  -     MRI Lumbar Spine With & Without Contrast; Future  -     XR Spine Lumbar Complete With Flex & Ext; Future    Chronic bilateral low back pain with bilateral sciatica  -     MRI Lumbar Spine With & Without Contrast; Future  -     XR Spine Lumbar Complete With Flex & Ext; Future    Chronic pain of left knee  -     Ambulatory Referral to Orthopedic Surgery      Return in about 12 days (around 9/5/2018) for After tests.

## 2018-08-24 ENCOUNTER — OFFICE VISIT (OUTPATIENT)
Dept: NEUROSURGERY | Facility: CLINIC | Age: 48
End: 2018-08-24

## 2018-08-24 VITALS
WEIGHT: 292 LBS | DIASTOLIC BLOOD PRESSURE: 82 MMHG | HEART RATE: 88 BPM | HEIGHT: 64 IN | BODY MASS INDEX: 49.85 KG/M2 | SYSTOLIC BLOOD PRESSURE: 130 MMHG

## 2018-08-24 DIAGNOSIS — M54.42 CHRONIC BILATERAL LOW BACK PAIN WITH BILATERAL SCIATICA: ICD-10-CM

## 2018-08-24 DIAGNOSIS — G89.29 CHRONIC PAIN OF LEFT KNEE: ICD-10-CM

## 2018-08-24 DIAGNOSIS — G89.29 CHRONIC BILATERAL LOW BACK PAIN WITH BILATERAL SCIATICA: ICD-10-CM

## 2018-08-24 DIAGNOSIS — M25.562 CHRONIC PAIN OF LEFT KNEE: ICD-10-CM

## 2018-08-24 DIAGNOSIS — M54.41 CHRONIC BILATERAL LOW BACK PAIN WITH BILATERAL SCIATICA: ICD-10-CM

## 2018-08-24 DIAGNOSIS — M51.36 DEGENERATION OF LUMBAR INTERVERTEBRAL DISC: Primary | ICD-10-CM

## 2018-08-24 PROCEDURE — 99214 OFFICE O/P EST MOD 30 MIN: CPT | Performed by: NEUROLOGICAL SURGERY

## 2018-09-06 ENCOUNTER — HOSPITAL ENCOUNTER (OUTPATIENT)
Dept: GENERAL RADIOLOGY | Facility: HOSPITAL | Age: 48
Discharge: HOME | End: 2018-09-06
Attending: NEUROLOGICAL SURGERY

## 2018-09-06 ENCOUNTER — HOSPITAL ENCOUNTER (OUTPATIENT)
Dept: MRI IMAGING | Facility: HOSPITAL | Age: 48
Discharge: HOME | End: 2018-09-06
Attending: NEUROLOGICAL SURGERY | Admitting: NEUROLOGICAL SURGERY

## 2018-09-06 DIAGNOSIS — M54.41 CHRONIC BILATERAL LOW BACK PAIN WITH BILATERAL SCIATICA: ICD-10-CM

## 2018-09-06 DIAGNOSIS — M54.42 CHRONIC BILATERAL LOW BACK PAIN WITH BILATERAL SCIATICA: ICD-10-CM

## 2018-09-06 DIAGNOSIS — G89.29 CHRONIC BILATERAL LOW BACK PAIN WITH BILATERAL SCIATICA: ICD-10-CM

## 2018-09-06 DIAGNOSIS — M51.36 DEGENERATION OF LUMBAR INTERVERTEBRAL DISC: ICD-10-CM

## 2018-09-06 LAB — CREAT BLDA-MCNC: 0.7 MG/DL (ref 0.6–1.3)

## 2018-09-06 PROCEDURE — 72158 MRI LUMBAR SPINE W/O & W/DYE: CPT

## 2018-09-06 PROCEDURE — A9577 INJ MULTIHANCE: HCPCS | Performed by: NEUROLOGICAL SURGERY

## 2018-09-06 PROCEDURE — 0 GADOBENATE DIMEGLUMINE 529 MG/ML SOLUTION: Performed by: NEUROLOGICAL SURGERY

## 2018-09-06 PROCEDURE — 82565 ASSAY OF CREATININE: CPT

## 2018-09-06 PROCEDURE — 72114 X-RAY EXAM L-S SPINE BENDING: CPT

## 2018-09-06 RX ADMIN — GADOBENATE DIMEGLUMINE 20 ML: 529 INJECTION, SOLUTION INTRAVENOUS at 10:21

## 2018-09-10 ENCOUNTER — OFFICE VISIT (OUTPATIENT)
Dept: FAMILY MEDICINE CLINIC | Facility: CLINIC | Age: 48
End: 2018-09-10

## 2018-09-10 VITALS
TEMPERATURE: 97.7 F | OXYGEN SATURATION: 99 % | HEIGHT: 64 IN | BODY MASS INDEX: 50.02 KG/M2 | HEART RATE: 88 BPM | WEIGHT: 293 LBS | SYSTOLIC BLOOD PRESSURE: 110 MMHG | RESPIRATION RATE: 16 BRPM | DIASTOLIC BLOOD PRESSURE: 80 MMHG

## 2018-09-10 DIAGNOSIS — I10 ESSENTIAL HYPERTENSION: Primary | ICD-10-CM

## 2018-09-10 DIAGNOSIS — E55.9 VITAMIN D DEFICIENCY: ICD-10-CM

## 2018-09-10 DIAGNOSIS — K21.9 GASTROESOPHAGEAL REFLUX DISEASE WITHOUT ESOPHAGITIS: ICD-10-CM

## 2018-09-10 DIAGNOSIS — Z12.31 ENCOUNTER FOR SCREENING MAMMOGRAM FOR BREAST CANCER: ICD-10-CM

## 2018-09-10 DIAGNOSIS — F41.9 ANXIETY: ICD-10-CM

## 2018-09-10 DIAGNOSIS — N91.2 AMENORRHEA: ICD-10-CM

## 2018-09-10 DIAGNOSIS — E03.9 ACQUIRED HYPOTHYROIDISM: ICD-10-CM

## 2018-09-10 DIAGNOSIS — R73.01 IMPAIRED FASTING GLUCOSE: ICD-10-CM

## 2018-09-10 PROCEDURE — 99214 OFFICE O/P EST MOD 30 MIN: CPT | Performed by: PHYSICIAN ASSISTANT

## 2018-09-10 RX ORDER — SUCRALFATE 1 G/1
1 TABLET ORAL 4 TIMES DAILY
Qty: 120 TABLET | Refills: 11 | Status: SHIPPED | OUTPATIENT
Start: 2018-09-10 | End: 2019-04-01

## 2018-09-10 RX ORDER — HYDROXYZINE HYDROCHLORIDE 25 MG/1
25 TABLET, FILM COATED ORAL 3 TIMES DAILY PRN
Qty: 60 TABLET | Refills: 5 | Status: SHIPPED | OUTPATIENT
Start: 2018-09-10 | End: 2019-04-16 | Stop reason: SDUPTHER

## 2018-09-10 RX ORDER — AMLODIPINE AND OLMESARTAN MEDOXOMIL 10; 40 MG/1; MG/1
1 TABLET ORAL DAILY
Qty: 30 TABLET | Refills: 5 | Status: SHIPPED | OUTPATIENT
Start: 2018-09-10 | End: 2019-01-02 | Stop reason: SDUPTHER

## 2018-09-10 RX ORDER — OMEPRAZOLE 40 MG/1
40 CAPSULE, DELAYED RELEASE ORAL DAILY
Qty: 30 CAPSULE | Refills: 11 | Status: SHIPPED | OUTPATIENT
Start: 2018-09-10 | End: 2019-01-17 | Stop reason: SDUPTHER

## 2018-09-10 RX ORDER — CARVEDILOL 12.5 MG/1
12.5 TABLET ORAL 2 TIMES DAILY WITH MEALS
Qty: 60 TABLET | Refills: 5 | Status: SHIPPED | OUTPATIENT
Start: 2018-09-10 | End: 2019-02-04 | Stop reason: SDUPTHER

## 2018-09-10 RX ORDER — SERTRALINE HYDROCHLORIDE 100 MG/1
200 TABLET, FILM COATED ORAL NIGHTLY
Qty: 60 TABLET | Refills: 5 | Status: SHIPPED | OUTPATIENT
Start: 2018-09-10 | End: 2019-03-21

## 2018-09-10 NOTE — PATIENT INSTRUCTIONS
Low glycemic index diet  Exercise 30 minutes most days of the week  Make sure you get results on any labs or tests we ordered today  We discussed medications and how to take them as prescribed  Sleep 6-8 hours each night if possible  If you have not signed up for [employer], please activate your code ASAP from your After Visit Summary today    LDL goal <100  LDL goal if heart disease <70  HDL goal >60  Triglyceride goal <150  BP goal =<130/80  Fasting glucose <100    Contact office if your depression worsens   Go to ER if start to develop feelings for suicide  Take medication as prescribed  If you are having trouble tolerating your medication, contact office before abruptly stopping it

## 2018-09-10 NOTE — PROGRESS NOTES
Subjective   Marisol Carrillo is a 48 y.o. female.     History of Present Illness   I will change Exforge today    She is still having left knee pain and replaced---X 2 and still pain and to see ortho    Seeing neurosurgeon for the spine and just had MRI     IMPRESSION:  1. The patient is status post fusion from L5 to S1. There is granulation  tissue abutting the traversing S1 roots bilaterally, more prominent on  the right. There is no evidence of disc herniation or disc fragment.  1. There is a mild central disc osteophyte complex present at L4-L5  resulting in mild flattening of the ventral surface of the thecal sac  similar in appearance as compared to the MRI examination of 05/30/2015    She has been to Dr Ballard for GI work up and is on PPI and Carafate and tried off and pain again; back on it  Sees Dr Askew for pain management    Marisol Carrillo female 48 y.o. who presents today for follow up of Depression and Anxiety.  She reports medication does help and this is best med and tried several.  Still gets some anxiety and depression. Onset of symptoms was approximately several years ago.  She denies current suicidal and homicidal ideation. Risk factors are family history of anxiety and or depression and lifestyle of multiple roles. Chronic pain Previous treatment includes current Rx.  She complains of the following medication side effects: none.  The patient has previously been in counseling and is now..  She has PRN Atarax and helps    No menses for a month; will check FSH  If not in menopause will siva Prometrium for 10 days  Marisol Carrillo 48 y.o. female who presents today for routine follow up check and medication refills.  she has a history of   Patient Active Problem List   Diagnosis   • Essential familial hypercholesterolemia   • Hypertension   • Hypothyroidism   • Adiposity   • Vitamin deficiency   • Impaired fasting glucose   • Vitamin D deficiency   • Lumbosacral radiculopathy   • Gastroesophageal  reflux disease   • Constipation   • Diarrhea   • Dysphagia   • Fatigue   • Heartburn   • Lumbar disc herniation with radiculopathy   • Nocturnal cough   • Pain in extremity   • Regurgitation   • Vomiting   • Anxiety   • Depression   • Degeneration of lumbar intervertebral disc   • Spinal headache   • Chronic bilateral low back pain with bilateral sciatica   • Disc disease, degenerative, lumbar or lumbosacral   • Primary localized osteoarthritis of left knee   • History of spinal fusion   • Epigastric pain   • Lumbar spondylolysis   • Chronic pain of left knee   .  Since the last visit, she has overall felt tired.  She has Hypertenision and is well controlled on medication, Impaired fasting glucose and will continue close lab follow up to watch for DMII, GERD and is well controlled on PPI medication, Hyperlipidemia and here to discuss treatment, Hypothyroidism and will need to update labs for continued treatment and Vitamin D deficiency and will update labs to confirm level is at goal >30.  she has been compliant with current medications have reviewed them.  The patient denies medication side effects.  Weight is up    Results for orders placed or performed during the hospital encounter of 09/06/18   POC Creatinine   Result Value Ref Range    Creatinine 0.70 0.60 - 1.30 mg/dL         The following portions of the patient's history were reviewed and updated as appropriate: allergies, current medications, past family history, past medical history, past social history, past surgical history and problem list.    Review of Systems   Constitutional: Positive for fatigue and unexpected weight change. Negative for activity change and appetite change.   HENT: Negative for nosebleeds and trouble swallowing.    Eyes: Negative for pain and visual disturbance.   Respiratory: Negative for chest tightness, shortness of breath and wheezing.    Cardiovascular: Negative for chest pain and palpitations.   Gastrointestinal: Positive for  nausea. Negative for abdominal pain and blood in stool.   Endocrine: Negative.    Genitourinary: Negative for difficulty urinating and hematuria.   Musculoskeletal: Positive for arthralgias, back pain and gait problem. Negative for joint swelling.   Skin: Negative for color change and rash.   Allergic/Immunologic: Negative.    Neurological: Negative for syncope and speech difficulty.   Hematological: Negative for adenopathy.   Psychiatric/Behavioral: Positive for decreased concentration and dysphoric mood. Negative for agitation and confusion. The patient is nervous/anxious.    All other systems reviewed and are negative.      Objective   Physical Exam   Constitutional: She is oriented to person, place, and time. She appears well-developed and well-nourished. No distress.   HENT:   Head: Normocephalic and atraumatic.   Eyes: Pupils are equal, round, and reactive to light. Conjunctivae and EOM are normal. Right eye exhibits no discharge. Left eye exhibits no discharge. No scleral icterus.   Neck: Normal range of motion. Neck supple. No tracheal deviation present. No thyromegaly present.   Cardiovascular: Normal rate, regular rhythm, normal heart sounds, intact distal pulses and normal pulses.  Exam reveals no gallop.    No murmur heard.  Trace pedal edema = bilat   Pulmonary/Chest: Effort normal and breath sounds normal. No respiratory distress. She has no wheezes. She has no rales.   Abdominal: Soft. Bowel sounds are normal. She exhibits no distension and no mass. Tenderness: epigastric and RUQ. There is no rebound and no guarding. No hernia.   Musculoskeletal: Normal range of motion.   Neurological: She is alert and oriented to person, place, and time. She exhibits normal muscle tone. Coordination normal.   Skin: Skin is warm. No rash noted. No erythema. No pallor.   Psychiatric: Her behavior is normal. Judgment and thought content normal.   Nursing note and vitals reviewed.      Assessment/Plan   Marisol was seen  today for hypertension.    Diagnoses and all orders for this visit:    Essential hypertension    Gastroesophageal reflux disease without esophagitis    Anxiety    Impaired fasting glucose    Vitamin D deficiency    Acquired hypothyroidism

## 2018-10-02 ENCOUNTER — FLU SHOT (OUTPATIENT)
Dept: FAMILY MEDICINE CLINIC | Facility: CLINIC | Age: 48
End: 2018-10-02

## 2018-10-02 DIAGNOSIS — Z23 FLU VACCINE NEED: ICD-10-CM

## 2018-10-02 PROCEDURE — 90471 IMMUNIZATION ADMIN: CPT | Performed by: PHYSICIAN ASSISTANT

## 2018-10-02 PROCEDURE — 90674 CCIIV4 VAC NO PRSV 0.5 ML IM: CPT | Performed by: PHYSICIAN ASSISTANT

## 2018-10-03 ENCOUNTER — OFFICE VISIT (OUTPATIENT)
Dept: NEUROSURGERY | Facility: CLINIC | Age: 48
End: 2018-10-03

## 2018-10-03 VITALS
HEIGHT: 64 IN | DIASTOLIC BLOOD PRESSURE: 76 MMHG | SYSTOLIC BLOOD PRESSURE: 118 MMHG | WEIGHT: 293 LBS | HEART RATE: 96 BPM | BODY MASS INDEX: 50.02 KG/M2

## 2018-10-03 DIAGNOSIS — G89.29 CHRONIC BILATERAL LOW BACK PAIN WITH BILATERAL SCIATICA: ICD-10-CM

## 2018-10-03 DIAGNOSIS — M54.41 CHRONIC BILATERAL LOW BACK PAIN WITH BILATERAL SCIATICA: ICD-10-CM

## 2018-10-03 DIAGNOSIS — Z98.1 HISTORY OF SPINAL FUSION: ICD-10-CM

## 2018-10-03 DIAGNOSIS — M51.36 DEGENERATION OF LUMBAR INTERVERTEBRAL DISC: Primary | ICD-10-CM

## 2018-10-03 DIAGNOSIS — M54.42 CHRONIC BILATERAL LOW BACK PAIN WITH BILATERAL SCIATICA: ICD-10-CM

## 2018-10-03 PROCEDURE — 99214 OFFICE O/P EST MOD 30 MIN: CPT | Performed by: NEUROLOGICAL SURGERY

## 2018-10-24 LAB
25(OH)D3+25(OH)D2 SERPL-MCNC: 27 NG/ML (ref 30–100)
ALBUMIN SERPL-MCNC: 4.4 G/DL (ref 3.5–5.2)
ALBUMIN/GLOB SERPL: 1.5 G/DL
ALP SERPL-CCNC: 123 U/L (ref 39–117)
ALT SERPL-CCNC: 11 U/L (ref 1–33)
AST SERPL-CCNC: 15 U/L (ref 1–32)
BASOPHILS # BLD AUTO: 0.05 10*3/MM3 (ref 0–0.2)
BASOPHILS NFR BLD AUTO: 0.7 % (ref 0–1.5)
BILIRUB SERPL-MCNC: 0.4 MG/DL (ref 0.1–1.2)
BUN SERPL-MCNC: 10 MG/DL (ref 6–20)
BUN/CREAT SERPL: 14.5 (ref 7–25)
CALCIUM SERPL-MCNC: 10 MG/DL (ref 8.6–10.5)
CHLORIDE SERPL-SCNC: 103 MMOL/L (ref 98–107)
CHOLEST SERPL-MCNC: 166 MG/DL (ref 0–200)
CO2 SERPL-SCNC: 25.1 MMOL/L (ref 22–29)
CREAT SERPL-MCNC: 0.69 MG/DL (ref 0.57–1)
EOSINOPHIL # BLD AUTO: 0.13 10*3/MM3 (ref 0–0.7)
EOSINOPHIL NFR BLD AUTO: 1.8 % (ref 0.3–6.2)
ERYTHROCYTE [DISTWIDTH] IN BLOOD BY AUTOMATED COUNT: 15.2 % (ref 11.7–13)
FOLATE SERPL-MCNC: 15.21 NG/ML (ref 4.78–24.2)
FSH SERPL-ACNC: 36.5 MIU/ML
GLOBULIN SER CALC-MCNC: 3 GM/DL
GLUCOSE SERPL-MCNC: 120 MG/DL (ref 65–99)
HBA1C MFR BLD: 5.9 % (ref 4.8–5.6)
HCT VFR BLD AUTO: 47 % (ref 35.6–45.5)
HDLC SERPL-MCNC: 52 MG/DL (ref 40–60)
HGB BLD-MCNC: 14.4 G/DL (ref 11.9–15.5)
IMM GRANULOCYTES # BLD: 0 10*3/MM3 (ref 0–0.03)
IMM GRANULOCYTES NFR BLD: 0 % (ref 0–0.5)
LDLC SERPL CALC-MCNC: 91 MG/DL (ref 0–100)
LH SERPL-ACNC: 61.1 MIU/ML
LYMPHOCYTES # BLD AUTO: 1.94 10*3/MM3 (ref 0.9–4.8)
LYMPHOCYTES NFR BLD AUTO: 26.3 % (ref 19.6–45.3)
MCH RBC QN AUTO: 27.2 PG (ref 26.9–32)
MCHC RBC AUTO-ENTMCNC: 30.6 G/DL (ref 32.4–36.3)
MCV RBC AUTO: 88.8 FL (ref 80.5–98.2)
MONOCYTES # BLD AUTO: 0.56 10*3/MM3 (ref 0.2–1.2)
MONOCYTES NFR BLD AUTO: 7.6 % (ref 5–12)
NEUTROPHILS # BLD AUTO: 4.69 10*3/MM3 (ref 1.9–8.1)
NEUTROPHILS NFR BLD AUTO: 63.6 % (ref 42.7–76)
PLATELET # BLD AUTO: 371 10*3/MM3 (ref 140–500)
POTASSIUM SERPL-SCNC: 4.4 MMOL/L (ref 3.5–5.2)
PROT SERPL-MCNC: 7.4 G/DL (ref 6–8.5)
RBC # BLD AUTO: 5.29 10*6/MM3 (ref 3.9–5.2)
SODIUM SERPL-SCNC: 144 MMOL/L (ref 136–145)
T3 SERPL-MCNC: 148 NG/DL (ref 80–200)
T4 FREE SERPL-MCNC: 1.27 NG/DL (ref 0.93–1.7)
TRIGL SERPL-MCNC: 114 MG/DL (ref 0–150)
TSH SERPL DL<=0.005 MIU/L-ACNC: 2.19 MIU/ML (ref 0.27–4.2)
VIT B12 SERPL-MCNC: 602 PG/ML (ref 211–946)
VLDLC SERPL CALC-MCNC: 22.8 MG/DL (ref 5–40)
WBC # BLD AUTO: 7.37 10*3/MM3 (ref 4.5–10.7)

## 2018-11-20 ENCOUNTER — OFFICE VISIT (OUTPATIENT)
Dept: FAMILY MEDICINE CLINIC | Facility: CLINIC | Age: 48
End: 2018-11-20

## 2018-11-20 VITALS
TEMPERATURE: 98.7 F | RESPIRATION RATE: 16 BRPM | HEIGHT: 64 IN | DIASTOLIC BLOOD PRESSURE: 80 MMHG | OXYGEN SATURATION: 96 % | SYSTOLIC BLOOD PRESSURE: 128 MMHG | WEIGHT: 291 LBS | HEART RATE: 100 BPM | BODY MASS INDEX: 49.68 KG/M2

## 2018-11-20 DIAGNOSIS — J01.90 ACUTE SINUSITIS, RECURRENCE NOT SPECIFIED, UNSPECIFIED LOCATION: Primary | ICD-10-CM

## 2018-11-20 PROCEDURE — 99213 OFFICE O/P EST LOW 20 MIN: CPT | Performed by: PHYSICIAN ASSISTANT

## 2018-11-20 RX ORDER — DEXTROMETHORPHAN HYDROBROMIDE AND PROMETHAZINE HYDROCHLORIDE 15; 6.25 MG/5ML; MG/5ML
5 SYRUP ORAL 4 TIMES DAILY PRN
Qty: 180 ML | Refills: 1 | Status: SHIPPED | OUTPATIENT
Start: 2018-11-20 | End: 2019-01-17

## 2018-11-20 RX ORDER — CEFDINIR 300 MG/1
CAPSULE ORAL
Qty: 20 CAPSULE | Refills: 0 | Status: SHIPPED | OUTPATIENT
Start: 2018-11-20 | End: 2018-11-20 | Stop reason: SDUPTHER

## 2018-11-20 RX ORDER — DEXTROMETHORPHAN HYDROBROMIDE AND PROMETHAZINE HYDROCHLORIDE 15; 6.25 MG/5ML; MG/5ML
5 SYRUP ORAL 4 TIMES DAILY PRN
Qty: 180 ML | Refills: 1 | Status: SHIPPED | OUTPATIENT
Start: 2018-11-20 | End: 2018-11-20 | Stop reason: SDUPTHER

## 2018-11-20 RX ORDER — CEFDINIR 300 MG/1
CAPSULE ORAL
Qty: 20 CAPSULE | Refills: 0 | Status: SHIPPED | OUTPATIENT
Start: 2018-11-20 | End: 2018-11-30

## 2018-11-20 NOTE — PROGRESS NOTES
Subjective   Marisol Carrillo is a 48 y.o. female.     History of Present Illness   Marisol Carrillo 48 y.o. female who presents for evaluation of upper respiratory congestion. Symptoms include congestion, sore throat, post nasal drip, fever, hoarseness and ear pain.  Onset of symptoms was 5 days ago, gradually worsening since that time. Patient denies shortness of breath, wheezing.   Evaluation to date: was seen at [facility] Treatment to date:  Advil.OTC cough med  Fever since yesterday; 101 was highest ; today 100.5  Went [facility] Sunday and Dx viral URI and I note neg POCT strep;  Pulse was 108; no Rx given  The following portions of the patient's history were reviewed and updated as appropriate: allergies, current medications, past family history, past medical history, past social history, past surgical history and problem list.    Review of Systems   Constitutional: Positive for fatigue and fever. Negative for activity change and unexpected weight change.   HENT: Positive for congestion, ear pain, postnasal drip, sinus pain, sore throat and voice change.    Eyes: Negative for pain and discharge.   Respiratory: Positive for cough. Negative for chest tightness, shortness of breath and wheezing.    Cardiovascular: Negative for chest pain and palpitations.   Gastrointestinal: Negative for abdominal pain, diarrhea and vomiting.   Endocrine: Negative.    Genitourinary: Negative.    Musculoskeletal: Positive for back pain, gait problem and joint swelling.   Skin: Negative for color change, rash and wound.   Allergic/Immunologic: Negative.    Neurological: Positive for weakness and headaches. Negative for seizures and syncope.   Psychiatric/Behavioral: Negative.        Objective   Physical Exam   Constitutional: She is oriented to person, place, and time. She appears well-developed and well-nourished.  Non-toxic appearance. No distress.   HENT:   Head: Normocephalic and atraumatic. Hair is normal.   Right Ear: External ear  normal. No drainage, swelling or tenderness. Tympanic membrane is retracted.   Left Ear: External ear normal. No drainage, swelling or tenderness. Tympanic membrane is retracted.   Nose: Mucosal edema present. No epistaxis. Right sinus exhibits maxillary sinus tenderness and frontal sinus tenderness. Left sinus exhibits maxillary sinus tenderness and frontal sinus tenderness.   Mouth/Throat: Uvula is midline and mucous membranes are normal. No oral lesions. No uvula swelling. Posterior oropharyngeal erythema present. No oropharyngeal exudate.   Looks like resolving ulcerations on uvula; also see cobblestoning from pnd and read   Eyes: Conjunctivae and EOM are normal. Pupils are equal, round, and reactive to light. Right eye exhibits no discharge. Left eye exhibits no discharge. No scleral icterus.   Neck: Normal range of motion. Neck supple.   Cardiovascular: Normal rate, regular rhythm and normal heart sounds. Exam reveals no gallop.   No murmur heard.  Pulmonary/Chest: Effort normal and breath sounds normal. No stridor. No respiratory distress. She has no wheezes. She has no rales. She exhibits no tenderness.   Lymphadenopathy:     She has cervical adenopathy.   Neurological: She is alert and oriented to person, place, and time. She exhibits normal muscle tone.   Skin: Skin is warm and dry. No rash noted. She is not diaphoretic.   Psychiatric: She has a normal mood and affect. Her behavior is normal. Judgment and thought content normal.   Nursing note and vitals reviewed.      Assessment/Plan   Marisol was seen today for uri and sore throat.    Diagnoses and all orders for this visit:    Acute sinusitis, recurrence not specified, unspecified location    Other orders  -     cefdinir (OMNICEF) 300 MG capsule; One cap PO BID for infection  -     promethazine-dextromethorphan (PROMETHAZINE-DM) 6.25-15 MG/5ML syrup; Take 5 mL by mouth 4 (Four) Times a Day As Needed for Cough.

## 2018-11-24 RX ORDER — AZITHROMYCIN 250 MG/1
TABLET, FILM COATED ORAL
Qty: 6 TABLET | Refills: 1 | Status: SHIPPED | OUTPATIENT
Start: 2018-11-24 | End: 2018-11-30

## 2018-11-30 ENCOUNTER — OFFICE VISIT (OUTPATIENT)
Dept: FAMILY MEDICINE CLINIC | Facility: CLINIC | Age: 48
End: 2018-11-30

## 2018-11-30 VITALS
TEMPERATURE: 98.4 F | RESPIRATION RATE: 16 BRPM | SYSTOLIC BLOOD PRESSURE: 120 MMHG | WEIGHT: 286 LBS | BODY MASS INDEX: 48.83 KG/M2 | HEIGHT: 64 IN | OXYGEN SATURATION: 96 % | HEART RATE: 78 BPM | DIASTOLIC BLOOD PRESSURE: 68 MMHG

## 2018-11-30 DIAGNOSIS — J18.9 PNEUMONIA OF BOTH LOWER LOBES DUE TO INFECTIOUS ORGANISM: ICD-10-CM

## 2018-11-30 DIAGNOSIS — J01.90 ACUTE SINUSITIS, RECURRENCE NOT SPECIFIED, UNSPECIFIED LOCATION: Primary | ICD-10-CM

## 2018-11-30 PROCEDURE — 99213 OFFICE O/P EST LOW 20 MIN: CPT | Performed by: PHYSICIAN ASSISTANT

## 2018-11-30 RX ORDER — AMOXICILLIN AND CLAVULANATE POTASSIUM 875; 125 MG/1; MG/1
1 TABLET, FILM COATED ORAL
COMMUNITY
Start: 2018-11-24 | End: 2018-11-30

## 2018-11-30 RX ORDER — DOXYCYCLINE HYCLATE 100 MG
100 TABLET ORAL 2 TIMES DAILY
COMMUNITY
Start: 2018-11-24 | End: 2019-01-17

## 2018-11-30 RX ORDER — AMOXICILLIN AND CLAVULANATE POTASSIUM 875; 125 MG/1; MG/1
1 TABLET, FILM COATED ORAL 2 TIMES DAILY
COMMUNITY
Start: 2018-11-24 | End: 2019-01-17

## 2018-11-30 NOTE — PROGRESS NOTES
Subjective   Marisol Carrillo is a 48 y.o. female.     History of Present Illness   Marisol Carrillo 48 y.o. female presents today for urgent care follow up.  she was treated 11-24-18 for clinical pneumonia  .  I reviewed all of the labs and diagnostic testing and noted:  CXR was read neg    The patient's medications were changed:  Current outpatient and discharge medications have been reconciled for the patient.  Reviewed by: Tomeka Linares PA-C    she does not have a follow up appointment with a specialist:     She went to [facility] and saw DR Hollis  He added Doxy and changed Augmentin  CXR no pneumonia  EXAMINATION: XR CHEST 2VW    DATE: 11/24/2018    COMPARISON: None available    INDICATION: shortness of breath with bibasilar rales.        FINDINGS: The cardiac silhouette is within normal limits. The mediastinum and hilar structures are unremarkable. There is no pneumonic consolidation, pulmonary edema, pleural effusion, or pneumothorax. There are scattered small linear densities within   the mid and inferior aspect of the left lung characteristic of subsegmental atelectasis or parenchymal scarring.    IMPRESSION: No acute radiographic abnormality.    Dictated by: PETR Montoya M.D.    Images and Report reviewed and interpreted by: PETR Montoya M.D.    I saw her a few days prior to this; Her heart rate was high and now is back down.  I was concerned with this and the coughing.  She is finally better and we emailed daily and became afebrile 1.5 days after [facility] visit.    More cough to lay down but is better  ? Color to drg  Sitting up helps cough  Energy level still low  No runny or stuffy nose; just pnd and cough      3 more days doxy and augmentin    The following portions of the patient's history were reviewed and updated as appropriate: allergies, current medications, past family history, past medical history, past social history, past surgical history and problem list.    Review of Systems   Constitutional:  Positive for appetite change and fatigue. Negative for activity change and unexpected weight change.   HENT: Positive for congestion, postnasal drip and sore throat. Negative for ear pain.    Eyes: Negative for pain and discharge.   Respiratory: Positive for cough. Negative for chest tightness, shortness of breath and wheezing.    Cardiovascular: Negative for chest pain and palpitations.   Gastrointestinal: Positive for nausea. Negative for abdominal pain, diarrhea and vomiting.   Endocrine: Negative.    Genitourinary: Negative.    Musculoskeletal: Positive for back pain, gait problem, joint swelling and myalgias.   Skin: Negative for color change, rash and wound.   Allergic/Immunologic: Negative.    Neurological: Positive for weakness. Negative for seizures and syncope.   Psychiatric/Behavioral: Negative.        Objective   Physical Exam   Constitutional: She is oriented to person, place, and time. She appears well-developed and well-nourished.  Non-toxic appearance. No distress.   HENT:   Head: Normocephalic and atraumatic. Hair is normal.   Right Ear: External ear normal. No drainage, swelling or tenderness. Tympanic membrane is retracted.   Left Ear: External ear normal. No drainage, swelling or tenderness. Tympanic membrane is retracted.   Nose: Mucosal edema present. No epistaxis.   Mouth/Throat: Uvula is midline and mucous membranes are normal. No oral lesions. No uvula swelling. Posterior oropharyngeal erythema present. No oropharyngeal exudate.   Eyes: Conjunctivae and EOM are normal. Pupils are equal, round, and reactive to light. Right eye exhibits no discharge. Left eye exhibits no discharge. No scleral icterus.   Neck: Normal range of motion. Neck supple.   Cardiovascular: Normal rate, regular rhythm and normal heart sounds. Exam reveals no gallop.   No murmur heard.  Pulmonary/Chest: Breath sounds normal. No stridor. No respiratory distress. She has no wheezes. She has no rales. She exhibits no  tenderness.   Abdominal: Soft. There is no tenderness.   Lymphadenopathy:     She has no cervical adenopathy.   Neurological: She is alert and oriented to person, place, and time. She exhibits normal muscle tone.   Skin: Skin is warm and dry. No rash noted. She is not diaphoretic.   Psychiatric: She has a normal mood and affect. Her behavior is normal. Judgment and thought content normal.   Nursing note and vitals reviewed.      Assessment/Plan   Problems Addressed this Visit     None      Visit Diagnoses     Acute sinusitis, recurrence not specified, unspecified location    -  Primary    Pneumonia of both lower lobes due to infectious organism (CMS/HCC)        but negative on CXR    Relevant Medications    amoxicillin-clavulanate (AUGMENTIN) 875-125 MG per tablet    doxycycline (VIBRAMYICN) 100 MG tablet

## 2018-12-20 ENCOUNTER — OFFICE VISIT (OUTPATIENT)
Dept: PAIN MEDICINE | Facility: CLINIC | Age: 48
End: 2018-12-20

## 2018-12-20 VITALS
DIASTOLIC BLOOD PRESSURE: 81 MMHG | SYSTOLIC BLOOD PRESSURE: 113 MMHG | HEART RATE: 63 BPM | RESPIRATION RATE: 15 BRPM | WEIGHT: 291.6 LBS | TEMPERATURE: 98.8 F | OXYGEN SATURATION: 96 % | BODY MASS INDEX: 49.78 KG/M2 | HEIGHT: 64 IN

## 2018-12-20 DIAGNOSIS — M25.562 CHRONIC PAIN OF LEFT KNEE: ICD-10-CM

## 2018-12-20 DIAGNOSIS — G89.29 CHRONIC BILATERAL LOW BACK PAIN WITH BILATERAL SCIATICA: ICD-10-CM

## 2018-12-20 DIAGNOSIS — M54.42 CHRONIC BILATERAL LOW BACK PAIN WITH BILATERAL SCIATICA: ICD-10-CM

## 2018-12-20 DIAGNOSIS — M51.16 LUMBAR DISC HERNIATION WITH RADICULOPATHY: ICD-10-CM

## 2018-12-20 DIAGNOSIS — M54.17 LUMBOSACRAL RADICULOPATHY: Primary | ICD-10-CM

## 2018-12-20 DIAGNOSIS — M54.41 CHRONIC BILATERAL LOW BACK PAIN WITH BILATERAL SCIATICA: ICD-10-CM

## 2018-12-20 DIAGNOSIS — G89.29 CHRONIC PAIN OF LEFT KNEE: ICD-10-CM

## 2018-12-20 PROCEDURE — 99214 OFFICE O/P EST MOD 30 MIN: CPT | Performed by: PAIN MEDICINE

## 2018-12-20 RX ORDER — TRAMADOL HYDROCHLORIDE 50 MG/1
50 TABLET ORAL 2 TIMES DAILY PRN
Qty: 40 TABLET | Refills: 0 | Status: SHIPPED | OUTPATIENT
Start: 2018-12-20 | End: 2019-04-02

## 2018-12-20 NOTE — PROGRESS NOTES
CHIEF COMPLAINT: Back Pain    HPI  Marisol Carrillo is a 48 y.o. female.  She is here to follow up for Back Pain    Marisol Carrillo is a 48 y.o. female  who presents to the office for follow-up.    Since last visit their pain has worsened. Has new pain over last several months over right low lumbar region with radiation around right hip. Sharp stabbing pain, worse with sitting for prolonged periods of time.   Evaluated by surgeon who stated no surgical options at this time. Here to discuss SCS.  The patient states their pain is a 7 on a scale of 1-10.  The patient describes this pain as constant dull and ache.  The pain is located in bilateral low back and does radiate lateral right hip. This painful problem is aggravated by physical activity, standing and walking and is alleviated by relaxation.    Chronic left knee pain. - left TKA 2/2019 by Dr. Chavez. Still using cane, limping. Throwing off walking and causing more back and hip discomfort. Improved with rest.     Past pain medications:   Gabapentin -helped but mental side effects (extreme)     Current pain medications:   nothing     Past therapies:  Physical Therapy: yes (back pain and currently for left knee)  Chiropractor: no  Massage Therapy: no  TENS: yes - for back and knee pain ( currently for knee pain)  Neck or back surgery: yes x2 with Dr. Dumas- microdiscectomy- Aug 2015 and lumbar fusion in Nov 2015  Past pain management: no  [facility]- helped some     Previous Injection: bilateral L5 TFESI x 1 - 7/2/2018  Effect of Injection (%): minimal help     Previous Injections: 3 lumbar epidurals around 2012 or 2013  Effect of Injection (%): helped a lot  Length of Relief: about 1 year     PEG Assessment   What number best describes your pain on average in the past week? 9  What number best describes how, during the past week, pain has interfered with your enjoyment of life? 9  What number best describes how, during the past week, pain has interfered  with your general activity? 9      Current Outpatient Medications:   •  amlodipine-olmesartan (RICK) 10-40 MG per tablet, Take 1 tablet by mouth Daily. For BP with Coreg (stop Exforge), Disp: 30 tablet, Rfl: 5  •  amoxicillin-clavulanate (AUGMENTIN) 875-125 MG per tablet, Take 1 tablet by mouth 2 (Two) Times a Day., Disp: , Rfl:   •  atorvastatin (LIPITOR) 40 MG tablet, Take 1 tablet by mouth Every Night. For cholesterol, Disp: 30 tablet, Rfl: 11  •  carvedilol (COREG) 12.5 MG tablet, Take 1 tablet by mouth 2 (Two) Times a Day With Meals. For BP and stop Bystolic, Disp: 60 tablet, Rfl: 5  •  Cholecalciferol (VITAMIN D-3) 5000 units tablet, Take 1 tablet by mouth Daily., Disp: , Rfl:   •  doxycycline (VIBRAMYICN) 100 MG tablet, Take 100 mg by mouth 2 (Two) Times a Day., Disp: , Rfl:   •  hydrOXYzine (ATARAX) 25 MG tablet, Take 1 tablet by mouth 3 (Three) Times a Day As Needed for Anxiety., Disp: 60 tablet, Rfl: 5  •  levothyroxine (SYNTHROID) 88 MCG tablet, Take 1 tablet by mouth Daily. For thyroid; new dose, Disp: 30 tablet, Rfl: 11  •  lidocaine (LIDODERM) 5 %, Place 1 patch on the skin Daily. Remove & Discard patch within 12 hours or as directed by MD, Disp: 30 patch, Rfl: 3  •  Multiple Vitamin (MULTI VITAMIN DAILY PO), Take 1 capsule by mouth Every Evening., Disp: , Rfl:   •  omeprazole (PRILOSEC) 40 MG capsule, Take 1 capsule by mouth Daily. For GERD, Disp: 30 capsule, Rfl: 11  •  promethazine-dextromethorphan (PROMETHAZINE-DM) 6.25-15 MG/5ML syrup, Take 5 mL by mouth 4 (Four) Times a Day As Needed for Cough., Disp: 180 mL, Rfl: 1  •  sertraline (ZOLOFT) 100 MG tablet, Take 2 tablets by mouth Every Night. For stress, Disp: 60 tablet, Rfl: 5  •  sucralfate (CARAFATE) 1 g tablet, Take 1 tablet by mouth 4 (Four) Times a Day. For stomach, Disp: 120 tablet, Rfl: 11  •  topiramate (TOPAMAX) 25 MG tablet, Take 1 tablet by mouth 2 (Two) Times a Day., Disp: 60 tablet, Rfl: 1  •  traMADol (ULTRAM) 50 MG tablet, Take 1  tablet by mouth 2 (Two) Times a Day As Needed for Moderate Pain  or Severe Pain ., Disp: 40 tablet, Rfl: 0    IMAGING  MRI LUMBAR SPINE WITH AND WITHOUT CONTRAST   FINDINGS: The patient has undergone fusion from L5 to S1 with bilateral  transpedicular screws as well as interbody graft material. There is  moderate loss of disc height at L5-S1. There is mild disc desiccation  with relative preservation of disc height at L4-L5. The conus is at T12  and the caudal aspect of the spinal cord appears unremarkable.   L1-L2: There is no evidence of disc bulge or herniation.   L2-L3: There is no evidence of disc bulge or herniation.   L3-L4: There is no evidence of disc bulge or herniation. Mild facet  degenerative disease is present bilaterally.   L4-L5: Moderate facet degenerative disease is present bilaterally. There  is a mild broad-based disc osteophyte complex resulting in mild canal  stenosis. This is similar to the MRI examination 05/30/2015.   L5-S1: A decompressive laminectomy is noted. There is no evidence of  disc herniation. There is a small annular tear related to the  posterolateral aspect of the disc to the right. After contrast  administration there was enhancement of the epidural soft tissues  anterolateral to the thecal sac bilaterally, more prominent on the  right. These areas abut the traversing S1 nerve roots and are most  consistent with areas of granulation tissue.   IMPRESSION:  1. The patient is status post fusion from L5 to S1. There is granulation  tissue abutting the traversing S1 roots bilaterally, more prominent on  the right. There is no evidence of disc herniation or disc fragment.  1. There is a mild central disc osteophyte complex present at L4-L5  resulting in mild flattening of the ventral surface of the thecal sac  similar in appearance as compared to the MRI examination of 05/30/2015.    Imaging last reviewed: 12/21/18     PFSH:  The following portions of the patient's history were  "reviewed and updated as appropriate: problem list, past medical history, past surgery history, social history, family history, medications, and allergies    Review of Systems   Constitutional: Positive for activity change (NO CHANGE) and fatigue. Negative for chills and fever.   HENT: Negative for congestion.    Eyes: Negative for visual disturbance.   Respiratory: Negative for cough, shortness of breath and wheezing.    Cardiovascular: Negative.    Gastrointestinal: Negative for constipation and diarrhea.   Genitourinary: Negative for difficulty urinating.   Musculoskeletal: Positive for back pain.   Neurological: Positive for weakness. Negative for dizziness, light-headedness, numbness and headaches.   Psychiatric/Behavioral: Positive for sleep disturbance. Negative for agitation, confusion, hallucinations and suicidal ideas. The patient is nervous/anxious.    All other systems reviewed and are negative.      Vitals:    12/20/18 0916   BP: 113/81   Pulse: 63   Resp: 15   Temp: 98.8 °F (37.1 °C)   SpO2: 96%   Weight: 132 kg (291 lb 9.6 oz)   Height: 162.6 cm (64.02\")   PainSc:   7   PainLoc: Back       Physical Exam   Constitutional: She appears well-developed and well-nourished. No distress.   HENT:   Head: Normocephalic and atraumatic.   Nose: Nose normal.   Mouth/Throat: Oropharynx is clear and moist.   Eyes: Conjunctivae and EOM are normal.   Neck: Normal range of motion. Neck supple.   Pulmonary/Chest: Effort normal. No stridor. No respiratory distress.   Musculoskeletal:        Right hip: She exhibits decreased range of motion, decreased strength and tenderness.        Lumbar back: She exhibits decreased range of motion, tenderness and pain.   +bilateral lumbar facet tenderness  +bilateral lumbar facet loading    Neurological: She is alert. She has normal strength. No cranial nerve deficit or sensory deficit. Gait (using cane) abnormal. Coordination normal.   Skin: Skin is warm and dry. No rash noted. She is " not diaphoretic.   Psychiatric: She has a normal mood and affect. Her speech is normal and behavior is normal.   Nursing note and vitals reviewed.    Ortho Exam  Neurologic Exam     Mental Status   Speech: speech is normal     Cranial Nerves     CN III, IV, VI   Extraocular motions are normal.     Motor Exam     Strength   Strength 5/5 throughout.       Lab Results   Component Value Date    POCMETH Negative 03/09/2018    POCAMPHET Negative 03/09/2018    POCBARBITUR Negative 03/09/2018    POCBENZO Negative 03/09/2018    POCCOCAINE Negative 03/09/2018    POCMETHADO Negative 03/09/2018    POCOPIATES Negative 03/09/2018    POCOXYCODO Negative 03/09/2018    POCPHENCYC Negative 03/09/2018    POCPROPOXY Negative 03/09/2018    POCTHC Negative 03/09/2018    POCTRICYC Negative 03/09/2018     Last UDS results reviewed: 12/21/18   Last UDS: 3/9/2018  Comments: no medication - Consistent     Date of last JOON reviewed : 12/21/18   Comments: [place]         Marisol was seen today for back pain.    Diagnoses and all orders for this visit:    Lumbosacral radiculopathy  -     Ambulatory Referral to Psychology    Lumbar disc herniation with radiculopathy  -     Ambulatory Referral to Psychology    Chronic bilateral low back pain with bilateral sciatica  -     Ambulatory Referral to Psychology    Other orders  -     traMADol (ULTRAM) 50 MG tablet; Take 1 tablet by mouth 2 (Two) Times a Day As Needed for Moderate Pain  or Severe Pain .      Requested Prescriptions     Signed Prescriptions Disp Refills   • traMADol (ULTRAM) 50 MG tablet 40 tablet 0     Sig: Take 1 tablet by mouth 2 (Two) Times a Day As Needed for Moderate Pain  or Severe Pain .     - two problems. Left knee - needs replacement - scheduled 2/2019- this is also complicating her low back pain as her compensating with abnormal gait is adding to her low back pain.   - once TKA is performed and she heals, hopefully her gait will back to normal. With no limping, her low  back pain should improve as the pain with the abnomral gait should resolve. She will be left with her chronic low back pain - her baseline pain.   - I recommend waiting until after her TKA/rehab/recovery period before preceding with the SCS trial as we can make sure we are covering her chronic low back pain and not her current flare up from possible knee problems.     - Has failed both LMBB and LESI at this point.   - will give low dose tramadol to try to bridge time until we can perform trial. Last UDS performed was reviewed and consistent.  If she needs more medication she will need updated UDS.    - discussion about SCS system, trial, information was given to patient today. If successful, plan for surgeon to place implant.   - Continue home exercise program.   - Continue with physical therapy and weight loss.  - This was a 25 minute face to face visit with 15 minutes spent counseling on medication, SCS, usage and treatment plan.     Wt Readings from Last 3 Encounters:   12/20/18 132 kg (291 lb 9.6 oz)   11/30/18 130 kg (286 lb)   11/20/18 132 kg (291 lb)     Body mass index is 50.03 kg/m². Patient counseled on the importance of weight loss to help with overall health and pain control. Patient instructed to attempt weight loss.   Plan: Calorie counting  reduce portion size and cut out extra servings    Follow-up as needed for pain     Manda Askew MD  Pain Management     JOON REPORT  As part of the patient's treatment plan, I am prescribing controlled substances. The patient has been made aware of appropriate use of such medications, including potential risk of somnolence, limited ability to drive and/or work safely, and the potential for dependence or overdose. It has also bee made clear that these medications are for use by this patient only, without concomitant use of alcohol or other substances unless prescribed.   Patient has completed prescribing agreement detailing terms of continued prescribing  of controlled substances, including monitoring JOON reports, urine drug screening, and pill counts if necessary. The patient is aware that inappropriate use will results in cessation of prescribing such medications.  JOON report has been reviewed and scanned into the patient's chart.  History and physical exam exhibit continued safe and appropriate use of controlled substances.

## 2018-12-20 NOTE — PATIENT INSTRUCTIONS
Education about SCS therapy:   - This was an extended office visit in which we entered into discussion about advanced pain relieving techniques, and discussed implantable pain therapies. We discussed advanced neuromodulation in the form of Spinal Cord Stimulation. This is a reasonable therapy for patients who have exhausted basic nonnarcotic options, basic modalities and physical therapies, and do not have any other reasonable surgical options. This therapy as an alternative to long term high dose opioid therapy.   - Risks include but are not limited to bleeding, infection, injury, paralysis, nerve injury, dural puncture, and risk for postprocedural pain. Implanted equipment risks include but are not limited to lead migration, lead fracture, risk of loss of pain relieving stimulation, risk of electrical shock, and risk of system failure.   - We discussed the theory and basic science behind SCS therapy including but not limited to energy delivery and relevant anatomy, in terms that are easy to understand and also with use of illustrative devices. Spinal Cord Stimulation therapies apply an electromagnetic field to a specific area on the spinal cord (Dorsal Column) to attempt to block transmission of painful signals from the peripheral nerves to the brain.   - We discussed that prior to trialing, I request that patients review relevant materials and perform some research, and also have a follow up education session with a device specialist from the . Also, insurance requires a presurgical psychological evaluation. When these have been completed, and all the patient's questions have been answered to their satisfaction, then we will plan to request authorization for trialing.   - We discussed the trialing process (aka Phase 1) that usually lasts a week, and the temporary nature of this trial. Trial success will determine whether or not we proceed to implant. We discussed reasonable expectations, and that I  feel that consistent 50% pain relief if medically successful and is a reasonable expectation to justify moving forward to permanent implant.   - Additional risks of Phase 1 include but are not limited to bleeding on insertion, bleeding on lead removal, and procedural site soreness.  - We discussed the percutaneous surgical implant, including postsurgical restrictions, risks, and alternatives. For spinal cord stimulation implanted device (aka SCS Phase 2) there is usually a midline vertical incision for the spinally implanted leads, and also a horizontal incision in the posterior lumbar flank for implantation of the battery & computer (aka IPG). The leads are tunneled from the midline incision to the medial aspect of the battery pocket incision.   - Postoperative restrictions include limiting the following activity as much as possible for 90 days: Lifting >10 lbs, bending at the waist, stretching/reaching overhead, and twisting

## 2019-01-02 RX ORDER — AMLODIPINE AND OLMESARTAN MEDOXOMIL 10; 40 MG/1; MG/1
1 TABLET ORAL DAILY
Qty: 30 TABLET | Refills: 5 | Status: SHIPPED | OUTPATIENT
Start: 2019-01-02 | End: 2019-02-04 | Stop reason: SDUPTHER

## 2019-01-17 ENCOUNTER — OFFICE VISIT (OUTPATIENT)
Dept: FAMILY MEDICINE CLINIC | Facility: CLINIC | Age: 49
End: 2019-01-17

## 2019-01-17 VITALS
OXYGEN SATURATION: 98 % | SYSTOLIC BLOOD PRESSURE: 114 MMHG | HEART RATE: 67 BPM | RESPIRATION RATE: 16 BRPM | DIASTOLIC BLOOD PRESSURE: 80 MMHG | BODY MASS INDEX: 49.68 KG/M2 | HEIGHT: 64 IN | WEIGHT: 291 LBS | TEMPERATURE: 97.6 F

## 2019-01-17 DIAGNOSIS — K21.00 GASTROESOPHAGEAL REFLUX DISEASE WITH ESOPHAGITIS: Primary | ICD-10-CM

## 2019-01-17 DIAGNOSIS — R05.8 NOCTURNAL COUGH: ICD-10-CM

## 2019-01-17 PROCEDURE — 99213 OFFICE O/P EST LOW 20 MIN: CPT | Performed by: PHYSICIAN ASSISTANT

## 2019-01-17 PROCEDURE — 71046 X-RAY EXAM CHEST 2 VIEWS: CPT | Performed by: PHYSICIAN ASSISTANT

## 2019-01-17 RX ORDER — OMEPRAZOLE 40 MG/1
CAPSULE, DELAYED RELEASE ORAL
Qty: 60 CAPSULE | Refills: 11 | Status: SHIPPED | OUTPATIENT
Start: 2019-01-17 | End: 2019-03-21

## 2019-01-17 NOTE — PROGRESS NOTES
Subjective   Marisol Carrillo is a 49 y.o. female.     History of Present Illness   Marisol Carrillo 49 y.o. female who presents for evaluation of cough, wheezing and more laying down and every night.  She has lapband and hx GERD and not taking PPI every day.  I need to restart and do BID for a month and then consider once daily.  She had pneumonia last month and was very ill.  She was on Cefdinir, then UC changed her to Augmentin and Doxy and CXR was read negative, but treated clinically.  NO current or recent fever;  No new exertional SOA, cough, wheezes.. Symptoms include occas cough at night.  Onset of symptoms was several years ago, unchanged since that time. Patient denies upper respiratory congestion, fever.   Evaluation to date: see above Treatment to date:  see above.   Will get this some during the day    On ARB and not ACE  Knee surgery left March 1    X-Ray  Interpretation report in house X-rays that I personally viewed    Relevant Clinical Issues/Diagnoses/Indications:  cough        Clinical Findings:  Scarring CHAPARRO; no organomegaly; no infiltrate           Comparative Data:  Not here          Date of Previous X-ray:    Change on current X-ray:      The following portions of the patient's history were reviewed and updated as appropriate: allergies, current medications, past family history, past medical history, past social history, past surgical history and problem list.    Review of Systems   Constitutional: Positive for fatigue. Negative for activity change, appetite change and unexpected weight change.   HENT: Negative for nosebleeds and trouble swallowing.    Eyes: Negative for pain and visual disturbance.   Respiratory: Positive for cough and wheezing. Negative for chest tightness and shortness of breath.    Cardiovascular: Negative for chest pain and palpitations.   Gastrointestinal: Negative for abdominal pain and blood in stool.   Endocrine: Negative.    Genitourinary: Negative for difficulty  urinating and hematuria.   Musculoskeletal: Positive for arthralgias, back pain, gait problem, joint swelling and myalgias.   Skin: Negative for color change and rash.   Allergic/Immunologic: Negative.    Neurological: Negative for syncope and speech difficulty.   Hematological: Negative for adenopathy.   Psychiatric/Behavioral: Positive for decreased concentration and dysphoric mood. Negative for agitation, confusion and sleep disturbance. The patient is nervous/anxious.    All other systems reviewed and are negative.      Objective   Physical Exam   Constitutional: She is oriented to person, place, and time. She appears well-developed and well-nourished.  Non-toxic appearance. No distress.   HENT:   Head: Normocephalic and atraumatic. Hair is normal.   Right Ear: External ear normal. No drainage, swelling or tenderness. Tympanic membrane is retracted.   Left Ear: External ear normal. No drainage, swelling or tenderness. Tympanic membrane is retracted.   Nose: Mucosal edema present. No epistaxis.   Mouth/Throat: Uvula is midline and mucous membranes are normal. No oral lesions. No uvula swelling. Posterior oropharyngeal erythema present. No oropharyngeal exudate.   Eyes: Conjunctivae and EOM are normal. Pupils are equal, round, and reactive to light. Right eye exhibits no discharge. Left eye exhibits no discharge. No scleral icterus.   Neck: Normal range of motion. Neck supple. No tracheal deviation present. No thyromegaly present.   Cardiovascular: Normal rate, regular rhythm, normal heart sounds, intact distal pulses and normal pulses. Exam reveals no gallop.   No murmur heard.  Pulmonary/Chest: Effort normal and breath sounds normal. No stridor. No respiratory distress. She has no wheezes. She has no rales. She exhibits no tenderness.   Abdominal: Soft. There is no tenderness.   Musculoskeletal: Normal range of motion.   Lymphadenopathy:     She has no cervical adenopathy.   Neurological: She is alert and  oriented to person, place, and time. She exhibits normal muscle tone. Coordination normal.   Skin: Skin is warm and dry. No rash noted. She is not diaphoretic. No erythema. No pallor.   Psychiatric: She has a normal mood and affect. Her behavior is normal. Judgment and thought content normal.   Nursing note and vitals reviewed.      Assessment/Plan   Marisol was seen today for uri.    Diagnoses and all orders for this visit:    Gastroesophageal reflux disease with esophagitis  -     XR Chest PA & Lateral  -     Ambulatory Referral to Gastroenterology    Nocturnal cough  -     XR Chest PA & Lateral  -     Ambulatory Referral to Gastroenterology    Other orders  -     omeprazole (PRILOSEC) 40 MG capsule; One PO BID for 1 month then once daily

## 2019-02-04 ENCOUNTER — PATIENT MESSAGE (OUTPATIENT)
Dept: FAMILY MEDICINE CLINIC | Facility: CLINIC | Age: 49
End: 2019-02-04

## 2019-02-04 RX ORDER — CARVEDILOL 12.5 MG/1
12.5 TABLET ORAL 2 TIMES DAILY WITH MEALS
Qty: 60 TABLET | Refills: 0 | Status: SHIPPED | OUTPATIENT
Start: 2019-02-04 | End: 2019-04-16 | Stop reason: SDUPTHER

## 2019-02-04 RX ORDER — ATORVASTATIN CALCIUM 40 MG/1
40 TABLET, FILM COATED ORAL NIGHTLY
Qty: 30 TABLET | Refills: 0 | Status: SHIPPED | OUTPATIENT
Start: 2019-02-04 | End: 2019-04-16 | Stop reason: SDUPTHER

## 2019-02-04 RX ORDER — LEVOTHYROXINE SODIUM 88 UG/1
88 TABLET ORAL DAILY
Qty: 30 TABLET | Refills: 0 | Status: SHIPPED | OUTPATIENT
Start: 2019-02-04 | End: 2019-04-16 | Stop reason: SDUPTHER

## 2019-02-04 RX ORDER — AMLODIPINE AND OLMESARTAN MEDOXOMIL 10; 40 MG/1; MG/1
1 TABLET ORAL DAILY
Qty: 30 TABLET | Refills: 0 | Status: SHIPPED | OUTPATIENT
Start: 2019-02-04 | End: 2019-03-04 | Stop reason: SDUPTHER

## 2019-02-20 DIAGNOSIS — R82.90 ABNORMAL URINALYSIS: Primary | ICD-10-CM

## 2019-02-23 LAB
APPEARANCE UR: (no result)
BACTERIA #/AREA URNS HPF: ABNORMAL /[HPF]
BACTERIA UR CULT: NORMAL
BACTERIA UR CULT: NORMAL
BILIRUB UR QL STRIP: NEGATIVE
CASTS URNS MICRO: ABNORMAL
CASTS URNS QL MICRO: PRESENT /LPF
COLOR UR: YELLOW
CRYSTALS URNS MICRO: ABNORMAL
EPI CELLS #/AREA URNS HPF: ABNORMAL /HPF
GLUCOSE UR QL: NEGATIVE
HGB UR QL STRIP: NEGATIVE
KETONES UR QL STRIP: NEGATIVE
LEUKOCYTE ESTERASE UR QL STRIP: NEGATIVE
MICRO URNS: (no result)
MICRO URNS: (no result)
MUCOUS THREADS URNS QL MICRO: PRESENT
NITRITE UR QL STRIP: NEGATIVE
PH UR STRIP: 5.5 [PH] (ref 5–7.5)
PROT UR QL STRIP: NEGATIVE
RBC #/AREA URNS HPF: ABNORMAL /HPF
SP GR UR: 1.03 (ref 1–1.03)
UNIDENT CRYS URNS QL MICRO: PRESENT
UROBILINOGEN UR STRIP-MCNC: 0.2 MG/DL (ref 0.2–1)
WBC #/AREA URNS HPF: ABNORMAL /HPF

## 2019-03-04 RX ORDER — AMLODIPINE AND OLMESARTAN MEDOXOMIL 10; 40 MG/1; MG/1
1 TABLET ORAL DAILY
Qty: 30 TABLET | Refills: 0 | Status: SHIPPED | OUTPATIENT
Start: 2019-03-04 | End: 2019-03-21

## 2019-03-14 DIAGNOSIS — K21.00 GASTROESOPHAGEAL REFLUX DISEASE WITH ESOPHAGITIS: Primary | ICD-10-CM

## 2019-03-14 DIAGNOSIS — R10.84 GENERALIZED ABDOMINAL PAIN: ICD-10-CM

## 2019-03-18 ENCOUNTER — OFFICE (OUTPATIENT)
Dept: URBAN - METROPOLITAN AREA CLINIC 66 | Facility: CLINIC | Age: 49
End: 2019-03-18

## 2019-03-18 VITALS
HEIGHT: 64 IN | SYSTOLIC BLOOD PRESSURE: 114 MMHG | HEART RATE: 107 BPM | DIASTOLIC BLOOD PRESSURE: 85 MMHG | WEIGHT: 285 LBS

## 2019-03-18 DIAGNOSIS — R10.9 UNSPECIFIED ABDOMINAL PAIN: ICD-10-CM

## 2019-03-18 DIAGNOSIS — Z98.84 BARIATRIC SURGERY STATUS: ICD-10-CM

## 2019-03-18 DIAGNOSIS — E66.9 OBESITY, UNSPECIFIED: ICD-10-CM

## 2019-03-18 DIAGNOSIS — R11.2 NAUSEA WITH VOMITING, UNSPECIFIED: ICD-10-CM

## 2019-03-18 PROCEDURE — 99244 OFF/OP CNSLTJ NEW/EST MOD 40: CPT

## 2019-03-19 ENCOUNTER — ON CAMPUS - OUTPATIENT (OUTPATIENT)
Dept: URBAN - METROPOLITAN AREA HOSPITAL 134 | Facility: HOSPITAL | Age: 49
End: 2019-03-19
Payer: COMMERCIAL

## 2019-03-19 DIAGNOSIS — R11.2 NAUSEA WITH VOMITING, UNSPECIFIED: ICD-10-CM

## 2019-03-19 DIAGNOSIS — R10.84 GENERALIZED ABDOMINAL PAIN: ICD-10-CM

## 2019-03-19 DIAGNOSIS — K22.70 BARRETT'S ESOPHAGUS WITHOUT DYSPLASIA: ICD-10-CM

## 2019-03-19 PROCEDURE — 43239 EGD BIOPSY SINGLE/MULTIPLE: CPT

## 2019-03-19 PROCEDURE — 20552 NJX 1/MLT TRIGGER POINT 1/2: CPT

## 2019-03-20 ENCOUNTER — DOCUMENTATION (OUTPATIENT)
Dept: BARIATRICS/WEIGHT MGMT | Facility: CLINIC | Age: 49
End: 2019-03-20

## 2019-03-21 ENCOUNTER — HOSPITAL ENCOUNTER (INPATIENT)
Facility: HOSPITAL | Age: 49
LOS: 5 days | Discharge: HOME | End: 2019-03-26
Attending: EMERGENCY MEDICINE | Admitting: SURGERY

## 2019-03-21 ENCOUNTER — APPOINTMENT (OUTPATIENT)
Dept: CT IMAGING | Facility: HOSPITAL | Age: 49
End: 2019-03-21

## 2019-03-21 ENCOUNTER — APPOINTMENT (OUTPATIENT)
Dept: GENERAL RADIOLOGY | Facility: HOSPITAL | Age: 49
End: 2019-03-21

## 2019-03-21 ENCOUNTER — PREP FOR SURGERY (OUTPATIENT)
Dept: OTHER | Facility: HOSPITAL | Age: 49
End: 2019-03-21

## 2019-03-21 DIAGNOSIS — R10.13 EPIGASTRIC PAIN: ICD-10-CM

## 2019-03-21 DIAGNOSIS — R13.19 OTHER DYSPHAGIA: Primary | ICD-10-CM

## 2019-03-21 DIAGNOSIS — R12 HEARTBURN: ICD-10-CM

## 2019-03-21 DIAGNOSIS — R11.2 NON-INTRACTABLE VOMITING WITH NAUSEA, UNSPECIFIED VOMITING TYPE: ICD-10-CM

## 2019-03-21 DIAGNOSIS — R11.2 NAUSEA AND VOMITING, INTRACTABILITY OF VOMITING NOT SPECIFIED, UNSPECIFIED VOMITING TYPE: ICD-10-CM

## 2019-03-21 DIAGNOSIS — R13.19 OTHER DYSPHAGIA: ICD-10-CM

## 2019-03-21 DIAGNOSIS — I26.99 OTHER PULMONARY EMBOLISM WITHOUT ACUTE COR PULMONALE, UNSPECIFIED CHRONICITY (HCC): Primary | ICD-10-CM

## 2019-03-21 PROBLEM — Z96.652: Status: ACTIVE | Noted: 2019-03-21

## 2019-03-21 LAB
ALBUMIN SERPL-MCNC: 4.4 G/DL (ref 3.5–5.2)
ALBUMIN/GLOB SERPL: 1.1 G/DL
ALP SERPL-CCNC: 110 U/L (ref 39–117)
ALT SERPL W P-5'-P-CCNC: 19 U/L (ref 1–33)
ANION GAP SERPL CALCULATED.3IONS-SCNC: 17.8 MMOL/L
AST SERPL-CCNC: 22 U/L (ref 1–32)
BASOPHILS # BLD AUTO: 0.06 10*3/MM3 (ref 0–0.2)
BASOPHILS NFR BLD AUTO: 0.7 % (ref 0–1.5)
BILIRUB SERPL-MCNC: 0.4 MG/DL (ref 0.2–1.2)
BILIRUB UR QL STRIP: NEGATIVE
BUN BLD-MCNC: 11 MG/DL (ref 6–20)
BUN/CREAT SERPL: 15.5 (ref 7–25)
CALCIUM SPEC-SCNC: 10.1 MG/DL (ref 8.6–10.5)
CHLORIDE SERPL-SCNC: 103 MMOL/L (ref 98–107)
CLARITY UR: CLEAR
CO2 SERPL-SCNC: 21.2 MMOL/L (ref 22–29)
COLOR UR: YELLOW
CREAT BLD-MCNC: 0.71 MG/DL (ref 0.57–1)
DEPRECATED RDW RBC AUTO: 50 FL (ref 37–54)
EOSINOPHIL # BLD AUTO: 0.01 10*3/MM3 (ref 0–0.4)
EOSINOPHIL NFR BLD AUTO: 0.1 % (ref 0.3–6.2)
ERYTHROCYTE [DISTWIDTH] IN BLOOD BY AUTOMATED COUNT: 15.1 % (ref 12.3–15.4)
GFR SERPL CREATININE-BSD FRML MDRD: 88 ML/MIN/1.73
GLOBULIN UR ELPH-MCNC: 3.9 GM/DL
GLUCOSE BLD-MCNC: 98 MG/DL (ref 65–99)
GLUCOSE UR STRIP-MCNC: NEGATIVE MG/DL
HCG SERPL QL: NEGATIVE
HCT VFR BLD AUTO: 43.3 % (ref 34–46.6)
HGB BLD-MCNC: 13.3 G/DL (ref 12–15.9)
HGB UR QL STRIP.AUTO: NEGATIVE
IMM GRANULOCYTES # BLD AUTO: 0.02 10*3/MM3 (ref 0–0.05)
IMM GRANULOCYTES NFR BLD AUTO: 0.2 % (ref 0–0.5)
KETONES UR QL STRIP: ABNORMAL
LEUKOCYTE ESTERASE UR QL STRIP.AUTO: NEGATIVE
LIPASE SERPL-CCNC: 12 U/L (ref 13–60)
LYMPHOCYTES # BLD AUTO: 1.37 10*3/MM3 (ref 0.7–3.1)
LYMPHOCYTES NFR BLD AUTO: 16.3 % (ref 19.6–45.3)
MCH RBC QN AUTO: 27.8 PG (ref 26.6–33)
MCHC RBC AUTO-ENTMCNC: 30.7 G/DL (ref 31.5–35.7)
MCV RBC AUTO: 90.6 FL (ref 79–97)
MONOCYTES # BLD AUTO: 0.67 10*3/MM3 (ref 0.1–0.9)
MONOCYTES NFR BLD AUTO: 8 % (ref 5–12)
NEUTROPHILS # BLD AUTO: 6.26 10*3/MM3 (ref 1.4–7)
NEUTROPHILS NFR BLD AUTO: 74.7 % (ref 42.7–76)
NITRITE UR QL STRIP: NEGATIVE
NRBC BLD AUTO-RTO: 0 /100 WBC (ref 0–0)
PH UR STRIP.AUTO: 6.5 [PH] (ref 5–8)
PLATELET # BLD AUTO: 494 10*3/MM3 (ref 140–450)
PMV BLD AUTO: 9.4 FL (ref 6–12)
POTASSIUM BLD-SCNC: 4.1 MMOL/L (ref 3.5–5.2)
PROT SERPL-MCNC: 8.3 G/DL (ref 6–8.5)
PROT UR QL STRIP: NEGATIVE
RBC # BLD AUTO: 4.78 10*6/MM3 (ref 3.77–5.28)
SODIUM BLD-SCNC: 142 MMOL/L (ref 136–145)
SP GR UR STRIP: 1.02 (ref 1–1.03)
TROPONIN T SERPL-MCNC: <0.01 NG/ML (ref 0–0.03)
UROBILINOGEN UR QL STRIP: ABNORMAL
WBC NRBC COR # BLD: 8.39 10*3/MM3 (ref 3.4–10.8)

## 2019-03-21 PROCEDURE — 93010 ELECTROCARDIOGRAM REPORT: CPT | Performed by: INTERNAL MEDICINE

## 2019-03-21 PROCEDURE — 0 DIATRIZOATE MEGLUMINE & SODIUM PER 1 ML: Performed by: PHYSICIAN ASSISTANT

## 2019-03-21 PROCEDURE — G0378 HOSPITAL OBSERVATION PER HR: HCPCS

## 2019-03-21 PROCEDURE — 74177 CT ABD & PELVIS W/CONTRAST: CPT

## 2019-03-21 PROCEDURE — 25010000002 MORPHINE PER 10 MG: Performed by: EMERGENCY MEDICINE

## 2019-03-21 PROCEDURE — 84703 CHORIONIC GONADOTROPIN ASSAY: CPT | Performed by: PHYSICIAN ASSISTANT

## 2019-03-21 PROCEDURE — 99284 EMERGENCY DEPT VISIT MOD MDM: CPT

## 2019-03-21 PROCEDURE — 25010000002 HYDROMORPHONE 1 MG/ML SOLUTION: Performed by: EMERGENCY MEDICINE

## 2019-03-21 PROCEDURE — 83690 ASSAY OF LIPASE: CPT | Performed by: PHYSICIAN ASSISTANT

## 2019-03-21 PROCEDURE — 71275 CT ANGIOGRAPHY CHEST: CPT

## 2019-03-21 PROCEDURE — 25010000002 ENOXAPARIN PER 10 MG: Performed by: PHYSICIAN ASSISTANT

## 2019-03-21 PROCEDURE — 80053 COMPREHEN METABOLIC PANEL: CPT | Performed by: PHYSICIAN ASSISTANT

## 2019-03-21 PROCEDURE — 81003 URINALYSIS AUTO W/O SCOPE: CPT | Performed by: PHYSICIAN ASSISTANT

## 2019-03-21 PROCEDURE — 84484 ASSAY OF TROPONIN QUANT: CPT | Performed by: PHYSICIAN ASSISTANT

## 2019-03-21 PROCEDURE — 85025 COMPLETE CBC W/AUTO DIFF WBC: CPT | Performed by: PHYSICIAN ASSISTANT

## 2019-03-21 PROCEDURE — 93005 ELECTROCARDIOGRAM TRACING: CPT | Performed by: PHYSICIAN ASSISTANT

## 2019-03-21 PROCEDURE — 25010000002 HYDROMORPHONE PER 4 MG: Performed by: EMERGENCY MEDICINE

## 2019-03-21 PROCEDURE — 25010000002 ONDANSETRON PER 1 MG: Performed by: PHYSICIAN ASSISTANT

## 2019-03-21 PROCEDURE — 71046 X-RAY EXAM CHEST 2 VIEWS: CPT

## 2019-03-21 PROCEDURE — 0 IOPAMIDOL PER 1 ML: Performed by: EMERGENCY MEDICINE

## 2019-03-21 RX ORDER — HYDROCODONE BITARTRATE AND ACETAMINOPHEN 7.5; 325 MG/1; MG/1
1 TABLET ORAL 4 TIMES DAILY PRN
Status: DISCONTINUED | OUTPATIENT
Start: 2019-03-21 | End: 2019-03-26 | Stop reason: HOSPADM

## 2019-03-21 RX ORDER — ONDANSETRON 2 MG/ML
4 INJECTION INTRAMUSCULAR; INTRAVENOUS EVERY 6 HOURS PRN
Status: DISCONTINUED | OUTPATIENT
Start: 2019-03-21 | End: 2019-03-26 | Stop reason: HOSPADM

## 2019-03-21 RX ORDER — LEVOTHYROXINE SODIUM 88 UG/1
88 TABLET ORAL
Status: DISCONTINUED | OUTPATIENT
Start: 2019-03-22 | End: 2019-03-26 | Stop reason: HOSPADM

## 2019-03-21 RX ORDER — MORPHINE SULFATE 2 MG/ML
4 INJECTION, SOLUTION INTRAMUSCULAR; INTRAVENOUS ONCE
Status: COMPLETED | OUTPATIENT
Start: 2019-03-21 | End: 2019-03-21

## 2019-03-21 RX ORDER — PANTOPRAZOLE SODIUM 40 MG/1
40 TABLET, DELAYED RELEASE ORAL EVERY MORNING
Status: DISCONTINUED | OUTPATIENT
Start: 2019-03-22 | End: 2019-03-26 | Stop reason: HOSPADM

## 2019-03-21 RX ORDER — SODIUM CHLORIDE 0.9 % (FLUSH) 0.9 %
1-10 SYRINGE (ML) INJECTION AS NEEDED
Status: CANCELLED | OUTPATIENT
Start: 2019-03-25

## 2019-03-21 RX ORDER — CARVEDILOL 12.5 MG/1
12.5 TABLET ORAL 2 TIMES DAILY WITH MEALS
Status: DISCONTINUED | OUTPATIENT
Start: 2019-03-21 | End: 2019-03-26 | Stop reason: HOSPADM

## 2019-03-21 RX ORDER — METOCLOPRAMIDE HYDROCHLORIDE 5 MG/ML
10 INJECTION INTRAMUSCULAR; INTRAVENOUS ONCE
Status: CANCELLED | OUTPATIENT
Start: 2019-03-25 | End: 2019-03-21

## 2019-03-21 RX ORDER — CHLORHEXIDINE GLUCONATE 0.12 MG/ML
15 RINSE ORAL
Status: CANCELLED | OUTPATIENT
Start: 2019-03-25 | End: 2019-03-25

## 2019-03-21 RX ORDER — CEFAZOLIN SODIUM IN 0.9 % NACL 3 G/100 ML
3 INTRAVENOUS SOLUTION, PIGGYBACK (ML) INTRAVENOUS
Status: CANCELLED | OUTPATIENT
Start: 2019-03-25

## 2019-03-21 RX ORDER — FAMOTIDINE 10 MG/ML
20 INJECTION, SOLUTION INTRAVENOUS ONCE
Status: CANCELLED | OUTPATIENT
Start: 2019-03-25 | End: 2019-03-21

## 2019-03-21 RX ORDER — SODIUM CHLORIDE 0.9 % (FLUSH) 0.9 %
3 SYRINGE (ML) INJECTION EVERY 12 HOURS SCHEDULED
Status: CANCELLED | OUTPATIENT
Start: 2019-03-25

## 2019-03-21 RX ORDER — NITROGLYCERIN 0.4 MG/1
0.4 TABLET SUBLINGUAL
Status: DISCONTINUED | OUTPATIENT
Start: 2019-03-21 | End: 2019-03-26 | Stop reason: HOSPADM

## 2019-03-21 RX ORDER — ONDANSETRON 2 MG/ML
4 INJECTION INTRAMUSCULAR; INTRAVENOUS ONCE
Status: COMPLETED | OUTPATIENT
Start: 2019-03-21 | End: 2019-03-21

## 2019-03-21 RX ORDER — SODIUM CHLORIDE 9 MG/ML
100 INJECTION, SOLUTION INTRAVENOUS CONTINUOUS
Status: DISCONTINUED | OUTPATIENT
Start: 2019-03-21 | End: 2019-03-23

## 2019-03-21 RX ORDER — HYDROMORPHONE HYDROCHLORIDE 1 MG/ML
0.5 INJECTION, SOLUTION INTRAMUSCULAR; INTRAVENOUS; SUBCUTANEOUS EVERY 4 HOURS PRN
Status: DISCONTINUED | OUTPATIENT
Start: 2019-03-21 | End: 2019-03-26 | Stop reason: HOSPADM

## 2019-03-21 RX ORDER — ATORVASTATIN CALCIUM 20 MG/1
40 TABLET, FILM COATED ORAL NIGHTLY
Status: DISCONTINUED | OUTPATIENT
Start: 2019-03-21 | End: 2019-03-26 | Stop reason: HOSPADM

## 2019-03-21 RX ORDER — HYDROXYZINE HYDROCHLORIDE 25 MG/1
25 TABLET, FILM COATED ORAL 3 TIMES DAILY PRN
Status: DISCONTINUED | OUTPATIENT
Start: 2019-03-21 | End: 2019-03-26 | Stop reason: HOSPADM

## 2019-03-21 RX ORDER — AMLODIPINE AND OLMESARTAN MEDOXOMIL 10; 40 MG/1; MG/1
1 TABLET ORAL DAILY
COMMUNITY
End: 2019-03-31

## 2019-03-21 RX ORDER — OMEPRAZOLE 40 MG/1
40 CAPSULE, DELAYED RELEASE ORAL 2 TIMES DAILY
COMMUNITY
End: 2019-04-02 | Stop reason: SDUPTHER

## 2019-03-21 RX ORDER — SODIUM CHLORIDE 0.9 % (FLUSH) 0.9 %
3 SYRINGE (ML) INJECTION EVERY 12 HOURS SCHEDULED
Status: DISCONTINUED | OUTPATIENT
Start: 2019-03-21 | End: 2019-03-26 | Stop reason: HOSPADM

## 2019-03-21 RX ORDER — HYDROCODONE BITARTRATE AND ACETAMINOPHEN 7.5; 325 MG/1; MG/1
1 TABLET ORAL 4 TIMES DAILY PRN
COMMUNITY
End: 2019-03-26 | Stop reason: HOSPADM

## 2019-03-21 RX ORDER — SCOLOPAMINE TRANSDERMAL SYSTEM 1 MG/1
1 PATCH, EXTENDED RELEASE TRANSDERMAL ONCE
Status: CANCELLED | OUTPATIENT
Start: 2019-03-25 | End: 2019-03-21

## 2019-03-21 RX ORDER — SUCRALFATE 1 G/1
1 TABLET ORAL
Status: DISCONTINUED | OUTPATIENT
Start: 2019-03-21 | End: 2019-03-22

## 2019-03-21 RX ORDER — SODIUM CHLORIDE 0.9 % (FLUSH) 0.9 %
3-10 SYRINGE (ML) INJECTION AS NEEDED
Status: DISCONTINUED | OUTPATIENT
Start: 2019-03-21 | End: 2019-03-26 | Stop reason: HOSPADM

## 2019-03-21 RX ORDER — HYDROMORPHONE HYDROCHLORIDE 1 MG/ML
0.5 INJECTION, SOLUTION INTRAMUSCULAR; INTRAVENOUS; SUBCUTANEOUS ONCE
Status: COMPLETED | OUTPATIENT
Start: 2019-03-21 | End: 2019-03-21

## 2019-03-21 RX ORDER — TRAMADOL HYDROCHLORIDE 50 MG/1
50 TABLET ORAL 2 TIMES DAILY PRN
Status: DISCONTINUED | OUTPATIENT
Start: 2019-03-21 | End: 2019-03-26 | Stop reason: HOSPADM

## 2019-03-21 RX ORDER — SODIUM CHLORIDE, SODIUM LACTATE, POTASSIUM CHLORIDE, CALCIUM CHLORIDE 600; 310; 30; 20 MG/100ML; MG/100ML; MG/100ML; MG/100ML
100 INJECTION, SOLUTION INTRAVENOUS CONTINUOUS
Status: CANCELLED | OUTPATIENT
Start: 2019-03-25

## 2019-03-21 RX ORDER — ZOLPIDEM TARTRATE 5 MG/1
5 TABLET ORAL NIGHTLY PRN
Status: DISCONTINUED | OUTPATIENT
Start: 2019-03-21 | End: 2019-03-23

## 2019-03-21 RX ADMIN — HYDROMORPHONE HYDROCHLORIDE 0.5 MG: 1 INJECTION, SOLUTION INTRAMUSCULAR; INTRAVENOUS; SUBCUTANEOUS at 12:07

## 2019-03-21 RX ADMIN — SODIUM CHLORIDE, POTASSIUM CHLORIDE, SODIUM LACTATE AND CALCIUM CHLORIDE 1000 ML: 600; 310; 30; 20 INJECTION, SOLUTION INTRAVENOUS at 09:35

## 2019-03-21 RX ADMIN — HYDROMORPHONE HYDROCHLORIDE 0.5 MG: 1 INJECTION, SOLUTION INTRAMUSCULAR; INTRAVENOUS; SUBCUTANEOUS at 13:05

## 2019-03-21 RX ADMIN — MORPHINE SULFATE 4 MG: 2 INJECTION, SOLUTION INTRAMUSCULAR; INTRAVENOUS at 09:43

## 2019-03-21 RX ADMIN — HYDROMORPHONE HYDROCHLORIDE 1 MG: 1 INJECTION, SOLUTION INTRAMUSCULAR; INTRAVENOUS; SUBCUTANEOUS at 15:07

## 2019-03-21 RX ADMIN — DIATRIZOATE MEGLUMINE AND DIATRIZOATE SODIUM 15 ML: 600; 100 SOLUTION ORAL; RECTAL at 14:20

## 2019-03-21 RX ADMIN — ZOLPIDEM TARTRATE 5 MG: 5 TABLET ORAL at 21:59

## 2019-03-21 RX ADMIN — ENOXAPARIN SODIUM 130 MG: 80 INJECTION SUBCUTANEOUS at 15:58

## 2019-03-21 RX ADMIN — SODIUM CHLORIDE 100 ML/HR: 9 INJECTION, SOLUTION INTRAVENOUS at 20:48

## 2019-03-21 RX ADMIN — IOPAMIDOL 95 ML: 755 INJECTION, SOLUTION INTRAVENOUS at 14:55

## 2019-03-21 RX ADMIN — SUCRALFATE 1 G: 1 TABLET ORAL at 23:02

## 2019-03-21 RX ADMIN — ONDANSETRON HYDROCHLORIDE 4 MG: 2 SOLUTION INTRAMUSCULAR; INTRAVENOUS at 09:38

## 2019-03-21 NOTE — PROGRESS NOTES
Patient's  (Nik) called reporting that although the patient has not had fluid in her band in over 18 months she has been experiencing dysphagia with all foods and most liquids with an acute onset beginning 12 days ago accompanied by moderate pain that is worsened with swallowing that raidiates from midline upper quarablnt to her back. Reports that an abdominal ultrasound was performed at [facility] last week, which was negative  and EGD was performed yesterday at [facility] but the camera could not be advanced past the patient's duodenum. Patient has a recent history of pneumonia and silent aspiration or reflux related to her band is a suspected contributing factor. Patient reportedly has been experiencing increasing fatigue, dey mouth, reflux, regurgitation, and weakness over the last few days. Patient has an appointment with our office in a little over a week bu t her  reports that they cannot wait that long. Patient's  reports that they are considering reporting to the [facility] [place] ER to see what could be done.     I advised the patient that, based on her presentation, it would.be helpful to proceed with UGI for this patient to.evaluate band slippage and placement to see if band removal is necessary at this point..     I did offer to order an UGI in the morning to evaluate ban d. Patient's  reports they will plan to proceed to the ER around 730am in the hope that their concerns may be addressed more quickly considering the severity of the patient's pain.

## 2019-03-22 ENCOUNTER — APPOINTMENT (OUTPATIENT)
Dept: NUCLEAR MEDICINE | Facility: HOSPITAL | Age: 49
End: 2019-03-22

## 2019-03-22 ENCOUNTER — APPOINTMENT (OUTPATIENT)
Dept: CARDIOLOGY | Facility: HOSPITAL | Age: 49
End: 2019-03-22

## 2019-03-22 LAB
ALBUMIN SERPL-MCNC: 3.5 G/DL (ref 3.5–5.2)
ALBUMIN/GLOB SERPL: 1.1 G/DL
ALP SERPL-CCNC: 83 U/L (ref 39–117)
ALT SERPL W P-5'-P-CCNC: 18 U/L (ref 1–33)
ANION GAP SERPL CALCULATED.3IONS-SCNC: 13.3 MMOL/L
AST SERPL-CCNC: 18 U/L (ref 1–32)
BASOPHILS # BLD AUTO: 0.03 10*3/MM3 (ref 0–0.2)
BASOPHILS NFR BLD AUTO: 0.4 % (ref 0–1.5)
BH CV ECHO MEAS - AO MEAN PG (FULL): 5 MMHG
BH CV ECHO MEAS - AO MEAN PG: 7 MMHG
BH CV ECHO MEAS - AO ROOT AREA (BSA CORRECTED): 1.1
BH CV ECHO MEAS - AO ROOT AREA: 4.9 CM^2
BH CV ECHO MEAS - AO ROOT DIAM: 2.5 CM
BH CV ECHO MEAS - AO V2 MEAN: 123 CM/SEC
BH CV ECHO MEAS - AO V2 VTI: 34.2 CM
BH CV ECHO MEAS - AVA(I,A): 2.6 CM^2
BH CV ECHO MEAS - AVA(I,D): 2.6 CM^2
BH CV ECHO MEAS - BSA(HAYCOCK): 2.5 M^2
BH CV ECHO MEAS - BSA: 2.3 M^2
BH CV ECHO MEAS - BZI_BMI: 49.5 KILOGRAMS/M^2
BH CV ECHO MEAS - BZI_METRIC_HEIGHT: 162 CM
BH CV ECHO MEAS - BZI_METRIC_WEIGHT: 130 KG
BH CV ECHO MEAS - EDV(CUBED): 85.2 ML
BH CV ECHO MEAS - EDV(MOD-SP2): 65 ML
BH CV ECHO MEAS - EDV(MOD-SP4): 58 ML
BH CV ECHO MEAS - EDV(TEICH): 87.7 ML
BH CV ECHO MEAS - EF(CUBED): 45.2 %
BH CV ECHO MEAS - EF(MOD-BP): 54 %
BH CV ECHO MEAS - EF(MOD-SP2): 50.8 %
BH CV ECHO MEAS - EF(MOD-SP4): 60.3 %
BH CV ECHO MEAS - EF(TEICH): 37.9 %
BH CV ECHO MEAS - ESV(CUBED): 46.7 ML
BH CV ECHO MEAS - ESV(MOD-SP2): 32 ML
BH CV ECHO MEAS - ESV(MOD-SP4): 23 ML
BH CV ECHO MEAS - ESV(TEICH): 54.4 ML
BH CV ECHO MEAS - FS: 18.2 %
BH CV ECHO MEAS - IVS/LVPW: 1
BH CV ECHO MEAS - IVSD: 0.8 CM
BH CV ECHO MEAS - LAT PEAK E' VEL: 14.6 CM/SEC
BH CV ECHO MEAS - LV DIASTOLIC VOL/BSA (35-75): 25.5 ML/M^2
BH CV ECHO MEAS - LV MASS(C)D: 109.4 GRAMS
BH CV ECHO MEAS - LV MASS(C)DI: 48.1 GRAMS/M^2
BH CV ECHO MEAS - LV MEAN PG: 2 MMHG
BH CV ECHO MEAS - LV SYSTOLIC VOL/BSA (12-30): 10.1 ML/M^2
BH CV ECHO MEAS - LV V1 MEAN: 71.3 CM/SEC
BH CV ECHO MEAS - LV V1 VTI: 21.5 CM
BH CV ECHO MEAS - LVIDD: 4.4 CM
BH CV ECHO MEAS - LVIDS: 3.6 CM
BH CV ECHO MEAS - LVLD AP2: 8.2 CM
BH CV ECHO MEAS - LVLD AP4: 8.2 CM
BH CV ECHO MEAS - LVLS AP2: 6.5 CM
BH CV ECHO MEAS - LVLS AP4: 7.1 CM
BH CV ECHO MEAS - LVOT AREA (M): 4.2 CM^2
BH CV ECHO MEAS - LVOT AREA: 4.2 CM^2
BH CV ECHO MEAS - LVOT DIAM: 2.3 CM
BH CV ECHO MEAS - LVPWD: 0.8 CM
BH CV ECHO MEAS - MED PEAK E' VEL: 8.6 CM/SEC
BH CV ECHO MEAS - MV A DUR: 162 SEC
BH CV ECHO MEAS - MV A MAX VEL: 82.1 CM/SEC
BH CV ECHO MEAS - MV DEC SLOPE: 630 CM/SEC^2
BH CV ECHO MEAS - MV DEC TIME: 162 SEC
BH CV ECHO MEAS - MV E MAX VEL: 101 CM/SEC
BH CV ECHO MEAS - MV E/A: 1.2
BH CV ECHO MEAS - MV MEAN PG: 1 MMHG
BH CV ECHO MEAS - MV P1/2T MAX VEL: 107 CM/SEC
BH CV ECHO MEAS - MV P1/2T: 49.7 MSEC
BH CV ECHO MEAS - MV V2 MEAN: 52.9 CM/SEC
BH CV ECHO MEAS - MV V2 VTI: 25.1 CM
BH CV ECHO MEAS - MVA P1/2T LCG: 2.1 CM^2
BH CV ECHO MEAS - MVA(P1/2T): 4.4 CM^2
BH CV ECHO MEAS - MVA(VTI): 3.6 CM^2
BH CV ECHO MEAS - PA ACC SLOPE: 853 CM/SEC^2
BH CV ECHO MEAS - PA ACC TIME: 0.11 SEC
BH CV ECHO MEAS - PA MAX PG (FULL): 2.5 MMHG
BH CV ECHO MEAS - PA MAX PG: 5.4 MMHG
BH CV ECHO MEAS - PA PR(ACCEL): 28.2 MMHG
BH CV ECHO MEAS - PA V2 MAX: 116 CM/SEC
BH CV ECHO MEAS - PULM A REVS DUR: 0.09 SEC
BH CV ECHO MEAS - PULM A REVS VEL: 30.3 CM/SEC
BH CV ECHO MEAS - PULM DIAS VEL: 45 CM/SEC
BH CV ECHO MEAS - PULM S/D: 1.2
BH CV ECHO MEAS - PULM SYS VEL: 52 CM/SEC
BH CV ECHO MEAS - RV MAX PG: 2.9 MMHG
BH CV ECHO MEAS - RV MEAN PG: 2 MMHG
BH CV ECHO MEAS - RV V1 MAX: 84.6 CM/SEC
BH CV ECHO MEAS - RV V1 MEAN: 59.3 CM/SEC
BH CV ECHO MEAS - RV V1 VTI: 17.9 CM
BH CV ECHO MEAS - SI(AO): 73.8 ML/M^2
BH CV ECHO MEAS - SI(CUBED): 16.9 ML/M^2
BH CV ECHO MEAS - SI(LVOT): 39.3 ML/M^2
BH CV ECHO MEAS - SI(MOD-SP2): 14.5 ML/M^2
BH CV ECHO MEAS - SI(MOD-SP4): 15.4 ML/M^2
BH CV ECHO MEAS - SI(TEICH): 14.6 ML/M^2
BH CV ECHO MEAS - SV(AO): 167.9 ML
BH CV ECHO MEAS - SV(CUBED): 38.5 ML
BH CV ECHO MEAS - SV(LVOT): 89.3 ML
BH CV ECHO MEAS - SV(MOD-SP2): 33 ML
BH CV ECHO MEAS - SV(MOD-SP4): 35 ML
BH CV ECHO MEAS - SV(TEICH): 33.3 ML
BH CV ECHO MEAS - TAPSE (>1.6): 3.3 CM2
BH CV ECHO MEAS - TR MAX VEL: 250 CM/SEC
BH CV ECHO MEASUREMENTS AVERAGE E/E' RATIO: 8.71
BH CV LOWER VASCULAR LEFT COMMON FEMORAL AUGMENT: NORMAL
BH CV LOWER VASCULAR LEFT COMMON FEMORAL COMPETENT: NORMAL
BH CV LOWER VASCULAR LEFT COMMON FEMORAL COMPRESS: NORMAL
BH CV LOWER VASCULAR LEFT COMMON FEMORAL PHASIC: NORMAL
BH CV LOWER VASCULAR LEFT COMMON FEMORAL SPONT: NORMAL
BH CV LOWER VASCULAR LEFT DISTAL FEMORAL COMPRESS: NORMAL
BH CV LOWER VASCULAR LEFT GASTRONEMIUS COMPRESS: NORMAL
BH CV LOWER VASCULAR LEFT GREATER SAPH AK COMPRESS: NORMAL
BH CV LOWER VASCULAR LEFT GREATER SAPH BK COMPRESS: NORMAL
BH CV LOWER VASCULAR LEFT MID FEMORAL AUGMENT: NORMAL
BH CV LOWER VASCULAR LEFT MID FEMORAL COMPETENT: NORMAL
BH CV LOWER VASCULAR LEFT MID FEMORAL COMPRESS: NORMAL
BH CV LOWER VASCULAR LEFT MID FEMORAL PHASIC: NORMAL
BH CV LOWER VASCULAR LEFT MID FEMORAL SPONT: NORMAL
BH CV LOWER VASCULAR LEFT PERONEAL COMPRESS: NORMAL
BH CV LOWER VASCULAR LEFT POPLITEAL AUGMENT: NORMAL
BH CV LOWER VASCULAR LEFT POPLITEAL COMPETENT: NORMAL
BH CV LOWER VASCULAR LEFT POPLITEAL COMPRESS: NORMAL
BH CV LOWER VASCULAR LEFT POPLITEAL PHASIC: NORMAL
BH CV LOWER VASCULAR LEFT POPLITEAL SPONT: NORMAL
BH CV LOWER VASCULAR LEFT POSTERIOR TIBIAL COMPRESS: NORMAL
BH CV LOWER VASCULAR LEFT PROXIMAL FEMORAL COMPRESS: NORMAL
BH CV LOWER VASCULAR LEFT SAPHENOFEMORAL JUNCTION COMPRESS: NORMAL
BH CV LOWER VASCULAR LEFT SAPHENOFEMORAL JUNCTION PHASIC: NORMAL
BH CV LOWER VASCULAR LEFT SAPHENOFEMORAL JUNCTION SPONT: NORMAL
BH CV LOWER VASCULAR RIGHT COMMON FEMORAL AUGMENT: NORMAL
BH CV LOWER VASCULAR RIGHT COMMON FEMORAL COMPETENT: NORMAL
BH CV LOWER VASCULAR RIGHT COMMON FEMORAL COMPRESS: NORMAL
BH CV LOWER VASCULAR RIGHT COMMON FEMORAL PHASIC: NORMAL
BH CV LOWER VASCULAR RIGHT COMMON FEMORAL SPONT: NORMAL
BH CV LOWER VASCULAR RIGHT DISTAL FEMORAL COMPRESS: NORMAL
BH CV LOWER VASCULAR RIGHT GASTRONEMIUS COMPRESS: NORMAL
BH CV LOWER VASCULAR RIGHT GREATER SAPH AK COMPRESS: NORMAL
BH CV LOWER VASCULAR RIGHT GREATER SAPH BK COMPRESS: NORMAL
BH CV LOWER VASCULAR RIGHT MID FEMORAL AUGMENT: NORMAL
BH CV LOWER VASCULAR RIGHT MID FEMORAL COMPETENT: NORMAL
BH CV LOWER VASCULAR RIGHT MID FEMORAL COMPRESS: NORMAL
BH CV LOWER VASCULAR RIGHT MID FEMORAL PHASIC: NORMAL
BH CV LOWER VASCULAR RIGHT MID FEMORAL SPONT: NORMAL
BH CV LOWER VASCULAR RIGHT PERONEAL COMPRESS: NORMAL
BH CV LOWER VASCULAR RIGHT POPLITEAL AUGMENT: NORMAL
BH CV LOWER VASCULAR RIGHT POPLITEAL COMPETENT: NORMAL
BH CV LOWER VASCULAR RIGHT POPLITEAL COMPRESS: NORMAL
BH CV LOWER VASCULAR RIGHT POPLITEAL PHASIC: NORMAL
BH CV LOWER VASCULAR RIGHT POPLITEAL SPONT: NORMAL
BH CV LOWER VASCULAR RIGHT POSTERIOR TIBIAL COMPRESS: NORMAL
BH CV LOWER VASCULAR RIGHT PROXIMAL FEMORAL COMPRESS: NORMAL
BH CV LOWER VASCULAR RIGHT SAPHENOFEMORAL JUNCTION COMPRESS: NORMAL
BH CV LOWER VASCULAR RIGHT SAPHENOFEMORAL JUNCTION PHASIC: NORMAL
BH CV LOWER VASCULAR RIGHT SAPHENOFEMORAL JUNCTION SPONT: NORMAL
BH CV STRESS COMMENTS STAGE 1: NORMAL
BH CV STRESS DOSE REGADENOSON STAGE 1: 0.4
BH CV STRESS DURATION MIN STAGE 1: 0
BH CV STRESS DURATION SEC STAGE 1: 10
BH CV STRESS PROTOCOL 1: NORMAL
BH CV STRESS RECOVERY BP: NORMAL MMHG
BH CV STRESS RECOVERY HR: 84 BPM
BH CV STRESS STAGE 1: 1
BH CV XLRA - RV BASE: 3.5 CM
BH CV XLRA - TDI S': 12.4 CM/SEC
BILIRUB SERPL-MCNC: 0.4 MG/DL (ref 0.2–1.2)
BUN BLD-MCNC: 10 MG/DL (ref 6–20)
BUN/CREAT SERPL: 17.5 (ref 7–25)
CALCIUM SPEC-SCNC: 9 MG/DL (ref 8.6–10.5)
CHLORIDE SERPL-SCNC: 104 MMOL/L (ref 98–107)
CO2 SERPL-SCNC: 22.7 MMOL/L (ref 22–29)
CREAT BLD-MCNC: 0.57 MG/DL (ref 0.57–1)
DEPRECATED RDW RBC AUTO: 48.4 FL (ref 37–54)
EOSINOPHIL # BLD AUTO: 0 10*3/MM3 (ref 0–0.4)
EOSINOPHIL NFR BLD AUTO: 0 % (ref 0.3–6.2)
ERYTHROCYTE [DISTWIDTH] IN BLOOD BY AUTOMATED COUNT: 14.8 % (ref 12.3–15.4)
GFR SERPL CREATININE-BSD FRML MDRD: 113 ML/MIN/1.73
GLOBULIN UR ELPH-MCNC: 3.1 GM/DL
GLUCOSE BLD-MCNC: 107 MG/DL (ref 65–99)
HCT VFR BLD AUTO: 36.3 % (ref 34–46.6)
HGB BLD-MCNC: 11.1 G/DL (ref 12–15.9)
IMM GRANULOCYTES # BLD AUTO: 0.02 10*3/MM3 (ref 0–0.05)
IMM GRANULOCYTES NFR BLD AUTO: 0.3 % (ref 0–0.5)
LEFT ATRIUM VOLUME INDEX: 30.4 ML/M2
LV EF 2D ECHO EST: 60 %
LV EF NUC BP: 74 %
LYMPHOCYTES # BLD AUTO: 1.25 10*3/MM3 (ref 0.7–3.1)
LYMPHOCYTES NFR BLD AUTO: 17.2 % (ref 19.6–45.3)
MAXIMAL PREDICTED HEART RATE: 172 BPM
MCH RBC QN AUTO: 27.5 PG (ref 26.6–33)
MCHC RBC AUTO-ENTMCNC: 30.6 G/DL (ref 31.5–35.7)
MCV RBC AUTO: 90.1 FL (ref 79–97)
MONOCYTES # BLD AUTO: 0.61 10*3/MM3 (ref 0.1–0.9)
MONOCYTES NFR BLD AUTO: 8.4 % (ref 5–12)
NEUTROPHILS # BLD AUTO: 5.36 10*3/MM3 (ref 1.4–7)
NEUTROPHILS NFR BLD AUTO: 73.7 % (ref 42.7–76)
NRBC BLD AUTO-RTO: 0 /100 WBC (ref 0–0)
PERCENT MAX PREDICTED HR: 63.95 %
PLATELET # BLD AUTO: 437 10*3/MM3 (ref 140–450)
PMV BLD AUTO: 9.3 FL (ref 6–12)
POTASSIUM BLD-SCNC: 4.1 MMOL/L (ref 3.5–5.2)
PROT SERPL-MCNC: 6.6 G/DL (ref 6–8.5)
RBC # BLD AUTO: 4.03 10*6/MM3 (ref 3.77–5.28)
SODIUM BLD-SCNC: 140 MMOL/L (ref 136–145)
STRESS BASELINE BP: NORMAL MMHG
STRESS BASELINE HR: 68 BPM
STRESS PERCENT HR: 75 %
STRESS POST PEAK BP: NORMAL MMHG
STRESS POST PEAK HR: 110 BPM
STRESS TARGET HR: 146 BPM
TROPONIN T SERPL-MCNC: <0.01 NG/ML (ref 0–0.03)
WBC NRBC COR # BLD: 7.27 10*3/MM3 (ref 3.4–10.8)

## 2019-03-22 PROCEDURE — 25010000002 ONDANSETRON PER 1 MG: Performed by: INTERNAL MEDICINE

## 2019-03-22 PROCEDURE — 93306 TTE W/DOPPLER COMPLETE: CPT | Performed by: INTERNAL MEDICINE

## 2019-03-22 PROCEDURE — 78452 HT MUSCLE IMAGE SPECT MULT: CPT

## 2019-03-22 PROCEDURE — 85025 COMPLETE CBC W/AUTO DIFF WBC: CPT | Performed by: INTERNAL MEDICINE

## 2019-03-22 PROCEDURE — 78452 HT MUSCLE IMAGE SPECT MULT: CPT | Performed by: INTERNAL MEDICINE

## 2019-03-22 PROCEDURE — 25010000002 REGADENOSON 0.4 MG/5ML SOLUTION: Performed by: INTERNAL MEDICINE

## 2019-03-22 PROCEDURE — 93017 CV STRESS TEST TRACING ONLY: CPT

## 2019-03-22 PROCEDURE — 25010000002 ENOXAPARIN PER 10 MG: Performed by: INTERNAL MEDICINE

## 2019-03-22 PROCEDURE — 93306 TTE W/DOPPLER COMPLETE: CPT

## 2019-03-22 PROCEDURE — 93970 EXTREMITY STUDY: CPT

## 2019-03-22 PROCEDURE — 25010000002 HYDROMORPHONE PER 4 MG: Performed by: INTERNAL MEDICINE

## 2019-03-22 PROCEDURE — 84484 ASSAY OF TROPONIN QUANT: CPT | Performed by: NURSE PRACTITIONER

## 2019-03-22 PROCEDURE — G0378 HOSPITAL OBSERVATION PER HR: HCPCS

## 2019-03-22 PROCEDURE — 0 TECHNETIUM SESTAMIBI: Performed by: INTERNAL MEDICINE

## 2019-03-22 PROCEDURE — A9500 TC99M SESTAMIBI: HCPCS | Performed by: INTERNAL MEDICINE

## 2019-03-22 PROCEDURE — 99204 OFFICE O/P NEW MOD 45 MIN: CPT | Performed by: NURSE PRACTITIONER

## 2019-03-22 PROCEDURE — 93018 CV STRESS TEST I&R ONLY: CPT | Performed by: INTERNAL MEDICINE

## 2019-03-22 PROCEDURE — 80053 COMPREHEN METABOLIC PANEL: CPT | Performed by: INTERNAL MEDICINE

## 2019-03-22 RX ORDER — SUCRALFATE ORAL 1 G/10ML
1 SUSPENSION ORAL
Status: DISCONTINUED | OUTPATIENT
Start: 2019-03-22 | End: 2019-03-26 | Stop reason: HOSPADM

## 2019-03-22 RX ADMIN — LEVOTHYROXINE SODIUM 88 MCG: 88 TABLET ORAL at 06:25

## 2019-03-22 RX ADMIN — TECHNETIUM TC 99M SESTAMIBI 1 DOSE: 1 INJECTION INTRAVENOUS at 12:28

## 2019-03-22 RX ADMIN — ZOLPIDEM TARTRATE 5 MG: 5 TABLET ORAL at 21:14

## 2019-03-22 RX ADMIN — ONDANSETRON HYDROCHLORIDE 4 MG: 2 SOLUTION INTRAMUSCULAR; INTRAVENOUS at 17:18

## 2019-03-22 RX ADMIN — CARVEDILOL 12.5 MG: 12.5 TABLET, FILM COATED ORAL at 08:59

## 2019-03-22 RX ADMIN — HYDROCODONE BITARTRATE AND ACETAMINOPHEN 1 TABLET: 7.5; 325 TABLET ORAL at 10:52

## 2019-03-22 RX ADMIN — ENOXAPARIN SODIUM 130 MG: 150 INJECTION SUBCUTANEOUS at 17:18

## 2019-03-22 RX ADMIN — SUCRALFATE 1 G: 1 SUSPENSION ORAL at 21:14

## 2019-03-22 RX ADMIN — PANTOPRAZOLE SODIUM 40 MG: 40 TABLET, DELAYED RELEASE ORAL at 06:25

## 2019-03-22 RX ADMIN — ONDANSETRON HYDROCHLORIDE 4 MG: 2 SOLUTION INTRAMUSCULAR; INTRAVENOUS at 06:35

## 2019-03-22 RX ADMIN — HYDROMORPHONE HYDROCHLORIDE 0.5 MG: 1 INJECTION, SOLUTION INTRAMUSCULAR; INTRAVENOUS; SUBCUTANEOUS at 17:17

## 2019-03-22 RX ADMIN — HYDROMORPHONE HYDROCHLORIDE 0.5 MG: 1 INJECTION, SOLUTION INTRAMUSCULAR; INTRAVENOUS; SUBCUTANEOUS at 21:14

## 2019-03-22 RX ADMIN — SUCRALFATE 1 G: 1 TABLET ORAL at 06:35

## 2019-03-22 RX ADMIN — AMLODIPINE BESYLATE: 5 TABLET ORAL at 10:52

## 2019-03-22 RX ADMIN — SODIUM CHLORIDE, PRESERVATIVE FREE 3 ML: 5 INJECTION INTRAVENOUS at 08:59

## 2019-03-22 RX ADMIN — REGADENOSON 0.4 MG: 0.08 INJECTION, SOLUTION INTRAVENOUS at 12:28

## 2019-03-22 RX ADMIN — ATORVASTATIN CALCIUM 40 MG: 20 TABLET, FILM COATED ORAL at 21:13

## 2019-03-22 RX ADMIN — SODIUM CHLORIDE 100 ML/HR: 9 INJECTION, SOLUTION INTRAVENOUS at 06:25

## 2019-03-22 RX ADMIN — ENOXAPARIN SODIUM 130 MG: 150 INJECTION SUBCUTANEOUS at 06:25

## 2019-03-22 RX ADMIN — SODIUM CHLORIDE, PRESERVATIVE FREE 3 ML: 5 INJECTION INTRAVENOUS at 21:14

## 2019-03-22 RX ADMIN — TECHNETIUM TC 99M SESTAMIBI 1 DOSE: 1 INJECTION INTRAVENOUS at 11:08

## 2019-03-22 RX ADMIN — HYDROMORPHONE HYDROCHLORIDE 0.5 MG: 1 INJECTION, SOLUTION INTRAMUSCULAR; INTRAVENOUS; SUBCUTANEOUS at 08:55

## 2019-03-22 RX ADMIN — CARVEDILOL 12.5 MG: 12.5 TABLET, FILM COATED ORAL at 17:18

## 2019-03-23 ENCOUNTER — APPOINTMENT (OUTPATIENT)
Dept: GENERAL RADIOLOGY | Facility: HOSPITAL | Age: 49
End: 2019-03-23

## 2019-03-23 PROBLEM — R13.19 OTHER DYSPHAGIA: Status: ACTIVE | Noted: 2019-03-21

## 2019-03-23 PROBLEM — R11.2 NAUSEA AND VOMITING: Status: ACTIVE | Noted: 2019-03-21

## 2019-03-23 LAB
ALBUMIN SERPL-MCNC: 3.4 G/DL (ref 3.5–5.2)
ALBUMIN/GLOB SERPL: 1.2 G/DL
ALP SERPL-CCNC: 85 U/L (ref 39–117)
ALT SERPL W P-5'-P-CCNC: 19 U/L (ref 1–33)
ANION GAP SERPL CALCULATED.3IONS-SCNC: 10.7 MMOL/L
AST SERPL-CCNC: 17 U/L (ref 1–32)
BASOPHILS # BLD AUTO: 0.05 10*3/MM3 (ref 0–0.2)
BASOPHILS NFR BLD AUTO: 0.7 % (ref 0–1.5)
BILIRUB SERPL-MCNC: 0.3 MG/DL (ref 0.2–1.2)
BUN BLD-MCNC: 12 MG/DL (ref 6–20)
BUN/CREAT SERPL: 24.5 (ref 7–25)
CALCIUM SPEC-SCNC: 8.6 MG/DL (ref 8.6–10.5)
CHLORIDE SERPL-SCNC: 107 MMOL/L (ref 98–107)
CHOLEST SERPL-MCNC: 170 MG/DL (ref 0–200)
CO2 SERPL-SCNC: 23.3 MMOL/L (ref 22–29)
CREAT BLD-MCNC: 0.49 MG/DL (ref 0.57–1)
DEPRECATED RDW RBC AUTO: 49.7 FL (ref 37–54)
EOSINOPHIL # BLD AUTO: 0.01 10*3/MM3 (ref 0–0.4)
EOSINOPHIL NFR BLD AUTO: 0.1 % (ref 0.3–6.2)
ERYTHROCYTE [DISTWIDTH] IN BLOOD BY AUTOMATED COUNT: 14.9 % (ref 12.3–15.4)
GFR SERPL CREATININE-BSD FRML MDRD: 135 ML/MIN/1.73
GLOBULIN UR ELPH-MCNC: 2.8 GM/DL
GLUCOSE BLD-MCNC: 110 MG/DL (ref 65–99)
HCT VFR BLD AUTO: 36.8 % (ref 34–46.6)
HDLC SERPL-MCNC: 35 MG/DL (ref 40–60)
HGB BLD-MCNC: 11.3 G/DL (ref 12–15.9)
IMM GRANULOCYTES # BLD AUTO: 0.02 10*3/MM3 (ref 0–0.05)
IMM GRANULOCYTES NFR BLD AUTO: 0.3 % (ref 0–0.5)
LDLC SERPL CALC-MCNC: 119 MG/DL (ref 0–100)
LDLC/HDLC SERPL: 3.4 {RATIO}
LYMPHOCYTES # BLD AUTO: 1.34 10*3/MM3 (ref 0.7–3.1)
LYMPHOCYTES NFR BLD AUTO: 19.8 % (ref 19.6–45.3)
MAGNESIUM SERPL-MCNC: 2.2 MG/DL (ref 1.6–2.6)
MCH RBC QN AUTO: 28.1 PG (ref 26.6–33)
MCHC RBC AUTO-ENTMCNC: 30.7 G/DL (ref 31.5–35.7)
MCV RBC AUTO: 91.5 FL (ref 79–97)
MONOCYTES # BLD AUTO: 0.72 10*3/MM3 (ref 0.1–0.9)
MONOCYTES NFR BLD AUTO: 10.7 % (ref 5–12)
NEUTROPHILS # BLD AUTO: 4.62 10*3/MM3 (ref 1.4–7)
NEUTROPHILS NFR BLD AUTO: 68.4 % (ref 42.7–76)
NRBC BLD AUTO-RTO: 0 /100 WBC (ref 0–0)
NT-PROBNP SERPL-MCNC: 142.2 PG/ML (ref 5–450)
PLATELET # BLD AUTO: 402 10*3/MM3 (ref 140–450)
PMV BLD AUTO: 10.2 FL (ref 6–12)
POTASSIUM BLD-SCNC: 4.5 MMOL/L (ref 3.5–5.2)
PROT SERPL-MCNC: 6.2 G/DL (ref 6–8.5)
RBC # BLD AUTO: 4.02 10*6/MM3 (ref 3.77–5.28)
SODIUM BLD-SCNC: 141 MMOL/L (ref 136–145)
TRIGL SERPL-MCNC: 80 MG/DL (ref 0–150)
VLDLC SERPL-MCNC: 16 MG/DL (ref 5–40)
WBC NRBC COR # BLD: 6.76 10*3/MM3 (ref 3.4–10.8)

## 2019-03-23 PROCEDURE — 25010000002 HYDROMORPHONE PER 4 MG: Performed by: INTERNAL MEDICINE

## 2019-03-23 PROCEDURE — 99212 OFFICE O/P EST SF 10 MIN: CPT | Performed by: NURSE PRACTITIONER

## 2019-03-23 PROCEDURE — G0378 HOSPITAL OBSERVATION PER HR: HCPCS

## 2019-03-23 PROCEDURE — 83880 ASSAY OF NATRIURETIC PEPTIDE: CPT | Performed by: NURSE PRACTITIONER

## 2019-03-23 PROCEDURE — 25010000002 ENOXAPARIN PER 10 MG: Performed by: INTERNAL MEDICINE

## 2019-03-23 PROCEDURE — 83735 ASSAY OF MAGNESIUM: CPT | Performed by: NURSE PRACTITIONER

## 2019-03-23 PROCEDURE — 25010000002 ONDANSETRON PER 1 MG: Performed by: INTERNAL MEDICINE

## 2019-03-23 PROCEDURE — 80061 LIPID PANEL: CPT | Performed by: NURSE PRACTITIONER

## 2019-03-23 PROCEDURE — 85025 COMPLETE CBC W/AUTO DIFF WBC: CPT | Performed by: INTERNAL MEDICINE

## 2019-03-23 PROCEDURE — 80053 COMPREHEN METABOLIC PANEL: CPT | Performed by: NURSE PRACTITIONER

## 2019-03-23 PROCEDURE — 74241: CPT

## 2019-03-23 RX ORDER — ZOLPIDEM TARTRATE 5 MG/1
10 TABLET ORAL NIGHTLY PRN
Status: DISCONTINUED | OUTPATIENT
Start: 2019-03-23 | End: 2019-03-26 | Stop reason: HOSPADM

## 2019-03-23 RX ADMIN — BARIUM SULFATE 100 ML: 960 POWDER, FOR SUSPENSION ORAL at 13:00

## 2019-03-23 RX ADMIN — SUCRALFATE 1 G: 1 SUSPENSION ORAL at 18:46

## 2019-03-23 RX ADMIN — HYDROMORPHONE HYDROCHLORIDE 0.5 MG: 1 INJECTION, SOLUTION INTRAMUSCULAR; INTRAVENOUS; SUBCUTANEOUS at 14:26

## 2019-03-23 RX ADMIN — ZOLPIDEM TARTRATE 10 MG: 5 TABLET ORAL at 21:24

## 2019-03-23 RX ADMIN — ONDANSETRON HYDROCHLORIDE 4 MG: 2 SOLUTION INTRAMUSCULAR; INTRAVENOUS at 21:24

## 2019-03-23 RX ADMIN — ONDANSETRON HYDROCHLORIDE 4 MG: 2 SOLUTION INTRAMUSCULAR; INTRAVENOUS at 08:33

## 2019-03-23 RX ADMIN — SUCRALFATE 1 G: 1 SUSPENSION ORAL at 21:23

## 2019-03-23 RX ADMIN — CARVEDILOL 12.5 MG: 12.5 TABLET, FILM COATED ORAL at 18:49

## 2019-03-23 RX ADMIN — SUCRALFATE 1 G: 1 SUSPENSION ORAL at 08:33

## 2019-03-23 RX ADMIN — LEVOTHYROXINE SODIUM 88 MCG: 88 TABLET ORAL at 06:09

## 2019-03-23 RX ADMIN — CARVEDILOL 12.5 MG: 12.5 TABLET, FILM COATED ORAL at 08:33

## 2019-03-23 RX ADMIN — HYDROMORPHONE HYDROCHLORIDE 0.5 MG: 1 INJECTION, SOLUTION INTRAMUSCULAR; INTRAVENOUS; SUBCUTANEOUS at 21:24

## 2019-03-23 RX ADMIN — ENOXAPARIN SODIUM 130 MG: 150 INJECTION SUBCUTANEOUS at 18:46

## 2019-03-23 RX ADMIN — SODIUM CHLORIDE, PRESERVATIVE FREE 3 ML: 5 INJECTION INTRAVENOUS at 21:25

## 2019-03-23 RX ADMIN — HYDROMORPHONE HYDROCHLORIDE 0.5 MG: 1 INJECTION, SOLUTION INTRAMUSCULAR; INTRAVENOUS; SUBCUTANEOUS at 08:33

## 2019-03-23 RX ADMIN — ONDANSETRON HYDROCHLORIDE 4 MG: 2 SOLUTION INTRAMUSCULAR; INTRAVENOUS at 14:26

## 2019-03-23 RX ADMIN — ATORVASTATIN CALCIUM 40 MG: 20 TABLET, FILM COATED ORAL at 21:23

## 2019-03-23 RX ADMIN — SODIUM CHLORIDE, PRESERVATIVE FREE 3 ML: 5 INJECTION INTRAVENOUS at 08:33

## 2019-03-23 RX ADMIN — HYDROCODONE BITARTRATE AND ACETAMINOPHEN 1 TABLET: 7.5; 325 TABLET ORAL at 10:26

## 2019-03-23 RX ADMIN — ENOXAPARIN SODIUM 130 MG: 150 INJECTION SUBCUTANEOUS at 06:09

## 2019-03-23 RX ADMIN — PANTOPRAZOLE SODIUM 40 MG: 40 TABLET, DELAYED RELEASE ORAL at 06:09

## 2019-03-23 RX ADMIN — AMLODIPINE BESYLATE: 5 TABLET ORAL at 08:33

## 2019-03-23 RX ADMIN — SODIUM CHLORIDE 100 ML/HR: 9 INJECTION, SOLUTION INTRAVENOUS at 03:09

## 2019-03-24 LAB
ANION GAP SERPL CALCULATED.3IONS-SCNC: 11.6 MMOL/L
BASOPHILS # BLD AUTO: 0.03 10*3/MM3 (ref 0–0.2)
BASOPHILS NFR BLD AUTO: 0.4 % (ref 0–1.5)
BUN BLD-MCNC: 9 MG/DL (ref 6–20)
BUN/CREAT SERPL: 16.1 (ref 7–25)
CALCIUM SPEC-SCNC: 8.7 MG/DL (ref 8.6–10.5)
CHLORIDE SERPL-SCNC: 108 MMOL/L (ref 98–107)
CO2 SERPL-SCNC: 22.4 MMOL/L (ref 22–29)
CREAT BLD-MCNC: 0.56 MG/DL (ref 0.57–1)
DEPRECATED RDW RBC AUTO: 50.8 FL (ref 37–54)
EOSINOPHIL # BLD AUTO: 0.02 10*3/MM3 (ref 0–0.4)
EOSINOPHIL NFR BLD AUTO: 0.3 % (ref 0.3–6.2)
ERYTHROCYTE [DISTWIDTH] IN BLOOD BY AUTOMATED COUNT: 15.1 % (ref 12.3–15.4)
GFR SERPL CREATININE-BSD FRML MDRD: 116 ML/MIN/1.73
GLUCOSE BLD-MCNC: 118 MG/DL (ref 65–99)
HCT VFR BLD AUTO: 38.3 % (ref 34–46.6)
HGB BLD-MCNC: 11.5 G/DL (ref 12–15.9)
IMM GRANULOCYTES # BLD AUTO: 0.02 10*3/MM3 (ref 0–0.05)
IMM GRANULOCYTES NFR BLD AUTO: 0.3 % (ref 0–0.5)
LYMPHOCYTES # BLD AUTO: 1.29 10*3/MM3 (ref 0.7–3.1)
LYMPHOCYTES NFR BLD AUTO: 17.8 % (ref 19.6–45.3)
MCH RBC QN AUTO: 27.6 PG (ref 26.6–33)
MCHC RBC AUTO-ENTMCNC: 30 G/DL (ref 31.5–35.7)
MCV RBC AUTO: 91.8 FL (ref 79–97)
MONOCYTES # BLD AUTO: 0.76 10*3/MM3 (ref 0.1–0.9)
MONOCYTES NFR BLD AUTO: 10.5 % (ref 5–12)
NEUTROPHILS # BLD AUTO: 5.12 10*3/MM3 (ref 1.4–7)
NEUTROPHILS NFR BLD AUTO: 70.7 % (ref 42.7–76)
NRBC BLD AUTO-RTO: 0 /100 WBC (ref 0–0)
PLATELET # BLD AUTO: 364 10*3/MM3 (ref 140–450)
PMV BLD AUTO: 10.1 FL (ref 6–12)
POTASSIUM BLD-SCNC: 3.6 MMOL/L (ref 3.5–5.2)
RBC # BLD AUTO: 4.17 10*6/MM3 (ref 3.77–5.28)
SODIUM BLD-SCNC: 142 MMOL/L (ref 136–145)
WBC NRBC COR # BLD: 7.24 10*3/MM3 (ref 3.4–10.8)

## 2019-03-24 PROCEDURE — 25010000002 ONDANSETRON PER 1 MG: Performed by: INTERNAL MEDICINE

## 2019-03-24 PROCEDURE — 99212 OFFICE O/P EST SF 10 MIN: CPT | Performed by: NURSE PRACTITIONER

## 2019-03-24 PROCEDURE — G0378 HOSPITAL OBSERVATION PER HR: HCPCS

## 2019-03-24 PROCEDURE — 80048 BASIC METABOLIC PNL TOTAL CA: CPT | Performed by: HOSPITALIST

## 2019-03-24 PROCEDURE — 85025 COMPLETE CBC W/AUTO DIFF WBC: CPT | Performed by: HOSPITALIST

## 2019-03-24 PROCEDURE — 25010000002 HYDROMORPHONE PER 4 MG: Performed by: INTERNAL MEDICINE

## 2019-03-24 PROCEDURE — 25010000002 ENOXAPARIN PER 10 MG: Performed by: INTERNAL MEDICINE

## 2019-03-24 RX ADMIN — SODIUM CHLORIDE, PRESERVATIVE FREE 3 ML: 5 INJECTION INTRAVENOUS at 21:09

## 2019-03-24 RX ADMIN — HYDROMORPHONE HYDROCHLORIDE 0.5 MG: 1 INJECTION, SOLUTION INTRAMUSCULAR; INTRAVENOUS; SUBCUTANEOUS at 06:31

## 2019-03-24 RX ADMIN — HYDROMORPHONE HYDROCHLORIDE 0.5 MG: 1 INJECTION, SOLUTION INTRAMUSCULAR; INTRAVENOUS; SUBCUTANEOUS at 11:17

## 2019-03-24 RX ADMIN — SUCRALFATE 1 G: 1 SUSPENSION ORAL at 06:31

## 2019-03-24 RX ADMIN — AMLODIPINE BESYLATE: 5 TABLET ORAL at 08:02

## 2019-03-24 RX ADMIN — HYDROMORPHONE HYDROCHLORIDE 0.5 MG: 1 INJECTION, SOLUTION INTRAMUSCULAR; INTRAVENOUS; SUBCUTANEOUS at 21:08

## 2019-03-24 RX ADMIN — CARVEDILOL 12.5 MG: 12.5 TABLET, FILM COATED ORAL at 16:41

## 2019-03-24 RX ADMIN — HYDROMORPHONE HYDROCHLORIDE 0.5 MG: 1 INJECTION, SOLUTION INTRAMUSCULAR; INTRAVENOUS; SUBCUTANEOUS at 16:37

## 2019-03-24 RX ADMIN — LEVOTHYROXINE SODIUM 88 MCG: 88 TABLET ORAL at 06:31

## 2019-03-24 RX ADMIN — SODIUM CHLORIDE, PRESERVATIVE FREE 3 ML: 5 INJECTION INTRAVENOUS at 08:04

## 2019-03-24 RX ADMIN — CARVEDILOL 12.5 MG: 12.5 TABLET, FILM COATED ORAL at 08:02

## 2019-03-24 RX ADMIN — ONDANSETRON HYDROCHLORIDE 4 MG: 2 SOLUTION INTRAMUSCULAR; INTRAVENOUS at 21:08

## 2019-03-24 RX ADMIN — ATORVASTATIN CALCIUM 40 MG: 20 TABLET, FILM COATED ORAL at 21:08

## 2019-03-24 RX ADMIN — ONDANSETRON HYDROCHLORIDE 4 MG: 2 SOLUTION INTRAMUSCULAR; INTRAVENOUS at 14:28

## 2019-03-24 RX ADMIN — TRAMADOL HYDROCHLORIDE 50 MG: 50 TABLET ORAL at 11:17

## 2019-03-24 RX ADMIN — SUCRALFATE 1 G: 1 SUSPENSION ORAL at 11:17

## 2019-03-24 RX ADMIN — PANTOPRAZOLE SODIUM 40 MG: 40 TABLET, DELAYED RELEASE ORAL at 06:31

## 2019-03-24 RX ADMIN — ENOXAPARIN SODIUM 130 MG: 150 INJECTION SUBCUTANEOUS at 06:43

## 2019-03-24 RX ADMIN — ONDANSETRON HYDROCHLORIDE 4 MG: 2 SOLUTION INTRAMUSCULAR; INTRAVENOUS at 06:31

## 2019-03-24 RX ADMIN — SUCRALFATE 1 G: 1 SUSPENSION ORAL at 16:37

## 2019-03-24 RX ADMIN — SUCRALFATE 1 G: 1 SUSPENSION ORAL at 21:08

## 2019-03-24 RX ADMIN — ZOLPIDEM TARTRATE 10 MG: 5 TABLET ORAL at 21:08

## 2019-03-25 ENCOUNTER — ANESTHESIA EVENT (OUTPATIENT)
Dept: PERIOP | Facility: HOSPITAL | Age: 49
End: 2019-03-25

## 2019-03-25 ENCOUNTER — ANESTHESIA (OUTPATIENT)
Dept: PERIOP | Facility: HOSPITAL | Age: 49
End: 2019-03-25

## 2019-03-25 LAB
ANION GAP SERPL CALCULATED.3IONS-SCNC: 11 MMOL/L
ANION GAP SERPL CALCULATED.3IONS-SCNC: 14.5 MMOL/L
BASOPHILS # BLD AUTO: 0.02 10*3/MM3 (ref 0–0.2)
BASOPHILS NFR BLD AUTO: 0.3 % (ref 0–1.5)
BUN BLD-MCNC: 10 MG/DL (ref 6–20)
BUN BLD-MCNC: 8 MG/DL (ref 6–20)
BUN/CREAT SERPL: 11.6 (ref 7–25)
BUN/CREAT SERPL: 15.6 (ref 7–25)
CALCIUM SPEC-SCNC: 8.9 MG/DL (ref 8.6–10.5)
CALCIUM SPEC-SCNC: 9 MG/DL (ref 8.6–10.5)
CHLORIDE SERPL-SCNC: 103 MMOL/L (ref 98–107)
CHLORIDE SERPL-SCNC: 106 MMOL/L (ref 98–107)
CO2 SERPL-SCNC: 22.5 MMOL/L (ref 22–29)
CO2 SERPL-SCNC: 24 MMOL/L (ref 22–29)
CREAT BLD-MCNC: 0.64 MG/DL (ref 0.57–1)
CREAT BLD-MCNC: 0.69 MG/DL (ref 0.57–1)
DEPRECATED RDW RBC AUTO: 50.1 FL (ref 37–54)
EOSINOPHIL # BLD AUTO: 0.01 10*3/MM3 (ref 0–0.4)
EOSINOPHIL NFR BLD AUTO: 0.1 % (ref 0.3–6.2)
ERYTHROCYTE [DISTWIDTH] IN BLOOD BY AUTOMATED COUNT: 15.2 % (ref 12.3–15.4)
GFR SERPL CREATININE-BSD FRML MDRD: 91 ML/MIN/1.73
GFR SERPL CREATININE-BSD FRML MDRD: 99 ML/MIN/1.73
GLUCOSE BLD-MCNC: 118 MG/DL (ref 65–99)
GLUCOSE BLD-MCNC: 134 MG/DL (ref 65–99)
HCT VFR BLD AUTO: 38.5 % (ref 34–46.6)
HGB BLD-MCNC: 11.6 G/DL (ref 12–15.9)
IMM GRANULOCYTES # BLD AUTO: 0.02 10*3/MM3 (ref 0–0.05)
IMM GRANULOCYTES NFR BLD AUTO: 0.3 % (ref 0–0.5)
LYMPHOCYTES # BLD AUTO: 1.28 10*3/MM3 (ref 0.7–3.1)
LYMPHOCYTES NFR BLD AUTO: 17.8 % (ref 19.6–45.3)
MCH RBC QN AUTO: 27.5 PG (ref 26.6–33)
MCHC RBC AUTO-ENTMCNC: 30.1 G/DL (ref 31.5–35.7)
MCV RBC AUTO: 91.2 FL (ref 79–97)
MONOCYTES # BLD AUTO: 0.76 10*3/MM3 (ref 0.1–0.9)
MONOCYTES NFR BLD AUTO: 10.6 % (ref 5–12)
NEUTROPHILS # BLD AUTO: 5.1 10*3/MM3 (ref 1.4–7)
NEUTROPHILS NFR BLD AUTO: 70.9 % (ref 42.7–76)
NRBC BLD AUTO-RTO: 0 /100 WBC (ref 0–0)
PLATELET # BLD AUTO: 359 10*3/MM3 (ref 140–450)
PMV BLD AUTO: 10.2 FL (ref 6–12)
POTASSIUM BLD-SCNC: 4.3 MMOL/L (ref 3.5–5.2)
POTASSIUM BLD-SCNC: 4.3 MMOL/L (ref 3.5–5.2)
RBC # BLD AUTO: 4.22 10*6/MM3 (ref 3.77–5.28)
SODIUM BLD-SCNC: 140 MMOL/L (ref 136–145)
SODIUM BLD-SCNC: 141 MMOL/L (ref 136–145)
WBC NRBC COR # BLD: 7.19 10*3/MM3 (ref 3.4–10.8)

## 2019-03-25 PROCEDURE — 25010000002 MIDAZOLAM PER 1 MG: Performed by: ANESTHESIOLOGY

## 2019-03-25 PROCEDURE — 94799 UNLISTED PULMONARY SVC/PX: CPT

## 2019-03-25 PROCEDURE — 25010000002 ONDANSETRON PER 1 MG: Performed by: INTERNAL MEDICINE

## 2019-03-25 PROCEDURE — 25010000002 HYDROMORPHONE PER 4 MG: Performed by: SURGERY

## 2019-03-25 PROCEDURE — 25010000002 ONDANSETRON PER 1 MG: Performed by: ANESTHESIOLOGY

## 2019-03-25 PROCEDURE — 80048 BASIC METABOLIC PNL TOTAL CA: CPT | Performed by: HOSPITALIST

## 2019-03-25 PROCEDURE — 93010 ELECTROCARDIOGRAM REPORT: CPT | Performed by: INTERNAL MEDICINE

## 2019-03-25 PROCEDURE — 80048 BASIC METABOLIC PNL TOTAL CA: CPT | Performed by: SURGERY

## 2019-03-25 PROCEDURE — 25010000002 ENOXAPARIN PER 10 MG: Performed by: INTERNAL MEDICINE

## 2019-03-25 PROCEDURE — 25010000002 METOCLOPRAMIDE PER 10 MG: Performed by: SURGERY

## 2019-03-25 PROCEDURE — 85025 COMPLETE CBC W/AUTO DIFF WBC: CPT | Performed by: HOSPITALIST

## 2019-03-25 PROCEDURE — 25010000002 NEOSTIGMINE PER 0.5 MG: Performed by: ANESTHESIOLOGY

## 2019-03-25 PROCEDURE — 93005 ELECTROCARDIOGRAM TRACING: CPT | Performed by: INTERNAL MEDICINE

## 2019-03-25 PROCEDURE — 25010000002 FENTANYL CITRATE (PF) 100 MCG/2ML SOLUTION: Performed by: ANESTHESIOLOGY

## 2019-03-25 PROCEDURE — 25010000002 PROPOFOL 10 MG/ML EMULSION: Performed by: ANESTHESIOLOGY

## 2019-03-25 PROCEDURE — 43774 LAP RMVL GASTR ADJ ALL PARTS: CPT | Performed by: SURGERY

## 2019-03-25 PROCEDURE — 43774 LAP RMVL GASTR ADJ ALL PARTS: CPT | Performed by: NURSE PRACTITIONER

## 2019-03-25 PROCEDURE — 25010000002 HYDROMORPHONE PER 4 MG: Performed by: ANESTHESIOLOGY

## 2019-03-25 PROCEDURE — 0DP64CZ REMOVAL OF EXTRALUMINAL DEVICE FROM STOMACH, PERCUTANEOUS ENDOSCOPIC APPROACH: ICD-10-PCS | Performed by: SURGERY

## 2019-03-25 PROCEDURE — 25010000002 KETOROLAC TROMETHAMINE PER 15 MG: Performed by: ANESTHESIOLOGY

## 2019-03-25 PROCEDURE — 25010000002 HYDROMORPHONE PER 4 MG: Performed by: INTERNAL MEDICINE

## 2019-03-25 RX ORDER — CEFAZOLIN SODIUM IN 0.9 % NACL 3 G/100 ML
3 INTRAVENOUS SOLUTION, PIGGYBACK (ML) INTRAVENOUS
Status: COMPLETED | OUTPATIENT
Start: 2019-03-25 | End: 2019-03-25

## 2019-03-25 RX ORDER — LORAZEPAM 2 MG/ML
1 INJECTION INTRAMUSCULAR EVERY 12 HOURS PRN
Status: DISCONTINUED | OUTPATIENT
Start: 2019-03-25 | End: 2019-03-26 | Stop reason: HOSPADM

## 2019-03-25 RX ORDER — PROMETHAZINE HYDROCHLORIDE 25 MG/ML
12.5 INJECTION, SOLUTION INTRAMUSCULAR; INTRAVENOUS EVERY 4 HOURS PRN
Status: DISCONTINUED | OUTPATIENT
Start: 2019-03-25 | End: 2019-03-26 | Stop reason: HOSPADM

## 2019-03-25 RX ORDER — DIPHENHYDRAMINE HYDROCHLORIDE 50 MG/ML
25 INJECTION INTRAMUSCULAR; INTRAVENOUS EVERY 4 HOURS PRN
Status: DISCONTINUED | OUTPATIENT
Start: 2019-03-25 | End: 2019-03-26 | Stop reason: HOSPADM

## 2019-03-25 RX ORDER — SODIUM CHLORIDE 0.9 % (FLUSH) 0.9 %
1-10 SYRINGE (ML) INJECTION AS NEEDED
Status: DISCONTINUED | OUTPATIENT
Start: 2019-03-25 | End: 2019-03-25 | Stop reason: HOSPADM

## 2019-03-25 RX ORDER — ACETAMINOPHEN 650 MG/1
650 SUPPOSITORY RECTAL EVERY 4 HOURS PRN
Status: DISCONTINUED | OUTPATIENT
Start: 2019-03-25 | End: 2019-03-26 | Stop reason: HOSPADM

## 2019-03-25 RX ORDER — SODIUM CHLORIDE, SODIUM LACTATE, POTASSIUM CHLORIDE, CALCIUM CHLORIDE 600; 310; 30; 20 MG/100ML; MG/100ML; MG/100ML; MG/100ML
100 INJECTION, SOLUTION INTRAVENOUS CONTINUOUS
Status: DISCONTINUED | OUTPATIENT
Start: 2019-03-25 | End: 2019-03-25

## 2019-03-25 RX ORDER — SODIUM CHLORIDE 0.9 % (FLUSH) 0.9 %
3 SYRINGE (ML) INJECTION EVERY 12 HOURS SCHEDULED
Status: DISCONTINUED | OUTPATIENT
Start: 2019-03-25 | End: 2019-03-25 | Stop reason: HOSPADM

## 2019-03-25 RX ORDER — NITROGLYCERIN 0.4 MG/1
0.4 TABLET SUBLINGUAL
Status: DISCONTINUED | OUTPATIENT
Start: 2019-03-25 | End: 2019-03-26 | Stop reason: HOSPADM

## 2019-03-25 RX ORDER — PROMETHAZINE HYDROCHLORIDE 25 MG/ML
6.25 INJECTION, SOLUTION INTRAMUSCULAR; INTRAVENOUS ONCE AS NEEDED
Status: DISCONTINUED | OUTPATIENT
Start: 2019-03-25 | End: 2019-03-25 | Stop reason: HOSPADM

## 2019-03-25 RX ORDER — LIDOCAINE HYDROCHLORIDE 20 MG/ML
INJECTION, SOLUTION INFILTRATION; PERINEURAL AS NEEDED
Status: DISCONTINUED | OUTPATIENT
Start: 2019-03-25 | End: 2019-03-25 | Stop reason: SURG

## 2019-03-25 RX ORDER — SODIUM CHLORIDE, SODIUM LACTATE, POTASSIUM CHLORIDE, CALCIUM CHLORIDE 600; 310; 30; 20 MG/100ML; MG/100ML; MG/100ML; MG/100ML
9 INJECTION, SOLUTION INTRAVENOUS CONTINUOUS PRN
Status: DISCONTINUED | OUTPATIENT
Start: 2019-03-25 | End: 2019-03-26 | Stop reason: HOSPADM

## 2019-03-25 RX ORDER — FENTANYL CITRATE 50 UG/ML
25 INJECTION, SOLUTION INTRAMUSCULAR; INTRAVENOUS
Status: DISCONTINUED | OUTPATIENT
Start: 2019-03-25 | End: 2019-03-25 | Stop reason: HOSPADM

## 2019-03-25 RX ORDER — METOCLOPRAMIDE HYDROCHLORIDE 5 MG/ML
10 INJECTION INTRAMUSCULAR; INTRAVENOUS ONCE
Status: COMPLETED | OUTPATIENT
Start: 2019-03-25 | End: 2019-03-25

## 2019-03-25 RX ORDER — SODIUM CHLORIDE 0.9 % (FLUSH) 0.9 %
3-10 SYRINGE (ML) INJECTION AS NEEDED
Status: DISCONTINUED | OUTPATIENT
Start: 2019-03-25 | End: 2019-03-25 | Stop reason: HOSPADM

## 2019-03-25 RX ORDER — KETOROLAC TROMETHAMINE 30 MG/ML
INJECTION, SOLUTION INTRAMUSCULAR; INTRAVENOUS AS NEEDED
Status: DISCONTINUED | OUTPATIENT
Start: 2019-03-25 | End: 2019-03-25 | Stop reason: SURG

## 2019-03-25 RX ORDER — ACETAMINOPHEN 160 MG/5ML
650 SOLUTION ORAL EVERY 4 HOURS PRN
Status: DISCONTINUED | OUTPATIENT
Start: 2019-03-25 | End: 2019-03-26 | Stop reason: HOSPADM

## 2019-03-25 RX ORDER — FAMOTIDINE 10 MG/ML
20 INJECTION, SOLUTION INTRAVENOUS ONCE
Status: COMPLETED | OUTPATIENT
Start: 2019-03-25 | End: 2019-03-25

## 2019-03-25 RX ORDER — PROMETHAZINE HYDROCHLORIDE 25 MG/1
25 SUPPOSITORY RECTAL ONCE AS NEEDED
Status: DISCONTINUED | OUTPATIENT
Start: 2019-03-25 | End: 2019-03-25 | Stop reason: HOSPADM

## 2019-03-25 RX ORDER — HYDROMORPHONE HYDROCHLORIDE 1 MG/ML
0.25 INJECTION, SOLUTION INTRAMUSCULAR; INTRAVENOUS; SUBCUTANEOUS
Status: DISCONTINUED | OUTPATIENT
Start: 2019-03-25 | End: 2019-03-25 | Stop reason: HOSPADM

## 2019-03-25 RX ORDER — ONDANSETRON 4 MG/1
4 TABLET, FILM COATED ORAL EVERY 4 HOURS PRN
Status: DISCONTINUED | OUTPATIENT
Start: 2019-03-25 | End: 2019-03-26 | Stop reason: HOSPADM

## 2019-03-25 RX ORDER — ONDANSETRON 2 MG/ML
INJECTION INTRAMUSCULAR; INTRAVENOUS AS NEEDED
Status: DISCONTINUED | OUTPATIENT
Start: 2019-03-25 | End: 2019-03-25 | Stop reason: SURG

## 2019-03-25 RX ORDER — ROCURONIUM BROMIDE 10 MG/ML
INJECTION, SOLUTION INTRAVENOUS AS NEEDED
Status: DISCONTINUED | OUTPATIENT
Start: 2019-03-25 | End: 2019-03-25 | Stop reason: SURG

## 2019-03-25 RX ORDER — MIDAZOLAM HYDROCHLORIDE 1 MG/ML
1 INJECTION INTRAMUSCULAR; INTRAVENOUS
Status: DISCONTINUED | OUTPATIENT
Start: 2019-03-25 | End: 2019-03-25 | Stop reason: HOSPADM

## 2019-03-25 RX ORDER — METOCLOPRAMIDE HYDROCHLORIDE 5 MG/ML
10 INJECTION INTRAMUSCULAR; INTRAVENOUS EVERY 6 HOURS
Status: DISCONTINUED | OUTPATIENT
Start: 2019-03-25 | End: 2019-03-26 | Stop reason: HOSPADM

## 2019-03-25 RX ORDER — FAMOTIDINE 10 MG/ML
20 INJECTION, SOLUTION INTRAVENOUS
Status: DISCONTINUED | OUTPATIENT
Start: 2019-03-25 | End: 2019-03-25 | Stop reason: HOSPADM

## 2019-03-25 RX ORDER — HYDROMORPHONE HYDROCHLORIDE 2 MG/1
2 TABLET ORAL EVERY 4 HOURS PRN
Status: DISCONTINUED | OUTPATIENT
Start: 2019-03-25 | End: 2019-03-26 | Stop reason: HOSPADM

## 2019-03-25 RX ORDER — ONDANSETRON 2 MG/ML
4 INJECTION INTRAMUSCULAR; INTRAVENOUS EVERY 4 HOURS PRN
Status: DISCONTINUED | OUTPATIENT
Start: 2019-03-25 | End: 2019-03-26 | Stop reason: HOSPADM

## 2019-03-25 RX ORDER — SCOLOPAMINE TRANSDERMAL SYSTEM 1 MG/1
1 PATCH, EXTENDED RELEASE TRANSDERMAL ONCE
Status: DISCONTINUED | OUTPATIENT
Start: 2019-03-25 | End: 2019-03-25

## 2019-03-25 RX ORDER — MORPHINE SULFATE 2 MG/ML
2 INJECTION, SOLUTION INTRAMUSCULAR; INTRAVENOUS
Status: DISCONTINUED | OUTPATIENT
Start: 2019-03-25 | End: 2019-03-26 | Stop reason: HOSPADM

## 2019-03-25 RX ORDER — CEFAZOLIN SODIUM IN 0.9 % NACL 3 G/100 ML
3 INTRAVENOUS SOLUTION, PIGGYBACK (ML) INTRAVENOUS
Status: DISCONTINUED | OUTPATIENT
Start: 2019-03-25 | End: 2019-03-25 | Stop reason: HOSPADM

## 2019-03-25 RX ORDER — CHLORHEXIDINE GLUCONATE 0.12 MG/ML
15 RINSE ORAL
Status: COMPLETED | OUTPATIENT
Start: 2019-03-25 | End: 2019-03-25

## 2019-03-25 RX ORDER — HYDROMORPHONE HYDROCHLORIDE 1 MG/ML
0.5 INJECTION, SOLUTION INTRAMUSCULAR; INTRAVENOUS; SUBCUTANEOUS
Status: DISCONTINUED | OUTPATIENT
Start: 2019-03-25 | End: 2019-03-26 | Stop reason: HOSPADM

## 2019-03-25 RX ORDER — ACETAMINOPHEN 325 MG/1
650 TABLET ORAL EVERY 4 HOURS PRN
Status: DISCONTINUED | OUTPATIENT
Start: 2019-03-25 | End: 2019-03-26 | Stop reason: HOSPADM

## 2019-03-25 RX ORDER — SODIUM CHLORIDE, SODIUM LACTATE, POTASSIUM CHLORIDE, CALCIUM CHLORIDE 600; 310; 30; 20 MG/100ML; MG/100ML; MG/100ML; MG/100ML
75 INJECTION, SOLUTION INTRAVENOUS CONTINUOUS
Status: DISCONTINUED | OUTPATIENT
Start: 2019-03-25 | End: 2019-03-26 | Stop reason: HOSPADM

## 2019-03-25 RX ORDER — FENTANYL CITRATE 50 UG/ML
INJECTION, SOLUTION INTRAMUSCULAR; INTRAVENOUS AS NEEDED
Status: DISCONTINUED | OUTPATIENT
Start: 2019-03-25 | End: 2019-03-25 | Stop reason: SURG

## 2019-03-25 RX ORDER — BUPIVACAINE HYDROCHLORIDE AND EPINEPHRINE 5; 5 MG/ML; UG/ML
INJECTION, SOLUTION EPIDURAL; INTRACAUDAL; PERINEURAL AS NEEDED
Status: DISCONTINUED | OUTPATIENT
Start: 2019-03-25 | End: 2019-03-25 | Stop reason: HOSPADM

## 2019-03-25 RX ORDER — NALOXONE HCL 0.4 MG/ML
0.1 VIAL (ML) INJECTION
Status: DISCONTINUED | OUTPATIENT
Start: 2019-03-25 | End: 2019-03-26 | Stop reason: HOSPADM

## 2019-03-25 RX ORDER — FAMOTIDINE 10 MG/ML
20 INJECTION, SOLUTION INTRAVENOUS EVERY 12 HOURS SCHEDULED
Status: DISCONTINUED | OUTPATIENT
Start: 2019-03-25 | End: 2019-03-26 | Stop reason: HOSPADM

## 2019-03-25 RX ORDER — NALOXONE HCL 0.4 MG/ML
0.4 VIAL (ML) INJECTION
Status: DISCONTINUED | OUTPATIENT
Start: 2019-03-25 | End: 2019-03-26 | Stop reason: HOSPADM

## 2019-03-25 RX ORDER — MIDAZOLAM HYDROCHLORIDE 1 MG/ML
2 INJECTION INTRAMUSCULAR; INTRAVENOUS
Status: DISCONTINUED | OUTPATIENT
Start: 2019-03-25 | End: 2019-03-25 | Stop reason: HOSPADM

## 2019-03-25 RX ORDER — PROPOFOL 10 MG/ML
VIAL (ML) INTRAVENOUS AS NEEDED
Status: DISCONTINUED | OUTPATIENT
Start: 2019-03-25 | End: 2019-03-25 | Stop reason: SURG

## 2019-03-25 RX ORDER — LORAZEPAM 1 MG/1
1 TABLET ORAL EVERY 12 HOURS PRN
Status: DISCONTINUED | OUTPATIENT
Start: 2019-03-25 | End: 2019-03-26 | Stop reason: HOSPADM

## 2019-03-25 RX ORDER — ONDANSETRON 4 MG/1
4 TABLET, ORALLY DISINTEGRATING ORAL EVERY 4 HOURS PRN
Status: DISCONTINUED | OUTPATIENT
Start: 2019-03-25 | End: 2019-03-26 | Stop reason: HOSPADM

## 2019-03-25 RX ORDER — GLYCOPYRROLATE 0.2 MG/ML
INJECTION INTRAMUSCULAR; INTRAVENOUS AS NEEDED
Status: DISCONTINUED | OUTPATIENT
Start: 2019-03-25 | End: 2019-03-25 | Stop reason: SURG

## 2019-03-25 RX ORDER — PROMETHAZINE HYDROCHLORIDE 25 MG/1
25 TABLET ORAL ONCE AS NEEDED
Status: DISCONTINUED | OUTPATIENT
Start: 2019-03-25 | End: 2019-03-25 | Stop reason: HOSPADM

## 2019-03-25 RX ORDER — SCOLOPAMINE TRANSDERMAL SYSTEM 1 MG/1
1 PATCH, EXTENDED RELEASE TRANSDERMAL ONCE
Status: DISCONTINUED | OUTPATIENT
Start: 2019-03-25 | End: 2019-03-25 | Stop reason: HOSPADM

## 2019-03-25 RX ADMIN — FENTANYL CITRATE 50 MCG: 50 INJECTION, SOLUTION INTRAMUSCULAR; INTRAVENOUS at 15:16

## 2019-03-25 RX ADMIN — HYOSCYAMINE SULFATE 125 MCG: 0.12 TABLET, ORALLY DISINTEGRATING ORAL at 20:28

## 2019-03-25 RX ADMIN — SUCRALFATE 1 G: 1 SUSPENSION ORAL at 20:29

## 2019-03-25 RX ADMIN — ONDANSETRON 4 MG: 2 INJECTION INTRAMUSCULAR; INTRAVENOUS at 15:32

## 2019-03-25 RX ADMIN — KETOROLAC TROMETHAMINE 30 MG: 30 INJECTION, SOLUTION INTRAMUSCULAR; INTRAVENOUS at 15:34

## 2019-03-25 RX ADMIN — SODIUM CHLORIDE, POTASSIUM CHLORIDE, SODIUM LACTATE AND CALCIUM CHLORIDE 75 ML/HR: 600; 310; 30; 20 INJECTION, SOLUTION INTRAVENOUS at 17:18

## 2019-03-25 RX ADMIN — SODIUM CHLORIDE, POTASSIUM CHLORIDE, SODIUM LACTATE AND CALCIUM CHLORIDE 500 ML: 600; 310; 30; 20 INJECTION, SOLUTION INTRAVENOUS at 13:57

## 2019-03-25 RX ADMIN — HYDROMORPHONE HYDROCHLORIDE 0.25 MG: 1 INJECTION, SOLUTION INTRAMUSCULAR; INTRAVENOUS; SUBCUTANEOUS at 16:25

## 2019-03-25 RX ADMIN — METOCLOPRAMIDE 10 MG: 5 INJECTION, SOLUTION INTRAMUSCULAR; INTRAVENOUS at 14:05

## 2019-03-25 RX ADMIN — SUCRALFATE 1 G: 1 SUSPENSION ORAL at 17:15

## 2019-03-25 RX ADMIN — HYDROMORPHONE HYDROCHLORIDE 0.25 MG: 1 INJECTION, SOLUTION INTRAMUSCULAR; INTRAVENOUS; SUBCUTANEOUS at 16:16

## 2019-03-25 RX ADMIN — FENTANYL CITRATE 100 MCG: 50 INJECTION, SOLUTION INTRAMUSCULAR; INTRAVENOUS at 15:54

## 2019-03-25 RX ADMIN — HYDROMORPHONE HYDROCHLORIDE 0.5 MG: 1 INJECTION, SOLUTION INTRAMUSCULAR; INTRAVENOUS; SUBCUTANEOUS at 04:43

## 2019-03-25 RX ADMIN — NEOSTIGMINE METHYLSULFATE 2 MG: 1 INJECTION INTRAMUSCULAR; INTRAVENOUS; SUBCUTANEOUS at 15:33

## 2019-03-25 RX ADMIN — FENTANYL CITRATE 25 MCG: 50 INJECTION INTRAMUSCULAR; INTRAVENOUS at 16:15

## 2019-03-25 RX ADMIN — GLYCOPYRROLATE 0.3 MG: 0.2 INJECTION INTRAMUSCULAR; INTRAVENOUS at 15:33

## 2019-03-25 RX ADMIN — HYDROCODONE BITARTRATE AND ACETAMINOPHEN 1 TABLET: 7.5; 325 TABLET ORAL at 21:33

## 2019-03-25 RX ADMIN — FAMOTIDINE 20 MG: 10 INJECTION INTRAVENOUS at 20:29

## 2019-03-25 RX ADMIN — ROCURONIUM BROMIDE 30 MG: 10 INJECTION INTRAVENOUS at 14:59

## 2019-03-25 RX ADMIN — SCOPALAMINE 1 PATCH: 1 PATCH, EXTENDED RELEASE TRANSDERMAL at 14:02

## 2019-03-25 RX ADMIN — CEFAZOLIN 3 G: 1 INJECTION, POWDER, FOR SOLUTION INTRAMUSCULAR; INTRAVENOUS; PARENTERAL at 14:56

## 2019-03-25 RX ADMIN — SODIUM CHLORIDE, POTASSIUM CHLORIDE, SODIUM LACTATE AND CALCIUM CHLORIDE 100 ML/HR: 600; 310; 30; 20 INJECTION, SOLUTION INTRAVENOUS at 16:20

## 2019-03-25 RX ADMIN — FENTANYL CITRATE 50 MCG: 50 INJECTION, SOLUTION INTRAMUSCULAR; INTRAVENOUS at 15:36

## 2019-03-25 RX ADMIN — FAMOTIDINE 20 MG: 10 INJECTION INTRAVENOUS at 14:05

## 2019-03-25 RX ADMIN — SODIUM CHLORIDE, PRESERVATIVE FREE 3 ML: 5 INJECTION INTRAVENOUS at 20:30

## 2019-03-25 RX ADMIN — HYDROMORPHONE HYDROCHLORIDE 0.5 MG: 1 INJECTION, SOLUTION INTRAMUSCULAR; INTRAVENOUS; SUBCUTANEOUS at 17:16

## 2019-03-25 RX ADMIN — FENTANYL CITRATE 25 MCG: 50 INJECTION INTRAMUSCULAR; INTRAVENOUS at 16:45

## 2019-03-25 RX ADMIN — ZOLPIDEM TARTRATE 10 MG: 5 TABLET ORAL at 20:29

## 2019-03-25 RX ADMIN — HYDROMORPHONE HYDROCHLORIDE 0.5 MG: 1 INJECTION, SOLUTION INTRAMUSCULAR; INTRAVENOUS; SUBCUTANEOUS at 09:30

## 2019-03-25 RX ADMIN — CHLORHEXIDINE GLUCONATE 15 ML: 1.2 RINSE ORAL at 14:05

## 2019-03-25 RX ADMIN — PROPOFOL 200 MG: 10 INJECTION, EMULSION INTRAVENOUS at 14:59

## 2019-03-25 RX ADMIN — ATORVASTATIN CALCIUM 40 MG: 20 TABLET, FILM COATED ORAL at 20:28

## 2019-03-25 RX ADMIN — ENOXAPARIN SODIUM 130 MG: 150 INJECTION SUBCUTANEOUS at 17:17

## 2019-03-25 RX ADMIN — ONDANSETRON HYDROCHLORIDE 4 MG: 2 SOLUTION INTRAMUSCULAR; INTRAVENOUS at 17:16

## 2019-03-25 RX ADMIN — CARVEDILOL 12.5 MG: 12.5 TABLET, FILM COATED ORAL at 09:30

## 2019-03-25 RX ADMIN — FENTANYL CITRATE 25 MCG: 50 INJECTION INTRAMUSCULAR; INTRAVENOUS at 16:35

## 2019-03-25 RX ADMIN — CARVEDILOL 12.5 MG: 12.5 TABLET, FILM COATED ORAL at 17:15

## 2019-03-25 RX ADMIN — HYDROMORPHONE HYDROCHLORIDE 0.5 MG: 1 INJECTION, SOLUTION INTRAMUSCULAR; INTRAVENOUS; SUBCUTANEOUS at 20:29

## 2019-03-25 RX ADMIN — ONDANSETRON HYDROCHLORIDE 4 MG: 2 SOLUTION INTRAMUSCULAR; INTRAVENOUS at 04:43

## 2019-03-25 RX ADMIN — LIDOCAINE HYDROCHLORIDE 80 MG: 20 INJECTION, SOLUTION INFILTRATION; PERINEURAL at 14:59

## 2019-03-25 RX ADMIN — METOCLOPRAMIDE 10 MG: 5 INJECTION, SOLUTION INTRAMUSCULAR; INTRAVENOUS at 23:41

## 2019-03-25 RX ADMIN — FENTANYL CITRATE 25 MCG: 50 INJECTION INTRAMUSCULAR; INTRAVENOUS at 16:25

## 2019-03-25 RX ADMIN — FENTANYL CITRATE 50 MCG: 50 INJECTION, SOLUTION INTRAMUSCULAR; INTRAVENOUS at 14:59

## 2019-03-25 RX ADMIN — MIDAZOLAM 2 MG: 1 INJECTION INTRAMUSCULAR; INTRAVENOUS at 14:47

## 2019-03-25 NOTE — ANESTHESIA POSTPROCEDURE EVALUATION
Patient: Marisol Carrillo    Procedure Summary     Date:  03/25/19 Room / Location:  [facility] OR  / [facility] MAIN OR    Anesthesia Start:  1453 Anesthesia Stop:  1554    Procedure:  GASTRIC BANDING REMOVAL LAPAROSCOPIC (N/A Abdomen) Diagnosis:       Other dysphagia      Nausea and vomiting, intractability of vomiting not specified, unspecified vomiting type      Epigastric pain      (Other dysphagia [R13.19])      (Nausea and vomiting, intractability of vomiting not specified, unspecified vomiting type [R11.2])      (Epigastric pain [R10.13])    Surgeon:  Tu Hartman Jr., MD Provider:  Donte Draper MD    Anesthesia Type:  general ASA Status:  3          Anesthesia Type: general  Last vitals  BP   (!) 135/106 (03/25/19 1620)   Temp   37 °C (98.6 °F) (03/25/19 1550)   Pulse   75 (03/25/19 1620)   Resp   12 (03/25/19 1620)     SpO2   99 % (03/25/19 1620)     Post Anesthesia Care and Evaluation    Patient location during evaluation: bedside  Patient participation: complete - patient participated  Level of consciousness: awake and alert  Pain management: adequate  Airway patency: patent  Anesthetic complications: No anesthetic complications  PONV Status: controlled  Cardiovascular status: acceptable  Respiratory status: acceptable  Hydration status: acceptable

## 2019-03-25 NOTE — ANESTHESIA PREPROCEDURE EVALUATION
Anesthesia Evaluation     Patient summary reviewed   history of anesthetic complications: PONV               Airway   Mallampati: II  No difficulty expected  Dental      Pulmonary    (+) pulmonary embolism,   Cardiovascular     ECG reviewed  Rhythm: regular    (+) hypertension,       Neuro/Psych  GI/Hepatic/Renal/Endo    (+) obesity,   hypothyroidism,     Musculoskeletal     Abdominal    Substance History      OB/GYN          Other                        Anesthesia Plan    ASA 3     general     Anesthetic plan, all risks, benefits, and alternatives have been provided, discussed and informed consent has been obtained with: patient.  Use of blood products discussed with patient .

## 2019-03-25 NOTE — ANESTHESIA POSTPROCEDURE EVALUATION
Patient: Marisol Carrillo    Procedure Summary     Date:  03/25/19 Room / Location:  [facility] OR 04 / [facility] MAIN OR    Anesthesia Start:  1453 Anesthesia Stop:  1554    Procedure:  GASTRIC BANDING REMOVAL LAPAROSCOPIC (N/A Abdomen) Diagnosis:       Other dysphagia      Nausea and vomiting, intractability of vomiting not specified, unspecified vomiting type      Epigastric pain      (Other dysphagia [R13.19])      (Nausea and vomiting, intractability of vomiting not specified, unspecified vomiting type [R11.2])      (Epigastric pain [R10.13])    Surgeon:  Tu Hartman Jr., MD Provider:  Donte Draper MD    Anesthesia Type:  general ASA Status:  3          Anesthesia Type: general  Last vitals  BP   131/82 (03/25/19 1635)   Temp   37 °C (98.6 °F) (03/25/19 1550)   Pulse   55 (03/25/19 1635)   Resp   12 (03/25/19 1635)     SpO2   97 % (03/25/19 1635)     Post Anesthesia Care and Evaluation    Patient location during evaluation: bedside  Patient participation: complete - patient participated  Level of consciousness: awake and alert  Pain management: adequate  Airway patency: patent  Anesthetic complications: No anesthetic complications  PONV Status: controlled  Cardiovascular status: acceptable  Respiratory status: acceptable  Hydration status: acceptable

## 2019-03-25 NOTE — ANESTHESIA PROCEDURE NOTES
Airway  Urgency: elective    Airway not difficult    General Information and Staff    Patient location during procedure: OR  Anesthesiologist: Donte Draper MD    Indications and Patient Condition  Indications for airway management: airway protection    Preoxygenated: yes  Mask difficulty assessment: 1 - vent by mask    Final Airway Details  Final airway type: endotracheal airway      Successful airway: ETT  Cuffed: yes   Successful intubation technique: direct laryngoscopy  Endotracheal tube insertion site: oral  Blade: Vargas  Blade size: 2  ETT size (mm): 7.0  Cormack-Lehane Classification: grade I - full view of glottis  Placement verified by: chest auscultation   Number of attempts at approach: 1

## 2019-03-26 VITALS
HEIGHT: 64 IN | TEMPERATURE: 99.3 F | OXYGEN SATURATION: 94 % | RESPIRATION RATE: 16 BRPM | WEIGHT: 293 LBS | HEART RATE: 84 BPM | SYSTOLIC BLOOD PRESSURE: 135 MMHG | DIASTOLIC BLOOD PRESSURE: 82 MMHG | BODY MASS INDEX: 50.02 KG/M2

## 2019-03-26 LAB
ANION GAP SERPL CALCULATED.3IONS-SCNC: 11.4 MMOL/L
BASOPHILS # BLD AUTO: 0.02 10*3/MM3 (ref 0–0.2)
BASOPHILS NFR BLD AUTO: 0.2 % (ref 0–1.5)
BUN BLD-MCNC: 13 MG/DL (ref 6–20)
BUN/CREAT SERPL: 21 (ref 7–25)
CALCIUM SPEC-SCNC: 8.5 MG/DL (ref 8.6–10.5)
CHLORIDE SERPL-SCNC: 107 MMOL/L (ref 98–107)
CO2 SERPL-SCNC: 23.6 MMOL/L (ref 22–29)
CREAT BLD-MCNC: 0.62 MG/DL (ref 0.57–1)
DEPRECATED RDW RBC AUTO: 50.4 FL (ref 37–54)
EOSINOPHIL # BLD AUTO: 0.01 10*3/MM3 (ref 0–0.4)
EOSINOPHIL NFR BLD AUTO: 0.1 % (ref 0.3–6.2)
ERYTHROCYTE [DISTWIDTH] IN BLOOD BY AUTOMATED COUNT: 15.2 % (ref 12.3–15.4)
GFR SERPL CREATININE-BSD FRML MDRD: 103 ML/MIN/1.73
GLUCOSE BLD-MCNC: 114 MG/DL (ref 65–99)
HCT VFR BLD AUTO: 35.8 % (ref 34–46.6)
HGB BLD-MCNC: 10.9 G/DL (ref 12–15.9)
IMM GRANULOCYTES # BLD AUTO: 0.05 10*3/MM3 (ref 0–0.05)
IMM GRANULOCYTES NFR BLD AUTO: 0.4 % (ref 0–0.5)
LYMPHOCYTES # BLD AUTO: 1.18 10*3/MM3 (ref 0.7–3.1)
LYMPHOCYTES NFR BLD AUTO: 9.5 % (ref 19.6–45.3)
MCH RBC QN AUTO: 27.8 PG (ref 26.6–33)
MCHC RBC AUTO-ENTMCNC: 30.4 G/DL (ref 31.5–35.7)
MCV RBC AUTO: 91.3 FL (ref 79–97)
MONOCYTES # BLD AUTO: 1.44 10*3/MM3 (ref 0.1–0.9)
MONOCYTES NFR BLD AUTO: 11.6 % (ref 5–12)
NEUTROPHILS # BLD AUTO: 9.73 10*3/MM3 (ref 1.4–7)
NEUTROPHILS NFR BLD AUTO: 78.2 % (ref 42.7–76)
NRBC BLD AUTO-RTO: 0 /100 WBC (ref 0–0)
PLATELET # BLD AUTO: 321 10*3/MM3 (ref 140–450)
PMV BLD AUTO: 10.3 FL (ref 6–12)
POTASSIUM BLD-SCNC: 4.2 MMOL/L (ref 3.5–5.2)
RBC # BLD AUTO: 3.92 10*6/MM3 (ref 3.77–5.28)
SODIUM BLD-SCNC: 142 MMOL/L (ref 136–145)
WBC NRBC COR # BLD: 12.43 10*3/MM3 (ref 3.4–10.8)

## 2019-03-26 PROCEDURE — 25010000002 ENOXAPARIN PER 10 MG: Performed by: INTERNAL MEDICINE

## 2019-03-26 PROCEDURE — 94799 UNLISTED PULMONARY SVC/PX: CPT

## 2019-03-26 PROCEDURE — 25010000002 METOCLOPRAMIDE PER 10 MG: Performed by: SURGERY

## 2019-03-26 PROCEDURE — 85025 COMPLETE CBC W/AUTO DIFF WBC: CPT | Performed by: HOSPITALIST

## 2019-03-26 PROCEDURE — 80048 BASIC METABOLIC PNL TOTAL CA: CPT | Performed by: HOSPITALIST

## 2019-03-26 PROCEDURE — 93005 ELECTROCARDIOGRAM TRACING: CPT | Performed by: NURSE PRACTITIONER

## 2019-03-26 PROCEDURE — 93010 ELECTROCARDIOGRAM REPORT: CPT | Performed by: INTERNAL MEDICINE

## 2019-03-26 PROCEDURE — 99231 SBSQ HOSP IP/OBS SF/LOW 25: CPT | Performed by: NURSE PRACTITIONER

## 2019-03-26 RX ORDER — HYDROCODONE BITARTRATE AND ACETAMINOPHEN 7.5; 325 MG/1; MG/1
1 TABLET ORAL 4 TIMES DAILY PRN
Qty: 20 TABLET | Refills: 0 | Status: SHIPPED | OUTPATIENT
Start: 2019-03-26 | End: 2019-04-02

## 2019-03-26 RX ADMIN — PANTOPRAZOLE SODIUM 40 MG: 40 TABLET, DELAYED RELEASE ORAL at 06:41

## 2019-03-26 RX ADMIN — SODIUM CHLORIDE, POTASSIUM CHLORIDE, SODIUM LACTATE AND CALCIUM CHLORIDE 75 ML/HR: 600; 310; 30; 20 INJECTION, SOLUTION INTRAVENOUS at 02:49

## 2019-03-26 RX ADMIN — SODIUM CHLORIDE, PRESERVATIVE FREE 3 ML: 5 INJECTION INTRAVENOUS at 08:44

## 2019-03-26 RX ADMIN — CARVEDILOL 12.5 MG: 12.5 TABLET, FILM COATED ORAL at 08:43

## 2019-03-26 RX ADMIN — LEVOTHYROXINE SODIUM 88 MCG: 88 TABLET ORAL at 06:41

## 2019-03-26 RX ADMIN — FAMOTIDINE 20 MG: 10 INJECTION INTRAVENOUS at 08:43

## 2019-03-26 RX ADMIN — SUCRALFATE 1 G: 1 SUSPENSION ORAL at 06:41

## 2019-03-26 RX ADMIN — HYOSCYAMINE SULFATE 125 MCG: 0.12 TABLET, ORALLY DISINTEGRATING ORAL at 11:32

## 2019-03-26 RX ADMIN — AMLODIPINE BESYLATE: 5 TABLET ORAL at 08:42

## 2019-03-26 RX ADMIN — HYDROCODONE BITARTRATE AND ACETAMINOPHEN 1 TABLET: 7.5; 325 TABLET ORAL at 11:32

## 2019-03-26 RX ADMIN — ENOXAPARIN SODIUM 130 MG: 150 INJECTION SUBCUTANEOUS at 06:41

## 2019-03-26 RX ADMIN — METOCLOPRAMIDE 10 MG: 5 INJECTION, SOLUTION INTRAMUSCULAR; INTRAVENOUS at 11:32

## 2019-03-26 RX ADMIN — SUCRALFATE 1 G: 1 SUSPENSION ORAL at 11:32

## 2019-03-26 RX ADMIN — HYOSCYAMINE SULFATE 125 MCG: 0.12 TABLET, ORALLY DISINTEGRATING ORAL at 08:43

## 2019-03-26 RX ADMIN — METOCLOPRAMIDE 10 MG: 5 INJECTION, SOLUTION INTRAMUSCULAR; INTRAVENOUS at 06:41

## 2019-03-27 ENCOUNTER — READMISSION MANAGEMENT (OUTPATIENT)
Dept: CALL CENTER | Facility: HOSPITAL | Age: 49
End: 2019-03-27

## 2019-03-27 NOTE — OUTREACH NOTE
Prep Survey      Responses   Facility patient discharged from?  [place]   Is patient eligible?  Yes   Discharge diagnosis  Pulmonary embolism w/o cor pulmonale, s/p removal of lap band, s/p total prosthetic knee replacement left, dysphagia, N/V, epigastric pain, GERD, HTN, Hypothyroidism   Does the patient have one of the following disease processes/diagnoses(primary or secondary)?  General Surgery   Does the patient have Home health ordered?  No   Is there a DME ordered?  No   Comments regarding appointments  See AVS   Prep survey completed?  Yes          Lakisha Kang RN

## 2019-03-28 ENCOUNTER — READMISSION MANAGEMENT (OUTPATIENT)
Dept: CALL CENTER | Facility: HOSPITAL | Age: 49
End: 2019-03-28

## 2019-03-28 NOTE — OUTREACH NOTE
General Surgery Week 1 Survey      Responses   Facility patient discharged from?  [place]   Does the patient have one of the following disease processes/diagnoses(primary or secondary)?  General Surgery   Is there a successful TCM telephone encounter documented?  No   Week 1 attempt successful?  Yes   Call start time  0732   Call end time  0738   Is patient permission given to speak with other caregiver?  Yes   List who call center can speak with  -Nik   Meds reviewed with patient/caregiver?  Yes   Is the patient having any side effects they believe may be caused by any medication additions or changes?  No   Does the patient have all medications related to this admission filled (includes all antibiotics, pain medications, etc.)  Yes   Is the patient taking all medications as directed (includes completed medication regime)?  Yes   Does the patient have a follow up appointment scheduled with their surgeon?  No   What is preventing the patient from scheduling follow up appointments?  Waiting on return call   Nursing Interventions  Educated patient on importance of making appointment   Has the patient kept scheduled appointments due by today?  N/A   Comments  States is waiting for surgeon's office to call with appt.   Has home health visited the patient within 72 hours of discharge?  N/A   Psychosocial issues?  No   Psychosocial comments   is helping her.   Did the patient receive a copy of their discharge instructions?  Yes   Nursing interventions  Reviewed instructions with patient, Educated on MyChart [uses My Chart]   What is the patient's perception of their health status since discharge?  Improving   Nursing interventions  Nurse provided patient education   Is the patient /caregiver able to teach back basic post-op care?  Continue use of incentive spirometry at least 1 week post discharge, Practice 'cough and deep breath', Drive as instructed by MD in discharge instructions, Take showers  only when approved by MD-sponge bathe until then, No tub bath, swimming, or hot tub until instructed by MD, Keep incision areas clean,dry and protected, Do not remove steri-strips, Lifting as instructed by MD in discharge instructions   Is the patient/caregiver able to teach back signs and symptoms of incisional infection?  Increased redness, swelling or pain at the incisonal site, Increased drainage or bleeding, Incisional warmth, Pus or odor from incision, Fever   Is the patient/caregiver able to teach back steps to recovery at home?  Set small, achievable goals for return to baseline health, Rest and rebuild strength, gradually increase activity, Practice good oral hygiene, Eat a well-balance diet, Make a list of questions for surgeon's appointment   Is the patient/caregiver able to teach back the hierarchy of who to call/visit for symptoms/problems? PCP, Specialist, Home health nurse, Urgent Care, ED, 911  Yes   Week 1 call completed?  Yes   Wrap up additional comments  States is feeling much better. States incisions are clean, dry, intact without redness or swelling. Denies any problems today.          Yael Pruett RN

## 2019-03-31 RX ORDER — AMLODIPINE AND OLMESARTAN MEDOXOMIL 10; 40 MG/1; MG/1
1 TABLET ORAL DAILY
Qty: 30 TABLET | Refills: 1 | Status: SHIPPED | OUTPATIENT
Start: 2019-03-31 | End: 2019-04-16 | Stop reason: SDUPTHER

## 2019-04-01 RX ORDER — SUCRALFATE ORAL 1 G/10ML
1 SUSPENSION ORAL 4 TIMES DAILY
Qty: 400 ML | Refills: 0 | Status: SHIPPED | OUTPATIENT
Start: 2019-04-01 | End: 2019-04-04 | Stop reason: SDUPTHER

## 2019-04-02 ENCOUNTER — OFFICE VISIT (OUTPATIENT)
Dept: FAMILY MEDICINE CLINIC | Facility: CLINIC | Age: 49
End: 2019-04-02

## 2019-04-02 VITALS
WEIGHT: 269 LBS | HEART RATE: 94 BPM | OXYGEN SATURATION: 97 % | BODY MASS INDEX: 45.93 KG/M2 | RESPIRATION RATE: 18 BRPM | TEMPERATURE: 97.5 F | DIASTOLIC BLOOD PRESSURE: 70 MMHG | SYSTOLIC BLOOD PRESSURE: 110 MMHG | HEIGHT: 64 IN

## 2019-04-02 DIAGNOSIS — I26.99 OTHER ACUTE PULMONARY EMBOLISM WITHOUT ACUTE COR PULMONALE (HCC): ICD-10-CM

## 2019-04-02 DIAGNOSIS — R11.0 NAUSEA: ICD-10-CM

## 2019-04-02 DIAGNOSIS — Z09 HOSPITAL DISCHARGE FOLLOW-UP: Primary | ICD-10-CM

## 2019-04-02 DIAGNOSIS — R53.83 TIRED: ICD-10-CM

## 2019-04-02 DIAGNOSIS — R10.10 PAIN OF UPPER ABDOMEN: ICD-10-CM

## 2019-04-02 PROCEDURE — 99214 OFFICE O/P EST MOD 30 MIN: CPT | Performed by: PHYSICIAN ASSISTANT

## 2019-04-02 RX ORDER — OMEPRAZOLE 40 MG/1
40 CAPSULE, DELAYED RELEASE ORAL 2 TIMES DAILY
Qty: 60 CAPSULE | Refills: 5 | Status: SHIPPED | OUTPATIENT
Start: 2019-04-02 | End: 2019-10-18

## 2019-04-02 NOTE — PATIENT INSTRUCTIONS
Low glycemic index diet  Exercise 30 minutes most days of the week  Make sure you get results on any labs or tests we ordered today  We discussed medications and how to take them as prescribed  Sleep 6-8 hours each night if possible  If you have not signed up for [email or web address], please activate your code ASAP from your After Visit Summary today    LDL goal <100  LDL goal if heart disease <70  HDL goal >60  Triglyceride goal <150  BP goal =<130/80  Fasting glucose <100

## 2019-04-02 NOTE — PROGRESS NOTES
Transitional Care Follow Up Visit  Subjective     Marisol Carrillo is a 49 y.o. female who presents for a transitional care management visit.    Within 48 business hours after discharge our office contacted her via telephone to coordinate her care and needs.      I reviewed and discussed the details of that call along with the discharge summary, hospital problems, inpatient lab results, inpatient diagnostic studies, and consultation reports with Marisol.     Current outpatient and discharge medications have been reconciled for the patient.    No flowsheet data found.  Risk for Readmission (LACE) Score: 7 (3/26/2019  6:00 AM)      History of Present Illness   Course During Hospital Stay:  3-21-19 to 3-26-19  She was inpatient for abd pain and PE  I know her lapband was removed while inpatient and no help with her burning stabbing epigastric pain that radiates bubble feeling to her back.  She had EGD March with Dr BOBO Mcintyre.  She has patches of Medrano's Esophagus noted.  Not taking her PPI daily; She did start the Carafate Dr Hartman Rx last PM--liquid form.  This is helping some!!      Hospital Course: Marisol Carrillo  is a 49-year-old female with complicated past medical history remarkable of which is morbid obesity requiring lap band placement in 2012.  According the patient she has been unsettled since then and has been having off-and-on abdominal pain for which various workup has been done.  Eventually in December 2017 she was recommended to have cholecystectomy which was performed without any resolution of her symptoms.  She has been struggling with these discomfort and has been through various gastroenterologist and workups in the past.  She also has significant osteoarthritis and did undergo elective left total knee replacement by  at [facility] on 3/1/2019.  According the patient a week afterwards she started having increasing abdominal discomfort as well as shortness of breath and was  completely unsettled.  She did go to the [facility] emergency room a week ago and had extensive workup done including CT scan of her chest and no acute abnormality was found.  She was then seen again by her orthopedic surgery service and because of the continued symptoms she was referred to gastroenterology service and in fact and had an EGD by Dr. Anabella Mcintyre on 3/19/2019 which revealed no acute abnormality except for Medrano's esophagus and esophagitis and gastritis.  Her lab and was not noted to be eroding.  She was continued on Prilosec and Carafate.  Because of her persistent symptoms she came back to the emergency room and was found to have small pulmonary embolism to explain her respiratory symptoms.  She is being admitted for complete anticoagulation and also for evaluation of continued abdominal symptoms.  Patient had spoken to her bariatric surgeon Dr. Hartman who apparently has seen her earlier in the emergency room.  Patient denies any hematemesis or melena.         The patient was admitted to the hospital and seen by bariatric surgery, cardiology and pulmonary.  The patient was treated with Lovenox for PE and was seen by bariatric surgery who decided to remove the lap band.  After removing the lap band the patient looked well enough to go home and she will resume her Eliquis for anticoagulation at increased dose of 5 mg twice daily and she will need to continue with this for probably 6-12 months.  She will follow-up with her primary care physician in 1 week for continuing care and also follow-up with bariatric surgery for postop check    DR Hartman also lysed adhesions from lapband    Saw cardio and had stress echo--Dr Cohen--neg  pulm Dr Diaz noted this:  No need for follow-up imaging on her PE which is small and provoked by recent surgery and immobilization.  I discussed that with the patient  Sierra Diaz MD    NO DVT with leg doppler---she is on Eliquis--Dr Coley still wants  hematology to see her and make sure no concern about future VTE or clotting d/o and duration of Eliquis.        03/23/19  2:10 PM    Small node left side neck  Still low energy;  Some SOA still since inpatient----tire easily; NOT worse  Add back the PPI and do BID---this helped so much when BID     Still nausea--try up to 8mg zofran  The following portions of the patient's history were reviewed and updated as appropriate: allergies, current medications, past family history, past medical history, past social history, past surgical history and problem list.    Review of Systems   Constitutional: Positive for activity change, appetite change, fatigue and unexpected weight change.   HENT: Negative for nosebleeds and trouble swallowing.    Eyes: Negative for pain and visual disturbance.   Respiratory: Positive for shortness of breath. Negative for chest tightness and wheezing.    Cardiovascular: Negative for chest pain and palpitations.   Gastrointestinal: Positive for abdominal pain and nausea. Negative for blood in stool.   Endocrine: Positive for cold intolerance.   Genitourinary: Positive for decreased urine volume. Negative for difficulty urinating and hematuria.   Musculoskeletal: Positive for back pain. Negative for joint swelling.   Skin: Negative for color change and rash.   Allergic/Immunologic: Negative.    Neurological: Negative for syncope and speech difficulty.   Hematological: Negative for adenopathy.   Psychiatric/Behavioral: Positive for decreased concentration, dysphoric mood and sleep disturbance. Negative for agitation and confusion. The patient is nervous/anxious.    All other systems reviewed and are negative.      Objective   Physical Exam   Constitutional: She is oriented to person, place, and time. She appears well-developed and well-nourished. No distress.   HENT:   Head: Normocephalic and atraumatic.   Eyes: Conjunctivae and EOM are normal. Pupils are equal, round, and reactive to light. Right  eye exhibits no discharge. Left eye exhibits no discharge. No scleral icterus.   Neck: Normal range of motion. Neck supple. No tracheal deviation present. No thyromegaly present.   Cardiovascular: Normal rate, regular rhythm, normal heart sounds, intact distal pulses and normal pulses. Exam reveals no gallop.   No murmur heard.  Pulmonary/Chest: Effort normal and breath sounds normal. No respiratory distress. She has no wheezes. She has no rales.   Abdominal: Soft. Bowel sounds are normal. There is tenderness (epigastric).   Musculoskeletal: Normal range of motion.   Lymphadenopathy:     She has cervical adenopathy (resolving left cervial node left).   Neurological: She is alert and oriented to person, place, and time. She exhibits normal muscle tone. Coordination normal.   Skin: Skin is warm. No rash noted. No pallor.   abd incisions healing well     Psychiatric: She has a normal mood and affect. Her behavior is normal. Judgment and thought content normal.   Nursing note and vitals reviewed.      Assessment/Plan   Marisol was seen today for gi problem.    Diagnoses and all orders for this visit:    Hospital discharge follow-up    Pain of upper abdomen    Other acute pulmonary embolism without acute cor pulmonale (CMS/HCC)  -     Ambulatory Referral to Hematology / Oncology    Nausea    Tired    Other orders  -     omeprazole (priLOSEC) 40 MG capsule; Take 1 capsule by mouth 2 (Two) Times a Day. For Medrano's and GERD      EGD March with Medrano's    Saw cardio and had stress echo--Dr Cohen--neg  pulm Dr Diaz noted this:  No need for follow-up imaging on her PE which is small and provoked by recent surgery and immobilization.  I discussed that with the patient  Sierra Diaz MD    NO DVT with leg doppler---she is on Eliquis--Dr Coley still wants hematology to see her and make sure no concern about future VTE or clotting d/o and duration of Eliquis.      small node left side neck  Still low energy;  Some  SOA still since inpatient----tire easily; NOT worse  Add back the PPI and do BID---this helped so much when BID     Still nausea--try up to 8mg zofran

## 2019-04-04 ENCOUNTER — READMISSION MANAGEMENT (OUTPATIENT)
Dept: CALL CENTER | Facility: HOSPITAL | Age: 49
End: 2019-04-04

## 2019-04-04 ENCOUNTER — OFFICE VISIT (OUTPATIENT)
Dept: BARIATRICS/WEIGHT MGMT | Facility: CLINIC | Age: 49
End: 2019-04-04

## 2019-04-04 VITALS
DIASTOLIC BLOOD PRESSURE: 79 MMHG | RESPIRATION RATE: 18 BRPM | HEIGHT: 64 IN | TEMPERATURE: 98.1 F | SYSTOLIC BLOOD PRESSURE: 122 MMHG | HEART RATE: 95 BPM | BODY MASS INDEX: 47.63 KG/M2 | WEIGHT: 279 LBS

## 2019-04-04 DIAGNOSIS — E78.01 ESSENTIAL FAMILIAL HYPERCHOLESTEROLEMIA: ICD-10-CM

## 2019-04-04 DIAGNOSIS — I10 ESSENTIAL HYPERTENSION: ICD-10-CM

## 2019-04-04 DIAGNOSIS — Z98.84 HISTORY OF REMOVAL OF LAPAROSCOPIC GASTRIC BANDING DEVICE: Primary | ICD-10-CM

## 2019-04-04 DIAGNOSIS — R53.83 FATIGUE, UNSPECIFIED TYPE: ICD-10-CM

## 2019-04-04 DIAGNOSIS — L76.82 INCISIONAL PAIN: ICD-10-CM

## 2019-04-04 PROBLEM — R10.13 EPIGASTRIC PAIN: Status: RESOLVED | Noted: 2017-08-30 | Resolved: 2019-04-04

## 2019-04-04 PROBLEM — R11.2 NAUSEA AND VOMITING: Status: RESOLVED | Noted: 2019-03-21 | Resolved: 2019-04-04

## 2019-04-04 PROCEDURE — 99024 POSTOP FOLLOW-UP VISIT: CPT | Performed by: NURSE PRACTITIONER

## 2019-04-04 RX ORDER — SUCRALFATE ORAL 1 G/10ML
1 SUSPENSION ORAL 4 TIMES DAILY
Qty: 1200 ML | Refills: 1 | Status: SHIPPED | OUTPATIENT
Start: 2019-04-04 | End: 2019-05-07 | Stop reason: SDUPTHER

## 2019-04-04 NOTE — PROGRESS NOTES
[facility]IATRIC SURGERY  [address]  [facility] [id number] [phone]  [address]  [facility] [id number] [phone]  Dept: [phone]  4/4/2019      Marisol Carrillo.  [id number]  [id number]  1970  female      Chief Complaint   Patient presents with   • Follow-up     1 week post op band removal       BH Post-Op Bariatric Surgery:   Marisol Carrillo is status post laparopscopic Laparoscopic Band Removal procedure, performed on 3/25/19.     HPI:   Today's weight is 127 kg (279 lb) pounds, today's BMI is Body mass index is 48.22 kg/m².,  Marisol Carrillo denies nausea, vomiting, dysphagia, or heartburn. The patient c/o post-op pain that is improving. she is doing well with protein and water intake so far. Taking their vitamins, walking and using IS. Denies fevers, chills, chest pain or shortness of air.      Diet and Exercise: Diet history reviewed and discussed with the patient. Weight loss/gains to date discussed with the patient. No carbonated beverage consumption and exercising regularly- walking frequently.   Supplements: multivitamins, B-12, calcium, iron, B-1 and Vitamin D.     Review of Systems   Constitutional: Positive for appetite change. Negative for fatigue and unexpected weight change.   HENT: Negative.    Eyes: Negative.    Respiratory: Negative.    Cardiovascular: Negative.  Negative for leg swelling.   Gastrointestinal: Negative for abdominal distention, abdominal pain, constipation, diarrhea, nausea and vomiting.   Genitourinary: Negative for difficulty urinating, frequency and urgency.   Musculoskeletal: Negative for back pain.   Skin: Negative.    Psychiatric/Behavioral: Negative.    All other systems reviewed and are negative.      Patient Active Problem List   Diagnosis   • Essential familial hypercholesterolemia   • Hypertension   • Hypothyroidism   • Adiposity   • Vitamin deficiency   • Impaired fasting  glucose   • Vitamin D deficiency   • Lumbosacral radiculopathy   • Gastroesophageal reflux disease   • Constipation   • Diarrhea   • Fatigue   • Heartburn   • Lumbar disc herniation with radiculopathy   • Pain in extremity   • Anxiety   • Depression   • Degeneration of lumbar intervertebral disc   • Spinal headache   • Chronic bilateral low back pain with bilateral sciatica   • Disc disease, degenerative, lumbar or lumbosacral   • Primary localized osteoarthritis of left knee   • History of spinal fusion   • Lumbar spondylolysis   • Chronic pain of left knee   • Pulmonary embolus (CMS/HCC)   • S/P total prosthetic knee replacement not using cement, left   • Other dysphagia   • History of removal of laparoscopic gastric banding device   • Incisional pain       The following portions of the patient's history were reviewed and updated as appropriate: allergies, current medications, past family history, past medical history, past social history, past surgical history and problem list.    Vitals:    04/04/19 1004   BP: 122/79   Pulse: 95   Resp: 18   Temp: 98.1 °F (36.7 °C)       Physical Exam   Constitutional: She appears well-developed and well-nourished.   Neck: No thyromegaly present.   Cardiovascular: Normal rate, regular rhythm and normal heart sounds.   Pulmonary/Chest: Effort normal and breath sounds normal. No respiratory distress. She has no wheezes.   Abdominal: Soft. Bowel sounds are normal. She exhibits no distension. There is no tenderness. There is no guarding. No hernia.   Musculoskeletal: She exhibits no edema or tenderness.   Neurological: She is alert.   Skin: Skin is warm and dry. No rash noted. No erythema.   Psychiatric: She has a normal mood and affect. Her behavior is normal.   Nursing note and vitals reviewed.      Assessment:   Post-op, the patient is doing well.     Encounter Diagnoses   Name Primary?   • History of removal of laparoscopic gastric banding device Yes   • Fatigue, unspecified  type    • Incisional pain    • Essential familial hypercholesterolemia    • Essential hypertension        Plan:   Reviewed with patient the importance of following the manual for diet progression. Increase activity as tolerated. Continue increasing daily intake of protein and water.   Return to work: the patient is to return to 3 weeks from their surgery date with no restrictions unless they develop medical problems in which we will see them back in the office. They received a note in our office today with their return to work date.  Activity restrictions: no lifting, pushing or pulling over 25lbs for 3 weeks.   Recommended patient be sure to get at least 70 grams of protein per day. Discussed with the patient the recommended amount of water per day to intake. Reviewed vitamin requirements. Be sure to do routine exercise and increase activity as tolerated. No asa, nsaids or steroids for 8 weeks. Patient may use miralax as needed if necessary.     Instructions / Recommendations: dietary counseling recommended, recommended a daily protein intake of  grams, vitamin supplement(s) recommended, recommended exercising at least 150 minutes per week, behavior modifications recommended and instructed to call the office for concerns, questions, or problems.     The patient was instructed to follow up at one month follow up appt.     The patient was counseled regarding post op bariatric manual

## 2019-04-04 NOTE — OUTREACH NOTE
General Surgery Week 2 Survey      Responses   Facility patient discharged from?  [place]   Does the patient have one of the following disease processes/diagnoses(primary or secondary)?  General Surgery   Week 2 attempt successful?  Yes   Call start time  1410   Discharge diagnosis  Pulmonary embolism w/o cor pulmonale, s/p removal of lap band, s/p total prosthetic knee replacement left, dysphagia, N/V, epigastric pain, GERD, HTN, Hypothyroidism   Meds reviewed with patient/caregiver?  Yes   Is the patient having any side effects they believe may be caused by any medication additions or changes?  No   Does the patient have all medications related to this admission filled (includes all antibiotics, pain medications, etc.)  Yes   Is the patient taking all medications as directed (includes completed medication regime)?  Yes   Does the patient have a follow up appointment scheduled with their surgeon?  Yes   Has the patient kept scheduled appointments due by today?  Yes   Has home health visited the patient within 72 hours of discharge?  N/A   Psychosocial issues?  No   Did the patient receive a copy of their discharge instructions?  Yes   Nursing interventions  Reviewed instructions with patient   What is the patient's perception of their health status since discharge?  Improving   Is the patient /caregiver able to teach back basic post-op care?  Continue use of incentive spirometry at least 1 week post discharge, Practice 'cough and deep breath', Drive as instructed by MD in discharge instructions, Take showers only when approved by MD-sponge bathe until then, No tub bath, swimming, or hot tub until instructed by MD, Keep incision areas clean,dry and protected, Do not remove steri-strips, Lifting as instructed by MD in discharge instructions   Is the patient/caregiver able to teach back signs and symptoms of incisional infection?  Increased redness, swelling or pain at the incisonal site, Increased drainage or  bleeding, Incisional warmth, Pus or odor from incision, Fever   Is the patient/caregiver able to teach back steps to recovery at home?  Set small, achievable goals for return to baseline health, Rest and rebuild strength, gradually increase activity, Practice good oral hygiene, Eat a well-balance diet, Make a list of questions for surgeon's appointment   Is the patient/caregiver able to teach back the hierarchy of who to call/visit for symptoms/problems? PCP, Specialist, Home health nurse, Urgent Care, ED, 911  Yes   Week 2 call completed?  Yes          Cele Keene, RN

## 2019-04-11 ENCOUNTER — READMISSION MANAGEMENT (OUTPATIENT)
Dept: CALL CENTER | Facility: HOSPITAL | Age: 49
End: 2019-04-11

## 2019-04-11 NOTE — OUTREACH NOTE
General Surgery Week 3 Survey      Responses   Facility patient discharged from?  [place]   Does the patient have one of the following disease processes/diagnoses(primary or secondary)?  General Surgery   Week 3 attempt successful?  No   Unsuccessful attempts  Attempt 1          Daisy Spear RN

## 2019-04-12 ENCOUNTER — READMISSION MANAGEMENT (OUTPATIENT)
Dept: CALL CENTER | Facility: HOSPITAL | Age: 49
End: 2019-04-12

## 2019-04-12 NOTE — OUTREACH NOTE
General Surgery Week 3 Survey      Responses   Facility patient discharged from?  [facility]   Does the patient have one of the following disease processes/diagnoses(primary or secondary)?  General Surgery   Week 3 attempt successful?  Yes   Call start time  [id number]   Call end time  0815   Meds reviewed with patient/caregiver?  Yes   Is the patient having any side effects they believe may be caused by any medication additions or changes?  Yes   Side effects comments   diarrhea from eliquis every couple hours with blood in diarrhea last few days told to call PCP is very fatigued, she has already contacted PCP this morning she states.    Is the patient taking all medications as directed (includes completed medication regime)?  Yes   Medication comments  2.5 mg eliquis is what is being taken as prescribed for her knee surgery was prescribed 5 mg   Has the patient kept scheduled appointments due by today?  Yes   Comments  diarrhea, inflammed anus and irritation from diarrhea, blood in stool, told needs to be seen, she has contacted PCP today   What is the patient's perception of their health status since discharge?  Worsening   Additional teach back comments  Nurse told her she needs to be seen by PCP or go to ER for blood in her stool on eliquis   Week 3 call completed?  Yes   Wrap up additional comments  She is having diarrhea and has blood in stool, is on eliquis, told her she needs to be seen, then she states blood is not great amt. not sure is from inflammed anus or bleeding in diarrhea, told she needs to be seen by PCP or Er today.           Daisy Spear RN

## 2019-04-16 ENCOUNTER — OFFICE VISIT (OUTPATIENT)
Dept: FAMILY MEDICINE CLINIC | Facility: CLINIC | Age: 49
End: 2019-04-16

## 2019-04-16 VITALS
WEIGHT: 279 LBS | RESPIRATION RATE: 16 BRPM | HEIGHT: 70 IN | TEMPERATURE: 98.7 F | BODY MASS INDEX: 39.94 KG/M2 | SYSTOLIC BLOOD PRESSURE: 110 MMHG | HEART RATE: 88 BPM | DIASTOLIC BLOOD PRESSURE: 72 MMHG | OXYGEN SATURATION: 97 %

## 2019-04-16 DIAGNOSIS — R73.01 IMPAIRED FASTING GLUCOSE: ICD-10-CM

## 2019-04-16 DIAGNOSIS — I10 ESSENTIAL HYPERTENSION: ICD-10-CM

## 2019-04-16 DIAGNOSIS — Z12.31 ENCOUNTER FOR SCREENING MAMMOGRAM FOR BREAST CANCER: ICD-10-CM

## 2019-04-16 DIAGNOSIS — I26.99 OTHER ACUTE PULMONARY EMBOLISM WITHOUT ACUTE COR PULMONALE (HCC): Primary | ICD-10-CM

## 2019-04-16 DIAGNOSIS — K21.00 GASTROESOPHAGEAL REFLUX DISEASE WITH ESOPHAGITIS: ICD-10-CM

## 2019-04-16 DIAGNOSIS — E03.9 ACQUIRED HYPOTHYROIDISM: ICD-10-CM

## 2019-04-16 DIAGNOSIS — F41.9 ANXIETY: ICD-10-CM

## 2019-04-16 PROCEDURE — 99214 OFFICE O/P EST MOD 30 MIN: CPT | Performed by: PHYSICIAN ASSISTANT

## 2019-04-16 RX ORDER — CARVEDILOL 12.5 MG/1
12.5 TABLET ORAL 2 TIMES DAILY WITH MEALS
Qty: 60 TABLET | Refills: 5 | Status: SHIPPED | OUTPATIENT
Start: 2019-04-16 | End: 2019-10-18 | Stop reason: SDUPTHER

## 2019-04-16 RX ORDER — HYDROXYZINE HYDROCHLORIDE 25 MG/1
25 TABLET, FILM COATED ORAL 3 TIMES DAILY PRN
Qty: 60 TABLET | Refills: 5 | Status: SHIPPED | OUTPATIENT
Start: 2019-04-16 | End: 2019-10-18 | Stop reason: SDUPTHER

## 2019-04-16 RX ORDER — AMLODIPINE AND OLMESARTAN MEDOXOMIL 10; 40 MG/1; MG/1
1 TABLET ORAL DAILY
Qty: 30 TABLET | Refills: 5 | Status: SHIPPED | OUTPATIENT
Start: 2019-04-16 | End: 2019-10-18 | Stop reason: SDUPTHER

## 2019-04-16 RX ORDER — AMLODIPINE AND OLMESARTAN MEDOXOMIL 10; 40 MG/1; MG/1
1 TABLET ORAL DAILY
Qty: 30 TABLET | Refills: 1 | Status: SHIPPED | OUTPATIENT
Start: 2019-04-16 | End: 2019-04-16 | Stop reason: SDUPTHER

## 2019-04-16 RX ORDER — ATORVASTATIN CALCIUM 40 MG/1
40 TABLET, FILM COATED ORAL NIGHTLY
Qty: 30 TABLET | Refills: 11 | Status: SHIPPED | OUTPATIENT
Start: 2019-04-16 | End: 2019-12-05 | Stop reason: SDUPTHER

## 2019-04-16 RX ORDER — LEVOTHYROXINE SODIUM 88 UG/1
88 TABLET ORAL DAILY
Qty: 30 TABLET | Refills: 5 | Status: SHIPPED | OUTPATIENT
Start: 2019-04-16 | End: 2019-10-18 | Stop reason: SDUPTHER

## 2019-04-16 NOTE — PROGRESS NOTES
Subjective   Marisol Carrillo is a 49 y.o. female.     History of Present Illness   Marisol Carrillo 49 y.o. female who presents today for routine follow up check and medication refills.  she has a history of   Patient Active Problem List   Diagnosis   • Essential familial hypercholesterolemia   • Hypertension   • Hypothyroidism   • Adiposity   • Vitamin deficiency   • Impaired fasting glucose   • Vitamin D deficiency   • Lumbosacral radiculopathy   • Gastroesophageal reflux disease   • Constipation   • Diarrhea   • Fatigue   • Heartburn   • Lumbar disc herniation with radiculopathy   • Pain in extremity   • Anxiety   • Depression   • Degeneration of lumbar intervertebral disc   • Spinal headache   • Chronic bilateral low back pain with bilateral sciatica   • Disc disease, degenerative, lumbar or lumbosacral   • Primary localized osteoarthritis of left knee   • History of spinal fusion   • Lumbar spondylolysis   • Chronic pain of left knee   • Pulmonary embolus (CMS/HCC)   • S/P total prosthetic knee replacement not using cement, left   • Other dysphagia   • History of removal of laparoscopic gastric banding device   • Incisional pain   .  Since the last visit, she has overall felt tired.  She has Hypertenision and is well controlled on medication, Impaired fasting glucose and will continue close lab follow up to watch for DMII, Hyperlipidemia and is well controlled on medication, Hypothyroidism and is well controlled on Rx.  Labs are in desired treatment range and Vitamin D deficiency and will update labs to confirm level is at goal >30.  she has been compliant with current medications have reviewed them.  The patient denies medication side effects.  She was NOT taking Eliquis when she was Dx PE---off of this 12 days prior  To CTA chest    Results for orders placed or performed during the hospital encounter of 03/21/19   Comprehensive Metabolic Panel   Result Value Ref Range    Glucose 98 65 - 99 mg/dL    BUN 11 6  - 20 mg/dL    Creatinine 0.71 0.57 - 1.00 mg/dL    Sodium 142 136 - 145 mmol/L    Potassium 4.1 3.5 - 5.2 mmol/L    Chloride 103 98 - 107 mmol/L    CO2 21.2 (L) 22.0 - 29.0 mmol/L    Calcium 10.1 8.6 - 10.5 mg/dL    Total Protein 8.3 6.0 - 8.5 g/dL    Albumin 4.40 3.50 - 5.20 g/dL    ALT (SGPT) 19 1 - 33 U/L    AST (SGOT) 22 1 - 32 U/L    Alkaline Phosphatase 110 39 - 117 U/L    Total Bilirubin 0.4 0.2 - 1.2 mg/dL    eGFR Non African Amer 88 >60 mL/min/1.73    Globulin 3.9 gm/dL    A/G Ratio 1.1 g/dL    BUN/Creatinine Ratio 15.5 7.0 - 25.0    Anion Gap 17.8 mmol/L   Lipase   Result Value Ref Range    Lipase 12 (L) 13 - 60 U/L   Urinalysis With Microscopic If Indicated (No Culture) - Urine, Clean Catch   Result Value Ref Range    Color, UA Yellow Yellow, Straw    Appearance, UA Clear Clear    pH, UA 6.5 5.0 - 8.0    Specific Gravity, UA 1.025 1.005 - 1.030    Glucose, UA Negative Negative    Ketones, UA 40 mg/dL (2+) (A) Negative    Bilirubin, UA Negative Negative    Blood, UA Negative Negative    Protein, UA Negative Negative    Leuk Esterase, UA Negative Negative    Nitrite, UA Negative Negative    Urobilinogen, UA 0.2 E.U./dL 0.2 - 1.0 E.U./dL   hCG, Serum, Qualitative   Result Value Ref Range    HCG Qualitative Negative Negative   Troponin   Result Value Ref Range    Troponin T <0.010 0.000 - 0.030 ng/mL   CBC Auto Differential   Result Value Ref Range    WBC 8.39 3.40 - 10.80 10*3/mm3    RBC 4.78 3.77 - 5.28 10*6/mm3    Hemoglobin 13.3 12.0 - 15.9 g/dL    Hematocrit 43.3 34.0 - 46.6 %    MCV 90.6 79.0 - 97.0 fL    MCH 27.8 26.6 - 33.0 pg    MCHC 30.7 (L) 31.5 - 35.7 g/dL    RDW 15.1 12.3 - 15.4 %    RDW-SD 50.0 37.0 - 54.0 fl    MPV 9.4 6.0 - 12.0 fL    Platelets 494 (H) 140 - 450 10*3/mm3    Neutrophil % 74.7 42.7 - 76.0 %    Lymphocyte % 16.3 (L) 19.6 - 45.3 %    Monocyte % 8.0 5.0 - 12.0 %    Eosinophil % 0.1 (L) 0.3 - 6.2 %    Basophil % 0.7 0.0 - 1.5 %    Immature Grans % 0.2 0.0 - 0.5 %    Neutrophils,  Absolute 6.26 1.40 - 7.00 10*3/mm3    Lymphocytes, Absolute 1.37 0.70 - 3.10 10*3/mm3    Monocytes, Absolute 0.67 0.10 - 0.90 10*3/mm3    Eosinophils, Absolute 0.01 0.00 - 0.40 10*3/mm3    Basophils, Absolute 0.06 0.00 - 0.20 10*3/mm3    Immature Grans, Absolute 0.02 0.00 - 0.05 10*3/mm3    nRBC 0.0 0.0 - 0.0 /100 WBC   CBC Auto Differential   Result Value Ref Range    WBC 7.27 3.40 - 10.80 10*3/mm3    RBC 4.03 3.77 - 5.28 10*6/mm3    Hemoglobin 11.1 (L) 12.0 - 15.9 g/dL    Hematocrit 36.3 34.0 - 46.6 %    MCV 90.1 79.0 - 97.0 fL    MCH 27.5 26.6 - 33.0 pg    MCHC 30.6 (L) 31.5 - 35.7 g/dL    RDW 14.8 12.3 - 15.4 %    RDW-SD 48.4 37.0 - 54.0 fl    MPV 9.3 6.0 - 12.0 fL    Platelets 437 140 - 450 10*3/mm3    Neutrophil % 73.7 42.7 - 76.0 %    Lymphocyte % 17.2 (L) 19.6 - 45.3 %    Monocyte % 8.4 5.0 - 12.0 %    Eosinophil % 0.0 (L) 0.3 - 6.2 %    Basophil % 0.4 0.0 - 1.5 %    Immature Grans % 0.3 0.0 - 0.5 %    Neutrophils, Absolute 5.36 1.40 - 7.00 10*3/mm3    Lymphocytes, Absolute 1.25 0.70 - 3.10 10*3/mm3    Monocytes, Absolute 0.61 0.10 - 0.90 10*3/mm3    Eosinophils, Absolute 0.00 0.00 - 0.40 10*3/mm3    Basophils, Absolute 0.03 0.00 - 0.20 10*3/mm3    Immature Grans, Absolute 0.02 0.00 - 0.05 10*3/mm3    nRBC 0.0 0.0 - 0.0 /100 WBC   Comprehensive Metabolic Panel   Result Value Ref Range    Glucose 107 (H) 65 - 99 mg/dL    BUN 10 6 - 20 mg/dL    Creatinine 0.57 0.57 - 1.00 mg/dL    Sodium 140 136 - 145 mmol/L    Potassium 4.1 3.5 - 5.2 mmol/L    Chloride 104 98 - 107 mmol/L    CO2 22.7 22.0 - 29.0 mmol/L    Calcium 9.0 8.6 - 10.5 mg/dL    Total Protein 6.6 6.0 - 8.5 g/dL    Albumin 3.50 3.50 - 5.20 g/dL    ALT (SGPT) 18 1 - 33 U/L    AST (SGOT) 18 1 - 32 U/L    Alkaline Phosphatase 83 39 - 117 U/L    Total Bilirubin 0.4 0.2 - 1.2 mg/dL    eGFR Non African Amer 113 >60 mL/min/1.73    Globulin 3.1 gm/dL    A/G Ratio 1.1 g/dL    BUN/Creatinine Ratio 17.5 7.0 - 25.0    Anion Gap 13.3 mmol/L   Troponin   Result  Value Ref Range    Troponin T <0.010 0.000 - 0.030 ng/mL   Comprehensive Metabolic Panel   Result Value Ref Range    Glucose 110 (H) 65 - 99 mg/dL    BUN 12 6 - 20 mg/dL    Creatinine 0.49 (L) 0.57 - 1.00 mg/dL    Sodium 141 136 - 145 mmol/L    Potassium 4.5 3.5 - 5.2 mmol/L    Chloride 107 98 - 107 mmol/L    CO2 23.3 22.0 - 29.0 mmol/L    Calcium 8.6 8.6 - 10.5 mg/dL    Total Protein 6.2 6.0 - 8.5 g/dL    Albumin 3.40 (L) 3.50 - 5.20 g/dL    ALT (SGPT) 19 1 - 33 U/L    AST (SGOT) 17 1 - 32 U/L    Alkaline Phosphatase 85 39 - 117 U/L    Total Bilirubin 0.3 0.2 - 1.2 mg/dL    eGFR Non African Amer 135 >60 mL/min/1.73    Globulin 2.8 gm/dL    A/G Ratio 1.2 g/dL    BUN/Creatinine Ratio 24.5 7.0 - 25.0    Anion Gap 10.7 mmol/L   Magnesium   Result Value Ref Range    Magnesium 2.2 1.6 - 2.6 mg/dL   Lipid Panel   Result Value Ref Range    Total Cholesterol 170 0 - 200 mg/dL    Triglycerides 80 0 - 150 mg/dL    HDL Cholesterol 35 (L) 40 - 60 mg/dL    LDL Cholesterol  119 (H) 0 - 100 mg/dL    VLDL Cholesterol 16 5 - 40 mg/dL    LDL/HDL Ratio 3.40    BNP   Result Value Ref Range    proBNP 142.2 5.0 - 450.0 pg/mL   CBC Auto Differential   Result Value Ref Range    WBC 6.76 3.40 - 10.80 10*3/mm3    RBC 4.02 3.77 - 5.28 10*6/mm3    Hemoglobin 11.3 (L) 12.0 - 15.9 g/dL    Hematocrit 36.8 34.0 - 46.6 %    MCV 91.5 79.0 - 97.0 fL    MCH 28.1 26.6 - 33.0 pg    MCHC 30.7 (L) 31.5 - 35.7 g/dL    RDW 14.9 12.3 - 15.4 %    RDW-SD 49.7 37.0 - 54.0 fl    MPV 10.2 6.0 - 12.0 fL    Platelets 402 140 - 450 10*3/mm3    Neutrophil % 68.4 42.7 - 76.0 %    Lymphocyte % 19.8 19.6 - 45.3 %    Monocyte % 10.7 5.0 - 12.0 %    Eosinophil % 0.1 (L) 0.3 - 6.2 %    Basophil % 0.7 0.0 - 1.5 %    Immature Grans % 0.3 0.0 - 0.5 %    Neutrophils, Absolute 4.62 1.40 - 7.00 10*3/mm3    Lymphocytes, Absolute 1.34 0.70 - 3.10 10*3/mm3    Monocytes, Absolute 0.72 0.10 - 0.90 10*3/mm3    Eosinophils, Absolute 0.01 0.00 - 0.40 10*3/mm3    Basophils, Absolute 0.05  0.00 - 0.20 10*3/mm3    Immature Grans, Absolute 0.02 0.00 - 0.05 10*3/mm3    nRBC 0.0 0.0 - 0.0 /100 WBC   Basic Metabolic Panel   Result Value Ref Range    Glucose 118 (H) 65 - 99 mg/dL    BUN 9 6 - 20 mg/dL    Creatinine 0.56 (L) 0.57 - 1.00 mg/dL    Sodium 142 136 - 145 mmol/L    Potassium 3.6 3.5 - 5.2 mmol/L    Chloride 108 (H) 98 - 107 mmol/L    CO2 22.4 22.0 - 29.0 mmol/L    Calcium 8.7 8.6 - 10.5 mg/dL    eGFR Non African Amer 116 >60 mL/min/1.73    BUN/Creatinine Ratio 16.1 7.0 - 25.0    Anion Gap 11.6 mmol/L   CBC Auto Differential   Result Value Ref Range    WBC 7.24 3.40 - 10.80 10*3/mm3    RBC 4.17 3.77 - 5.28 10*6/mm3    Hemoglobin 11.5 (L) 12.0 - 15.9 g/dL    Hematocrit 38.3 34.0 - 46.6 %    MCV 91.8 79.0 - 97.0 fL    MCH 27.6 26.6 - 33.0 pg    MCHC 30.0 (L) 31.5 - 35.7 g/dL    RDW 15.1 12.3 - 15.4 %    RDW-SD 50.8 37.0 - 54.0 fl    MPV 10.1 6.0 - 12.0 fL    Platelets 364 140 - 450 10*3/mm3    Neutrophil % 70.7 42.7 - 76.0 %    Lymphocyte % 17.8 (L) 19.6 - 45.3 %    Monocyte % 10.5 5.0 - 12.0 %    Eosinophil % 0.3 0.3 - 6.2 %    Basophil % 0.4 0.0 - 1.5 %    Immature Grans % 0.3 0.0 - 0.5 %    Neutrophils, Absolute 5.12 1.40 - 7.00 10*3/mm3    Lymphocytes, Absolute 1.29 0.70 - 3.10 10*3/mm3    Monocytes, Absolute 0.76 0.10 - 0.90 10*3/mm3    Eosinophils, Absolute 0.02 0.00 - 0.40 10*3/mm3    Basophils, Absolute 0.03 0.00 - 0.20 10*3/mm3    Immature Grans, Absolute 0.02 0.00 - 0.05 10*3/mm3    nRBC 0.0 0.0 - 0.0 /100 WBC   Basic Metabolic Panel   Result Value Ref Range    Glucose 118 (H) 65 - 99 mg/dL    BUN 10 6 - 20 mg/dL    Creatinine 0.64 0.57 - 1.00 mg/dL    Sodium 141 136 - 145 mmol/L    Potassium 4.3 3.5 - 5.2 mmol/L    Chloride 106 98 - 107 mmol/L    CO2 24.0 22.0 - 29.0 mmol/L    Calcium 8.9 8.6 - 10.5 mg/dL    eGFR Non African Amer 99 >60 mL/min/1.73    BUN/Creatinine Ratio 15.6 7.0 - 25.0    Anion Gap 11.0 mmol/L   CBC Auto Differential   Result Value Ref Range    WBC 7.19 3.40 - 10.80  10*3/mm3    RBC 4.22 3.77 - 5.28 10*6/mm3    Hemoglobin 11.6 (L) 12.0 - 15.9 g/dL    Hematocrit 38.5 34.0 - 46.6 %    MCV 91.2 79.0 - 97.0 fL    MCH 27.5 26.6 - 33.0 pg    MCHC 30.1 (L) 31.5 - 35.7 g/dL    RDW 15.2 12.3 - 15.4 %    RDW-SD 50.1 37.0 - 54.0 fl    MPV 10.2 6.0 - 12.0 fL    Platelets 359 140 - 450 10*3/mm3    Neutrophil % 70.9 42.7 - 76.0 %    Lymphocyte % 17.8 (L) 19.6 - 45.3 %    Monocyte % 10.6 5.0 - 12.0 %    Eosinophil % 0.1 (L) 0.3 - 6.2 %    Basophil % 0.3 0.0 - 1.5 %    Immature Grans % 0.3 0.0 - 0.5 %    Neutrophils, Absolute 5.10 1.40 - 7.00 10*3/mm3    Lymphocytes, Absolute 1.28 0.70 - 3.10 10*3/mm3    Monocytes, Absolute 0.76 0.10 - 0.90 10*3/mm3    Eosinophils, Absolute 0.01 0.00 - 0.40 10*3/mm3    Basophils, Absolute 0.02 0.00 - 0.20 10*3/mm3    Immature Grans, Absolute 0.02 0.00 - 0.05 10*3/mm3    nRBC 0.0 0.0 - 0.0 /100 WBC   Basic Metabolic Panel   Result Value Ref Range    Glucose 134 (H) 65 - 99 mg/dL    BUN 8 6 - 20 mg/dL    Creatinine 0.69 0.57 - 1.00 mg/dL    Sodium 140 136 - 145 mmol/L    Potassium 4.3 3.5 - 5.2 mmol/L    Chloride 103 98 - 107 mmol/L    CO2 22.5 22.0 - 29.0 mmol/L    Calcium 9.0 8.6 - 10.5 mg/dL    eGFR Non African Amer 91 >60 mL/min/1.73    BUN/Creatinine Ratio 11.6 7.0 - 25.0    Anion Gap 14.5 mmol/L   Basic Metabolic Panel   Result Value Ref Range    Glucose 114 (H) 65 - 99 mg/dL    BUN 13 6 - 20 mg/dL    Creatinine 0.62 0.57 - 1.00 mg/dL    Sodium 142 136 - 145 mmol/L    Potassium 4.2 3.5 - 5.2 mmol/L    Chloride 107 98 - 107 mmol/L    CO2 23.6 22.0 - 29.0 mmol/L    Calcium 8.5 (L) 8.6 - 10.5 mg/dL    eGFR Non African Amer 103 >60 mL/min/1.73    BUN/Creatinine Ratio 21.0 7.0 - 25.0    Anion Gap 11.4 mmol/L   CBC Auto Differential   Result Value Ref Range    WBC 12.43 (H) 3.40 - 10.80 10*3/mm3    RBC 3.92 3.77 - 5.28 10*6/mm3    Hemoglobin 10.9 (L) 12.0 - 15.9 g/dL    Hematocrit 35.8 34.0 - 46.6 %    MCV 91.3 79.0 - 97.0 fL    MCH 27.8 26.6 - 33.0 pg    MCHC  30.4 (L) 31.5 - 35.7 g/dL    RDW 15.2 12.3 - 15.4 %    RDW-SD 50.4 37.0 - 54.0 fl    MPV 10.3 6.0 - 12.0 fL    Platelets 321 140 - 450 10*3/mm3    Neutrophil % 78.2 (H) 42.7 - 76.0 %    Lymphocyte % 9.5 (L) 19.6 - 45.3 %    Monocyte % 11.6 5.0 - 12.0 %    Eosinophil % 0.1 (L) 0.3 - 6.2 %    Basophil % 0.2 0.0 - 1.5 %    Immature Grans % 0.4 0.0 - 0.5 %    Neutrophils, Absolute 9.73 (H) 1.40 - 7.00 10*3/mm3    Lymphocytes, Absolute 1.18 0.70 - 3.10 10*3/mm3    Monocytes, Absolute 1.44 (H) 0.10 - 0.90 10*3/mm3    Eosinophils, Absolute 0.01 0.00 - 0.40 10*3/mm3    Basophils, Absolute 0.02 0.00 - 0.20 10*3/mm3    Immature Grans, Absolute 0.05 0.00 - 0.05 10*3/mm3    nRBC 0.0 0.0 - 0.0 /100 WBC   Stress Test With Myocardial Perfusion One Day   Result Value Ref Range    BH CV STRESS PROTOCOL 1 Pharmacologic     Stage 1 1     Duration Min Stage 1 0     Duration Sec Stage 1 10     Stress Dose Regadenoson Stage 1 0.4     Stress Comments Stage 1 10 sec bolus injection     Baseline HR 68 bpm    Baseline /66 mmHg    Peak  bpm    Percent Max Pred HR 63.95 %    Percent Target HR 75 %    Peak /66 mmHg    Recovery HR 84 bpm    Recovery /61 mmHg    Target HR (85%) 146 bpm    Max. Pred. HR (100%) 172 bpm    Nuc Stress EF 74 %   Adult Transthoracic Echo Complete W/ Cont if Necessary Per Protocol   Result Value Ref Range    BSA 2.3 m^2    IVSd 0.8 cm    LVIDd 4.4 cm    LVIDs 3.6 cm    LVPWd 0.8 cm    IVS/LVPW 1.0     FS 18.2 %    EDV(Teich) 87.7 ml    ESV(Teich) 54.4 ml    EF(Teich) 37.9 %    EDV(cubed) 85.2 ml    ESV(cubed) 46.7 ml    EF(cubed) 45.2 %    LV mass(C)d 109.4 grams    LV mass(C)dI 48.1 grams/m^2    SV(Teich) 33.3 ml    SI(Teich) 14.6 ml/m^2    SV(cubed) 38.5 ml    SI(cubed) 16.9 ml/m^2    Ao root diam 2.5 cm    Ao root area 4.9 cm^2    LVOT diam 2.3 cm    LVOT area 4.2 cm^2    LVOT area(traced) 4.2 cm^2    LVLd ap4 8.2 cm    EDV(MOD-sp4) 58.0 ml    LVLs ap4 7.1 cm    ESV(MOD-sp4) 23.0 ml     EF(MOD-sp4) 60.3 %    LVLd ap2 8.2 cm    EDV(MOD-sp2) 65.0 ml    LVLs ap2 6.5 cm    ESV(MOD-sp2) 32.0 ml    EF(MOD-sp2) 50.8 %    SV(MOD-sp4) 35.0 ml    SI(MOD-sp4) 15.4 ml/m^2    SV(MOD-sp2) 33.0 ml    SI(MOD-sp2) 14.5 ml/m^2    Ao root area (BSA corrected) 1.1     LV Meredith Vol (BSA corrected) 25.5 ml/m^2    LV Sys Vol (BSA corrected) 10.1 ml/m^2    MV A dur 162.0 sec    MV E max micha 101.0 cm/sec    MV A max micha 82.1 cm/sec    MV E/A 1.2     MV V2 mean 52.9 cm/sec    MV mean PG 1.0 mmHg    MV V2 VTI 25.1 cm    MVA(VTI) 3.6 cm^2    MV P1/2t max micha 107.0 cm/sec    MV P1/2t 49.7 msec    MVA(P1/2t) 4.4 cm^2    MV dec slope 630.0 cm/sec^2    MV dec time 162 sec    Ao V2 mean 123.0 cm/sec    Ao mean PG 7.0 mmHg    Ao mean PG (full) 5.0 mmHg    Ao V2 VTI 34.2 cm    SY(I,A) 2.6 cm^2    SY(I,D) 2.6 cm^2    LV V1 mean PG 2.0 mmHg    LV V1 mean 71.3 cm/sec    LV V1 VTI 21.5 cm    SV(Ao) 167.9 ml    SI(Ao) 73.8 ml/m^2    SV(LVOT) 89.3 ml    SI(LVOT) 39.3 ml/m^2    PA V2 max 116.0 cm/sec    PA max PG 5.4 mmHg    PA max PG (full) 2.5 mmHg    PA acc slope 853.0 cm/sec^2    PA acc time 0.11 sec    RV V1 max PG 2.9 mmHg    RV V1 mean PG 2.0 mmHg    RV V1 max 84.6 cm/sec    RV V1 mean 59.3 cm/sec    RV V1 VTI 17.9 cm    TR max micha 250.0 cm/sec    PA pr(Accel) 28.2 mmHg    Pulm Sys Micha 52.0 cm/sec    Pulm Meredith Micha 45.0 cm/sec    Pulm S/D 1.2     Pulm A Revs Dur 0.09 sec    Pulm A Revs Micha 30.3 cm/sec    MVA P1/2T LCG 2.1 cm^2    BH CV ECHO KOBY - BZI_BMI 49.5 kilograms/m^2     CV ECHO KOBY - BSA(HAYCOCK) 2.5 m^2     CV ECHO KOBY - BZI_METRIC_WEIGHT 130.0 kg     CV ECHO KOBY - BZI_METRIC_HEIGHT 162.0 cm    TDI S' 12.40 cm/sec    RV Base 3.50 cm    LA Volume Index 30.4 mL/m2    Avg E/e' ratio 8.71     EF(MOD-bp) 54.0 %    Lat Peak E' Micha 14.6 cm/sec    Med Peak E' Micha 8.60 cm/sec    TAPSE (>1.6) 3.30 cm2    Echo EF Estimated 60 %   Duplex Venous Lower Extremity - Bilateral CAR   Result Value Ref Range    Right Common Femoral  Spont Y     Right Common Femoral Phasic Y     Right Common Femoral Augment Y     Right Common Femoral Competent Y     Right Common Femoral Compress C     Right Saphenofemoral Junction Spont Y     Right Saphenofemoral Junction Phasic Y     Right Saphenofemoral Junction Compress C     Right Proximal Femoral Compress C     Right Mid Femoral Spont Y     Right Mid Femoral Phasic Y     Right Mid Femoral Augment Y     Right Mid Femoral Competent Y     Right Mid Femoral Compress C     Right Distal Femoral Compress C     Right Popliteal Spont Y     Right Popliteal Phasic Y     Right Popliteal Augment Y     Right Popliteal Competent Y     Right Popliteal Compress C     Right Posterior Tibial Compress C     Right Peroneal Compress C     Right GastronemiusSoleal Compress C     Right Greater Saph AK Compress C     Right Greater Saph BK Compress C     Left Common Femoral Spont Y     Left Common Femoral Phasic Y     Left Common Femoral Augment Y     Left Common Femoral Competent Y     Left Common Femoral Compress C     Left Saphenofemoral Junction Spont Y     Left Saphenofemoral Junction Phasic Y     Left Saphenofemoral Junction Compress C     Left Proximal Femoral Compress C     Left Mid Femoral Spont Y     Left Mid Femoral Phasic Y     Left Mid Femoral Augment Y     Left Mid Femoral Competent Y     Left Mid Femoral Compress C     Left Distal Femoral Compress C     Left Popliteal Spont Y     Left Popliteal Phasic Y     Left Popliteal Augment Y     Left Popliteal Competent Y     Left Popliteal Compress C     Left Posterior Tibial Compress C     Left Peroneal Compress C     Left GastronemiusSoleal Compress C     Left Greater Saph AK Compress C     Left Greater Saph BK Compress C      She is taking the Carafate from DR Hartman; she is taking the PPI twice daily  ---Carafate does take away the substernal and epigastric burning    She stopped her Eliquis---She stopped this d/t causing diarrhea, weakness, exertional SOA-----this has all  stopped since stopping it    She declines trying another med to replace Eliquis  I have d/w her and spouse that stopping Eliquis may result in another PE and even death.  She chooses not to take it.    I have asked her to keep hematologist appt;   Dr. Coley aware of this and above;  Restart Vit D  No SOA or chest pain  The following portions of the patient's history were reviewed and updated as appropriate: allergies, current medications, past family history, past medical history, past social history, past surgical history and problem list.    Review of Systems   Constitutional: Positive for fatigue. Negative for activity change, appetite change and unexpected weight change.   HENT: Negative for nosebleeds and trouble swallowing.    Eyes: Negative for pain and visual disturbance.   Respiratory: Negative for chest tightness, shortness of breath and wheezing.    Cardiovascular: Negative for chest pain and palpitations.   Gastrointestinal: Positive for abdominal pain. Negative for blood in stool.   Endocrine: Negative.    Genitourinary: Negative for difficulty urinating and hematuria.   Musculoskeletal: Positive for back pain and gait problem. Negative for joint swelling.   Skin: Negative for color change and rash.   Allergic/Immunologic: Negative.    Neurological: Positive for weakness. Negative for syncope and speech difficulty.   Hematological: Negative for adenopathy.   Psychiatric/Behavioral: Positive for decreased concentration and sleep disturbance. Negative for agitation and confusion. The patient is nervous/anxious.    All other systems reviewed and are negative.      Objective   Physical Exam   Constitutional: She is oriented to person, place, and time. She appears well-developed and well-nourished. No distress.   HENT:   Head: Normocephalic and atraumatic.   Eyes: Conjunctivae and EOM are normal. Pupils are equal, round, and reactive to light. Right eye exhibits no discharge. Left eye exhibits no  discharge. No scleral icterus.   Neck: Normal range of motion. Neck supple. No tracheal deviation present. No thyromegaly present.   Cardiovascular: Normal rate, regular rhythm, normal heart sounds, intact distal pulses and normal pulses. Exam reveals no gallop.   No murmur heard.  Pulmonary/Chest: Effort normal and breath sounds normal. No respiratory distress. She has no wheezes. She has no rales.   Musculoskeletal: Normal range of motion.   Neurological: She is alert and oriented to person, place, and time. She exhibits normal muscle tone. Coordination normal.   Skin: Skin is warm. No rash noted. No erythema. No pallor.   Psychiatric: She has a normal mood and affect. Her behavior is normal. Judgment and thought content normal.   Nursing note and vitals reviewed.      Assessment/Plan   Marisol was seen today for hypertension.    Diagnoses and all orders for this visit:    Other acute pulmonary embolism without acute cor pulmonale (CMS/HCC)    Gastroesophageal reflux disease with esophagitis    Impaired fasting glucose    Acquired hypothyroidism    Essential hypertension    Anxiety    Other orders  -     Discontinue: amlodipine-olmesartan (RICK) 10-40 MG per tablet; Take 1 tablet by mouth Daily. For BP with Coreg (stop Exforge)  -     amlodipine-olmesartan (RICK) 10-40 MG per tablet; Take 1 tablet by mouth Daily. For BP with Coreg (stop Exforge)  -     carvedilol (COREG) 12.5 MG tablet; Take 1 tablet by mouth 2 (Two) Times a Day With Meals. For BP and stop Bystolic  -     levothyroxine (SYNTHROID) 88 MCG tablet; Take 1 tablet by mouth Daily. For thyroid; give generic  -     hydrOXYzine (ATARAX) 25 MG tablet; Take 1 tablet by mouth 3 (Three) Times a Day As Needed for Anxiety.  -     atorvastatin (LIPITOR) 40 MG tablet; Take 1 tablet by mouth Every Night. For cholesterol      In summary, Marisol MARTINI Lore[place], was seen today.  she was seen for  Hypertenision and is well controlled on medication, Impaired fasting glucose  and will continue close lab follow up to watch for DMII, Hyperlipidemia and is well controlled on medication, Hypothyroidism and is well controlled on Rx.  Labs are in desired treatment range and Vitamin D deficiency and will update labs to confirm level is at goal >30,      She declines Rx for the new PE RUL; still see hematologist; talked about she could have another PE off of med and result in death--she understands  Refill meds  F/u Dr Hartman and ortho    Atarax does help anxiety PRN and refill  Plan labs Oct

## 2019-04-16 NOTE — PATIENT INSTRUCTIONS
Low glycemic index diet  Exercise 30 minutes most days of the week  Make sure you get results on any labs or tests we ordered today  We discussed medications and how to take them as prescribed  Sleep 6-8 hours each night if possible  If you have not signed up for [employer], please activate your code ASAP from your After Visit Summary today    LDL goal <100  LDL goal if heart disease <70  HDL goal >60  Triglyceride goal <150  BP goal =<130/80  Fasting glucose <100

## 2019-05-01 ENCOUNTER — APPOINTMENT (OUTPATIENT)
Dept: ONCOLOGY | Facility: CLINIC | Age: 49
End: 2019-05-01

## 2019-05-01 ENCOUNTER — APPOINTMENT (OUTPATIENT)
Dept: OTHER | Facility: HOSPITAL | Age: 49
End: 2019-05-01

## 2019-05-07 ENCOUNTER — OFFICE VISIT (OUTPATIENT)
Dept: BARIATRICS/WEIGHT MGMT | Facility: CLINIC | Age: 49
End: 2019-05-07

## 2019-05-07 VITALS
DIASTOLIC BLOOD PRESSURE: 88 MMHG | TEMPERATURE: 98.1 F | BODY MASS INDEX: 40.37 KG/M2 | SYSTOLIC BLOOD PRESSURE: 125 MMHG | HEIGHT: 70 IN | RESPIRATION RATE: 18 BRPM | WEIGHT: 282 LBS | HEART RATE: 80 BPM

## 2019-05-07 DIAGNOSIS — I10 ESSENTIAL HYPERTENSION: ICD-10-CM

## 2019-05-07 DIAGNOSIS — E66.01 OBESITY, CLASS III, BMI 40-49.9 (MORBID OBESITY) (HCC): Primary | ICD-10-CM

## 2019-05-07 DIAGNOSIS — R12 HEARTBURN: ICD-10-CM

## 2019-05-07 DIAGNOSIS — E55.9 VITAMIN D DEFICIENCY: ICD-10-CM

## 2019-05-07 DIAGNOSIS — K21.00 GASTROESOPHAGEAL REFLUX DISEASE WITH ESOPHAGITIS: ICD-10-CM

## 2019-05-07 PROBLEM — R11.0 NAUSEA: Status: ACTIVE | Noted: 2019-05-07

## 2019-05-07 PROBLEM — E66.813 OBESITY, CLASS III, BMI 40-49.9 (MORBID OBESITY): Status: ACTIVE | Noted: 2019-05-07

## 2019-05-07 PROBLEM — R13.19 OTHER DYSPHAGIA: Status: RESOLVED | Noted: 2019-03-21 | Resolved: 2019-05-07

## 2019-05-07 PROCEDURE — 99024 POSTOP FOLLOW-UP VISIT: CPT | Performed by: NURSE PRACTITIONER

## 2019-05-07 RX ORDER — SUCRALFATE ORAL 1 G/10ML
1 SUSPENSION ORAL 4 TIMES DAILY
Qty: 1200 ML | Refills: 1 | Status: SHIPPED | OUTPATIENT
Start: 2019-05-07 | End: 2019-07-01 | Stop reason: SDUPTHER

## 2019-05-07 NOTE — PROGRESS NOTES
[facility] SURGERY  [address]  [facility] [address] [phone]  [address]  [facility] [id number] [phone]  Dept: [phone]  5/7/2019      Marisol Carrillo.  [id number]  [id number]  1970  female      Chief Complaint   Patient presents with   • Follow-up     1 month band removal        BH Post-Op Bariatric Surgery:   Marisol Carrillo is status post Laparoscopic Band Removal procedure, performed on 3/25/19     HPI:   Today's weight is 128 kg (282 lb) pounds, today's BMI is Body mass index is 40.74 kg/m²., she has a gain of 3 pounds. The patient reports a decreased portion size and loss of appetite.      Marisol Carrillo denies nausea, vomiting, dysphagia, abdominal pain or heartburn.     Diet and Exercise: Diet history reviewed and discussed with the patient. Weight loss/gains to date discussed with the patient. The patient states they are eating 60 grams of protein per day. She reports eating 3 meals per day, a typical portion size of 1/2 cup, eating 0 snacks per day, drinking 6 or more 8-oz. glasses of water per day, no carbonated beverage consumption and exercising regularly.     She reports that she is no longer having dysphagia but feels dependent on the carafate. She notices GI distress, burning sensation after eating anything acidic. She has been sticking to a bland diet. Lean protein, no acidic produce, and dairy products for protein. She reports that dysphagia has resolved.     Supplements: none.     Review of Systems   Constitutional: Positive for appetite change. Negative for fatigue and unexpected weight change.   HENT: Negative.    Eyes: Negative.    Respiratory: Negative.    Cardiovascular: Negative.  Negative for leg swelling.   Gastrointestinal: Negative for abdominal distention, abdominal pain, constipation, diarrhea, nausea and vomiting.   Genitourinary: Negative for difficulty urinating, frequency and  urgency.   Musculoskeletal: Negative for back pain.   Skin: Negative.    Psychiatric/Behavioral: Negative.    All other systems reviewed and are negative.      Patient Active Problem List   Diagnosis   • Essential familial hypercholesterolemia   • Hypertension   • Hypothyroidism   • Adiposity   • Vitamin deficiency   • Impaired fasting glucose   • Vitamin D deficiency   • Lumbosacral radiculopathy   • Gastroesophageal reflux disease   • Constipation   • Diarrhea   • Fatigue   • Heartburn   • Lumbar disc herniation with radiculopathy   • Pain in extremity   • Anxiety   • Depression   • Degeneration of lumbar intervertebral disc   • Spinal headache   • Chronic bilateral low back pain with bilateral sciatica   • Disc disease, degenerative, lumbar or lumbosacral   • Primary localized osteoarthritis of left knee   • History of spinal fusion   • Lumbar spondylolysis   • Chronic pain of left knee   • Pulmonary embolus (CMS/[facility])   • S/P total prosthetic knee replacement not using cement, left   • History of removal of laparoscopic gastric banding device   • Incisional pain   • Obesity, Class III, BMI 40-49.9 (morbid obesity) (CMS/[facility])   • Nausea       Past Medical History:   Diagnosis Date   • Acute pulmonary embolism (CMS/[facility]) 03/2019   • Alopecia    • Anxiety and depression    • Arthritis     OSTEO   • Balance problem     FOOTDROP LEFT FOOT   • Bilateral knee pain    • Breast mass     RIGHT, MD WATCHING.   • Chronic low back pain    • Depression    • Dysphagia 2/22/2016   • Epigastric pain 8/30/2017    Added automatically from request for surgery [id number]   • GERD (gastroesophageal reflux disease)    • Hyperlipidemia    • Hypertension    • Hypothyroidism    • Impaired fasting glucose    • Kidney calculus     HISTORY OF   • Nausea and vomiting 3/21/2019    Added automatically from request for surgery [id number]   • Nocturnal cough 2/22/2016   • Pneumonia     2015 S/P LUMBAR FUSION   • Regurgitation 2/22/2016   • Sciatica    •  Spinal headache     AFTER MYELOGRAM. BLOOD PATCH X2   • Vitamin D deficiency        The following portions of the patient's history were reviewed and updated as appropriate: allergies, current medications, past family history, past medical history, past social history, past surgical history and problem list.    Vitals:    05/07/19 0906   BP: 125/88   Pulse: 80   Resp: 18   Temp: 98.1 °F (36.7 °C)       Physical Exam   Constitutional: She appears well-developed and well-nourished.   Neck: No thyromegaly present.   Cardiovascular: Normal rate, regular rhythm and normal heart sounds.   Pulmonary/Chest: Effort normal and breath sounds normal. No respiratory distress. She has no wheezes.   Abdominal: Soft. Bowel sounds are normal. She exhibits no distension. There is no tenderness. There is no guarding. No hernia.   Musculoskeletal: She exhibits no edema or tenderness.   Neurological: She is alert.   Skin: Skin is warm and dry. No rash noted. No erythema.   Psychiatric: She has a normal mood and affect. Her behavior is normal.   Nursing note and vitals reviewed.      Assessment:   Post-op, the patient is doing well.     Encounter Diagnoses   Name Primary?   • Obesity, Class III, BMI 40-49.9 (morbid obesity) (CMS/HCC) Yes   • Essential hypertension    • Vitamin D deficiency    • Gastroesophageal reflux disease with esophagitis        Plan:   Encouraged patient to be sure to get plenty of lean protein per day through small frequent meals all with a protein source.   Activity restrictions: none.   Recommended patient be sure to get at least 70 grams of protein per day by eating small, frequent meals all with high lean protein choices. Be sure to limit/cut back on daily carbohydrate intake. Discussed with the patient the recommended amount of water per day to intake- half of body weight in ounces. Reviewed vitamin requirements. Be sure to do routine exercise, 150 minutes per week minimum, including both cardio and strength  training.     Instructions / Recommendations: dietary counseling recommended, recommended a daily protein intake of  grams, vitamin supplement(s) recommended, recommended exercising at least 150 minutes per week, behavior modifications recommended and instructed to call the office for concerns, questions, or problems.        Total time spent face to face was 20 minutes and 15 minutes was spent counseling.

## 2019-07-01 RX ORDER — SUCRALFATE ORAL 1 G/10ML
1 SUSPENSION ORAL 4 TIMES DAILY
Qty: 1200 ML | Refills: 1 | Status: SHIPPED | OUTPATIENT
Start: 2019-07-01 | End: 2019-08-07 | Stop reason: SDUPTHER

## 2019-08-07 RX ORDER — SUCRALFATE ORAL 1 G/10ML
1 SUSPENSION ORAL 4 TIMES DAILY
Qty: 1200 ML | Refills: 2 | Status: SHIPPED | OUTPATIENT
Start: 2019-08-07 | End: 2019-10-18 | Stop reason: SDUPTHER

## 2019-08-19 ENCOUNTER — APPOINTMENT (OUTPATIENT)
Dept: WOMENS IMAGING | Facility: HOSPITAL | Age: 49
End: 2019-08-19

## 2019-08-19 PROCEDURE — 77067 SCR MAMMO BI INCL CAD: CPT | Performed by: RADIOLOGY

## 2019-08-19 PROCEDURE — 77063 BREAST TOMOSYNTHESIS BI: CPT | Performed by: RADIOLOGY

## 2019-08-28 ENCOUNTER — APPOINTMENT (OUTPATIENT)
Dept: OTHER | Facility: HOSPITAL | Age: 49
End: 2019-08-28

## 2019-09-23 NOTE — PATIENT INSTRUCTIONS
Low glycemic index diet  Exercise 30 minutes most days of the week  Make sure you get results on any labs or tests we ordered today  We discussed medications and how to take them as prescribed  Sleep 6-8 hours each night if possible  If you have not signed up for [employer], please activate your code ASAP from your After Visit Summary today    LDL goal <100  LDL goal if heart disease <70  HDL goal >60  Triglyceride goal <150  BP goal =<130/80  Fasting glucose <100    Elevated HOB also  Food Choices for Gastroesophageal Reflux Disease, Adult  When you have gastroesophageal reflux disease (GERD), the foods you eat and your eating habits are very important. Choosing the right foods can help ease the discomfort of GERD. Consider working with a diet and nutrition specialist (dietitian) to help you make healthy food choices.  What general guidelines should I follow?  Eating plan  · Choose healthy foods low in fat, such as fruits, vegetables, whole grains, low-fat dairy products, and lean meat, fish, and poultry.  · Eat frequent, small meals instead of three large meals each day. Eat your meals slowly, in a relaxed setting. Avoid bending over or lying down until 2-3 hours after eating.  · Limit high-fat foods such as fatty meats or fried foods.  · Limit your intake of oils, butter, and shortening to less than 8 teaspoons each day.  · Avoid the following:  ? Foods that cause symptoms. These may be different for different people. Keep a food diary to keep track of foods that cause symptoms.  ? Alcohol.  ? Drinking large amounts of liquid with meals.  ? Eating meals during the 2-3 hours before bed.  · Cook foods using methods other than frying. This may include baking, grilling, or broiling.  Lifestyle    · Maintain a healthy weight. Ask your health care provider what weight is healthy for you. If you need to lose weight, work with your health care provider to do so safely.  · Exercise for at least 30 minutes on 5 or more days  each week, or as told by your health care provider.  · Avoid wearing clothes that fit tightly around your waist and chest.  · Do not use any products that contain nicotine or tobacco, such as cigarettes and e-cigarettes. If you need help quitting, ask your health care provider.  · Sleep with the head of your bed raised. Use a wedge under the mattress or blocks under the bed frame to raise the head of the bed.  What foods are not recommended?  The items listed may not be a complete list. Talk with your dietitian about what dietary choices are best for you.  Grains  Pastries or quick breads with added fat. French toast.  Vegetables  Deep fried vegetables. French fries. Any vegetables prepared with added fat. Any vegetables that cause symptoms. For some people this may include tomatoes and tomato products, chili peppers, onions and garlic, and horseradish.  Fruits  Any fruits prepared with added fat. Any fruits that cause symptoms. For some people this may include citrus fruits, such as oranges, grapefruit, pineapple, and jacqueline.  Meats and other protein foods  High-fat meats, such as fatty beef or pork, hot dogs, ribs, ham, sausage, salami and whipple. Fried meat or protein, including fried fish and fried chicken. Nuts and nut butters.  Dairy  Whole milk and chocolate milk. Sour cream. Cream. Ice cream. Cream cheese. Milk shakes.  Beverages  Coffee and tea, with or without caffeine. Carbonated beverages. Sodas. Energy drinks. Fruit juice made with acidic fruits (such as orange or grapefruit). Tomato juice. Alcoholic drinks.  Fats and oils  Butter. Margarine. Shortening. Ghee.  Sweets and desserts  Chocolate and cocoa. Donuts.  Seasoning and other foods  Pepper. Peppermint and spearmint. Any condiments, herbs, or seasonings that cause symptoms. For some people, this may include juárez, hot sauce, or vinegar-based salad dressings.  Summary  · When you have gastroesophageal reflux disease (GERD), food and lifestyle choices  are very important to help ease the discomfort of GERD.  · Eat frequent, small meals instead of three large meals each day. Eat your meals slowly, in a relaxed setting. Avoid bending over or lying down until 2-3 hours after eating.  · Limit high-fat foods such as fatty meat or fried foods.  This information is not intended to replace advice given to you by your health care provider. Make sure you discuss any questions you have with your health care provider.  Document Released: 12/18/2006 Document Revised: 12/19/2017 Document Reviewed: 12/19/2017  [employer] Patient Education © 2018 [employer]     no

## 2019-10-01 ENCOUNTER — RESULTS ENCOUNTER (OUTPATIENT)
Dept: FAMILY MEDICINE CLINIC | Facility: CLINIC | Age: 49
End: 2019-10-01

## 2019-10-01 DIAGNOSIS — I10 ESSENTIAL HYPERTENSION: ICD-10-CM

## 2019-10-01 DIAGNOSIS — F41.9 ANXIETY: ICD-10-CM

## 2019-10-01 DIAGNOSIS — E03.9 ACQUIRED HYPOTHYROIDISM: ICD-10-CM

## 2019-10-01 DIAGNOSIS — R73.01 IMPAIRED FASTING GLUCOSE: ICD-10-CM

## 2019-10-01 DIAGNOSIS — I26.99 OTHER ACUTE PULMONARY EMBOLISM WITHOUT ACUTE COR PULMONALE (HCC): ICD-10-CM

## 2019-10-01 DIAGNOSIS — K21.00 GASTROESOPHAGEAL REFLUX DISEASE WITH ESOPHAGITIS: ICD-10-CM

## 2019-10-02 ENCOUNTER — CONSULT (OUTPATIENT)
Dept: ONCOLOGY | Facility: CLINIC | Age: 49
End: 2019-10-02

## 2019-10-02 ENCOUNTER — LAB (OUTPATIENT)
Dept: OTHER | Facility: HOSPITAL | Age: 49
End: 2019-10-02

## 2019-10-02 VITALS
TEMPERATURE: 98.1 F | HEART RATE: 83 BPM | BODY MASS INDEX: 48.59 KG/M2 | OXYGEN SATURATION: 98 % | HEIGHT: 64 IN | RESPIRATION RATE: 18 BRPM | SYSTOLIC BLOOD PRESSURE: 113 MMHG | DIASTOLIC BLOOD PRESSURE: 79 MMHG | WEIGHT: 284.6 LBS

## 2019-10-02 DIAGNOSIS — R10.13 EPIGASTRIC PAIN: Primary | ICD-10-CM

## 2019-10-02 DIAGNOSIS — R79.89 LFT ELEVATION: Primary | ICD-10-CM

## 2019-10-02 DIAGNOSIS — I26.99 OTHER PULMONARY EMBOLISM WITHOUT ACUTE COR PULMONALE, UNSPECIFIED CHRONICITY (HCC): ICD-10-CM

## 2019-10-02 DIAGNOSIS — K22.719 BARRETT'S ESOPHAGUS WITH DYSPLASIA: ICD-10-CM

## 2019-10-02 DIAGNOSIS — R10.9 ABDOMINAL PAIN, UNSPECIFIED ABDOMINAL LOCATION: ICD-10-CM

## 2019-10-02 DIAGNOSIS — I26.99 OTHER PULMONARY EMBOLISM WITHOUT ACUTE COR PULMONALE, UNSPECIFIED CHRONICITY (HCC): Primary | ICD-10-CM

## 2019-10-02 LAB
ALBUMIN SERPL-MCNC: 4.4 G/DL (ref 3.5–5.2)
ALBUMIN/GLOB SERPL: 1.2 G/DL
ALP SERPL-CCNC: 147 U/L (ref 39–117)
ALT SERPL W P-5'-P-CCNC: 13 U/L (ref 1–33)
ANION GAP SERPL CALCULATED.3IONS-SCNC: 14.2 MMOL/L (ref 5–15)
AST SERPL-CCNC: 14 U/L (ref 1–32)
BASOPHILS # BLD AUTO: 0.05 10*3/MM3 (ref 0–0.2)
BASOPHILS NFR BLD AUTO: 0.7 % (ref 0–1.5)
BILIRUB SERPL-MCNC: 0.5 MG/DL (ref 0.1–1.2)
BUN BLD-MCNC: 14 MG/DL (ref 6–20)
BUN/CREAT SERPL: 17.3 (ref 7–25)
CALCIUM SPEC-SCNC: 10.2 MG/DL (ref 8.6–10.5)
CHLORIDE SERPL-SCNC: 101 MMOL/L (ref 98–107)
CO2 SERPL-SCNC: 25.8 MMOL/L (ref 22–29)
CREAT BLD-MCNC: 0.81 MG/DL (ref 0.57–1)
D DIMER PPP FEU-MCNC: 0.67 MCGFEU/ML (ref 0–0.49)
DEPRECATED RDW RBC AUTO: 47 FL (ref 37–54)
EOSINOPHIL # BLD AUTO: 0.09 10*3/MM3 (ref 0–0.4)
EOSINOPHIL NFR BLD AUTO: 1.3 % (ref 0.3–6.2)
ERYTHROCYTE [DISTWIDTH] IN BLOOD BY AUTOMATED COUNT: 15.7 % (ref 12.3–15.4)
FOLATE SERPL-MCNC: 11.8 NG/ML (ref 4.78–24.2)
GFR SERPL CREATININE-BSD FRML MDRD: 75 ML/MIN/1.73
GLOBULIN UR ELPH-MCNC: 3.8 GM/DL
GLUCOSE BLD-MCNC: 110 MG/DL (ref 65–99)
HBA1C MFR BLD: 5.9 % (ref 4.8–5.6)
HCT VFR BLD AUTO: 39.8 % (ref 34–46.6)
HGB BLD-MCNC: 13 G/DL (ref 12–15.9)
IMM GRANULOCYTES # BLD AUTO: 0.04 10*3/MM3 (ref 0–0.05)
IMM GRANULOCYTES NFR BLD AUTO: 0.6 % (ref 0–0.5)
LYMPHOCYTES # BLD AUTO: 1.98 10*3/MM3 (ref 0.7–3.1)
LYMPHOCYTES NFR BLD AUTO: 27.7 % (ref 19.6–45.3)
MCH RBC QN AUTO: 26.7 PG (ref 26.6–33)
MCHC RBC AUTO-ENTMCNC: 32.7 G/DL (ref 31.5–35.7)
MCV RBC AUTO: 81.7 FL (ref 79–97)
MONOCYTES # BLD AUTO: 0.75 10*3/MM3 (ref 0.1–0.9)
MONOCYTES NFR BLD AUTO: 10.5 % (ref 5–12)
NEUTROPHILS # BLD AUTO: 4.23 10*3/MM3 (ref 1.7–7)
NEUTROPHILS NFR BLD AUTO: 59.2 % (ref 42.7–76)
NRBC BLD AUTO-RTO: 0 /100 WBC (ref 0–0.2)
PLATELET # BLD AUTO: 341 10*3/MM3 (ref 140–450)
PMV BLD AUTO: 9.5 FL (ref 6–12)
POTASSIUM BLD-SCNC: 4.1 MMOL/L (ref 3.5–5.2)
PROT SERPL-MCNC: 8.2 G/DL (ref 6–8.5)
RBC # BLD AUTO: 4.87 10*6/MM3 (ref 3.77–5.28)
SODIUM BLD-SCNC: 141 MMOL/L (ref 136–145)
TSH SERPL DL<=0.05 MIU/L-ACNC: 1.57 UIU/ML (ref 0.27–4.2)
VIT B12 BLD-MCNC: 406 PG/ML (ref 211–946)
WBC NRBC COR # BLD: 7.14 10*3/MM3 (ref 3.4–10.8)

## 2019-10-02 PROCEDURE — 36415 COLL VENOUS BLD VENIPUNCTURE: CPT

## 2019-10-02 PROCEDURE — 85379 FIBRIN DEGRADATION QUANT: CPT | Performed by: INTERNAL MEDICINE

## 2019-10-02 PROCEDURE — 82306 VITAMIN D 25 HYDROXY: CPT | Performed by: PHYSICIAN ASSISTANT

## 2019-10-02 PROCEDURE — 85025 COMPLETE CBC W/AUTO DIFF WBC: CPT | Performed by: INTERNAL MEDICINE

## 2019-10-02 PROCEDURE — 80061 LIPID PANEL: CPT | Performed by: PHYSICIAN ASSISTANT

## 2019-10-02 PROCEDURE — 83036 HEMOGLOBIN GLYCOSYLATED A1C: CPT | Performed by: PHYSICIAN ASSISTANT

## 2019-10-02 PROCEDURE — 84443 ASSAY THYROID STIM HORMONE: CPT | Performed by: PHYSICIAN ASSISTANT

## 2019-10-02 PROCEDURE — 82746 ASSAY OF FOLIC ACID SERUM: CPT | Performed by: PHYSICIAN ASSISTANT

## 2019-10-02 PROCEDURE — 84439 ASSAY OF FREE THYROXINE: CPT | Performed by: PHYSICIAN ASSISTANT

## 2019-10-02 PROCEDURE — 99205 OFFICE O/P NEW HI 60 MIN: CPT | Performed by: INTERNAL MEDICINE

## 2019-10-02 PROCEDURE — 84481 FREE ASSAY (FT-3): CPT | Performed by: PHYSICIAN ASSISTANT

## 2019-10-02 PROCEDURE — 82607 VITAMIN B-12: CPT | Performed by: PHYSICIAN ASSISTANT

## 2019-10-02 PROCEDURE — 80053 COMPREHEN METABOLIC PANEL: CPT | Performed by: PHYSICIAN ASSISTANT

## 2019-10-02 RX ORDER — INFLUENZA A VIRUS A/SINGAPORE/GP1908/2015 IVR-180A (H1N1) ANTIGEN (PROPIOLACTONE INACTIVATED), INFLUENZA A VIRUS A/SINGAPORE/INFIMH-16-0019/2016 IVR-186 (H3N2) ANTIGEN (PROPIOLACTONE INACTIVATED), INFLUENZA B VIRUS B/MARYLAND/15/2016 ANTIGEN (PROPIOLACTONE INACTIVATED), AND INFLUENZA B VIRUS B/PHUKET/3073/2013 BVR-1B ANTIGEN (PROPIOLACTONE INACTIVATED) 15; 15; 15; 15 UG/.5ML; UG/.5ML; UG/.5ML; UG/.5ML
INJECTION, SUSPENSION INTRAMUSCULAR
Refills: 0 | COMMUNITY
Start: 2019-09-18 | End: 2019-10-18

## 2019-10-02 NOTE — PROGRESS NOTES
Subjective     REASON FOR CONSULTATION:  Acute pulmonary embolism without acute cor pulmonale   Provide an opinion on any further workup or treatment                             REQUESTING PHYSICIAN:  Tomeka Linares PA-C    RECORDS OBTAINED:  Records of the patients history including those obtained from the referring provider were reviewed and summarized in detail.    HISTORY OF PRESENT ILLNESS:  The patient is a 49 y.o. year old female who is here for an opinion about the above issue.    History of Present Illness   Patient is a 49 year old female who was referred here by Tomeka Linares PA-C for evaluation of pulmonary embolism.     She states she had a right total knee arthoplasty on March 1st, 2019. She was supposed to be on blood thinners for 30 days after her knee replacement but was unable to take the blood thinners for the first few days after surgery due to the fact that she was vomiting after eating and drinking. She was admitted to the hospital on 03/13/2019. She was told she had a small PE but she had no associated symptoms, no shortness of breath, no chest pain and no cough.  Doppler ultrasound of bilateral lower extremity done was negative     She states she wanted to have her lap band removed because of her vomiting and abdominal pain.    She was referred to Dr. Anabella Mcintyre who performed and EGD which revealed no acute abnormality except for Medrano's esophagus.    She did not return to taking her Eliquis after coming home from the hospital because she states she was so fatigued. She is still currently suffering with abdominal pain.   She had her gallbladder removed in 2017 because she was having similar symptoms of abdominal pain which mildly improved her symptoms. She was also treated with sucralfate with mild improvement of her pain. She states her abdominal pain is burning and radiates to her chest and back.     She does not currently take daily aspirin.    She denies any night sweats or shortness of  breath.     03/21/2019 CT ANGIOGRAM CHEST WITH CONTRAST  FINDINGS: Heart size within normal limits, no pericardial abnormality.  The esophagus is normal in course and caliber. Left band device in  typical position. Oral contrast material within the stomach, the stomach  is satisfactory in appearance.     The caliber of the aorta is normal. No mediastinal or hilar  mass/adenopathy. There is a parenchymal scar demonstrated within the  lingular segment. Lungs are otherwise clear. No suspicious pulmonary  mass or pleural effusion.     There are very small stable mediastinal and left hilar lymph nodes. No  adenopathy or mass has developed. Small pulmonary emboli demonstrated  within the right upper lobe. No pulmonary emboli within the left lobe.  RV:LV ratio less than 1.     Position of the Lap-Band is similar to 2018. No biliary duct dilatation  status post cholecystectomy. The liver, pancreas, spleen and adrenal  glands are normal in appearance. Both kidneys demonstrate a symmetric  satisfactory pattern of enhancement. A 2 mm to 3 mm nonobstructing right  renal calculus. There is a 12 mm hypodense dense left renal cortical  nodule similar in size compared to the previous examination, supporting  a benign etiology and perhaps renal simple/complex cyst. Size precludes  accurate CT attenuation assessment. The.caliber of the aorta is normal.  Urinary bladder is typical in appearance.     The uterus is anteverted, size appropriate for age, both ovaries are  small, symmetric and satisfactory in appearance. A few diverticula  arising from the sigmoid colon, no indication of diverticulitis. The  appendix is normal in appearance. Tiny appendicolith may be present.  Small bowel caliber and overall appearance within normal limits. Lumbar  fusion hardware is in position.     CONCLUSION:  1. Small right upper lobe pulmonary emboli.  2. Lingular segment scar formation.  3. Lap-Band device typical in appearance, the caliber of the  esophagus  is normal and there is oral contrast within a normal-appearing stomach  4. Tiny nonobstructing right renal calculus. Small stable hypodense left  renal cortical nodule which may simply represent a cyst, size precludes  accurate CT attenuation assessment.  5. Nominal sigmoid diverticulosis.     03/22/2019 STRESS TEST WITH MYOCARDIAL PERFUSION  · Left ventricular ejection fraction is hyperdynamic (Calculated EF > 70%).  · Myocardial perfusion imaging indicates a normal myocardial perfusion study with no evidence of ischemia.  · Impressions are consistent with a low risk study.  · Extremely small inferior wall ischemia can not beruled out    03/22/2019 DUPLEX VENOUS LOWER EXTREMITY   · Normal bilateral lower extremity venous duplex scan.    03/23/2019 FL UPPER GI SINGLE CONTRAST  CONCLUSION: LAP-BAND device in good position. The diameter of the  stomach at the level of the lap band is approximately 7 mm. The exam is  otherwise unremarkable.    08/19/2019 MAMMOGRAM SCREENING BILATERAL was benign.    PAST MEDICAL HISTORY:  1. GERD  2. Medrano's Esophagus  3. Status post lap band sx    FAMILY HISTORY:  No family history of cancer.     OB-GYN:  Menopause- since 2018    SOCIAL HISTORY:  She is a non-smoker. She is  with one daughter. She does not currently drink alcohol but she used to drink about 10 drinks per week for approximately two years but that was a few years ago. She does not currently work.     Past Medical History:   Diagnosis Date   • Acute pulmonary embolism (CMS/HCC) 03/2019   • Alopecia    • Anxiety and depression    • Arthritis     OSTEO   • Balance problem     FOOTDROP LEFT FOOT   • Bilateral knee pain    • Breast mass     RIGHT, MD WATCHING.   • Chronic low back pain    • Depression    • Dysphagia 2/22/2016   • Epigastric pain 8/30/2017    Added automatically from request for surgery [id number]   • GERD (gastroesophageal reflux disease)    • Hyperlipidemia    • Hypertension    •  Hypothyroidism    • Impaired fasting glucose    • Kidney calculus     HISTORY OF   • Nausea and vomiting 3/21/2019    Added automatically from request for surgery [id number]   • Nocturnal cough 2016   • Pneumonia     2015 S/P LUMBAR FUSION   • Regurgitation 2016   • Sciatica    • Spinal headache     AFTER MYELOGRAM. BLOOD PATCH X2   • Vitamin D deficiency         Past Surgical History:   Procedure Laterality Date   •  SECTION     • CHOLECYSTECTOMY N/A 2017    Procedure: CHOLECYSTECTOMY LAPAROSCOPIC;  Surgeon: Jam Ballard MD;  Location: [facility] OR [facility];  Service:    • DILATION AND CURETTAGE, DIAGNOSTIC / THERAPEUTIC     • ENDOSCOPY N/A 2017    Procedure: ESOPHAGOGASTRODUODENOSCOPY WITH BIOPSY;  Surgeon: Jam Ballard MD;  Location: [facility] ENDOSCOPY;  Service:    • GASTRIC BANDING REMOVAL N/A 3/25/2019    Procedure: GASTRIC BANDING REMOVAL LAPAROSCOPIC;  Surgeon: Tu Hartman Jr., MD;  Location: [facility] MAIN OR;  Service: General   • JOINT REPLACEMENT Left     KNEE   • KNEE ARTHROSCOPY Left     REPAIR OF MENISUCS   • LAPAROSCOPIC GASTRIC BANDING     • LITHOTRIPSY     • LUMBAR FUSION      L5-S1. WITH HARDWARE   • MICRODISCECTOMY LUMBAR     • IL TOTAL KNEE ARTHROPLASTY Left 3/29/2017    Procedure: TOTAL KNEE ARTHROPLASTY;  Surgeon: Zacarias Reeves MD;  Location: [facility] OR;  Service: Orthopedics   • TONSILLECTOMY          Current Outpatient Medications on File Prior to Visit   Medication Sig Dispense Refill   • AFLURIA QUADRIVALENT 0.5 ML suspension prefilled syringe injection ADM 0.5ML IM UTD  0   • amlodipine-olmesartan (RICK) 10-40 MG per tablet Take 1 tablet by mouth Daily. For BP with Coreg (stop Exforge) 30 tablet 5   • atorvastatin (LIPITOR) 40 MG tablet Take 1 tablet by mouth Every Night. For cholesterol 30 tablet 11   • carvedilol (COREG) 12.5 MG tablet Take 1 tablet by mouth 2 (Two) Times a Day With Meals. For BP and stop Bystolic 60 tablet 5   • hydrOXYzine (ATARAX) 25  MG tablet Take 1 tablet by mouth 3 (Three) Times a Day As Needed for Anxiety. 60 tablet 5   • levothyroxine (SYNTHROID) 88 MCG tablet Take 1 tablet by mouth Daily. For thyroid; give generic 30 tablet 5   • omeprazole (priLOSEC) 40 MG capsule Take 1 capsule by mouth 2 (Two) Times a Day. For Medrano's and GERD 60 capsule 5   • sucralfate (CARAFATE) 1 GM/10ML suspension Take 10 mL by mouth 4 (Four) Times a Day for 60 days. 1200 mL 2   • azithromycin (ZITHROMAX) 250 MG tablet Take 2 tablets the first day, then 1 tablet daily for 4 days. 6 tablet 0     No current facility-administered medications on file prior to visit.         ALLERGIES:    Allergies   Allergen Reactions   • Ciprofloxacin Hives   • Ketamine Other (See Comments)     MADE HER A LITTLE CRAZY   • Lisinopril Cough   • Propacetamol Other (See Comments)     UNSURE OF RX. AFTER BACK SURGERY   • Propoxyphene-Acetaminophen Itching   • Tirosint [Levothyroxine] Itching        Social History     Socioeconomic History   • Marital status:      Spouse name: Nik   • Number of children: Not on file   • Years of education: Not on file   • Highest education level: Not on file   Occupational History     Employer: UNEMPLOYED   Tobacco Use   • Smoking status: Never Smoker   • Smokeless tobacco: Never Used   Substance and Sexual Activity   • Alcohol use: Yes     Comment: ON OCCASION   • Drug use: No   • Sexual activity: Defer        Family History   Problem Relation Age of Onset   • Hypertension Mother    • Thyroid disease Mother    • Hypertension Father    • Thyroid disease Brother    • Alcohol abuse Maternal Grandmother    • Heart disease Maternal Grandmother    • Malig Hyperthermia Neg Hx         Review of Systems   Constitutional: Positive for appetite change (10/02/19-New) and fatigue (10/02/19-New). Negative for chills, diaphoresis, fever and unexpected weight change.   HENT: Negative for hearing loss, sore throat and trouble swallowing.    Respiratory:  "Negative for cough, chest tightness, shortness of breath and wheezing.    Cardiovascular: Negative for chest pain, palpitations and leg swelling.   Gastrointestinal: Positive for abdominal pain (10/02/19-New) and nausea (10/02/19-New ). Negative for abdominal distention, constipation, diarrhea and vomiting.   Genitourinary: Negative for dysuria, frequency, hematuria and urgency.   Musculoskeletal: Positive for back pain (10/02/19-New). Negative for joint swelling.        No muscle weakness.   Skin: Negative for rash and wound.   Neurological: Negative for seizures, syncope, speech difficulty, weakness, numbness and headaches.   Hematological: Negative for adenopathy. Does not bruise/bleed easily.   Psychiatric/Behavioral: Negative for behavioral problems, confusion and suicidal ideas.       Objective     Vitals:    10/02/19 0949   BP: 113/79   Pulse: 83   Resp: 18   Temp: 98.1 °F (36.7 °C)   TempSrc: Oral   SpO2: 98%   Weight: 129 kg (284 lb 9.6 oz)   Height: 162.5 cm (63.98\")  Comment: New Patient Ht   PainSc:   3   PainLoc: Generalized     Current Status 10/2/2019   ECOG score 0       Physical Exam      GENERAL:  Well-developed, well-nourished in no acute distress.   SKIN:  Warm, dry without rashes, purpura or petechiae.  NECK:  Supple with good range of motion; no thyromegaly or masses, no JVD.  LYMPHATICS:  No cervical, supraclavicular, axillary or inguinal adenopathy.  CHEST:  Lungs clear to auscultation. Good airflow.  CARDIAC:  Regular rate and rhythm without murmurs, rubs or gallops. Normal S1,S2.  ABDOMEN:  Soft, nontender with no hepatosplenomegaly or masses.  EXTREMITIES:  No clubbing, cyanosis or edema.  NEUROLOGICAL:  Cranial Nerves II-XII grossly intact.  No focal neurological deficits.  PSYCHIATRIC:  Normal affect and mood.        RECENT LABS:  Hematology WBC   Date Value Ref Range Status   10/02/2019 7.14 3.40 - 10.80 10*3/mm3 Final   10/23/2018 7.37 4.50 - 10.70 10*3/mm3 Final     RBC   Date Value " Ref Range Status   10/02/2019 4.87 3.77 - 5.28 10*6/mm3 Final   10/23/2018 5.29 (H) 3.90 - 5.20 10*6/mm3 Final     Hemoglobin   Date Value Ref Range Status   10/02/2019 13.0 12.0 - 15.9 g/dL Final     Hematocrit   Date Value Ref Range Status   10/02/2019 39.8 34.0 - 46.6 % Final     Platelets   Date Value Ref Range Status   10/02/2019 341 140 - 450 10*3/mm3 Final          Assessment/Plan       1. Pulmonary Embolism-Patient is a 49-year-old female who following the surgery was not compliant with her anticoagulation and subsequently had to be admitted in March 2019 at which time she was found to have a small pulmonary embolism on the CT scan.  However after discharge she was discharged on Eliquis but she did not take it as she had chronic abdominal symptoms.   Originally seen on CT scan from 3/21/2019. She had a right total knee arthoplasty on 03/01/2019 and she was unable to take her blood thinner post-operatively due to abdominal pain.     Patient took only for about few weeks of Eliquis after discharge and stopped.    We will need to reassess her pulmonary embolism.  We will need to obtain a CT scan angiogram of the chest and with her chronic abdominal pain consider CT abdomen pelvis as well.    2. Abdominal pain- patient has been suffering with intermittent abdominal pain which has worsened over the past three years. She had her gallbladder removed in 2017 which did not provide much relief of her symptoms. She is currently on Carafate.  She was evaluated by Anabella Mcintyre and an EGD done showed Medrano's esophagus.  This was done at [facility].  The patient prefers to follow-up with GI at [facility].    Plan:  1.  Ordered CT angiogram of the chest to evaluate for residual pulmonary embolism.  2. Referred patient to GI for further evaluation of abdominal pain.  3. Patient to go back to lab today will obtain d-dimer.  4. Follow-up in 1 week with NP with results of CT scan  5. Follow-up 6 weeks with Chiara Edmonds  Jacinto, acted as scribe for Gwen Saldana MD  in documenting the service or procedure. To the best of my knowledge, I recorded what was dictated by MD Gwen Fuentes MD      CC- Tomeka Linares PA-C

## 2019-10-03 LAB
25(OH)D3 SERPL-MCNC: 26.7 NG/ML (ref 30–100)
CHOLEST SERPL-MCNC: 179 MG/DL (ref 100–199)
HDLC SERPL-MCNC: 63 MG/DL
LDLC SERPL CALC-MCNC: 100 MG/DL (ref 0–99)
T3FREE SERPL-MCNC: 2.6 PG/ML (ref 2–4.4)
T4 FREE SERPL-MCNC: 1.66 NG/DL (ref 0.82–1.77)
TRIGL SERPL-MCNC: 80 MG/DL (ref 0–149)
VLDLC SERPL-MCNC: 16 MG/DL (ref 5–40)

## 2019-10-06 DIAGNOSIS — R10.9 ABDOMINAL PAIN, UNSPECIFIED ABDOMINAL LOCATION: Primary | ICD-10-CM

## 2019-10-07 DIAGNOSIS — I26.99 OTHER PULMONARY EMBOLISM WITHOUT ACUTE COR PULMONALE, UNSPECIFIED CHRONICITY (HCC): Primary | ICD-10-CM

## 2019-10-07 NOTE — PROGRESS NOTES
Duplex scans bilateral upper extremities and bilateral lower extremities to be scheduled for 10/10/19 when other tests are being done at [facility] per in-basket message Dr. Saldana

## 2019-10-09 ENCOUNTER — INFUSION (OUTPATIENT)
Dept: ONCOLOGY | Facility: HOSPITAL | Age: 49
End: 2019-10-09

## 2019-10-09 ENCOUNTER — HOSPITAL ENCOUNTER (OUTPATIENT)
Dept: CARDIOLOGY | Facility: HOSPITAL | Age: 49
Discharge: HOME | End: 2019-10-09

## 2019-10-09 ENCOUNTER — HOSPITAL ENCOUNTER (OUTPATIENT)
Dept: CT IMAGING | Facility: HOSPITAL | Age: 49
Discharge: HOME | End: 2019-10-09

## 2019-10-09 ENCOUNTER — HOSPITAL ENCOUNTER (OUTPATIENT)
Dept: CARDIOLOGY | Facility: HOSPITAL | Age: 49
Discharge: HOME | End: 2019-10-09
Admitting: INTERNAL MEDICINE

## 2019-10-09 DIAGNOSIS — I26.99 OTHER PULMONARY EMBOLISM WITHOUT ACUTE COR PULMONALE, UNSPECIFIED CHRONICITY (HCC): ICD-10-CM

## 2019-10-09 LAB
BH CV LOWER VASCULAR LEFT COMMON FEMORAL AUGMENT: NORMAL
BH CV LOWER VASCULAR LEFT COMMON FEMORAL COMPETENT: NORMAL
BH CV LOWER VASCULAR LEFT COMMON FEMORAL COMPRESS: NORMAL
BH CV LOWER VASCULAR LEFT COMMON FEMORAL PHASIC: NORMAL
BH CV LOWER VASCULAR LEFT COMMON FEMORAL SPONT: NORMAL
BH CV LOWER VASCULAR LEFT DISTAL FEMORAL COMPRESS: NORMAL
BH CV LOWER VASCULAR LEFT GASTRONEMIUS COMPRESS: NORMAL
BH CV LOWER VASCULAR LEFT GREATER SAPH AK COMPRESS: NORMAL
BH CV LOWER VASCULAR LEFT GREATER SAPH BK COMPRESS: NORMAL
BH CV LOWER VASCULAR LEFT MID FEMORAL AUGMENT: NORMAL
BH CV LOWER VASCULAR LEFT MID FEMORAL COMPETENT: NORMAL
BH CV LOWER VASCULAR LEFT MID FEMORAL COMPRESS: NORMAL
BH CV LOWER VASCULAR LEFT MID FEMORAL PHASIC: NORMAL
BH CV LOWER VASCULAR LEFT MID FEMORAL SPONT: NORMAL
BH CV LOWER VASCULAR LEFT PERONEAL COMPRESS: NORMAL
BH CV LOWER VASCULAR LEFT POPLITEAL AUGMENT: NORMAL
BH CV LOWER VASCULAR LEFT POPLITEAL COMPETENT: NORMAL
BH CV LOWER VASCULAR LEFT POPLITEAL COMPRESS: NORMAL
BH CV LOWER VASCULAR LEFT POPLITEAL PHASIC: NORMAL
BH CV LOWER VASCULAR LEFT POPLITEAL SPONT: NORMAL
BH CV LOWER VASCULAR LEFT POSTERIOR TIBIAL COMPRESS: NORMAL
BH CV LOWER VASCULAR LEFT PROFUNDA FEMORAL COMPRESS: NORMAL
BH CV LOWER VASCULAR LEFT PROXIMAL FEMORAL COMPRESS: NORMAL
BH CV LOWER VASCULAR LEFT SAPHENOFEMORAL JUNCTION COMPRESS: NORMAL
BH CV LOWER VASCULAR RIGHT COMMON FEMORAL AUGMENT: NORMAL
BH CV LOWER VASCULAR RIGHT COMMON FEMORAL COMPETENT: NORMAL
BH CV LOWER VASCULAR RIGHT COMMON FEMORAL COMPRESS: NORMAL
BH CV LOWER VASCULAR RIGHT COMMON FEMORAL PHASIC: NORMAL
BH CV LOWER VASCULAR RIGHT COMMON FEMORAL SPONT: NORMAL
BH CV LOWER VASCULAR RIGHT DISTAL FEMORAL COMPRESS: NORMAL
BH CV LOWER VASCULAR RIGHT GASTRONEMIUS COMPRESS: NORMAL
BH CV LOWER VASCULAR RIGHT GREATER SAPH AK COMPRESS: NORMAL
BH CV LOWER VASCULAR RIGHT GREATER SAPH BK COMPRESS: NORMAL
BH CV LOWER VASCULAR RIGHT MID FEMORAL AUGMENT: NORMAL
BH CV LOWER VASCULAR RIGHT MID FEMORAL COMPETENT: NORMAL
BH CV LOWER VASCULAR RIGHT MID FEMORAL COMPRESS: NORMAL
BH CV LOWER VASCULAR RIGHT MID FEMORAL PHASIC: NORMAL
BH CV LOWER VASCULAR RIGHT MID FEMORAL SPONT: NORMAL
BH CV LOWER VASCULAR RIGHT PERONEAL COMPRESS: NORMAL
BH CV LOWER VASCULAR RIGHT POPLITEAL AUGMENT: NORMAL
BH CV LOWER VASCULAR RIGHT POPLITEAL COMPETENT: NORMAL
BH CV LOWER VASCULAR RIGHT POPLITEAL COMPRESS: NORMAL
BH CV LOWER VASCULAR RIGHT POPLITEAL PHASIC: NORMAL
BH CV LOWER VASCULAR RIGHT POPLITEAL SPONT: NORMAL
BH CV LOWER VASCULAR RIGHT POSTERIOR TIBIAL COMPRESS: NORMAL
BH CV LOWER VASCULAR RIGHT PROFUNDA FEMORAL COMPRESS: NORMAL
BH CV LOWER VASCULAR RIGHT PROXIMAL FEMORAL COMPRESS: NORMAL
BH CV LOWER VASCULAR RIGHT SAPHENOFEMORAL JUNCTION COMPRESS: NORMAL
BH CV UPPER VENOUS LEFT AXILLARY AUGMENT: NORMAL
BH CV UPPER VENOUS LEFT AXILLARY COMPETENT: NORMAL
BH CV UPPER VENOUS LEFT AXILLARY COMPRESS: NORMAL
BH CV UPPER VENOUS LEFT AXILLARY PHASIC: NORMAL
BH CV UPPER VENOUS LEFT AXILLARY SPONT: NORMAL
BH CV UPPER VENOUS LEFT BASILIC FOREARM COMPRESS: NORMAL
BH CV UPPER VENOUS LEFT BASILIC UPPER COMPRESS: NORMAL
BH CV UPPER VENOUS LEFT BRACHIAL COMPRESS: NORMAL
BH CV UPPER VENOUS LEFT CEPHALIC FOREARM COMPRESS: NORMAL
BH CV UPPER VENOUS LEFT CEPHALIC UPPER COMPRESS: NORMAL
BH CV UPPER VENOUS LEFT INTERNAL JUGULAR AUGMENT: NORMAL
BH CV UPPER VENOUS LEFT INTERNAL JUGULAR COMPETENT: NORMAL
BH CV UPPER VENOUS LEFT INTERNAL JUGULAR COMPRESS: NORMAL
BH CV UPPER VENOUS LEFT INTERNAL JUGULAR PHASIC: NORMAL
BH CV UPPER VENOUS LEFT INTERNAL JUGULAR SPONT: NORMAL
BH CV UPPER VENOUS LEFT RADIAL COMPRESS: NORMAL
BH CV UPPER VENOUS LEFT SUBCLAVIAN AUGMENT: NORMAL
BH CV UPPER VENOUS LEFT SUBCLAVIAN COMPETENT: NORMAL
BH CV UPPER VENOUS LEFT SUBCLAVIAN COMPRESS: NORMAL
BH CV UPPER VENOUS LEFT SUBCLAVIAN PHASIC: NORMAL
BH CV UPPER VENOUS LEFT SUBCLAVIAN SPONT: NORMAL
BH CV UPPER VENOUS LEFT ULNAR COMPRESS: NORMAL
BH CV UPPER VENOUS RIGHT AXILLARY AUGMENT: NORMAL
BH CV UPPER VENOUS RIGHT AXILLARY COMPETENT: NORMAL
BH CV UPPER VENOUS RIGHT AXILLARY COMPRESS: NORMAL
BH CV UPPER VENOUS RIGHT AXILLARY PHASIC: NORMAL
BH CV UPPER VENOUS RIGHT AXILLARY SPONT: NORMAL
BH CV UPPER VENOUS RIGHT BASILIC FOREARM COMPRESS: NORMAL
BH CV UPPER VENOUS RIGHT BASILIC UPPER COMPRESS: NORMAL
BH CV UPPER VENOUS RIGHT BRACHIAL COMPRESS: NORMAL
BH CV UPPER VENOUS RIGHT INTERNAL JUGULAR AUGMENT: NORMAL
BH CV UPPER VENOUS RIGHT INTERNAL JUGULAR COMPETENT: NORMAL
BH CV UPPER VENOUS RIGHT INTERNAL JUGULAR COMPRESS: NORMAL
BH CV UPPER VENOUS RIGHT INTERNAL JUGULAR PHASIC: NORMAL
BH CV UPPER VENOUS RIGHT INTERNAL JUGULAR SPONT: NORMAL
BH CV UPPER VENOUS RIGHT RADIAL COMPRESS: NORMAL
BH CV UPPER VENOUS RIGHT SUBCLAVIAN AUGMENT: NORMAL
BH CV UPPER VENOUS RIGHT SUBCLAVIAN COMPETENT: NORMAL
BH CV UPPER VENOUS RIGHT SUBCLAVIAN COMPRESS: NORMAL
BH CV UPPER VENOUS RIGHT SUBCLAVIAN PHASIC: NORMAL
BH CV UPPER VENOUS RIGHT SUBCLAVIAN SPONT: NORMAL
BH CV UPPER VENOUS RIGHT ULNAR COMPRESS: NORMAL
CREAT BLDA-MCNC: 0.7 MG/DL (ref 0.6–1.3)

## 2019-10-09 PROCEDURE — 71275 CT ANGIOGRAPHY CHEST: CPT

## 2019-10-09 PROCEDURE — 93970 EXTREMITY STUDY: CPT

## 2019-10-09 PROCEDURE — 0 IOPAMIDOL PER 1 ML: Performed by: INTERNAL MEDICINE

## 2019-10-09 PROCEDURE — 82565 ASSAY OF CREATININE: CPT

## 2019-10-09 PROCEDURE — 74177 CT ABD & PELVIS W/CONTRAST: CPT

## 2019-10-09 PROCEDURE — 0 DIATRIZOATE MEGLUMINE & SODIUM PER 1 ML: Performed by: INTERNAL MEDICINE

## 2019-10-09 RX ORDER — LANOLIN ALCOHOL/MO/W.PET/CERES
1000 CREAM (GRAM) TOPICAL DAILY
COMMUNITY

## 2019-10-09 RX ORDER — MELATONIN
1000 DAILY
COMMUNITY

## 2019-10-09 RX ADMIN — DIATRIZOATE MEGLUMINE AND DIATRIZOATE SODIUM 30 ML: 660; 100 LIQUID ORAL; RECTAL at 12:00

## 2019-10-09 RX ADMIN — IOPAMIDOL 95 ML: 755 INJECTION, SOLUTION INTRAVENOUS at 14:52

## 2019-10-10 ENCOUNTER — APPOINTMENT (OUTPATIENT)
Dept: CT IMAGING | Facility: HOSPITAL | Age: 49
End: 2019-10-10

## 2019-10-11 ENCOUNTER — OFFICE VISIT (OUTPATIENT)
Dept: ONCOLOGY | Facility: CLINIC | Age: 49
End: 2019-10-11

## 2019-10-11 ENCOUNTER — APPOINTMENT (OUTPATIENT)
Dept: LAB | Facility: HOSPITAL | Age: 49
End: 2019-10-11

## 2019-10-11 VITALS
OXYGEN SATURATION: 99 % | TEMPERATURE: 98 F | BODY MASS INDEX: 48.38 KG/M2 | WEIGHT: 283.4 LBS | DIASTOLIC BLOOD PRESSURE: 81 MMHG | RESPIRATION RATE: 22 BRPM | SYSTOLIC BLOOD PRESSURE: 133 MMHG | HEIGHT: 64 IN | HEART RATE: 100 BPM

## 2019-10-11 DIAGNOSIS — I26.99 OTHER ACUTE PULMONARY EMBOLISM WITHOUT ACUTE COR PULMONALE (HCC): Primary | ICD-10-CM

## 2019-10-11 PROCEDURE — G0463 HOSPITAL OUTPT CLINIC VISIT: HCPCS | Performed by: NURSE PRACTITIONER

## 2019-10-11 PROCEDURE — 99214 OFFICE O/P EST MOD 30 MIN: CPT | Performed by: NURSE PRACTITIONER

## 2019-10-11 NOTE — PROGRESS NOTES
Subjective     REASON FOR FOLLOW UP:  Acute pulmonary embolism without acute cor pulmonale following knee replacement March 2019.                             HISTORY OF PRESENT ILLNESS:  The patient is a 49 y.o. year old female who is here for an opinion about the above issue.    History of Present Illness    The patient returns to the office today with the above-mentioned history, for follow-up and scan review.  The patient is previously had a pulmonary emboli following total knee replacement March 2019.  She was noncompliant with anticoagulation following knee replacement leading to the small pulmonary emboli.  At the time of diagnosis, she was asymptomatic.  It was recommended she take Eliquis, though the patient was then noncompliant with anticoagulation again due to persistent abdominal pain.  She was seen in consultation by Dr. Saldana 1 week ago.  At that time, we ordered bilateral upper extremity Doppler ultrasounds, bilateral lower extremity Doppler ultrasound and CT angiogram as well as CT of the abdomen and pelvis for persistent abdominal pain.  She returns today to review all this information.   The patient is currently asymptomatic of thrombosis.  She denies shortness of breath or chest pain.  She denies hemoptysis.  She denies lower extremity swelling.  She does continue to have abdominal pain which she rates a 7 out of 10.  She was previously seen at [facility] gastroenterology, though she wanted to follow-up within the [facility] system.  She has been referred to Dr. Yanez, she is scheduled to be seen next month.    Past Medical History:   Diagnosis Date   • Acute pulmonary embolism (CMS/HCC) 03/2019   • Alopecia    • Anxiety and depression    • Arthritis     OSTEO   • Balance problem     FOOTDROP LEFT FOOT   • Bilateral knee pain    • Breast mass     RIGHT, MD WATCHING.   • Chronic low back pain    • Depression    • Dysphagia 2/22/2016   • Epigastric pain 8/30/2017    Added automatically from request  for surgery [id number]   • GERD (gastroesophageal reflux disease)    • History of infectious mononucleosis    • Hyperlipidemia    • Hypertension    • Hypothyroidism    • Impaired fasting glucose    • Kidney calculus     HISTORY OF   • Nausea and vomiting 3/21/2019    Added automatically from request for surgery [id number]   • Nocturnal cough 2016   • Pneumonia     2015 S/P LUMBAR FUSION   • Regurgitation 2016   • Sciatica    • Spinal headache     AFTER MYELOGRAM. BLOOD PATCH X2   • Vitamin D deficiency      Past Surgical History:   Procedure Laterality Date   •  SECTION     • CHOLECYSTECTOMY N/A 2017    Procedure: CHOLECYSTECTOMY LAPAROSCOPIC;  Surgeon: Jam Ballard MD;  Location: [facility] OR OSC;  Service:    • DILATION AND CURETTAGE, DIAGNOSTIC / THERAPEUTIC     • ENDOSCOPY N/A 2017    Procedure: ESOPHAGOGASTRODUODENOSCOPY WITH BIOPSY;  Surgeon: Jam Ballard MD;  Location: [facility] ENDOSCOPY;  Service:    • GASTRIC BANDING REMOVAL N/A 3/25/2019    Procedure: GASTRIC BANDING REMOVAL LAPAROSCOPIC;  Surgeon: Tu Hartman Jr., MD;  Location: [facility] MAIN OR;  Service: General   • JOINT REPLACEMENT Left 2017    KNEE   • KNEE ARTHROSCOPY Left     REPAIR OF MENISUCS   • LAPAROSCOPIC GASTRIC BANDING     • LITHOTRIPSY     • LUMBAR FUSION  2015    L5-S1. WITH HARDWARE   • MICRODISCECTOMY LUMBAR     • IL TOTAL KNEE ARTHROPLASTY Left 3/29/2017    Procedure: TOTAL KNEE ARTHROPLASTY;  Surgeon: Zacarias Reeves MD;  Location: [facility] OR;  Service: Orthopedics   • TONSILLECTOMY     • TOTAL KNEE ARTHROPLASTY REVISION Left 2019    Dr. Chavez       2019 CT ANGIOGRAM CHEST WITH CONTRAST  FINDINGS: Heart size within normal limits, no pericardial abnormality.  The esophagus is normal in course and caliber. Left band device in  typical position. Oral contrast material within the stomach, the stomach  is satisfactory in appearance.     The caliber of the aorta is normal. No mediastinal or  hilar  mass/adenopathy. There is a parenchymal scar demonstrated within the  lingular segment. Lungs are otherwise clear. No suspicious pulmonary  mass or pleural effusion.     There are very small stable mediastinal and left hilar lymph nodes. No  adenopathy or mass has developed. Small pulmonary emboli demonstrated  within the right upper lobe. No pulmonary emboli within the left lobe.  RV:LV ratio less than 1.     Position of the Lap-Band is similar to 2018. No biliary duct dilatation  status post cholecystectomy. The liver, pancreas, spleen and adrenal  glands are normal in appearance. Both kidneys demonstrate a symmetric  satisfactory pattern of enhancement. A 2 mm to 3 mm nonobstructing right  renal calculus. There is a 12 mm hypodense dense left renal cortical  nodule similar in size compared to the previous examination, supporting  a benign etiology and perhaps renal simple/complex cyst. Size precludes  accurate CT attenuation assessment. The.caliber of the aorta is normal.  Urinary bladder is typical in appearance.     The uterus is anteverted, size appropriate for age, both ovaries are  small, symmetric and satisfactory in appearance. A few diverticula  arising from the sigmoid colon, no indication of diverticulitis. The  appendix is normal in appearance. Tiny appendicolith may be present.  Small bowel caliber and overall appearance within normal limits. Lumbar  fusion hardware is in position.     CONCLUSION:  1. Small right upper lobe pulmonary emboli.  2. Lingular segment scar formation.  3. Lap-Band device typical in appearance, the caliber of the esophagus  is normal and there is oral contrast within a normal-appearing stomach  4. Tiny nonobstructing right renal calculus. Small stable hypodense left  renal cortical nodule which may simply represent a cyst, size precludes  accurate CT attenuation assessment.  5. Nominal sigmoid diverticulosis.     03/22/2019 STRESS TEST WITH MYOCARDIAL PERFUSION  · Left  ventricular ejection fraction is hyperdynamic (Calculated EF > 70%).  · Myocardial perfusion imaging indicates a normal myocardial perfusion study with no evidence of ischemia.  · Impressions are consistent with a low risk study.  · Extremely small inferior wall ischemia can not beruled out    03/22/2019 DUPLEX VENOUS LOWER EXTREMITY   · Normal bilateral lower extremity venous duplex scan.    03/23/2019 FL UPPER GI SINGLE CONTRAST  CONCLUSION: LAP-BAND device in good position. The diameter of the  stomach at the level of the lap band is approximately 7 mm. The exam is  otherwise unremarkable.    08/19/2019 MAMMOGRAM SCREENING BILATERAL was benign.    FAMILY HISTORY:  No family history of cancer.     OB-GYN:  Menopause- since 2018    SOCIAL HISTORY:  She is a non-smoker. She is  with one daughter. She does not currently drink alcohol but she used to drink about 10 drinks per week for approximately two years but that was a few years ago. She does not currently work.        Current Outpatient Medications on File Prior to Visit   Medication Sig Dispense Refill   • AFLURIA QUADRIVALENT 0.5 ML suspension prefilled syringe injection ADM 0.5ML IM UTD  0   • amlodipine-olmesartan (RICK) 10-40 MG per tablet Take 1 tablet by mouth Daily. For BP with Coreg (stop Exforge) 30 tablet 5   • atorvastatin (LIPITOR) 40 MG tablet Take 1 tablet by mouth Every Night. For cholesterol 30 tablet 11   • carvedilol (COREG) 12.5 MG tablet Take 1 tablet by mouth 2 (Two) Times a Day With Meals. For BP and stop Bystolic 60 tablet 5   • cholecalciferol (VITAMIN D3) 1000 units tablet Take 1,000 Units by mouth Daily.     • hydrOXYzine (ATARAX) 25 MG tablet Take 1 tablet by mouth 3 (Three) Times a Day As Needed for Anxiety. 60 tablet 5   • levothyroxine (SYNTHROID) 88 MCG tablet Take 1 tablet by mouth Daily. For thyroid; give generic 30 tablet 5   • omeprazole (priLOSEC) 40 MG capsule Take 1 capsule by mouth 2 (Two) Times a Day. For Medrano's  and GERD 60 capsule 5   • vitamin B-12 (CYANOCOBALAMIN) 1000 MCG tablet Take 1,000 mcg by mouth Daily.     • sucralfate (CARAFATE) 1 GM/10ML suspension Take 10 mL by mouth 4 (Four) Times a Day for 60 days. 1200 mL 2     No current facility-administered medications on file prior to visit.         ALLERGIES:    Allergies   Allergen Reactions   • Ciprofloxacin Hives   • Ketamine Other (See Comments)     MADE HER A LITTLE CRAZY   • Lisinopril Cough   • Propacetamol Other (See Comments)     UNSURE OF RX. AFTER BACK SURGERY   • Propoxyphene-Acetaminophen Itching   • Tirosint [Levothyroxine] Itching        Social History     Socioeconomic History   • Marital status:      Spouse name: Nik   • Number of children: 1   • Years of education: Some College   • Highest education level: Not on file   Occupational History     Employer: UNEMPLOYED   Tobacco Use   • Smoking status: Never Smoker   • Smokeless tobacco: Never Used   Substance and Sexual Activity   • Alcohol use: Yes     Comment: ON OCCASION   • Drug use: No   • Sexual activity: Defer        Family History   Problem Relation Age of Onset   • Hypertension Mother    • Thyroid disease Mother    • Mental illness Mother    • Hypertension Father    • Thyroid disease Brother    • Alcohol abuse Maternal Grandmother    • Heart disease Maternal Grandmother    • Malig Hyperthermia Neg Hx       I have reviewed the patient's medical history in detail and updated the computerized patient record.    Review of Systems   Constitutional: Positive for appetite change and fatigue. Negative for chills, diaphoresis, fever and unexpected weight change.   HENT: Negative for hearing loss, sore throat and trouble swallowing.    Respiratory: Negative for cough, chest tightness, shortness of breath and wheezing.    Cardiovascular: Negative for chest pain, palpitations and leg swelling.   Gastrointestinal: Positive for abdominal pain (unchanged) and nausea. Negative for abdominal  "distention, constipation, diarrhea and vomiting.   Genitourinary: Negative for dysuria, frequency, hematuria and urgency.   Musculoskeletal: Positive for back pain. Negative for joint swelling.        No muscle weakness.   Skin: Negative for rash and wound.   Neurological: Negative for seizures, syncope, speech difficulty, weakness, numbness and headaches.   Hematological: Negative for adenopathy. Does not bruise/bleed easily.   Psychiatric/Behavioral: Negative for behavioral problems, confusion and suicidal ideas.   Review of systems 10/11/2019 unchanged from previous office visit except as updated.     Objective     Vitals:    10/11/19 0818   BP: 133/81   Pulse: 100   Resp: 22   Temp: 98 °F (36.7 °C)   SpO2: 99%   Weight: 129 kg (283 lb 6.4 oz)   Height: 162.5 cm (63.98\")   PainSc:   7   PainLoc: Abdomen     Current Status 10/11/2019   ECOG score 1       Physical Exam    GENERAL:  Well-developed, well-nourished, obese female in no acute distress.   SKIN:  Warm, dry without rashes, purpura or petechiae.  NECK:  Supple with good range of motion; no masses, no JVD.  CHEST:  Lungs clear to auscultation. Good airflow. Without tachypnea.  CARDIAC:  Regular rate and rhythm without murmurs, rubs or gallops. Normal S1,S2.  ABDOMEN:  Soft. Bowel sounds present.   EXTREMITIES:  No clubbing, cyanosis or edema. No signs or upper or lower thrombosis.  NEUROLOGICAL:  Cranial Nerves II-XII grossly intact.  No focal neurological deficits.  PSYCHIATRIC:  Normal affect and mood.    Physical exam 10/11/2019  unchanged from previous office visit except as updated.     RECENT LABS:      Results from last 7 days   Lab Units 10/09/19  1230   CREATININE mg/dL 0.70     IMAGING:  Interpretation Summary     · Normal bilateral upper extremity venous duplex scan.     Interpretation Summary     · Normal bilateral lower extremity venous duplex scan.       CT ANGIOGRAM CHEST WITH CONTRAST, PULMONARY EMBOLISM PROTOCOL     HISTORY: Follow-up " pulmonary embolism.     TECHNIQUE: Spiral CT images were obtained from the lung apices to the  diaphragmatic domes following administration of intravenous contrast in  the angiographic phase. Coronal, sagittal and 3-D volume rendered  reformats were then obtained.     COMPARISON: 03/21/2019     FINDINGS: The pulmonary arteries are well opacified with intravenous  contrast. There are no convincing filling defects to suggest pulmonary  artery thromboembolism. No pleural or pericardial effusion. No  pathological mediastinal lymphadenopathy is present. The central airways  are patent without endobronchial lesion. No suspicious pulmonary nodules  or focal airspace disease. No pathological axillary lymphadenopathy.     IMPRESSION:  No convincing evidence of pulmonary artery thromboembolism.      CT ABDOMEN AND PELVIS WITH CONTRAST     HISTORY: Follow-up removal of gastric Lap-Band.     TECHNIQUE: Axial CT images of the abdomen and pelvis were obtained  following administration of intravenous contrast. The patient was given  oral contrast Coronal and sagittal reformats were obtained.     COMPARISON: CT dated 03/21/2019.     FINDINGS: The previously present gastric Lap-Band has been removed in  the interim. The small and large bowel loops demonstrate normal caliber.  The appendix is normal.     The liver, spleen and the pancreas is normal. The gallbladder is  surgically absent. Bilateral adrenal glands are normal. Both kidneys are  normal in size and attenuation. Punctate nephroliths are demonstrated  bilaterally, the largest within the midpole of the right kidney  measuring up to 4 mm. No hydronephrosis. A hypoattenuating lesion seen  anteriorly within the upper to mid pole of the left kidney in the  lateral cortex is unchanged from prior. The urinary bladder is minimally  distended and normal. The uterus is anteverted and normal. No abnormal  adnexal mass is seen. No ascites is present. No pathological  retroperitoneal  lymphadenopathy.     IMPRESSION:   1. No acute abnormality within the abdomen and pelvis.  2. Bilateral nonobstructing punctate calculi. Unchanged indeterminate  heterogeneously hypoattenuating left renal cortical lesion measuring 9  mm.     Radiation dose reduction techniques were utilized, including automated  exposure control and exposure modulation based on body size.        Assessment/Plan     1. Pulmonary Embolism: Right total knee arthroplasty 3/1/2019.  She was noncompliant with anticoagulation following knee replacement.  She was admitted 3/13/2019 with small pulmonary emboli with no associated symptoms.  Bilateral lower extremity ultrasounds negative at that time.  It was recommended she be anticoagulated with Eliquis, though following discharge, she was not compliant with Eliquis due to abdominal pain as discussed below.  · No need for hypercoagulable work-up given that this was a provoked emboli following orthopedic surgery with noncompliant anticoagulation.  · Repeat CT angiogram 10/9/2019 negative for pulmonary emboli  · Upper and lower duplex venous ultrasound negative for thrombosis 10/9/2019  · Patient was instructed to begin aspirin 81 mg daily      2. Abdominal pain: This is been a long-standing issue for several years.  She had a previous lap band that was removed because of vomiting and abdominal pain.  She had her gallbladder removed in 2017.  She was evaluated by Anabella Mcintyre and an EGD done showed Medrano's esophagus.  This was done at [facility].   · CT of the abdomen pelvis 10/9/2019 with no abnormalities  · Patient wishes to have a gastroenterologist within the [facility] system, she was referred to Dr. Yanez with follow-up 11/18/2019    PLAN:  1. The patient was instructed to begin an 81 mg aspirin daily  2. She was encouraged to drink plenty of fluids and ambulate every 2 hours with long travel  3. The patient is asymptomatic of thrombosis currently, we have reviewed signs and  symptoms of thrombosis and reasons to call our office  4. With no current thrombosis and not on anticoagulation, the patient will not require follow-up with our office.  She will call if she has new issues.  5. This plan was discussed reviewed with Dr. Saldana who is in agreement, all imaging reviewed with the patient today.     Neris Chapa, APRN  10/11/2019     PEDRO Linares PA-C

## 2019-10-17 ENCOUNTER — APPOINTMENT (OUTPATIENT)
Dept: ONCOLOGY | Facility: CLINIC | Age: 49
End: 2019-10-17

## 2019-10-17 ENCOUNTER — APPOINTMENT (OUTPATIENT)
Dept: OTHER | Facility: HOSPITAL | Age: 49
End: 2019-10-17

## 2019-10-18 ENCOUNTER — OFFICE VISIT (OUTPATIENT)
Dept: FAMILY MEDICINE CLINIC | Facility: CLINIC | Age: 49
End: 2019-10-18

## 2019-10-18 VITALS
HEIGHT: 64 IN | SYSTOLIC BLOOD PRESSURE: 128 MMHG | HEART RATE: 81 BPM | WEIGHT: 281 LBS | RESPIRATION RATE: 16 BRPM | BODY MASS INDEX: 47.97 KG/M2 | TEMPERATURE: 97.8 F | DIASTOLIC BLOOD PRESSURE: 80 MMHG | OXYGEN SATURATION: 100 %

## 2019-10-18 DIAGNOSIS — F33.1 MODERATE EPISODE OF RECURRENT MAJOR DEPRESSIVE DISORDER (HCC): ICD-10-CM

## 2019-10-18 DIAGNOSIS — F41.9 ANXIETY: ICD-10-CM

## 2019-10-18 DIAGNOSIS — E03.9 ACQUIRED HYPOTHYROIDISM: ICD-10-CM

## 2019-10-18 DIAGNOSIS — Z98.84 HISTORY OF REMOVAL OF LAPAROSCOPIC GASTRIC BANDING DEVICE: ICD-10-CM

## 2019-10-18 DIAGNOSIS — E55.9 VITAMIN D DEFICIENCY: ICD-10-CM

## 2019-10-18 DIAGNOSIS — I10 ESSENTIAL HYPERTENSION: Primary | ICD-10-CM

## 2019-10-18 DIAGNOSIS — E66.01 OBESITY, CLASS III, BMI 40-49.9 (MORBID OBESITY) (HCC): ICD-10-CM

## 2019-10-18 PROCEDURE — G0439 PPPS, SUBSEQ VISIT: HCPCS | Performed by: PHYSICIAN ASSISTANT

## 2019-10-18 PROCEDURE — 99214 OFFICE O/P EST MOD 30 MIN: CPT | Performed by: PHYSICIAN ASSISTANT

## 2019-10-18 RX ORDER — HYDROXYZINE HYDROCHLORIDE 25 MG/1
25 TABLET, FILM COATED ORAL 3 TIMES DAILY PRN
Qty: 60 TABLET | Refills: 5 | Status: SHIPPED | OUTPATIENT
Start: 2019-10-18 | End: 2020-07-20 | Stop reason: SDUPTHER

## 2019-10-18 RX ORDER — AMLODIPINE AND OLMESARTAN MEDOXOMIL 10; 40 MG/1; MG/1
1 TABLET ORAL DAILY
Qty: 30 TABLET | Refills: 5 | Status: SHIPPED | OUTPATIENT
Start: 2019-10-18 | End: 2019-12-05 | Stop reason: SDUPTHER

## 2019-10-18 RX ORDER — SUCRALFATE ORAL 1 G/10ML
1 SUSPENSION ORAL 4 TIMES DAILY
Qty: 1200 ML | Refills: 11 | Status: SHIPPED | OUTPATIENT
Start: 2019-10-18 | End: 2020-07-20

## 2019-10-18 RX ORDER — SERTRALINE HYDROCHLORIDE 100 MG/1
100 TABLET, FILM COATED ORAL DAILY
Qty: 30 TABLET | Refills: 5 | Status: SHIPPED | OUTPATIENT
Start: 2019-10-18 | End: 2019-12-05 | Stop reason: SDUPTHER

## 2019-10-18 RX ORDER — LEVOTHYROXINE SODIUM 88 UG/1
88 TABLET ORAL DAILY
Qty: 30 TABLET | Refills: 5 | Status: SHIPPED | OUTPATIENT
Start: 2019-10-18 | End: 2019-12-05 | Stop reason: SDUPTHER

## 2019-10-18 RX ORDER — PANTOPRAZOLE SODIUM 40 MG/1
40 TABLET, DELAYED RELEASE ORAL 2 TIMES DAILY
Qty: 60 TABLET | Refills: 5 | Status: SHIPPED | OUTPATIENT
Start: 2019-10-18 | End: 2019-12-05 | Stop reason: SDDI

## 2019-10-18 RX ORDER — CARVEDILOL 12.5 MG/1
12.5 TABLET ORAL 2 TIMES DAILY WITH MEALS
Qty: 60 TABLET | Refills: 5 | Status: SHIPPED | OUTPATIENT
Start: 2019-10-18 | End: 2019-12-05 | Stop reason: SDUPTHER

## 2019-10-18 RX ORDER — ASPIRIN 81 MG/1
81 TABLET ORAL DAILY
COMMUNITY
End: 2022-09-06

## 2019-10-18 NOTE — PROGRESS NOTES
The ABCs of the Annual Wellness Visit  Initial Medicare Wellness Visit    Chief Complaint   Patient presents with   • Hypertension       Subjective   History of Present Illness:  Marisol Carrillo is a 49 y.o. female who presents for an Initial Medicare Wellness Visit.    HEALTH RISK ASSESSMENT    Recent Hospitalizations:  Recently treated at the following:  [facility]    Current Medical Providers:  Patient Care Team:  Tomeka Linares PA-C as PCP - General (Family Medicine)  Tomeka Linares PA-C as Physician Assistant (Family Medicine)  Rakesh Dumas MD as Surgeon (Neurosurgery)  Zacarias Reeves MD as Consulting Physician (Orthopedic Surgery)  Jam Ballard MD as Surgeon (General Surgery)  Tomeka Linares PA-C as Referring Physician (Family Medicine)  Serina Gill MD as Consulting Physician (Hematology and Oncology)  Gwen Saldana MD as Consulting Physician (Hematology and Oncology)    Smoking Status:  Social History     Tobacco Use   Smoking Status Never Smoker   Smokeless Tobacco Never Used       Alcohol Consumption:  Social History     Substance and Sexual Activity   Alcohol Use Yes    Comment: ON OCCASION       Depression Screen:   PHQ-2/PHQ-9 Depression Screening 10/18/2019   Little interest or pleasure in doing things 0   Feeling down, depressed, or hopeless 0   Trouble falling or staying asleep, or sleeping too much -   Feeling tired or having little energy -   Poor appetite or overeating -   Feeling bad about yourself - or that you are a failure or have let yourself or your family down -   Trouble concentrating on things, such as reading the newspaper or watching television -   Moving or speaking so slowly that other people could have noticed. Or the opposite - being so fidgety or restless that you have been moving around a lot more than usual -   Thoughts that you would be better off dead, or of hurting yourself in some way -   Total Score 0   If you checked off any problems,  how difficult have these problems made it for you to do your work, take care of things at home, or get along with other people? -       Fall Risk Screen:  CHINYERE Fall Risk Assessment has not been completed.    Health Habits and Functional and Cognitive Screening:  Functional & Cognitive Status 10/18/2019   Do you have difficulty preparing food and eating? Yes   Do you have difficulty bathing yourself, getting dressed or grooming yourself? Yes   Do you have difficulty using the toilet? No   Do you have difficulty moving around from place to place? Yes   Do you have trouble with steps or getting out of a bed or a chair? Yes   Current Diet Well Balanced Diet   Dental Exam Not up to date   Eye Exam Up to date   Exercise (times per week) 0 times per week   Current Exercise Activities Include None   Do you need help using the phone?  No   Are you deaf or do you have serious difficulty hearing?  No   Do you need help with transportation? No   Do you need help shopping? Yes   Do you need help preparing meals?  Yes   Do you need help with housework?  Yes   Do you need help with laundry? Yes   Do you need help taking your medications? No   Do you need help managing money? No   Do you ever drive or ride in a car without wearing a seat belt? No   Have you felt unusual stress, anger or loneliness in the last month? Yes   Who do you live with? Spouse   If you need help, do you have trouble finding someone available to you? No   Have you been bothered in the last four weeks by sexual problems? No   Do you have difficulty concentrating, remembering or making decisions? Yes         Does the patient have evidence of cognitive impairment? No    Asprin use counseling:Taking ASA appropriately as indicated    Age-appropriate Screening Schedule:  Refer to the list below for future screening recommendations based on patient's age, sex and/or medical conditions. Orders for these recommended tests are listed in the plan section. The patient  has been provided with a written plan.    Health Maintenance   Topic Date Due   • PAP SMEAR  09/10/2019   • TDAP/TD VACCINES (2 - Td) 07/15/2020   • MAMMOGRAM  08/19/2020   • LIPID PANEL  10/02/2020   • INFLUENZA VACCINE  Completed          The following portions of the patient's history were reviewed and updated as appropriate: allergies, current medications, past family history, past medical history, past social history, past surgical history and problem list.    Outpatient Medications Prior to Visit   Medication Sig Dispense Refill   • amlodipine-olmesartan (RICK) 10-40 MG per tablet Take 1 tablet by mouth Daily. For BP with Coreg (stop Exforge) 30 tablet 5   • atorvastatin (LIPITOR) 40 MG tablet Take 1 tablet by mouth Every Night. For cholesterol 30 tablet 11   • carvedilol (COREG) 12.5 MG tablet Take 1 tablet by mouth 2 (Two) Times a Day With Meals. For BP and stop Bystolic 60 tablet 5   • cholecalciferol (VITAMIN D3) 1000 units tablet Take 1,000 Units by mouth Daily.     • hydrOXYzine (ATARAX) 25 MG tablet Take 1 tablet by mouth 3 (Three) Times a Day As Needed for Anxiety. 60 tablet 5   • levothyroxine (SYNTHROID) 88 MCG tablet Take 1 tablet by mouth Daily. For thyroid; give generic 30 tablet 5   • omeprazole (priLOSEC) 40 MG capsule Take 1 capsule by mouth 2 (Two) Times a Day. For Medrano's and GERD 60 capsule 5   • vitamin B-12 (CYANOCOBALAMIN) 1000 MCG tablet Take 1,000 mcg by mouth Daily.     • sucralfate (CARAFATE) 1 GM/10ML suspension Take 10 mL by mouth 4 (Four) Times a Day for 60 days. 1200 mL 2   • AFLURIA QUADRIVALENT 0.5 ML suspension prefilled syringe injection ADM 0.5ML IM UTD  0     No facility-administered medications prior to visit.        Patient Active Problem List   Diagnosis   • Essential familial hypercholesterolemia   • Hypertension   • Hypothyroidism   • Adiposity   • Vitamin deficiency   • Impaired fasting glucose   • Vitamin D deficiency   • Lumbosacral radiculopathy   •  "Gastroesophageal reflux disease   • Constipation   • Diarrhea   • Fatigue   • Heartburn   • Lumbar disc herniation with radiculopathy   • Pain in extremity   • Anxiety   • Depression   • Degeneration of lumbar intervertebral disc   • Spinal headache   • Chronic bilateral low back pain with bilateral sciatica   • Disc disease, degenerative, lumbar or lumbosacral   • Primary localized osteoarthritis of left knee   • History of spinal fusion   • Lumbar spondylolysis   • Chronic pain of left knee   • Pulmonary embolus (CMS/[facility])   • S/P total prosthetic knee replacement not using cement, left   • History of removal of laparoscopic gastric banding device   • Incisional pain   • Obesity, Class III, BMI 40-49.9 (morbid obesity) (CMS/HCC)   • Nausea   • Medrano's esophagus with dysplasia   • Abdominal pain       Advanced Care Planning:  Patient does not have an advance directive - information provided to the patient today    Review of Systems    Compared to one year ago, the patient feels her physical health is the same.  Compared to one year ago, the patient feels her mental health is worse.    Reviewed chart for potential of high risk medication in the elderly: yes  Reviewed chart for potential of harmful drug interactions in the elderly:yes    Objective         Vitals:    10/18/19 0909   BP: 128/80   BP Location: Left arm   Patient Position: Sitting   Cuff Size: Large Adult   Pulse: 81   Resp: 16   Temp: 97.8 °F (36.6 °C)   TempSrc: Oral   SpO2: 100%   Weight: 127 kg (281 lb)   Height: 162.5 cm (63.98\")   PainSc:   7   PainLoc: Abdomen       Body mass index is 48.26 kg/m².  Discussed the patient's BMI with her. The BMI is above average; BMI management plan is completed.  Need pap  Physical Exam    Lab Results   Component Value Date    CHLPL 179 10/02/2019    TRIG 80 10/02/2019    HDL 63 10/02/2019     (H) 10/02/2019    VLDL 16 10/02/2019    HGBA1C 5.90 (H) 10/02/2019        Assessment/Plan   Medicare Risks and " Personalized Health Plan  CMS Preventative Services Quick Reference  Depression/Dysphoria  Obesity/Overweight   Polypharmacy    The above risks/problems have been discussed with the patient.  Pertinent information has been shared with the patient in the After Visit Summary.  Follow up plans and orders are seen below in the Assessment/Plan Section.    There are no diagnoses linked to this encounter.  Follow Up:  No Follow-up on file.     An After Visit Summary and PPPS were given to the patient.

## 2019-10-18 NOTE — PROGRESS NOTES
"Subjective   Marisol Carrillo is a 49 y.o. female.     History of Present Illness      Since the last visit, she has overall felt anxious.  She has Essential Hypertension and well controlled on current medication, Hyperlipidemia with goals met with current Rx, Hypothyroidism well controlled on current medication and will continue Rx and Vitamin D deficiency and will update labs for continued management.  she has been compliant with current medications have reviewed them.  The patient denies medication side effects.  Will refill medications. /80 (BP Location: Left arm, Patient Position: Sitting, Cuff Size: Large Adult)   Pulse 81   Temp 97.8 °F (36.6 °C) (Oral)   Resp 16   Ht 162.5 cm (63.98\")   Wt 127 kg (281 lb)   LMP  (LMP Unknown)   SpO2 100%   BMI 48.26 kg/m²     Results for orders placed or performed during the hospital encounter of 10/09/19   Duplex Venous Upper Extremity - Bilateral CAR   Result Value Ref Range    Right Internal Jugular Augment Y     Right Internal Jugular Competent Y     Right Internal Jugular Compress C     Right Internal Jugular Phasic Y     Right Internal Jugular Spont Y     Left Internal Jugular Augment Y     Left Internal Jugular Competent Y     Left Internal Jugular Compress C     Left Internal Jugular Phasic Y     Left Internal Jugular Spont Y     Right Subclavian Augment Y     Right Subclavian Competent Y     Right Subclavian Compress C     Right Subclavian Phasic Y     Right Subclavian Spont Y     Left Subclavian Augment Y     Left Subclavian Competent Y     Left Subclavian Compress C     Left Subclavian Phasic Y     Left Subclavian Spont Y     Right Axillary Augment Y     Right Axillary Competent Y     Right Axillary Compress C     Right Axillary Phasic Y     Right Axillary Spont Y     Left Axillary Augment Y     Left Axillary Competent Y     Left Axillary Compress C     Left Axillary Phasic Y     Left Axillary Spont Y     Right Brachial Compress C     Left Brachial " Compress C     Right Radial Compress C     Left Radial Compress C     Right Ulnar Compress C     Left Ulnar Compress C     Right Basilic Upper Compress C     Left Basilic Upper Compress C     Right Basilic Forearm Compress C     Left Basilic Forearm Compress C     Left Cephalic Upper Compress C     Left Cephalic Forearm Compress C        Want her to see Dr Neves for the renal lesion noted on CT    Notes from DR CARRILLO   then from [facility]  Assessment/Plan         1. Pulmonary Embolism: Right total knee arthroplasty 3/1/2019.  She was noncompliant with anticoagulation following knee replacement.  She was admitted 3/13/2019 with small pulmonary emboli with no associated symptoms.  Bilateral lower extremity ultrasounds negative at that time.  It was recommended she be anticoagulated with Eliquis, though following discharge, she was not compliant with Eliquis due to abdominal pain as discussed below.  · No need for hypercoagulable work-up given that this was a provoked emboli following orthopedic surgery with noncompliant anticoagulation.  · Repeat CT angiogram 10/9/2019 negative for pulmonary emboli  · Upper and lower duplex venous ultrasound negative for thrombosis 10/9/2019  · Patient was instructed to begin aspirin 81 mg daily        2. Abdominal pain: This is been a long-standing issue for several years.  She had a previous lap band that was removed because of vomiting and abdominal pain.  She had her gallbladder removed in 2017.  She was evaluated by Anabella Mcintyre and an EGD done showed Medrano's esophagus.  This was done at [facility].   · CT of the abdomen pelvis 10/9/2019 with no abnormalities  · Patient wishes to have a gastroenterologist within the [facility] system, she was referred to Dr. Yanez with follow-up 11/18/2019     PLAN:  1. The patient was instructed to begin an 81 mg aspirin daily  2. She was encouraged to drink plenty of fluids and ambulate every 2 hours with long travel  3. The patient is  "asymptomatic of thrombosis currently, we have reviewed signs and symptoms of thrombosis and reasons to call our office  4. With no current thrombosis and not on anticoagulation, the patient will not require follow-up with our office.  She will call if she has new issues.  5. This plan was discussed reviewed with Dr. Saldana who is in agreement, all imaging reviewed with the patient today.      Neris Chapa, APRN  10/11/2019      CC- Tomeka Linares PA-C     She still has abd pain still hurts; I want to try a different PPI and keep it at BID  I will keep her on Carafate;  Dr. Yanez to see her Kehinde Carrillo female 49 y.o., /80 (BP Location: Left arm, Patient Position: Sitting, Cuff Size: Large Adult)   Pulse 81   Temp 97.8 °F (36.6 °C) (Oral)   Resp 16   Ht 162.5 cm (63.98\")   Wt 127 kg (281 lb)   LMP  (LMP Unknown)   SpO2 100%   BMI 48.26 kg/m²   who presents today for follow up of Depression and Anxiety.  She reports wants to go back on Zoloft and was on 100mg. Onset of symptoms was approximately several years ago.  She denies current suicidal and homicidal ideation. Risk factors are family history of anxiety and or depression, lifestyle of multiple roles and chronic illness.  Previous treatment includes Zoloft is best med.  She complains of the following medication side effects: none.  The patient has previously been in counseling..  Also on Vistaril      The following portions of the patient's history were reviewed and updated as appropriate: allergies, current medications, past family history, past medical history, past social history, past surgical history and problem list.    Review of Systems   Constitutional: Positive for activity change, appetite change and fatigue. Negative for unexpected weight change.   HENT: Negative for nosebleeds and trouble swallowing.    Eyes: Negative for pain and visual disturbance.   Respiratory: Negative for chest tightness, shortness of breath and " wheezing.    Cardiovascular: Negative for chest pain and palpitations.   Gastrointestinal: Positive for abdominal pain and nausea. Negative for blood in stool.   Endocrine: Negative.    Genitourinary: Negative for difficulty urinating and hematuria.   Musculoskeletal: Positive for arthralgias, back pain, gait problem and myalgias. Negative for joint swelling.   Skin: Negative for color change and rash.   Allergic/Immunologic: Negative.    Neurological: Positive for weakness. Negative for syncope and speech difficulty.   Hematological: Negative for adenopathy.   Psychiatric/Behavioral: Positive for agitation, confusion, decreased concentration, dysphoric mood and sleep disturbance. The patient is nervous/anxious.    All other systems reviewed and are negative.      Objective   Physical Exam   Constitutional: She is oriented to person, place, and time. She appears well-developed and well-nourished. No distress.   HENT:   Head: Normocephalic and atraumatic.   Eyes: Conjunctivae and EOM are normal. Pupils are equal, round, and reactive to light. Right eye exhibits no discharge. Left eye exhibits no discharge. No scleral icterus.   Neck: Normal range of motion. Neck supple. No tracheal deviation present. No thyromegaly present.   Cardiovascular: Normal rate, regular rhythm, normal heart sounds, intact distal pulses and normal pulses. Exam reveals no gallop.   No murmur heard.  Pulmonary/Chest: Effort normal and breath sounds normal. No respiratory distress. She has no wheezes. She has no rales.   Musculoskeletal: Normal range of motion.   Neurological: She is alert and oriented to person, place, and time. She exhibits normal muscle tone. Coordination normal.   Skin: Skin is warm. No rash noted. No erythema. No pallor.   Psychiatric: She has a normal mood and affect. Her behavior is normal. Judgment and thought content normal.   Nursing note and vitals reviewed.      Assessment/Plan   There are no diagnoses linked to this  encounter.    Plan, Marisol Carrillo, was seen today.  she was seen for HTN and continue medication, Hyperlipidemia and will continue current medication, Hypothyroidism well controlled, continue medication and Vitamin D deficiency and will update labs .  She has appt GI  Refill GI med and try different PPI; do BID  Restart Zoloft and PRN Vistaril  F/u urologist

## 2019-10-30 ENCOUNTER — RESULTS ENCOUNTER (OUTPATIENT)
Dept: FAMILY MEDICINE CLINIC | Facility: CLINIC | Age: 49
End: 2019-10-30

## 2019-10-30 DIAGNOSIS — R79.89 LFT ELEVATION: ICD-10-CM

## 2019-11-07 ENCOUNTER — OFFICE VISIT (OUTPATIENT)
Dept: NEUROSURGERY | Facility: CLINIC | Age: 49
End: 2019-11-07

## 2019-11-07 VITALS
HEART RATE: 74 BPM | DIASTOLIC BLOOD PRESSURE: 78 MMHG | HEIGHT: 64 IN | WEIGHT: 285 LBS | BODY MASS INDEX: 48.65 KG/M2 | SYSTOLIC BLOOD PRESSURE: 138 MMHG

## 2019-11-07 DIAGNOSIS — R29.898 WEAKNESS OF LEFT FOOT: ICD-10-CM

## 2019-11-07 DIAGNOSIS — M54.16 BILATERAL LUMBAR RADICULOPATHY: Primary | ICD-10-CM

## 2019-11-07 DIAGNOSIS — Z98.1 HISTORY OF SPINAL FUSION: ICD-10-CM

## 2019-11-07 DIAGNOSIS — M51.26 PROTRUSION OF LUMBAR INTERVERTEBRAL DISC: ICD-10-CM

## 2019-11-07 PROCEDURE — 99213 OFFICE O/P EST LOW 20 MIN: CPT | Performed by: NURSE PRACTITIONER

## 2019-11-18 ENCOUNTER — OFFICE VISIT (OUTPATIENT)
Dept: GASTROENTEROLOGY | Facility: CLINIC | Age: 49
End: 2019-11-18

## 2019-11-18 VITALS
SYSTOLIC BLOOD PRESSURE: 138 MMHG | TEMPERATURE: 97.9 F | WEIGHT: 286.8 LBS | BODY MASS INDEX: 48.96 KG/M2 | HEIGHT: 64 IN | DIASTOLIC BLOOD PRESSURE: 84 MMHG

## 2019-11-18 DIAGNOSIS — R74.8 ELEVATED ALKALINE PHOSPHATASE LEVEL: ICD-10-CM

## 2019-11-18 DIAGNOSIS — R11.0 NAUSEA: ICD-10-CM

## 2019-11-18 DIAGNOSIS — K22.70 BARRETT'S ESOPHAGUS WITHOUT DYSPLASIA: ICD-10-CM

## 2019-11-18 DIAGNOSIS — R10.13 EPIGASTRIC PAIN: Primary | ICD-10-CM

## 2019-11-18 LAB
ALBUMIN SERPL-MCNC: 4.5 G/DL (ref 3.5–5.2)
ALBUMIN/GLOB SERPL: 1.5 G/DL
ALP SERPL-CCNC: 119 U/L (ref 39–117)
ALT SERPL-CCNC: 15 U/L (ref 1–33)
AST SERPL-CCNC: 13 U/L (ref 1–32)
BILIRUB SERPL-MCNC: 0.3 MG/DL (ref 0.2–1.2)
BUN SERPL-MCNC: 16 MG/DL (ref 6–20)
BUN/CREAT SERPL: 19.5 (ref 7–25)
CALCIUM SERPL-MCNC: 9.5 MG/DL (ref 8.6–10.5)
CHLORIDE SERPL-SCNC: 101 MMOL/L (ref 98–107)
CO2 SERPL-SCNC: 23.8 MMOL/L (ref 22–29)
CREAT SERPL-MCNC: 0.82 MG/DL (ref 0.57–1)
GGT SERPL-CCNC: 18 U/L (ref 5–36)
GLOBULIN SER CALC-MCNC: 3 GM/DL
GLUCOSE SERPL-MCNC: 119 MG/DL (ref 65–99)
POTASSIUM SERPL-SCNC: 4.6 MMOL/L (ref 3.5–5.2)
PROT SERPL-MCNC: 7.5 G/DL (ref 6–8.5)
SODIUM SERPL-SCNC: 142 MMOL/L (ref 136–145)

## 2019-11-18 PROCEDURE — 99204 OFFICE O/P NEW MOD 45 MIN: CPT | Performed by: INTERNAL MEDICINE

## 2019-11-18 RX ORDER — HYOSCYAMINE SULFATE 0.125 MG
0.12 TABLET ORAL EVERY 6 HOURS PRN
Qty: 90 TABLET | Refills: 1 | Status: SHIPPED | OUTPATIENT
Start: 2019-11-18 | End: 2019-12-30 | Stop reason: SDUPTHER

## 2019-11-18 NOTE — PROGRESS NOTES
Chief Complaint   Patient presents with   • Abdominal Pain     ongoing x 4 years; radiates to back   • Nausea       Subjective     HPI    Marisol Carrillo is a 49 y.o. female with a past medical history noted below who presents for evaluation of abdominal pain and nausea.    She has seen Dr. Anabella Mcintyre in the past but does not want to to need to follow with him.    Pain has been present for 4 years.  It has been epigastric pain with radiation to her scapula.  This led to a kylah but this did not solve her problems. Currently describing pain in a band around her upper abdomen.  Sharp and throbbing. Can be constant.  Better with carafate.  She is on high dose ppi.  There is associated mild nausea.  No vomiting.  She reports EGD in March that showed a focus of Medrano's esophagus.  I have seen the pathology report that demonstrates no dysplasia.  She was told to repeat EGD in 1 year.    She feels that pantoprazole as well as Carafate have been helping her.  She denies any significant issues with feeling food stick in her esophagus though she does report that she has had to have a dilatation in the past.  She is morbidly obese with no evidence of weight change over the past month.    She did have a LAP-BAND.  This was removed in March as well but her symptoms have persisted.    No family hx GI malignancy    No smoking, no ETOH    No excess NSAIDs            Past Medical History:   Diagnosis Date   • Acute pulmonary embolism (CMS/HCC) 03/2019   • Alopecia    • Anxiety and depression    • Arthritis     OSTEO   • Balance problem     FOOTDROP LEFT FOOT   • Medrano esophagus March2019   • Bilateral knee pain    • Breast mass     RIGHT, MD WATCHING.   • Chronic low back pain    • Depression    • Dysphagia 2/22/2016   • Epigastric pain 8/30/2017    Added automatically from request for surgery [id number]   • Fatty liver 2018   • GERD (gastroesophageal reflux disease)    • Hernia 2017   • History of infectious mononucleosis    •  Hyperlipidemia    • Hypertension    • Hypothyroidism    • Impaired fasting glucose    • Kidney calculus     HISTORY OF   • Nausea and vomiting 3/21/2019    Added automatically from request for surgery [id number]   • Nocturnal cough 2/22/2016   • Pneumonia     2015 S/P LUMBAR FUSION   • Regurgitation 2/22/2016   • Sciatica    • Spinal headache     AFTER MYELOGRAM. BLOOD PATCH X2   • Vitamin D deficiency          Current Outpatient Medications:   •  amlodipine-olmesartan (RICK) 10-40 MG per tablet, Take 1 tablet by mouth Daily. For BP with Coreg, Disp: 30 tablet, Rfl: 5  •  aspirin 81 MG EC tablet, Take 81 mg by mouth Daily., Disp: , Rfl:   •  atorvastatin (LIPITOR) 40 MG tablet, Take 1 tablet by mouth Every Night. For cholesterol, Disp: 30 tablet, Rfl: 11  •  carvedilol (COREG) 12.5 MG tablet, Take 1 tablet by mouth 2 (Two) Times a Day With Meals. For BP, Disp: 60 tablet, Rfl: 5  •  cholecalciferol (VITAMIN D3) 1000 units tablet, Take 1,000 Units by mouth Daily., Disp: , Rfl:   •  hydrOXYzine (ATARAX) 25 MG tablet, Take 1 tablet by mouth 3 (Three) Times a Day As Needed for Anxiety., Disp: 60 tablet, Rfl: 5  •  levothyroxine (SYNTHROID) 88 MCG tablet, Take 1 tablet by mouth Daily. For thyroid; give generic, Disp: 30 tablet, Rfl: 5  •  pantoprazole (PROTONIX) 40 MG EC tablet, Take 1 tablet by mouth 2 (Two) Times a Day. For GI (replaces Omeprazole), Disp: 60 tablet, Rfl: 5  •  sertraline (ZOLOFT) 100 MG tablet, Take 1 tablet by mouth Daily. For stress, Disp: 30 tablet, Rfl: 5  •  sertraline (ZOLOFT) 50 MG tablet, Take 1 tablet by mouth Daily., Disp: 30 tablet, Rfl: 0  •  sucralfate (CARAFATE) 1 GM/10ML suspension, Take 10 mL by mouth 4 (Four) Times a Day for 60 days. For GI--liquid form, Disp: 1200 mL, Rfl: 11  •  vitamin B-12 (CYANOCOBALAMIN) 1000 MCG tablet, Take 1,000 mcg by mouth Daily., Disp: , Rfl:   •  amoxicillin (AMOXIL) 875 MG tablet, Take 1 tablet by mouth Every 12 (Twelve) Hours., Disp: 20 tablet, Rfl: 0  •   hyoscyamine (ANASPAZ,LEVSIN) 0.125 MG tablet, Take 1 tablet by mouth Every 6 (Six) Hours As Needed for Cramping., Disp: 90 tablet, Rfl: 1    Allergies   Allergen Reactions   • Ciprofloxacin Hives   • Ketamine Other (See Comments)     MADE HER A LITTLE CRAZY   • Lisinopril Cough   • Propacetamol Other (See Comments)     UNSURE OF RX. AFTER BACK SURGERY   • Propoxyphene-Acetaminophen Itching   • Tirosint [Levothyroxine] Itching       Social History     Socioeconomic History   • Marital status:      Spouse name: Nik   • Number of children: 1   • Years of education: Some College   • Highest education level: Not on file   Occupational History     Employer: UNEMPLOYED   Tobacco Use   • Smoking status: Never Smoker   • Smokeless tobacco: Never Used   Substance and Sexual Activity   • Alcohol use: Yes     Comment: ON OCCASION   • Drug use: No   • Sexual activity: Yes     Partners: Male     Birth control/protection: None       Family History   Problem Relation Age of Onset   • Hypertension Mother    • Thyroid disease Mother    • Mental illness Mother    • Hypertension Father    • Thyroid disease Brother    • Alcohol abuse Maternal Grandmother    • Heart disease Maternal Grandmother    • Malig Hyperthermia Neg Hx        Review of Systems   Constitutional: Negative for activity change, appetite change, fatigue and fever.   HENT: Negative for sore throat and trouble swallowing.    Respiratory: Negative.    Cardiovascular: Negative.  Negative for chest pain.   Gastrointestinal: Positive for abdominal pain. Negative for abdominal distention and blood in stool.   Endocrine: Negative for cold intolerance and heat intolerance.   Genitourinary: Negative for difficulty urinating, dysuria, frequency and pelvic pain.   Musculoskeletal: Negative for arthralgias, back pain and myalgias.   Skin: Negative.    Neurological: Negative for weakness, numbness and headaches.   Hematological: Negative for adenopathy. Does not  bruise/bleed easily.   All other systems reviewed and are negative.      Objective     Vitals:    11/18/19 0830   BP: 138/84   Temp: 97.9 °F (36.6 °C)         11/18/19  0830   Weight: 130 kg (286 lb 12.8 oz)     Body mass index is 49.23 kg/m².    Physical Exam   Constitutional: She is oriented to person, place, and time. She appears well-developed and well-nourished. No distress.   Obese   HENT:   Head: Normocephalic and atraumatic.   Right Ear: External ear normal.   Left Ear: External ear normal.   Nose: Nose normal.   Mouth/Throat: Oropharynx is clear and moist.   Eyes: Conjunctivae and EOM are normal. Right eye exhibits no discharge. Left eye exhibits no discharge. No scleral icterus.   Neck: Normal range of motion. Neck supple. No thyromegaly present.   No supraclavicular adenopathy   Cardiovascular: Normal rate, regular rhythm, normal heart sounds and intact distal pulses. Exam reveals no gallop.   No murmur heard.  No lower extremity edema   Pulmonary/Chest: Effort normal and breath sounds normal. No respiratory distress. She has no wheezes.   Abdominal: Soft. Normal appearance and bowel sounds are normal. She exhibits no distension and no mass. There is no hepatosplenomegaly. There is no tenderness. There is no rigidity, no rebound and no guarding. No hernia.   Abdomen is nontender to palpation   Genitourinary:   Genitourinary Comments: Rectal exam deferred   Musculoskeletal: Normal range of motion. She exhibits no edema or tenderness.   No atrophy of upper or lower extremities.  Normal digits and nails of both hands.   Lymphadenopathy:     She has no cervical adenopathy.   Neurological: She is alert and oriented to person, place, and time. She displays no atrophy. Coordination normal.   Skin: Skin is warm and dry. No rash noted. She is not diaphoretic. No erythema.   Psychiatric: She has a normal mood and affect. Her behavior is normal. Judgment and thought content normal.   Vitals reviewed.      WBC   Date  Value Ref Range Status   10/02/2019 7.14 3.40 - 10.80 10*3/mm3 Final   10/23/2018 7.37 4.50 - 10.70 10*3/mm3 Final     RBC   Date Value Ref Range Status   10/02/2019 4.87 3.77 - 5.28 10*6/mm3 Final   10/23/2018 5.29 (H) 3.90 - 5.20 10*6/mm3 Final     Hemoglobin   Date Value Ref Range Status   10/02/2019 13.0 12.0 - 15.9 g/dL Final     Hematocrit   Date Value Ref Range Status   10/02/2019 39.8 34.0 - 46.6 % Final     MCV   Date Value Ref Range Status   10/02/2019 81.7 79.0 - 97.0 fL Final     MCH   Date Value Ref Range Status   10/02/2019 26.7 26.6 - 33.0 pg Final     MCHC   Date Value Ref Range Status   10/02/2019 32.7 31.5 - 35.7 g/dL Final     RDW   Date Value Ref Range Status   10/02/2019 15.7 (H) 12.3 - 15.4 % Final     RDW-SD   Date Value Ref Range Status   10/02/2019 47.0 37.0 - 54.0 fl Final     MPV   Date Value Ref Range Status   10/02/2019 9.5 6.0 - 12.0 fL Final     Platelets   Date Value Ref Range Status   10/02/2019 341 140 - 450 10*3/mm3 Final     Neutrophil %   Date Value Ref Range Status   10/02/2019 59.2 42.7 - 76.0 % Final     Lymphocyte %   Date Value Ref Range Status   10/02/2019 27.7 19.6 - 45.3 % Final     Monocyte %   Date Value Ref Range Status   10/02/2019 10.5 5.0 - 12.0 % Final     Eosinophil %   Date Value Ref Range Status   10/02/2019 1.3 0.3 - 6.2 % Final     Basophil %   Date Value Ref Range Status   10/02/2019 0.7 0.0 - 1.5 % Final     Immature Grans %   Date Value Ref Range Status   10/02/2019 0.6 (H) 0.0 - 0.5 % Final     Neutrophils, Absolute   Date Value Ref Range Status   10/02/2019 4.23 1.70 - 7.00 10*3/mm3 Final     Lymphocytes, Absolute   Date Value Ref Range Status   10/02/2019 1.98 0.70 - 3.10 10*3/mm3 Final     Monocytes, Absolute   Date Value Ref Range Status   10/02/2019 0.75 0.10 - 0.90 10*3/mm3 Final     Eosinophils, Absolute   Date Value Ref Range Status   10/02/2019 0.09 0.00 - 0.40 10*3/mm3 Final     Basophils, Absolute   Date Value Ref Range Status   10/02/2019 0.05  0.00 - 0.20 10*3/mm3 Final     Immature Grans, Absolute   Date Value Ref Range Status   10/02/2019 0.04 0.00 - 0.05 10*3/mm3 Final     nRBC   Date Value Ref Range Status   10/02/2019 0.0 0.0 - 0.2 /100 WBC Final       Glucose   Date Value Ref Range Status   10/02/2019 110 (H) 65 - 99 mg/dL Final     Sodium   Date Value Ref Range Status   10/02/2019 141 136 - 145 mmol/L Final     Potassium   Date Value Ref Range Status   10/02/2019 4.1 3.5 - 5.2 mmol/L Final     CO2   Date Value Ref Range Status   10/02/2019 25.8 22.0 - 29.0 mmol/L Final     Chloride   Date Value Ref Range Status   10/02/2019 101 98 - 107 mmol/L Final     Anion Gap   Date Value Ref Range Status   10/02/2019 14.2 5.0 - 15.0 mmol/L Final     Creatinine   Date Value Ref Range Status   10/09/2019 0.70 0.60 - 1.30 mg/dL Final     Comment:     Serial Number: [id number]:  [id number]     BUN   Date Value Ref Range Status   10/02/2019 14 6 - 20 mg/dL Final     BUN/Creatinine Ratio   Date Value Ref Range Status   10/02/2019 17.3 7.0 - 25.0 Final     Calcium   Date Value Ref Range Status   10/02/2019 10.2 8.6 - 10.5 mg/dL Final     eGFR Non  Amer   Date Value Ref Range Status   10/02/2019 75 >60 mL/min/1.73 Final     Alkaline Phosphatase   Date Value Ref Range Status   10/02/2019 147 (H) 39 - 117 U/L Final     Total Protein   Date Value Ref Range Status   10/02/2019 8.2 6.0 - 8.5 g/dL Final     ALT (SGPT)   Date Value Ref Range Status   10/02/2019 13 1 - 33 U/L Final     AST (SGOT)   Date Value Ref Range Status   10/02/2019 14 1 - 32 U/L Final     Total Bilirubin   Date Value Ref Range Status   10/02/2019 0.5 0.1 - 1.2 mg/dL Final     Albumin   Date Value Ref Range Status   10/02/2019 4.40 3.50 - 5.20 g/dL Final     Globulin   Date Value Ref Range Status   10/02/2019 3.8 gm/dL Final     A/G Ratio   Date Value Ref Range Status   10/23/2018 1.5 g/dL Final         FINDINGS: The previously present gastric Lap-Band has been removed in  the interim. The small  and large bowel loops demonstrate normal caliber.  The appendix is normal.     The liver, spleen and the pancreas is normal. The gallbladder is  surgically absent. Bilateral adrenal glands are normal. Both kidneys are  normal in size and attenuation. Punctate nephroliths are demonstrated  bilaterally, the largest within the midpole of the right kidney  measuring up to 4 mm. No hydronephrosis. A hypoattenuating lesion seen  anteriorly within the upper to mid pole of the left kidney in the  lateral cortex is unchanged from prior. The urinary bladder is minimally  distended and normal. The uterus is anteverted and normal. No abnormal  adnexal mass is seen. No ascites is present. No pathological  retroperitoneal lymphadenopathy.     IMPRESSION:   1. No acute abnormality within the abdomen and pelvis.  2. Bilateral nonobstructing punctate calculi. Unchanged indeterminate  heterogeneously hypoattenuating left renal cortical lesion measuring 9  mm. Attention on follow-up is recommended     Radiation dose reduction techniques were utilized, including automated  exposure control and exposure modulation based on body size.     This report was finalized on 10/11/2019 11:42 AM by Dr. Anna Moses M.D.      No notes on file    Assessment/Plan    Epigastric pain: Chronic issue.  She has already had an extensive work-up.  I do think the etiology is something more functional versus musculoskeletal given all of her benign findings to date.    Nausea: With above    Medrano's esophagus: Nondysplastic per the pathology that I reviewed    Evaded alkaline phosphatase level: Mild, suspected fatty liver disease    Plan  Esophagram for further assessment of this area following her lap band removal  Celiac testing  Repeat CMP, GGT  To new PPI, Carafate  Add in Levsin as needed    I had a karyn discussion with her.  She is quite upset over her symptoms and I was quite honest that given their duration for the past 4 years and already  extensive but negative work-up to date, the likelihood that I am going to be able to pinpoint a single etiology and fix it is small.  She does verbalize understanding of that.    Marisol was seen today for abdominal pain and nausea.    Diagnoses and all orders for this visit:    Epigastric pain  -     FL Esophagram Complete; Future  -     Comprehensive Metabolic Panel  -     IgA  -     Tissue Transglutaminase, IgA  -     Tissue Transglutaminase, IgG  -     Gamma GT    Nausea  -     Comprehensive Metabolic Panel  -     IgA  -     Tissue Transglutaminase, IgA  -     Tissue Transglutaminase, IgG  -     Gamma GT    Medrano's esophagus without dysplasia  -     Comprehensive Metabolic Panel  -     IgA  -     Tissue Transglutaminase, IgA  -     Tissue Transglutaminase, IgG  -     Gamma GT    Elevated alkaline phosphatase level  -     Comprehensive Metabolic Panel  -     IgA  -     Tissue Transglutaminase, IgA  -     Tissue Transglutaminase, IgG  -     Gamma GT    Other orders  -     hyoscyamine (ANASPAZ,LEVSIN) 0.125 MG tablet; Take 1 tablet by mouth Every 6 (Six) Hours As Needed for Cramping.        I have discussed the above plan with the patient.  They verbalize understanding and are in agreement with the plan.  They have been advised to contact the office for any questions, concerns, or changes related to their health.    Dictated utilizing Dragon dictation

## 2019-11-19 LAB
IGA SERPL-MCNC: 235 MG/DL (ref 87–352)
TTG IGA SER-ACNC: <2 U/ML (ref 0–3)
TTG IGG SER-ACNC: <2 U/ML (ref 0–5)

## 2019-11-21 ENCOUNTER — TELEPHONE (OUTPATIENT)
Dept: GASTROENTEROLOGY | Facility: CLINIC | Age: 49
End: 2019-11-21

## 2019-11-21 NOTE — TELEPHONE ENCOUNTER
Regarding: Test Results Question  Contact: [phone]  ----- Message from [employer], [employer] sent at 11/21/2019  3:29 PM EST -----    I have a question about COMPREHENSIVE METABOLIC PANEL resulted on 11/18/19, 8:07 PM.    What does bone metabolism mean in my case?

## 2019-11-21 NOTE — PROGRESS NOTES
My chart note:  Alkaline phosphatase is down to 119, just 2 points above upper limit of normal.  However the GGT is normal which suggest that the alkaline phosphatase abnormality is not coming from the liver and is likely due to bone metabolism.    Celiac testing is normal.    Glucose was 119

## 2019-11-22 NOTE — TELEPHONE ENCOUNTER
Called pt and advised per Dr Yanez that it means either making bone or breaking bone down as occurs with osteopenia /osteoporsis. Pt verb understanding.

## 2019-12-05 ENCOUNTER — OFFICE VISIT (OUTPATIENT)
Dept: FAMILY MEDICINE CLINIC | Facility: CLINIC | Age: 49
End: 2019-12-05

## 2019-12-05 VITALS
SYSTOLIC BLOOD PRESSURE: 122 MMHG | RESPIRATION RATE: 16 BRPM | TEMPERATURE: 97.7 F | OXYGEN SATURATION: 97 % | WEIGHT: 290 LBS | HEIGHT: 64 IN | DIASTOLIC BLOOD PRESSURE: 78 MMHG | HEART RATE: 75 BPM | BODY MASS INDEX: 49.51 KG/M2

## 2019-12-05 DIAGNOSIS — E55.9 VITAMIN D DEFICIENCY: ICD-10-CM

## 2019-12-05 DIAGNOSIS — M54.41 CHRONIC BILATERAL LOW BACK PAIN WITH BILATERAL SCIATICA: ICD-10-CM

## 2019-12-05 DIAGNOSIS — G89.29 CHRONIC BILATERAL LOW BACK PAIN WITH BILATERAL SCIATICA: ICD-10-CM

## 2019-12-05 DIAGNOSIS — E03.9 ACQUIRED HYPOTHYROIDISM: ICD-10-CM

## 2019-12-05 DIAGNOSIS — I10 ESSENTIAL HYPERTENSION: Primary | ICD-10-CM

## 2019-12-05 DIAGNOSIS — F33.0 MILD EPISODE OF RECURRENT MAJOR DEPRESSIVE DISORDER (HCC): ICD-10-CM

## 2019-12-05 DIAGNOSIS — F41.9 ANXIETY: ICD-10-CM

## 2019-12-05 DIAGNOSIS — M54.42 CHRONIC BILATERAL LOW BACK PAIN WITH BILATERAL SCIATICA: ICD-10-CM

## 2019-12-05 DIAGNOSIS — R73.01 IMPAIRED FASTING GLUCOSE: ICD-10-CM

## 2019-12-05 DIAGNOSIS — R21 RASH AND NONSPECIFIC SKIN ERUPTION: ICD-10-CM

## 2019-12-05 PROCEDURE — 99214 OFFICE O/P EST MOD 30 MIN: CPT | Performed by: PHYSICIAN ASSISTANT

## 2019-12-05 RX ORDER — LEVOTHYROXINE SODIUM 88 UG/1
88 TABLET ORAL DAILY
Qty: 90 TABLET | Refills: 3 | Status: SHIPPED | OUTPATIENT
Start: 2019-12-05 | End: 2020-07-20 | Stop reason: SDUPTHER

## 2019-12-05 RX ORDER — NYSTATIN 100000 [USP'U]/G
POWDER TOPICAL 4 TIMES DAILY
Qty: 60 G | Refills: 11 | Status: SHIPPED | OUTPATIENT
Start: 2019-12-05

## 2019-12-05 RX ORDER — OMEPRAZOLE 40 MG/1
CAPSULE, DELAYED RELEASE ORAL
Qty: 180 CAPSULE | Refills: 3 | Status: SHIPPED | OUTPATIENT
Start: 2019-12-05 | End: 2020-07-20 | Stop reason: SDUPTHER

## 2019-12-05 RX ORDER — AMLODIPINE AND OLMESARTAN MEDOXOMIL 10; 40 MG/1; MG/1
1 TABLET ORAL DAILY
Qty: 90 TABLET | Refills: 1 | Status: SHIPPED | OUTPATIENT
Start: 2019-12-05 | End: 2020-07-20 | Stop reason: SDUPTHER

## 2019-12-05 RX ORDER — SERTRALINE HYDROCHLORIDE 100 MG/1
200 TABLET, FILM COATED ORAL DAILY
Qty: 180 TABLET | Refills: 3 | Status: SHIPPED | OUTPATIENT
Start: 2019-12-05 | End: 2021-02-07

## 2019-12-05 RX ORDER — ATORVASTATIN CALCIUM 40 MG/1
40 TABLET, FILM COATED ORAL NIGHTLY
Qty: 90 TABLET | Refills: 3 | Status: SHIPPED | OUTPATIENT
Start: 2019-12-05 | End: 2020-07-20 | Stop reason: SDUPTHER

## 2019-12-05 RX ORDER — CARVEDILOL 12.5 MG/1
12.5 TABLET ORAL 2 TIMES DAILY WITH MEALS
Qty: 180 TABLET | Refills: 3 | Status: SHIPPED | OUTPATIENT
Start: 2019-12-05 | End: 2020-07-20 | Stop reason: SDUPTHER

## 2019-12-05 NOTE — PROGRESS NOTES
"Subjective   Marisol Carrillo is a 49 y.o. female.     History of Present Illness    Since the last visit, she has overall felt tired.  She has Essential Hypertension and well controlled on current medication, Impaired fasting glucose and will monitor labs to watch for DMII, Hyperlipidemia with goals met with current Rx, Hypothyroidism well controlled on current medication and will continue Rx and Vitamin D deficiency and labs are at goal >30 ng/mL.  she has been compliant with current medications have reviewed them.  The patient denies medication side effects.  Will refill medications. /78 (BP Location: Left arm, Patient Position: Sitting, Cuff Size: Large Adult)   Pulse 75   Temp 97.7 °F (36.5 °C) (Oral)   Resp 16   Ht 162.6 cm (64\")   Wt 132 kg (290 lb)   LMP  (LMP Unknown)   SpO2 97%   BMI 49.78 kg/m²   She is on ASA per DR Bustamante --hx PE  Saw Dr. Yanez for GI pain and has swallow study and f/u plan. Some help with Levsin.  Results for orders placed or performed in visit on 11/18/19   Comprehensive Metabolic Panel   Result Value Ref Range    Glucose 119 (H) 65 - 99 mg/dL    BUN 16 6 - 20 mg/dL    Creatinine 0.82 0.57 - 1.00 mg/dL    eGFR Non African Am 74 >60 mL/min/1.73    eGFR African Am 90 >60 mL/min/1.73    BUN/Creatinine Ratio 19.5 7.0 - 25.0    Sodium 142 136 - 145 mmol/L    Potassium 4.6 3.5 - 5.2 mmol/L    Chloride 101 98 - 107 mmol/L    Total CO2 23.8 22.0 - 29.0 mmol/L    Calcium 9.5 8.6 - 10.5 mg/dL    Total Protein 7.5 6.0 - 8.5 g/dL    Albumin 4.50 3.50 - 5.20 g/dL    Globulin 3.0 gm/dL    A/G Ratio 1.5 g/dL    Total Bilirubin 0.3 0.2 - 1.2 mg/dL    Alkaline Phosphatase 119 (H) 39 - 117 U/L    AST (SGOT) 13 1 - 32 U/L    ALT (SGPT) 15 1 - 33 U/L   IgA   Result Value Ref Range    IgA 235 87 - 352 mg/dL   Tissue Transglutaminase, IgA   Result Value Ref Range    Tissue Transglutaminase IgA <2 0 - 3 U/mL   Tissue Transglutaminase, IgG   Result Value Ref Range    Tissue Transglutaminase " "IgG <2 0 - 5 U/mL   Gamma GT   Result Value Ref Range    GGT 18 5 - 36 U/L   failed Protonix and tried      Marisol LIANET Carrillo female 49 y.o., /78 (BP Location: Left arm, Patient Position: Sitting, Cuff Size: Large Adult)   Pulse 75   Temp 97.7 °F (36.5 °C) (Oral)   Resp 16   Ht 162.6 cm (64\")   Wt 132 kg (290 lb)   LMP  (LMP Unknown)   SpO2 97%   BMI 49.78 kg/m²   who presents today for follow up of Depression and Anxiety.  She reports some help with Zoloft at 200mg; she will consider change to Lexapro in future.  This is helping but does still have some daily anxiety and depression.  I do not want to add Wellbutrin yet, it can make anxiety worse. Onset of symptoms was approximately several years ago.  She denies current suicidal and homicidal ideation. Risk factors are family history of anxiety and or depression, lifestyle of multiple roles and chronic pain.  Previous treatment includes current Rx.  She complains of the following medication side effects: none.  The patient has previously been in counseling..    Did see Dr Neves and not concerned about renal lesion mentioned on CT Oct    I will update labs April    The following portions of the patient's history were reviewed and updated as appropriate: allergies, current medications, past family history, past medical history, past social history, past surgical history and problem list.    Review of Systems   Constitutional: Positive for fatigue. Negative for activity change, appetite change and unexpected weight change.   HENT: Negative for nosebleeds and trouble swallowing.    Eyes: Negative for pain and visual disturbance.   Respiratory: Negative for chest tightness, shortness of breath and wheezing.    Cardiovascular: Negative for chest pain and palpitations.   Gastrointestinal: Positive for abdominal pain. Negative for blood in stool.   Endocrine: Negative.    Genitourinary: Negative for difficulty urinating and hematuria.   Musculoskeletal: " Positive for arthralgias, back pain and gait problem. Negative for joint swelling.   Skin: Positive for rash. Negative for color change.   Allergic/Immunologic: Negative.    Neurological: Positive for weakness. Negative for syncope and speech difficulty.   Hematological: Negative for adenopathy.   Psychiatric/Behavioral: Positive for decreased concentration and dysphoric mood. Negative for agitation and confusion. The patient is nervous/anxious.    All other systems reviewed and are negative.      Objective   Physical Exam   Constitutional: She is oriented to person, place, and time. She appears well-developed and well-nourished. No distress.   HENT:   Head: Normocephalic and atraumatic.   Eyes: Conjunctivae and EOM are normal. Pupils are equal, round, and reactive to light. Right eye exhibits no discharge. Left eye exhibits no discharge. No scleral icterus.   Neck: Normal range of motion. Neck supple. No tracheal deviation present. No thyromegaly present.   Cardiovascular: Normal rate, regular rhythm, normal heart sounds, intact distal pulses and normal pulses. Exam reveals no gallop.   No murmur heard.  Trace pedal edema = bilat   Pulmonary/Chest: Effort normal and breath sounds normal. No respiratory distress. She has no wheezes. She has no rales.   Musculoskeletal: Normal range of motion.   Neurological: She is alert and oriented to person, place, and time. She exhibits normal muscle tone. Coordination normal.   Skin: Skin is warm. Rash noted. No erythema. No pallor.   Psychiatric: She has a normal mood and affect. Her behavior is normal. Judgment and thought content normal.   Nursing note and vitals reviewed.      Assessment/Plan   Problems Addressed this Visit        Cardiovascular and Mediastinum    Hypertension - Primary       Digestive    Vitamin D deficiency       Endocrine    Hypothyroidism    Impaired fasting glucose       Nervous and Auditory    Chronic bilateral low back pain with bilateral sciatica        Other    Anxiety    Depression      Other Visit Diagnoses     Rash and nonspecific skin eruption        Relevant Medications    nystatin (MYCOSTATIN) 896505 UNIT/GM powder          Plan, Marisol Carrillo, was seen today.  she was seen for HTN and continue medication, Imparied fasting glucose and plan follow up labs, diet, and exercise, GERD and will continue on PPI medication, Hypothyroidism well controlled, continue medication and Vitamin D deficiency and supplemented.  Cont Zoloft for now and does help  F/u with GI  Labs May  Change back PPI  Refill yeast rash

## 2019-12-05 NOTE — PATIENT INSTRUCTIONS

## 2019-12-10 ENCOUNTER — HOSPITAL ENCOUNTER (OUTPATIENT)
Dept: GENERAL RADIOLOGY | Facility: HOSPITAL | Age: 49
Discharge: HOME | End: 2019-12-10
Admitting: INTERNAL MEDICINE

## 2019-12-10 DIAGNOSIS — R10.13 EPIGASTRIC PAIN: ICD-10-CM

## 2019-12-10 PROCEDURE — 74220 X-RAY XM ESOPHAGUS 1CNTRST: CPT

## 2019-12-10 RX ADMIN — BARIUM SULFATE 135 ML: 980 POWDER, FOR SUSPENSION ORAL at 09:50

## 2019-12-10 RX ADMIN — BARIUM SULFATE 700 MG: 700 TABLET ORAL at 09:50

## 2019-12-10 RX ADMIN — BARIUM SULFATE 183 ML: 960 POWDER, FOR SUSPENSION ORAL at 09:50

## 2019-12-10 RX ADMIN — ANTACID/ANTIFLATULENT 0.5 TABLET: 380; 550; 10; 10 GRANULE, EFFERVESCENT ORAL at 09:50

## 2019-12-14 NOTE — PROGRESS NOTES
My chart note:  Esophagram shows no obstruction to swallowing, small hiatal hernia (common), no evidence of reflux, mild muscle spasms of the esophagus.  No significant finding to associate with the pain.    Continue to use the levsin.  Can also use a heating pad, tylenol to try to help the pain.

## 2019-12-30 ENCOUNTER — TELEPHONE (OUTPATIENT)
Dept: GASTROENTEROLOGY | Facility: CLINIC | Age: 49
End: 2019-12-30

## 2019-12-30 RX ORDER — HYOSCYAMINE SULFATE 0.125 MG
0.12 TABLET ORAL EVERY 6 HOURS PRN
Qty: 90 TABLET | Refills: 0 | Status: SHIPPED | OUTPATIENT
Start: 2019-12-30 | End: 2020-01-16 | Stop reason: SDUPTHER

## 2019-12-30 NOTE — TELEPHONE ENCOUNTER
----- Message from Marisol Carrillo sent at 12/30/2019  9:12 AM EST -----  Regarding: Prescription Question  Contact: [phone]  I am out of refills if Levsin.  I have about a week left of my current refill.  I am asking for additional refills as this medication is helping.   Thank you

## 2019-12-30 NOTE — TELEPHONE ENCOUNTER
See o/v note of 11/18.      Escribe completed for levsin 0.125 mg - take 1 tablet by mouth every 6 hours as needed for cramping, #90, R0.      Pt has f/u appt with DR Yanez on 1/16.     Call to pt to advise of above.  Verb understanding.

## 2020-01-16 ENCOUNTER — OFFICE VISIT (OUTPATIENT)
Dept: GASTROENTEROLOGY | Facility: CLINIC | Age: 50
End: 2020-01-16

## 2020-01-16 ENCOUNTER — TELEPHONE (OUTPATIENT)
Dept: GASTROENTEROLOGY | Facility: CLINIC | Age: 50
End: 2020-01-16

## 2020-01-16 VITALS
WEIGHT: 285 LBS | BODY MASS INDEX: 48.65 KG/M2 | DIASTOLIC BLOOD PRESSURE: 82 MMHG | HEIGHT: 64 IN | TEMPERATURE: 97.6 F | SYSTOLIC BLOOD PRESSURE: 132 MMHG

## 2020-01-16 DIAGNOSIS — K22.70 BARRETT'S ESOPHAGUS WITHOUT DYSPLASIA: ICD-10-CM

## 2020-01-16 DIAGNOSIS — K21.00 GASTROESOPHAGEAL REFLUX DISEASE WITH ESOPHAGITIS: Primary | ICD-10-CM

## 2020-01-16 DIAGNOSIS — R13.19 ESOPHAGEAL DYSPHAGIA: ICD-10-CM

## 2020-01-16 DIAGNOSIS — R10.13 EPIGASTRIC PAIN: ICD-10-CM

## 2020-01-16 PROCEDURE — 99214 OFFICE O/P EST MOD 30 MIN: CPT | Performed by: INTERNAL MEDICINE

## 2020-01-16 RX ORDER — HYOSCYAMINE SULFATE 0.125 MG
0.25 TABLET ORAL EVERY 6 HOURS PRN
Qty: 180 TABLET | Refills: 3 | Status: SHIPPED | OUTPATIENT
Start: 2020-01-16 | End: 2021-09-28

## 2020-01-16 RX ORDER — SODIUM CHLORIDE, SODIUM LACTATE, POTASSIUM CHLORIDE, CALCIUM CHLORIDE 600; 310; 30; 20 MG/100ML; MG/100ML; MG/100ML; MG/100ML
30 INJECTION, SOLUTION INTRAVENOUS CONTINUOUS
Status: CANCELLED | OUTPATIENT
Start: 2020-02-18

## 2020-01-16 NOTE — PROGRESS NOTES
Chief Complaint   Patient presents with   • Difficulty Swallowing   • Heartburn     Subjective     HPI  Marislo Carrillo is a 50 y.o. female who presents for follow up of chronic epigastric pain, nausea, non-dysplastic Medrano's esophagus (short segment), and elevated alkaline phosphatase level.    Work-up for the alkaline phosphatase level showed a normal GGT, subsequently not liver origin.    She reports today that the levsin is helpful.  Epigastric pain still present but less severe.  Asking to take the levsin more frequently or higher dose.  She does report that the Levsin does give her blurry vision but she is willing to tolerate that for the benefit of the pills.    Complains of issues swallowing.  Usually with each meal, has to stop and focus on swallowing foods down.  No long periods of food sticking.  No regurgitation.  She has Carafate but does not take it.  She says the liquid is too expensive.  She does have numerous Carafate pills at home.    Feeling reflux, hoarse.  Feels that she needs to be dilated-- she says she got relief about 4 years ago when she was dilated.  She wants to be dilated again.    Esophagram without obstruction..    Past Medical History:   Diagnosis Date   • Acute pulmonary embolism (CMS/HCC) 03/2019   • Alopecia    • Anxiety and depression    • Arthritis     OSTEO   • Balance problem     FOOTDROP LEFT FOOT   • Medrano esophagus March2019   • Bilateral knee pain    • Breast mass     RIGHT, MD WATCHING.   • Chronic low back pain    • Depression    • Dysphagia 2/22/2016   • Epigastric pain 8/30/2017    Added automatically from request for surgery [id number]   • Fatty liver 2018   • GERD (gastroesophageal reflux disease)    • Hernia 2017   • History of infectious mononucleosis    • Hyperlipidemia    • Hypertension    • Hypothyroidism    • Impaired fasting glucose    • Irritable bowel syndrome    • Kidney calculus     HISTORY OF   • Nausea and vomiting 3/21/2019    Added automatically from  request for surgery [id number]   • Nocturnal cough 2/22/2016   • Pneumonia     2015 S/P LUMBAR FUSION   • Regurgitation 2/22/2016   • Sciatica    • Spinal headache     AFTER MYELOGRAM. BLOOD PATCH X2   • Vitamin D deficiency        Social History     Socioeconomic History   • Marital status:      Spouse name: Nik   • Number of children: 1   • Years of education: Some College   • Highest education level: Not on file   Occupational History     Employer: UNEMPLOYED   Tobacco Use   • Smoking status: Never Smoker   • Smokeless tobacco: Never Used   Substance and Sexual Activity   • Alcohol use: Not Currently     Comment: ON OCCASION   • Drug use: No   • Sexual activity: Yes     Partners: Male     Birth control/protection: None         Current Outpatient Medications:   •  amlodipine-olmesartan (RICK) 10-40 MG per tablet, Take 1 tablet by mouth Daily. For BP with Coreg, Disp: 90 tablet, Rfl: 1  •  aspirin 81 MG EC tablet, Take 81 mg by mouth Daily., Disp: , Rfl:   •  atorvastatin (LIPITOR) 40 MG tablet, Take 1 tablet by mouth Every Night. For cholesterol, Disp: 90 tablet, Rfl: 3  •  carvedilol (COREG) 12.5 MG tablet, Take 1 tablet by mouth 2 (Two) Times a Day With Meals. For BP, Disp: 180 tablet, Rfl: 3  •  cholecalciferol (VITAMIN D3) 1000 units tablet, Take 1,000 Units by mouth Daily., Disp: , Rfl:   •  hydrOXYzine (ATARAX) 25 MG tablet, Take 1 tablet by mouth 3 (Three) Times a Day As Needed for Anxiety., Disp: 60 tablet, Rfl: 5  •  hyoscyamine (ANASPAZ,LEVSIN) 0.125 MG tablet, Take 2 tablets by mouth Every 6 (Six) Hours As Needed for Cramping., Disp: 180 tablet, Rfl: 3  •  levothyroxine (SYNTHROID) 88 MCG tablet, Take 1 tablet by mouth Daily. For thyroid; give generic, Disp: 90 tablet, Rfl: 3  •  nystatin (MYCOSTATIN) 050201 UNIT/GM powder, Apply  topically to the appropriate area as directed 4 (Four) Times a Day. PRN yeast rash, Disp: 60 g, Rfl: 11  •  omeprazole (PrilOSEC) 40 MG capsule, One PO BID For GERD,  Disp: 180 capsule, Rfl: 3  •  sertraline (ZOLOFT) 100 MG tablet, Take 2 tablets by mouth Daily. For stress, Disp: 180 tablet, Rfl: 3  •  vitamin B-12 (CYANOCOBALAMIN) 1000 MCG tablet, Take 1,000 mcg by mouth Daily., Disp: , Rfl:   •  sucralfate (CARAFATE) 1 GM/10ML suspension, Take 10 mL by mouth 4 (Four) Times a Day for 60 days. For GI--liquid form, Disp: 1200 mL, Rfl: 11    Review of Systems   Constitutional: Negative for activity change, appetite change, chills and fever.   HENT: Positive for trouble swallowing.    Eyes: Positive for visual disturbance.        Blurry vision   Respiratory: Negative.    Cardiovascular: Negative.  Negative for chest pain.   Gastrointestinal: Positive for abdominal pain. Negative for abdominal distention, anal bleeding, constipation, diarrhea, nausea and vomiting.   Genitourinary: Negative for dysuria, frequency and hematuria.       Objective   Vitals:    01/16/20 0917   BP: 132/82   Temp: 97.6 °F (36.4 °C)         01/16/20 0917   Weight: 129 kg (285 lb)     Body mass index is 48.92 kg/m².      Physical Exam   Constitutional: She is oriented to person, place, and time. She appears well-developed and well-nourished. No distress.   Morbidly obese   HENT:   Head: Normocephalic and atraumatic.   Right Ear: External ear normal.   Left Ear: External ear normal.   Nose: Nose normal.   Mouth/Throat: Oropharynx is clear and moist.   Eyes: Conjunctivae and EOM are normal. Right eye exhibits no discharge. Left eye exhibits no discharge. No scleral icterus.   Neck: Normal range of motion. Neck supple. No thyromegaly present.   No supraclavicular adenopathy   Cardiovascular: Normal rate, regular rhythm, normal heart sounds and intact distal pulses. Exam reveals no gallop.   No murmur heard.  No lower extremity edema   Pulmonary/Chest: Effort normal and breath sounds normal. No respiratory distress. She has no wheezes.   Abdominal: Soft. Normal appearance and bowel sounds are normal. She exhibits  no distension and no mass. There is no hepatosplenomegaly. There is no tenderness. There is no rigidity, no rebound and no guarding. No hernia.   Genitourinary:   Genitourinary Comments: Rectal exam deferred   Musculoskeletal: Normal range of motion. She exhibits no edema or tenderness.   No atrophy of upper or lower extremities.  Normal digits and nails of both hands.  Walks with a cane   Lymphadenopathy:     She has no cervical adenopathy.   Neurological: She is alert and oriented to person, place, and time. She displays no atrophy. Coordination normal.   Skin: Skin is warm and dry. No rash noted. She is not diaphoretic. No erythema.   Psychiatric: She has a normal mood and affect. Her behavior is normal. Judgment and thought content normal.   Vitals reviewed.      WBC   Date Value Ref Range Status   10/02/2019 7.14 3.40 - 10.80 10*3/mm3 Final   10/23/2018 7.37 4.50 - 10.70 10*3/mm3 Final     RBC   Date Value Ref Range Status   10/02/2019 4.87 3.77 - 5.28 10*6/mm3 Final   10/23/2018 5.29 (H) 3.90 - 5.20 10*6/mm3 Final     Hemoglobin   Date Value Ref Range Status   10/02/2019 13.0 12.0 - 15.9 g/dL Final     Hematocrit   Date Value Ref Range Status   10/02/2019 39.8 34.0 - 46.6 % Final     MCV   Date Value Ref Range Status   10/02/2019 81.7 79.0 - 97.0 fL Final     MCH   Date Value Ref Range Status   10/02/2019 26.7 26.6 - 33.0 pg Final     MCHC   Date Value Ref Range Status   10/02/2019 32.7 31.5 - 35.7 g/dL Final     RDW   Date Value Ref Range Status   10/02/2019 15.7 (H) 12.3 - 15.4 % Final     RDW-SD   Date Value Ref Range Status   10/02/2019 47.0 37.0 - 54.0 fl Final     MPV   Date Value Ref Range Status   10/02/2019 9.5 6.0 - 12.0 fL Final     Platelets   Date Value Ref Range Status   10/02/2019 341 140 - 450 10*3/mm3 Final     Neutrophil %   Date Value Ref Range Status   10/02/2019 59.2 42.7 - 76.0 % Final     Lymphocyte %   Date Value Ref Range Status   10/02/2019 27.7 19.6 - 45.3 % Final     Monocyte %    Date Value Ref Range Status   10/02/2019 10.5 5.0 - 12.0 % Final     Eosinophil %   Date Value Ref Range Status   10/02/2019 1.3 0.3 - 6.2 % Final     Basophil %   Date Value Ref Range Status   10/02/2019 0.7 0.0 - 1.5 % Final     Immature Grans %   Date Value Ref Range Status   10/02/2019 0.6 (H) 0.0 - 0.5 % Final     Neutrophils, Absolute   Date Value Ref Range Status   10/02/2019 4.23 1.70 - 7.00 10*3/mm3 Final     Lymphocytes, Absolute   Date Value Ref Range Status   10/02/2019 1.98 0.70 - 3.10 10*3/mm3 Final     Monocytes, Absolute   Date Value Ref Range Status   10/02/2019 0.75 0.10 - 0.90 10*3/mm3 Final     Eosinophils, Absolute   Date Value Ref Range Status   10/02/2019 0.09 0.00 - 0.40 10*3/mm3 Final     Basophils, Absolute   Date Value Ref Range Status   10/02/2019 0.05 0.00 - 0.20 10*3/mm3 Final     Immature Grans, Absolute   Date Value Ref Range Status   10/02/2019 0.04 0.00 - 0.05 10*3/mm3 Final     nRBC   Date Value Ref Range Status   10/02/2019 0.0 0.0 - 0.2 /100 WBC Final       Lab Results   Component Value Date    GLUCOSE 110 (H) 10/02/2019    BUN 16 11/18/2019    CREATININE 0.82 11/18/2019    EGFRIFNONA 74 11/18/2019    EGFRIFAFRI 90 11/18/2019    BCR 19.5 11/18/2019    CO2 23.8 11/18/2019    CALCIUM 9.5 11/18/2019    PROTENTOTREF 7.5 11/18/2019    ALBUMIN 4.50 11/18/2019    LABIL2 1.5 11/18/2019    AST 13 11/18/2019    ALT 15 11/18/2019       CONCLUSION: Removal of previously demonstrated Lap-Band appliance.  Laryngeal penetration was seen, without demonstrated aspiration. Small  hiatal herniation of the upper stomach without detected gastroesophageal  reflux. Mild spasticity was demonstrated in the distal esophagus.     This report was finalized on 12/10/2019 2:54 PM by Dr. Jose Ryan M.D.      Assessment/Plan    Epigastric pain: Chronic issue.  Extensive work-up.  Likely functional component.  Better with Levsin.    GERD: Stable on PPI    Nondysplastic Medrano's esophagus: On  PPI    Esophageal dysphagia: Suspect related to esophageal spasm as she has had no obstruction on recent endoscopy nor on recent esophagram    Plan  She wants to have an EGD with dilation again.  She is quite convinced that the dilation a number of years ago helped her and she would like to try that again  She wants to try an increased dose of the Levsin.  She does understand that this may continue to alter her vision but she is willing to try it.  I am going to give her to 50 mcg to use but she is aware that if it causes more side effects that she needs to back down to the 125 mcg dosing  Have advised that she use the Carafate suspension before all meals.  She can use the pills that she has at home and dissolve them in water  I have given her some samples of IBgard to try    May ultimately need to consider a trial of amitriptyline or nortriptyline if she is not tolerating the higher dose of Levsin for her symptoms that overall are on the functional spectrum    Marisol was seen today for difficulty swallowing and heartburn.    Diagnoses and all orders for this visit:    Gastroesophageal reflux disease with esophagitis  -     Case Request; Standing  -     Follow Anesthesia Guidelines / Standing Orders; Future  -     Obtain Informed Consent; Future  -     Case Request    Esophageal dysphagia  -     Case Request; Standing  -     Follow Anesthesia Guidelines / Standing Orders; Future  -     Obtain Informed Consent; Future  -     Case Request    Medrano's esophagus without dysplasia    Epigastric pain    Other orders  -     hyoscyamine (ANASPAZ,LEVSIN) 0.125 MG tablet; Take 2 tablets by mouth Every 6 (Six) Hours As Needed for Cramping.        Dictated utilizing Dragon dictation

## 2020-01-16 NOTE — TELEPHONE ENCOUNTER
----- Message from Marisol Carrillo sent at 1/16/2020  2:42 PM EST -----  Regarding: Visit Follow-Up Question  Contact: [phone]  I forgot to mention at my appointment with you today that every night I wake myself up coughing.  I have to sit up for a while before I can try to go back to sleep.  In my test results for the Esophagram I had in December, it states I have a hiatal hernia at the beginning of my stomach.  Could this be why I cough while lying down?  Could this possibly be fixed when you dialate my esophagus which is scheduled for February 18th?  Thank you.

## 2020-01-21 ENCOUNTER — TELEPHONE (OUTPATIENT)
Dept: NEUROSURGERY | Facility: CLINIC | Age: 50
End: 2020-01-21

## 2020-01-21 DIAGNOSIS — Z98.1 HISTORY OF LUMBAR FUSION: ICD-10-CM

## 2020-01-21 DIAGNOSIS — M54.16 BILATERAL LUMBAR RADICULOPATHY: Primary | ICD-10-CM

## 2020-01-21 NOTE — TELEPHONE ENCOUNTER
Patient was last seen on 11/07/19. She was told to call back when she decided if she wanted a MRI or Myelogram. She decided that she wants to do an MRI. Is this ok?

## 2020-01-22 NOTE — TELEPHONE ENCOUNTER
Yes an MRI of the lumbar spine with and without contrast is okay.  I have entered the order if you will go ahead and send it over so that the schedulers can call her.    Thank you

## 2020-01-29 ENCOUNTER — TELEPHONE (OUTPATIENT)
Dept: GASTROENTEROLOGY | Facility: CLINIC | Age: 50
End: 2020-01-29

## 2020-01-29 NOTE — TELEPHONE ENCOUNTER
The hiatal hernia will not cause the coughing but it can increase reflux which can cause the coughing    Unfortunately, the EGD is for diagnosis and will not treat this.

## 2020-01-29 NOTE — TELEPHONE ENCOUNTER
----- Message from Marisol Carrillo sent at 1/29/2020  3:24 PM EST -----  Regarding: Visit Follow-Up Question  Contact: [phone]  This is the second message I have sent regarding a question I have about an upcoming procedure Dr Yanez is doing for me. I have not received a response since the original message on January 16.  I am copying it and resending below.     [I forgot to mention at my appointment with you that every night I wake myself up coughing. I have to sit up for a while before I can try to go back to sleep. In my test results for the Esophagram I had in December, it states I have a hiatal hernia at the beginning of my stomach. Could this be why I cough while lying down? Could this possibly be fixed when you dialate my esophagus which is scheduled for February 18th? ]  Thank you.

## 2020-01-30 NOTE — TELEPHONE ENCOUNTER
Call to pt.  Advise per Dr Yanez that the hiatal hernia will not causing the coughing, but can increase the reflux which can causing the coughing..    Unfortunately the EGD is for dx and will not tx this.      Verb understanding.  States understood that would be having EGD with dilation - not just EGD.  Asking to verify - question to Dr Yanez.

## 2020-01-30 NOTE — TELEPHONE ENCOUNTER
VM to pt - identifies as Marisol Carrillo.  Advise per Dr Yanez that yes - dilation will be performed.  Contact office if any questions.

## 2020-01-31 ENCOUNTER — TELEPHONE (OUTPATIENT)
Dept: GASTROENTEROLOGY | Facility: CLINIC | Age: 50
End: 2020-01-31

## 2020-01-31 NOTE — TELEPHONE ENCOUNTER
----- Message from Bella Askew sent at 1/31/2020 11:46 AM EST -----  Regarding: scope   Contact: [phone]  Questions about upcoming scope

## 2020-01-31 NOTE — TELEPHONE ENCOUNTER
Called pt and pt is asking if her hiatal hernia can be repaired when she has her egd done. Advised pt that a hiatal hernia repair would be a surgery that would be done by a surgeon and can not be done at the time of her scope. Pt verb understanding.

## 2020-02-10 ENCOUNTER — DOCUMENTATION (OUTPATIENT)
Dept: FAMILY MEDICINE CLINIC | Facility: CLINIC | Age: 50
End: 2020-02-10

## 2020-02-18 ENCOUNTER — ANESTHESIA (OUTPATIENT)
Dept: GASTROENTEROLOGY | Facility: HOSPITAL | Age: 50
End: 2020-02-18

## 2020-02-18 ENCOUNTER — HOSPITAL ENCOUNTER (OUTPATIENT)
Facility: HOSPITAL | Age: 50
Setting detail: HOSPITAL OUTPATIENT SURGERY
Discharge: HOME | End: 2020-02-18
Attending: INTERNAL MEDICINE | Admitting: INTERNAL MEDICINE

## 2020-02-18 ENCOUNTER — ANESTHESIA EVENT (OUTPATIENT)
Dept: GASTROENTEROLOGY | Facility: HOSPITAL | Age: 50
End: 2020-02-18

## 2020-02-18 VITALS
DIASTOLIC BLOOD PRESSURE: 82 MMHG | HEART RATE: 72 BPM | OXYGEN SATURATION: 96 % | WEIGHT: 293 LBS | HEIGHT: 64 IN | RESPIRATION RATE: 14 BRPM | SYSTOLIC BLOOD PRESSURE: 117 MMHG | BODY MASS INDEX: 50.02 KG/M2

## 2020-02-18 DIAGNOSIS — R13.19 ESOPHAGEAL DYSPHAGIA: ICD-10-CM

## 2020-02-18 DIAGNOSIS — K21.00 GASTROESOPHAGEAL REFLUX DISEASE WITH ESOPHAGITIS: ICD-10-CM

## 2020-02-18 PROCEDURE — 25010000002 PROPOFOL 10 MG/ML EMULSION: Performed by: ANESTHESIOLOGY

## 2020-02-18 PROCEDURE — 88305 TISSUE EXAM BY PATHOLOGIST: CPT | Performed by: INTERNAL MEDICINE

## 2020-02-18 PROCEDURE — S0260 H&P FOR SURGERY: HCPCS | Performed by: INTERNAL MEDICINE

## 2020-02-18 PROCEDURE — 43239 EGD BIOPSY SINGLE/MULTIPLE: CPT | Performed by: INTERNAL MEDICINE

## 2020-02-18 PROCEDURE — 43450 DILATE ESOPHAGUS 1/MULT PASS: CPT | Performed by: INTERNAL MEDICINE

## 2020-02-18 RX ORDER — SODIUM CHLORIDE, SODIUM LACTATE, POTASSIUM CHLORIDE, CALCIUM CHLORIDE 600; 310; 30; 20 MG/100ML; MG/100ML; MG/100ML; MG/100ML
30 INJECTION, SOLUTION INTRAVENOUS CONTINUOUS
Status: DISCONTINUED | OUTPATIENT
Start: 2020-02-18 | End: 2020-02-18 | Stop reason: HOSPADM

## 2020-02-18 RX ORDER — PROPOFOL 10 MG/ML
VIAL (ML) INTRAVENOUS AS NEEDED
Status: DISCONTINUED | OUTPATIENT
Start: 2020-02-18 | End: 2020-02-18 | Stop reason: SURG

## 2020-02-18 RX ORDER — PROPOFOL 10 MG/ML
VIAL (ML) INTRAVENOUS CONTINUOUS PRN
Status: DISCONTINUED | OUTPATIENT
Start: 2020-02-18 | End: 2020-02-18 | Stop reason: SURG

## 2020-02-18 RX ORDER — LIDOCAINE HYDROCHLORIDE 20 MG/ML
INJECTION, SOLUTION INFILTRATION; PERINEURAL AS NEEDED
Status: DISCONTINUED | OUTPATIENT
Start: 2020-02-18 | End: 2020-02-18 | Stop reason: SURG

## 2020-02-18 RX ADMIN — PROPOFOL 140 MCG/KG/MIN: 10 INJECTION, EMULSION INTRAVENOUS at 10:55

## 2020-02-18 RX ADMIN — SODIUM CHLORIDE, POTASSIUM CHLORIDE, SODIUM LACTATE AND CALCIUM CHLORIDE 30 ML/HR: 600; 310; 30; 20 INJECTION, SOLUTION INTRAVENOUS at 10:33

## 2020-02-18 RX ADMIN — LIDOCAINE HYDROCHLORIDE 100 MG: 20 INJECTION, SOLUTION INFILTRATION; PERINEURAL at 10:55

## 2020-02-18 RX ADMIN — PROPOFOL 100 MG: 10 INJECTION, EMULSION INTRAVENOUS at 11:03

## 2020-02-18 RX ADMIN — PROPOFOL 100 MG: 10 INJECTION, EMULSION INTRAVENOUS at 10:55

## 2020-02-18 NOTE — DISCHARGE INSTRUCTIONS
For the next 24 hours patient needs to be with a responsible adult.    For 24 hours DO NOT drive, operate machinery, appliances, drink alcohol, make important decisions or sign legal documents.    Start with a light or bland diet and advance to regular diet as tolerated.    Follow recommendations on procedure report provided by your doctor.    Call Dr Yanez for problems [phone] and for biopsy results in 7-10 days    Problems may include but not limited to: large amounts of bleeding, trouble breathing, repeated vomiting, severe unrelieved pain, fever or chills.

## 2020-02-18 NOTE — ANESTHESIA PREPROCEDURE EVALUATION
Anesthesia Evaluation     Patient summary reviewed and Nursing notes reviewed   history of anesthetic complications: prolonged sedation  NPO Solid Status: > 8 hours  NPO Liquid Status: > 2 hours           Airway   Mallampati: II  TM distance: >3 FB  Neck ROM: full  Possible difficult intubation  Dental - normal exam     Pulmonary - normal exam   (+) pneumonia , pulmonary embolism,   Cardiovascular - normal exam  Exercise tolerance: poor (<4 METS)    ECG reviewed  Rhythm: regular    (+) hypertension, hyperlipidemia,     ROS comment: NORMAL ECG -  Sinus rhythm    · The study is technically difficult for diagnosis. The quality of the study is limited due to poor acoustic windows related to patient body habitus.  · Left ventricle not well visualized. Left ventricular systolic function is normal. Calculated EF = 54.0%. Estimated EF = 60%. Normal left ventricular cavity size noted. Left ventricular diastolic function is normal.  · Normal right ventricular cavity size noted.    · Left ventricular ejection fraction is hyperdynamic (Calculated EF > 70%).  · Myocardial perfusion imaging indicates a normal myocardial perfusion study with no evidence of ischemia.  · Impressions are consistent with a low risk study.  · Extremely small inferior wall ischemia can not beruled out    Neuro/Psych  (+) headaches, numbness, psychiatric history,     GI/Hepatic/Renal/Endo    (+) obesity, morbid obesity, GERD,  liver disease, renal disease,     Musculoskeletal     Abdominal   (+) obese,     Bowel sounds: normal.   Substance History - negative use     OB/GYN negative ob/gyn ROS         Other   arthritis,                      Anesthesia Plan    ASA 4     MAC       Anesthetic plan, all risks, benefits, and alternatives have been provided, discussed and informed consent has been obtained with: patient.

## 2020-02-18 NOTE — H&P
[facility]  Pre Procedure History & Physical    Chief Complaint: epigastric pain, dysphagia      HPI: 50 y.o. female who presents for follow up of chronic epigastric pain, nausea, non-dysplastic Medrano's esophagus (short segment), and elevated alkaline phosphatase level.     Work-up for the alkaline phosphatase level showed a normal GGT, subsequently not liver origin.     She reports today that the levsin is helpful.  Epigastric pain still present but less severe.  Asking to take the levsin more frequently or higher dose.  She does report that the Levsin does give her blurry vision but she is willing to tolerate that for the benefit of the pills.     Complains of issues swallowing.  Usually with each meal, has to stop and focus on swallowing foods down.  No long periods of food sticking.  No regurgitation.  She has Carafate but does not take it.  She says the liquid is too expensive.  She does have numerous Carafate pills at home.     Feeling reflux, hoarse.  Feels that she needs to be dilated-- she says she got relief about 4 years ago when she was dilated.  She wants to be dilated again.    Past Medical History:   Past Medical History:   Diagnosis Date   • Acute pulmonary embolism (CMS/HCC) 03/2019   • Alopecia    • Anxiety and depression    • Arthritis     OSTEO   • Balance problem     FOOTDROP LEFT FOOT   • Medrano esophagus March2019   • Bilateral knee pain    • Breast mass     RIGHT, MD WATCHING.   • Chronic low back pain    • Depression    • Dysphagia 2/22/2016   • Epigastric pain 8/30/2017    Added automatically from request for surgery [id number]   • Fatty liver 2018   • GERD (gastroesophageal reflux disease)    • Hernia 2017   • History of infectious mononucleosis    • Hyperlipidemia    • Hypertension    • Hypothyroidism    • Impaired fasting glucose    • Irritable bowel syndrome    • Kidney calculus     HISTORY OF   • Nausea and vomiting 3/21/2019    Added automatically from request  for surgery [id number]   • Nocturnal cough 2/22/2016   • Pneumonia     2015 S/P LUMBAR FUSION   • Regurgitation 2/22/2016   • Sciatica    • Spinal headache     AFTER MYELOGRAM. BLOOD PATCH X2   • Vitamin D deficiency        Family History:  Family History   Problem Relation Age of Onset   • Hypertension Mother    • Thyroid disease Mother    • Mental illness Mother    • Hypertension Father    • Thyroid disease Brother    • Alcohol abuse Maternal Grandmother    • Heart disease Maternal Grandmother    • Malig Hyperthermia Neg Hx        Social History:   reports that she has never smoked. She has never used smokeless tobacco. She reports that she drank alcohol. She reports that she does not use drugs.    Medications:   No medications prior to admission.       Allergies:  Ciprofloxacin; Ketamine; Lisinopril; Propacetamol; Propoxyphene-acetaminophen; and Tirosint [levothyroxine]    ROS:    Pertinent items are noted in HPI     Objective     not currently breastfeeding.    Physical Exam   Constitutional: Pt is oriented to person, place, and time and well-developed, well-nourished, and in no distress.   HENT:   Mouth/Throat: Oropharynx is clear and moist.   Neck: Normal range of motion. Neck supple.   Cardiovascular: Normal rate, regular rhythm and normal heart sounds.    Pulmonary/Chest: Effort normal and breath sounds normal. No respiratory distress. No  wheezes.   Abdominal: Soft. Bowel sounds are normal.   Skin: Skin is warm and dry.   Psychiatric: Mood, memory, affect and judgment normal.     Assessment/Plan     Diagnosis: epigastric pain, dysphagia      Anticipated Surgical Procedure:  EGD      The risks, benefits, and alternatives of this procedure have been discussed with the patient or the responsible party- the patient understands and agrees to proceed.

## 2020-02-18 NOTE — ANESTHESIA POSTPROCEDURE EVALUATION
"Patient: Marisol Carrillo    Procedure Summary     Date:  02/18/20 Room / Location:   [facility]    Anesthesia Start:  1053 Anesthesia Stop:  1113    Procedure:  ESOPHAGOGASTRODUODENOSCOPY WITH BIOPSIES, SNELL DILATATION 44Fr, 46Fr (N/A Esophagus) Diagnosis:       Gastroesophageal reflux disease with esophagitis      Esophageal dysphagia      (Gastroesophageal reflux disease with esophagitis [K21.0])      (Esophageal dysphagia [R13.10])    Surgeon:  Kirsten Yanez MD Provider:  Ashlee Nelson MD    Anesthesia Type:  MAC ASA Status:  4          Anesthesia Type: MAC    Vitals  Vitals Value Taken Time   /75 2/18/2020 11:14 AM   Temp     Pulse 94 2/18/2020 11:14 AM   Resp 14 2/18/2020 11:14 AM   SpO2 95 % 2/18/2020 11:14 AM           Post Anesthesia Care and Evaluation    Patient location during evaluation: PHASE II  Patient participation: complete - patient participated  Level of consciousness: awake  Pain management: adequate  Airway patency: patent  Anesthetic complications: No anesthetic complications    Cardiovascular status: acceptable  Respiratory status: acceptable  Hydration status: acceptable    Comments: /75 (BP Location: Left arm, Patient Position: Lying)   Pulse 94   Resp 14   Ht 162.6 cm (64\")   Wt 134 kg (295 lb)   SpO2 95%   BMI 50.64 kg/m²         "

## 2020-02-19 LAB
CYTO UR: NORMAL
LAB AP CASE REPORT: NORMAL
PATH REPORT.FINAL DX SPEC: NORMAL
PATH REPORT.GROSS SPEC: NORMAL

## 2020-02-20 ENCOUNTER — TELEPHONE (OUTPATIENT)
Dept: GASTROENTEROLOGY | Facility: CLINIC | Age: 50
End: 2020-02-20

## 2020-02-20 NOTE — PROGRESS NOTES
Biopsies from her EGD show a little bit of inflammation of the stomach body, very mild.  No evidence of H. pylori    Biopsies from the GE junction confirm a very small area of Medrano's esophagus but there is no significant cell changes    She should continue her omeprazole    Please place in 3-year EGD recall for Medrano's esophagus

## 2020-02-20 NOTE — TELEPHONE ENCOUNTER
----- Message from Kirsten Yanez MD sent at 2/20/2020  2:22 PM EST -----  Biopsies from her EGD show a little bit of inflammation of the stomach body, very mild.  No evidence of H. pylori    Biopsies from the GE junction confirm a very small area of Medrano's esophagus but there is no significant cell changes    She should continue her omeprazole    Please place in 3-year EGD recall for Medrano's esophagus

## 2020-02-20 NOTE — TELEPHONE ENCOUNTER
Called pt an advised of Dr Yanez' note. Pt verb understanding.     Egd placed in recall for 02/18/2023.

## 2020-02-27 ENCOUNTER — APPOINTMENT (OUTPATIENT)
Dept: MRI IMAGING | Facility: HOSPITAL | Age: 50
End: 2020-02-27

## 2020-03-26 ENCOUNTER — APPOINTMENT (OUTPATIENT)
Dept: MRI IMAGING | Facility: HOSPITAL | Age: 50
End: 2020-03-26

## 2020-04-01 ENCOUNTER — RESULTS ENCOUNTER (OUTPATIENT)
Dept: FAMILY MEDICINE CLINIC | Facility: CLINIC | Age: 50
End: 2020-04-01

## 2020-04-01 DIAGNOSIS — R21 RASH AND NONSPECIFIC SKIN ERUPTION: ICD-10-CM

## 2020-04-01 DIAGNOSIS — E55.9 VITAMIN D DEFICIENCY: ICD-10-CM

## 2020-04-01 DIAGNOSIS — F41.9 ANXIETY: ICD-10-CM

## 2020-04-01 DIAGNOSIS — R73.01 IMPAIRED FASTING GLUCOSE: ICD-10-CM

## 2020-04-01 DIAGNOSIS — E03.9 ACQUIRED HYPOTHYROIDISM: ICD-10-CM

## 2020-04-01 DIAGNOSIS — I10 ESSENTIAL HYPERTENSION: ICD-10-CM

## 2020-04-01 DIAGNOSIS — M54.42 CHRONIC BILATERAL LOW BACK PAIN WITH BILATERAL SCIATICA: ICD-10-CM

## 2020-04-01 DIAGNOSIS — F33.0 MILD EPISODE OF RECURRENT MAJOR DEPRESSIVE DISORDER (HCC): ICD-10-CM

## 2020-04-01 DIAGNOSIS — M54.41 CHRONIC BILATERAL LOW BACK PAIN WITH BILATERAL SCIATICA: ICD-10-CM

## 2020-04-01 DIAGNOSIS — G89.29 CHRONIC BILATERAL LOW BACK PAIN WITH BILATERAL SCIATICA: ICD-10-CM

## 2020-05-18 ENCOUNTER — APPOINTMENT (OUTPATIENT)
Dept: MRI IMAGING | Facility: HOSPITAL | Age: 50
End: 2020-05-18

## 2020-06-25 ENCOUNTER — TELEPHONE (OUTPATIENT)
Dept: NEUROSURGERY | Facility: CLINIC | Age: 50
End: 2020-06-25

## 2020-06-25 NOTE — TELEPHONE ENCOUNTER
Dr WHITAKER please advise if she can be a televisit- she was last seen in Nov 2019 and was to decide if she wanted to do a MRI or myelogram and let us know    She called in January and said she wanted to do MRI and just had MRI in June    Do you need to see face to face?

## 2020-06-25 NOTE — TELEPHONE ENCOUNTER
ARMOND for patient letting her know that she is scheduled for televisit with Dr WHITAKER for 7.10.20 at 2:45 pm

## 2020-06-25 NOTE — TELEPHONE ENCOUNTER
Pt wants to know if she can have a virtual visit or a phone call to go over her MRI results with Dr Dumas?    [facility]  [phone]

## 2020-06-29 ENCOUNTER — HOSPITAL ENCOUNTER (OUTPATIENT)
Dept: MRI IMAGING | Facility: HOSPITAL | Age: 50
Discharge: HOME | End: 2020-06-29
Admitting: NURSE PRACTITIONER

## 2020-06-29 DIAGNOSIS — M54.16 BILATERAL LUMBAR RADICULOPATHY: ICD-10-CM

## 2020-06-29 DIAGNOSIS — Z98.1 HISTORY OF LUMBAR FUSION: ICD-10-CM

## 2020-06-29 LAB — CREAT BLDA-MCNC: 1 MG/DL (ref 0.6–1.3)

## 2020-06-29 PROCEDURE — A9577 INJ MULTIHANCE: HCPCS | Performed by: NURSE PRACTITIONER

## 2020-06-29 PROCEDURE — 0 GADOBENATE DIMEGLUMINE 529 MG/ML SOLUTION: Performed by: NURSE PRACTITIONER

## 2020-06-29 PROCEDURE — 82565 ASSAY OF CREATININE: CPT

## 2020-06-29 PROCEDURE — 72158 MRI LUMBAR SPINE W/O & W/DYE: CPT

## 2020-06-29 RX ADMIN — GADOBENATE DIMEGLUMINE 20 ML: 529 INJECTION, SOLUTION INTRAVENOUS at 10:37

## 2020-07-10 ENCOUNTER — OFFICE VISIT (OUTPATIENT)
Dept: NEUROSURGERY | Facility: CLINIC | Age: 50
End: 2020-07-10

## 2020-07-10 DIAGNOSIS — M51.36 DDD (DEGENERATIVE DISC DISEASE), LUMBAR: ICD-10-CM

## 2020-07-10 DIAGNOSIS — Z98.1 HISTORY OF LUMBAR FUSION: Primary | ICD-10-CM

## 2020-07-10 DIAGNOSIS — M96.1 POSTLAMINECTOMY SYNDROME, LUMBAR REGION: ICD-10-CM

## 2020-07-10 PROCEDURE — 99442 PR PHYS/QHP TELEPHONE EVALUATION 11-20 MIN: CPT | Performed by: NEUROLOGICAL SURGERY

## 2020-07-20 ENCOUNTER — OFFICE VISIT (OUTPATIENT)
Dept: FAMILY MEDICINE CLINIC | Facility: CLINIC | Age: 50
End: 2020-07-20

## 2020-07-20 DIAGNOSIS — R73.01 IMPAIRED FASTING GLUCOSE: ICD-10-CM

## 2020-07-20 DIAGNOSIS — E03.9 ACQUIRED HYPOTHYROIDISM: ICD-10-CM

## 2020-07-20 DIAGNOSIS — E55.9 VITAMIN D DEFICIENCY: ICD-10-CM

## 2020-07-20 DIAGNOSIS — M43.06 LUMBAR SPONDYLOLYSIS: ICD-10-CM

## 2020-07-20 DIAGNOSIS — I10 ESSENTIAL HYPERTENSION: Primary | ICD-10-CM

## 2020-07-20 DIAGNOSIS — E53.8 LOW SERUM VITAMIN B12: ICD-10-CM

## 2020-07-20 DIAGNOSIS — Z12.31 ENCOUNTER FOR SCREENING MAMMOGRAM FOR BREAST CANCER: ICD-10-CM

## 2020-07-20 DIAGNOSIS — F33.0 MILD EPISODE OF RECURRENT MAJOR DEPRESSIVE DISORDER (HCC): ICD-10-CM

## 2020-07-20 DIAGNOSIS — Z98.84 HISTORY OF REMOVAL OF LAPAROSCOPIC GASTRIC BANDING DEVICE: ICD-10-CM

## 2020-07-20 DIAGNOSIS — F41.9 ANXIETY: ICD-10-CM

## 2020-07-20 DIAGNOSIS — K21.9 GASTROESOPHAGEAL REFLUX DISEASE WITHOUT ESOPHAGITIS: ICD-10-CM

## 2020-07-20 DIAGNOSIS — M51.36 DDD (DEGENERATIVE DISC DISEASE), LUMBAR: ICD-10-CM

## 2020-07-20 PROCEDURE — 99214 OFFICE O/P EST MOD 30 MIN: CPT | Performed by: PHYSICIAN ASSISTANT

## 2020-07-20 RX ORDER — BUSPIRONE HYDROCHLORIDE 10 MG/1
10 TABLET ORAL 2 TIMES DAILY
Qty: 60 TABLET | Refills: 5 | Status: SHIPPED | OUTPATIENT
Start: 2020-07-20 | End: 2021-09-28

## 2020-07-20 RX ORDER — CARVEDILOL 12.5 MG/1
12.5 TABLET ORAL 2 TIMES DAILY WITH MEALS
Qty: 180 TABLET | Refills: 3 | Status: SHIPPED | OUTPATIENT
Start: 2020-07-20 | End: 2021-09-28 | Stop reason: SDUPTHER

## 2020-07-20 RX ORDER — LEVOTHYROXINE SODIUM 88 UG/1
88 TABLET ORAL DAILY
Qty: 90 TABLET | Refills: 3 | Status: SHIPPED | OUTPATIENT
Start: 2020-07-20 | End: 2021-09-28 | Stop reason: SDUPTHER

## 2020-07-20 RX ORDER — ATORVASTATIN CALCIUM 40 MG/1
40 TABLET, FILM COATED ORAL NIGHTLY
Qty: 90 TABLET | Refills: 3 | Status: SHIPPED | OUTPATIENT
Start: 2020-07-20 | End: 2021-07-07

## 2020-07-20 RX ORDER — AMLODIPINE AND OLMESARTAN MEDOXOMIL 10; 40 MG/1; MG/1
1 TABLET ORAL DAILY
Qty: 90 TABLET | Refills: 1 | Status: SHIPPED | OUTPATIENT
Start: 2020-07-20 | End: 2020-08-27

## 2020-07-20 RX ORDER — OMEPRAZOLE 40 MG/1
CAPSULE, DELAYED RELEASE ORAL
Qty: 180 CAPSULE | Refills: 3 | Status: SHIPPED | OUTPATIENT
Start: 2020-07-20 | End: 2021-07-07

## 2020-07-20 RX ORDER — HYDROXYZINE HYDROCHLORIDE 25 MG/1
25 TABLET, FILM COATED ORAL 3 TIMES DAILY PRN
Qty: 60 TABLET | Refills: 5 | Status: SHIPPED | OUTPATIENT
Start: 2020-07-20 | End: 2021-08-06

## 2020-07-20 NOTE — PATIENT INSTRUCTIONS
"Hypertension, Adult  High blood pressure (hypertension) is when the force of blood pumping through the arteries is too strong. The arteries are the blood vessels that carry blood from the heart throughout the body. Hypertension forces the heart to work harder to pump blood and may cause arteries to become narrow or stiff. Untreated or uncontrolled hypertension can cause a heart attack, heart failure, a stroke, kidney disease, and other problems.  A blood pressure reading consists of a higher number over a lower number. Ideally, your blood pressure should be below 120/80. The first (\"top\") number is called the systolic pressure. It is a measure of the pressure in your arteries as your heart beats. The second (\"bottom\") number is called the diastolic pressure. It is a measure of the pressure in your arteries as the heart relaxes.  What are the causes?  The exact cause of this condition is not known. There are some conditions that result in or are related to high blood pressure.  What increases the risk?  Some risk factors for high blood pressure are under your control. The following factors may make you more likely to develop this condition:  · Smoking.  · Having type 2 diabetes mellitus, high cholesterol, or both.  · Not getting enough exercise or physical activity.  · Being overweight.  · Having too much fat, sugar, calories, or salt (sodium) in your diet.  · Drinking too much alcohol.  Some risk factors for high blood pressure may be difficult or impossible to change. Some of these factors include:  · Having chronic kidney disease.  · Having a family history of high blood pressure.  · Age. Risk increases with age.  · Race. You may be at higher risk if you are .  · Gender. Men are at higher risk than women before age 45. After age 65, women are at higher risk than men.  · Having obstructive sleep apnea.  · Stress.  What are the signs or symptoms?  High blood pressure may not cause symptoms. Very high " blood pressure (hypertensive crisis) may cause:  · Headache.  · Anxiety.  · Shortness of breath.  · Nosebleed.  · Nausea and vomiting.  · Vision changes.  · Severe chest pain.  · Seizures.  How is this diagnosed?  This condition is diagnosed by measuring your blood pressure while you are seated, with your arm resting on a flat surface, your legs uncrossed, and your feet flat on the floor. The cuff of the blood pressure monitor will be placed directly against the skin of your upper arm at the level of your heart. It should be measured at least twice using the same arm. Certain conditions can cause a difference in blood pressure between your right and left arms.  Certain factors can cause blood pressure readings to be lower or higher than normal for a short period of time:  · When your blood pressure is higher when you are in a health care provider's office than when you are at home, this is called white coat hypertension. Most people with this condition do not need medicines.  · When your blood pressure is higher at home than when you are in a health care provider's office, this is called masked hypertension. Most people with this condition may need medicines to control blood pressure.  If you have a high blood pressure reading during one visit or you have normal blood pressure with other risk factors, you may be asked to:  · Return on a different day to have your blood pressure checked again.  · Monitor your blood pressure at home for 1 week or longer.  If you are diagnosed with hypertension, you may have other blood or imaging tests to help your health care provider understand your overall risk for other conditions.  How is this treated?  This condition is treated by making healthy lifestyle changes, such as eating healthy foods, exercising more, and reducing your alcohol intake. Your health care provider may prescribe medicine if lifestyle changes are not enough to get your blood pressure under control, and  if:  · Your systolic blood pressure is above 130.  · Your diastolic blood pressure is above 80.  Your personal target blood pressure may vary depending on your medical conditions, your age, and other factors.  Follow these instructions at home:  Eating and drinking    · Eat a diet that is high in fiber and potassium, and low in sodium, added sugar, and fat. An example eating plan is called the DASH (Dietary Approaches to Stop Hypertension) diet. To eat this way:  ? Eat plenty of fresh fruits and vegetables. Try to fill one half of your plate at each meal with fruits and vegetables.  ? Eat whole grains, such as whole-wheat pasta, brown rice, or whole-grain bread. Fill about one fourth of your plate with whole grains.  ? Eat or drink low-fat dairy products, such as skim milk or low-fat yogurt.  ? Avoid fatty cuts of meat, processed or cured meats, and poultry with skin. Fill about one fourth of your plate with lean proteins, such as fish, chicken without skin, beans, eggs, or tofu.  ? Avoid pre-made and processed foods. These tend to be higher in sodium, added sugar, and fat.  · Reduce your daily sodium intake. Most people with hypertension should eat less than 1,500 mg of sodium a day.  · Do not drink alcohol if:  ? Your health care provider tells you not to drink.  ? You are pregnant, may be pregnant, or are planning to become pregnant.  · If you drink alcohol:  ? Limit how much you use to:  § 0-1 drink a day for women.  § 0-2 drinks a day for men.  ? Be aware of how much alcohol is in your drink. In the [place] one drink equals one 12 oz bottle of beer (355 mL), one 5 oz glass of wine (148 mL), or one 1½ oz glass of hard liquor (44 mL).  Lifestyle    · Work with your health care provider to maintain a healthy body weight or to lose weight. Ask what an ideal weight is for you.  · Get at least 30 minutes of exercise most days of the week. Activities may include walking, swimming, or biking.  · Include exercise to  strengthen your muscles (resistance exercise), such as Pilates or lifting weights, as part of your weekly exercise routine. Try to do these types of exercises for 30 minutes at least 3 days a week.  · Do not use any products that contain nicotine or tobacco, such as cigarettes, e-cigarettes, and chewing tobacco. If you need help quitting, ask your health care provider.  · Monitor your blood pressure at home as told by your health care provider.  · Keep all follow-up visits as told by your health care provider. This is important.  Medicines  · Take over-the-counter and prescription medicines only as told by your health care provider. Follow directions carefully. Blood pressure medicines must be taken as prescribed.  · Do not skip doses of blood pressure medicine. Doing this puts you at risk for problems and can make the medicine less effective.  · Ask your health care provider about side effects or reactions to medicines that you should watch for.  Contact a health care provider if you:  · Think you are having a reaction to a medicine you are taking.  · Have headaches that keep coming back (recurring).  · Feel dizzy.  · Have swelling in your ankles.  · Have trouble with your vision.  Get help right away if you:  · Develop a severe headache or confusion.  · Have unusual weakness or numbness.  · Feel faint.  · Have severe pain in your chest or abdomen.  · Vomit repeatedly.  · Have trouble breathing.  Summary  · Hypertension is when the force of blood pumping through your arteries is too strong. If this condition is not controlled, it may put you at risk for serious complications.  · Your personal target blood pressure may vary depending on your medical conditions, your age, and other factors. For most people, a normal blood pressure is less than 120/80.  · Hypertension is treated with lifestyle changes, medicines, or a combination of both. Lifestyle changes include losing weight, eating a healthy, low-sodium diet,  exercising more, and limiting alcohol.  This information is not intended to replace advice given to you by your health care provider. Make sure you discuss any questions you have with your health care provider.  Document Released: 12/18/2006 Document Revised: 08/28/2019 Document Reviewed: 08/28/2019  Elsevier Patient Education © 2020 [employer]

## 2020-07-20 NOTE — PROGRESS NOTES
Subjective   Marisol Carrillo is a 50 y.o. female.   You have chosen to receive care through a telehealth visit.  Do you consent to use a video/audio connection for your medical care today? Yes    History of Present Illness    Since the last visit, she has overall felt tired.  She has Essential Hypertension and well controlled on current medication, Impaired fasting glucose and will monitor labs to watch for DMII, GERD controlled on PPI Rx, Hyperlipidemia and will start medication plan for treatment.  I will order follow up labs, Hypothyroidism well controlled on current medication and will continue Rx, Seasonal allergies and doing well on their medication  and Vitamin D deficiency and will update labs for continued management.  she has been compliant with current medications have reviewed them.  The patient denies medication side effects.  Will refill medications. There were no vitals taken for this visit.    Results for orders placed or performed during the hospital encounter of 06/29/20   POC Creatinine   Result Value Ref Range    Creatinine 1.00 0.60 - 1.30 mg/dL   Released by hematology for hx PE----remains on ASA.  Watching B12    Had EGD Dr Yanez 2-18-20 and did dilate esophagus. This has made such a difference. She has not needed Carafate;.  Levsin and Prilosec are working well.  MRI lumbar spine [facility] and had neurosurgeon appt by video Dr. Dumas. Noting her L4-5 was not progressed compared to 2018.   Weight down about 10 lbs  130/75 at home.   Marisol Carrillo female 50 y.o., There were no vitals taken for this visit.  who presents today for follow up of Depression and Anxiety.  She reports depression is mild and Rx helps. She has been on several meds in past. This is best. More anxiety and feels like that is worse. Needs me to help with anxiety and talked about adding Buspar. . Onset of symptoms was approximately several years ago.  She denies current suicidal and homicidal ideation. Risk factors are  family history of anxiety and or depression, lifestyle of multiple roles, family situation/stress, chronic illness and chronic pain.  Previous treatment includes current Rx.  She complains of the following medication side effects: none.  The patient has previously been in counseling..  She is asking about Lexapro----she has tried so many meds in past and do think she tried Lexapro or Celexa.  Failed Cymbalta. Want to add Buspar first; already tried Hydroxyzine---takes this---more to sleep.   Sees ortho for knee  Still want to update labs soon.  Daughter has Chron's   Can consider colonoscopy screen  Due for mammo---wants to wait  The following portions of the patient's history were reviewed and updated as appropriate: allergies, current medications, past family history, past medical history, past social history, past surgical history and problem list.    Review of Systems   Constitutional: Positive for fatigue. Negative for activity change, appetite change and unexpected weight change.   HENT: Negative for nosebleeds and trouble swallowing.    Eyes: Negative for pain and visual disturbance.   Respiratory: Negative for chest tightness, shortness of breath and wheezing.    Cardiovascular: Negative for chest pain and palpitations.   Gastrointestinal: Negative for abdominal pain and blood in stool.   Endocrine: Negative.    Genitourinary: Negative for difficulty urinating and hematuria.   Musculoskeletal: Positive for arthralgias, back pain and gait problem. Negative for joint swelling.   Skin: Negative for color change.   Allergic/Immunologic: Negative.    Neurological: Positive for weakness. Negative for syncope and speech difficulty.   Hematological: Negative for adenopathy.   Psychiatric/Behavioral: Positive for decreased concentration and dysphoric mood. Negative for agitation and confusion. The patient is nervous/anxious.    All other systems reviewed and are negative.      Objective   Physical Exam    Constitutional: She is oriented to person, place, and time. She appears well-developed and well-nourished. No distress.   HENT:   Head: Normocephalic and atraumatic.   Right Ear: External ear normal.   Left Ear: External ear normal.   Eyes: Conjunctivae are normal. Right eye exhibits no discharge. Left eye exhibits no discharge. No scleral icterus.   Neck: Normal range of motion.   Pulmonary/Chest: Effort normal. No stridor. No respiratory distress.   Abdominal: Soft. There is no guarding.   Musculoskeletal: Normal range of motion.   Neurological: She is alert and oriented to person, place, and time. Coordination normal.   Skin: Skin is dry. She is not diaphoretic.   Psychiatric: She has a normal mood and affect. Her behavior is normal. Judgment and thought content normal.       Assessment/Plan   There are no diagnoses linked to this encounter.  Will add Buspar 10mg in AM----can do BID PRN; cont Zoloft  Plan, Marisol Carrillo, was seen today.  she was seen for HTN and continue medication, Imparied fasting glucose and plan follow up labs, diet, and exercise, GERD and will continue on PPI medication, Hyperlipidemia and will continue current medication, Hypothyroidism well controlled, continue medication and Vitamin D deficiency and will update labs .  Cont GI meds  Labs due  mammo  F/u ortho, neurosurgery   Time spent:  25 minutes

## 2020-08-27 RX ORDER — AMLODIPINE AND OLMESARTAN MEDOXOMIL 10; 40 MG/1; MG/1
1 TABLET ORAL DAILY
Qty: 90 TABLET | Refills: 0 | Status: SHIPPED | OUTPATIENT
Start: 2020-08-27 | End: 2021-07-07

## 2021-01-13 NOTE — PROGRESS NOTES
Subjective   Patient ID: Marisol Carrillo is a 51 y.o. female is here today for follow-up.    Patient was last seen 7.10.20. She was seen for back pain , leg pain, extremity weakness, and numbness.    Patient is here today back pain, leg pain, sciatica.  Patient states she keeps tripping because her left foot drags. This causes her to loose her balance.    It has been about 5 years since she underwent L5-S1 fusion. It has been about 2 years since Dr. Chavez redid her left knee. Her right knee is still bothering her and ultimately it will probably need to be replaced. She has back and bilateral leg pain. As of the last visit she does not think things have changed much. Her daughter was recently diagnosed as having Crohn's disease so she has a lot on her plate. She does not feel ready for the spinal cord stimulator, which I think is probably her next best option. Because of all the health problems of her family and the COVID situation, she has not been getting out and doing much exercise. Things are about the same. Will keep an eye on her and have her come back in 6 months. She got social security/disability. If she decides she wants to consider the stimulator, she will let me know. She will need a trial from Dr. Askew. She will also let me know if things get worse.         Back Pain  The pain is present in the sacro-iliac. The quality of the pain is described as aching and stabbing. The pain radiates to the right thigh, left thigh, right knee, left knee, right foot and left foot (sides of legs). The pain is at a severity of 7/10. The pain is moderate. Stiffness is present all day. Associated symptoms include leg pain, tingling and weakness. Pertinent negatives include no bladder incontinence, bowel incontinence, fever or headaches.       The following portions of the patient's history were reviewed and updated as appropriate: allergies, current medications, past family history, past medical history, past social  history, past surgical history and problem list.    Review of Systems   Constitutional: Negative for chills and fever.   HENT: Negative for congestion.    Gastrointestinal: Negative for bowel incontinence.   Genitourinary: Positive for flank pain. Negative for bladder incontinence.   Musculoskeletal: Positive for back pain and gait problem.   Neurological: Positive for tingling and weakness. Negative for dizziness, light-headedness and headaches.   All other systems reviewed and are negative.          Objective     Vitals:    01/18/21 0840   BP: 126/78   Pulse: 93   Temp: 97.3 °F (36.3 °C)     There is no height or weight on file to calculate BMI.      Physical Exam  Constitutional:       Appearance: She is well-developed.   HENT:      Head: Normocephalic and atraumatic.   Eyes:      Extraocular Movements: EOM normal.      Conjunctiva/sclera: Conjunctivae normal.      Pupils: Pupils are equal, round, and reactive to light.   Neck:      Vascular: No carotid bruit.   Neurological:      Mental Status: She is oriented to person, place, and time.      Coordination: Finger-Nose-Finger Test and Heel to Shin Test normal.      Gait: Gait is intact.      Deep Tendon Reflexes:      Reflex Scores:       Tricep reflexes are 2+ on the right side and 2+ on the left side.       Bicep reflexes are 2+ on the right side and 2+ on the left side.       Brachioradialis reflexes are 2+ on the right side and 2+ on the left side.       Patellar reflexes are 2+ on the right side and 2+ on the left side.       Achilles reflexes are 2+ on the right side and 2+ on the left side.  Psychiatric:         Speech: Speech normal.       Neurologic Exam     Mental Status   Oriented to person, place, and time.   Registration of memory: Good recent and remote memory.   Attention: normal. Concentration: normal.   Speech: speech is normal   Level of consciousness: alert  Knowledge: consistent with education.     Cranial Nerves     CN II   Visual fields full  to confrontation.   Visual acuity: normal    CN III, IV, VI   Pupils are equal, round, and reactive to light.  Extraocular motions are normal.     CN V   Facial sensation intact.   Right corneal reflex: normal  Left corneal reflex: normal    CN VII   Facial expression full, symmetric.   Right facial weakness: none  Left facial weakness: none    CN VIII   Hearing: intact    CN IX, X   Palate: symmetric    CN XI   Right sternocleidomastoid strength: normal  Left sternocleidomastoid strength: normal    CN XII   Tongue: not atrophic  Tongue deviation: none    Motor Exam   Muscle bulk: normal  Right arm tone: normal  Left arm tone: normal  Right leg tone: normal  Left leg tone: normal    Strength   Strength 5/5 except as noted.   Right quadriceps: 4/5  Left quadriceps: 4/5  Right anterior tibial: 3/5  Left anterior tibial: 3/5  Right gastroc: 3/5  Left gastroc: 4/5    Sensory Exam   Light touch normal.     Gait, Coordination, and Reflexes     Gait  Gait: normal    Coordination   Finger to nose coordination: normal  Heel to shin coordination: normal    Reflexes   Right brachioradialis: 2+  Left brachioradialis: 2+  Right biceps: 2+  Left biceps: 2+  Right triceps: 2+  Left triceps: 2+  Right patellar: 2+  Left patellar: 2+  Right achilles: 2+  Left achilles: 2+  Right : 2+  Left : 2+          Assessment/Plan   Independent Review of Radiographic Studies:      I personally reviewed the images from the following studies.    I reviewed her lumbar MRI done on 6/29/2020 which shows a fused segment of L5-S1.  She still has a mild amount of central disc protrusion at L4-L5 with mild lateral recess stenosis but I do not think it is any more progressed or different than the scan in 2018.  Agree with the report.    Medical Decision Making:      We will continue seeing her and I will arrange for her to come in the office in another 6 months.  If things get worse she will let me know.      Diagnoses and all orders for this  visit:    1. Postlaminectomy syndrome, lumbar region (Primary)    2. DDD (degenerative disc disease), lumbar    3. History of lumbar fusion      Return in about 6 months (around 7/18/2021) for Patient to face.

## 2021-01-18 ENCOUNTER — OFFICE VISIT (OUTPATIENT)
Dept: NEUROSURGERY | Facility: CLINIC | Age: 51
End: 2021-01-18

## 2021-01-18 VITALS — DIASTOLIC BLOOD PRESSURE: 78 MMHG | TEMPERATURE: 97.3 F | HEART RATE: 93 BPM | SYSTOLIC BLOOD PRESSURE: 126 MMHG

## 2021-01-18 DIAGNOSIS — Z98.1 HISTORY OF LUMBAR FUSION: ICD-10-CM

## 2021-01-18 DIAGNOSIS — M96.1 POSTLAMINECTOMY SYNDROME, LUMBAR REGION: Primary | ICD-10-CM

## 2021-01-18 DIAGNOSIS — M51.36 DDD (DEGENERATIVE DISC DISEASE), LUMBAR: ICD-10-CM

## 2021-01-18 PROCEDURE — 99213 OFFICE O/P EST LOW 20 MIN: CPT | Performed by: NEUROLOGICAL SURGERY

## 2021-02-07 RX ORDER — SERTRALINE HYDROCHLORIDE 100 MG/1
200 TABLET, FILM COATED ORAL DAILY
Qty: 180 TABLET | Refills: 3 | Status: SHIPPED | OUTPATIENT
Start: 2021-02-07 | End: 2021-09-28

## 2021-03-24 ENCOUNTER — IMMUNIZATION (OUTPATIENT)
Dept: VACCINE CLINIC | Facility: HOSPITAL | Age: 51
End: 2021-03-24

## 2021-03-24 PROCEDURE — 0001A: CPT | Performed by: OBSTETRICS & GYNECOLOGY

## 2021-03-24 PROCEDURE — 91300 HC SARSCOV02 VAC 30MCG/0.3ML IM: CPT | Performed by: OBSTETRICS & GYNECOLOGY

## 2021-04-14 ENCOUNTER — IMMUNIZATION (OUTPATIENT)
Dept: VACCINE CLINIC | Facility: HOSPITAL | Age: 51
End: 2021-04-14

## 2021-04-14 PROCEDURE — 0002A: CPT | Performed by: OBSTETRICS & GYNECOLOGY

## 2021-04-14 PROCEDURE — 91300 HC SARSCOV02 VAC 30MCG/0.3ML IM: CPT | Performed by: OBSTETRICS & GYNECOLOGY

## 2021-07-07 RX ORDER — ATORVASTATIN CALCIUM 40 MG/1
40 TABLET, FILM COATED ORAL NIGHTLY
Qty: 30 TABLET | Refills: 0 | Status: SHIPPED | OUTPATIENT
Start: 2021-07-07 | End: 2021-08-08

## 2021-07-07 RX ORDER — OMEPRAZOLE 40 MG/1
CAPSULE, DELAYED RELEASE ORAL
Qty: 60 CAPSULE | Refills: 0 | Status: SHIPPED | OUTPATIENT
Start: 2021-07-07 | End: 2021-08-08

## 2021-07-07 RX ORDER — AMLODIPINE AND OLMESARTAN MEDOXOMIL 10; 40 MG/1; MG/1
TABLET ORAL
Qty: 30 TABLET | Refills: 0 | Status: SHIPPED | OUTPATIENT
Start: 2021-07-07 | End: 2021-08-08

## 2021-07-20 NOTE — PROGRESS NOTES
Subjective   Patient ID: Marisol Carrillo is a 51 y.o. female is here today for follow-up. Patient was last seen 1/18/21 for Postlaminectomy syndrome, lumbar region, degenerative disc disease, lumbar and History of lumbar fusion.      Patient reports today with back that radiates down the lateral sides of both legs and the top of feet. The top of her feet she states feel like little stab pains.    This very nice patient was fused by me about 6 to 7 years ago at L5-S1.  She has chronic back and leg pain as well as chronic knee pain.  At some point we felt that she was going to need to have a spinal cord stimulator placed but she does not feel ready for that.  There are a lot of other issues in her life and family including the fact that her daughter who is 10 years old has chronic Crohn's disease and she is dealing with that.  Her left knee is doing reasonably well.  The unoperated on the right knee is going to need to be replaced at some point.  She is working with Dr. Kathy yates and still does not feel ready to move forward with it like she does not feel ready to move forward with a spinal cord stimulator.  Her exam is the same documented below.  She wants to pursue the spinal cord stimulator, she will let us know and I would probably repeat the lumbar MRI to make sure there are not any major changes.  I did tell her that Dr. Askew was no longer at the practice that she was at an if she reemerges with a new practice we can ask her to do the spinal cord stimulator trial.  If not we can certainly find another pain physician to do that.        Back Pain  The pain is present in the lumbar spine. The quality of the pain is described as aching and stabbing. The pain radiates to the right thigh, left thigh, left knee, right knee, right foot and left foot. The pain is at a severity of 7/10. The pain is the same all the time (walking). The symptoms are aggravated by standing, twisting, position, bending and sitting.  "Associated symptoms include weakness. Pertinent negatives include no bladder incontinence, bowel incontinence or fever. She has tried bed rest and ice (pool, recliner) for the symptoms. The treatment provided mild relief.       The following portions of the patient's history were reviewed and updated as appropriate: allergies, current medications, past family history, past medical history, past social history, past surgical history and problem list.    Review of Systems   Constitutional: Negative for chills, fatigue and fever.   HENT: Negative for congestion.    Gastrointestinal: Negative for bowel incontinence.   Genitourinary: Negative for bladder incontinence.   Musculoskeletal: Positive for back pain.   Neurological: Positive for weakness.   All other systems reviewed and are negative.          Objective     Vitals:    07/21/21 0840   BP: 124/82   Temp: 97.7 °F (36.5 °C)   Weight: (!) 140 kg (309 lb)   Height: 162.6 cm (64\")     Body mass index is 53.04 kg/m².      Physical Exam  Constitutional:       Appearance: She is well-developed.   HENT:      Head: Normocephalic and atraumatic.   Eyes:      Extraocular Movements: EOM normal.      Conjunctiva/sclera: Conjunctivae normal.      Pupils: Pupils are equal, round, and reactive to light.   Neck:      Vascular: No carotid bruit.   Neurological:      Mental Status: She is oriented to person, place, and time.      Coordination: Finger-Nose-Finger Test and Heel to Shin Test normal.      Gait: Gait is intact.      Deep Tendon Reflexes:      Reflex Scores:       Tricep reflexes are 2+ on the right side and 2+ on the left side.       Bicep reflexes are 2+ on the right side and 2+ on the left side.       Brachioradialis reflexes are 2+ on the right side and 2+ on the left side.       Patellar reflexes are 2+ on the right side and 2+ on the left side.       Achilles reflexes are 2+ on the right side and 2+ on the left side.  Psychiatric:         Speech: Speech normal. "       Neurologic Exam     Mental Status   Oriented to person, place, and time.   Registration of memory: Good recent and remote memory.   Attention: normal. Concentration: normal.   Speech: speech is normal   Level of consciousness: alert  Knowledge: consistent with education.     Cranial Nerves     CN II   Visual fields full to confrontation.   Visual acuity: normal    CN III, IV, VI   Pupils are equal, round, and reactive to light.  Extraocular motions are normal.     CN V   Facial sensation intact.   Right corneal reflex: normal  Left corneal reflex: normal    CN VII   Facial expression full, symmetric.   Right facial weakness: none  Left facial weakness: none    CN VIII   Hearing: intact    CN IX, X   Palate: symmetric    CN XI   Right sternocleidomastoid strength: normal  Left sternocleidomastoid strength: normal    CN XII   Tongue: not atrophic  Tongue deviation: none    Motor Exam   Muscle bulk: normal  Right arm tone: normal  Left arm tone: normal  Right leg tone: normal  Left leg tone: normal    Strength   Strength 5/5 except as noted.   Right quadriceps: 4/5  Left quadriceps: 4/5  Right anterior tibial: 4/5  Left anterior tibial: 4/5  Right gastroc: 4/5  Left gastroc: 4/5    Sensory Exam   Light touch normal.     Gait, Coordination, and Reflexes     Gait  Gait: normal    Coordination   Finger to nose coordination: normal  Heel to shin coordination: normal    Reflexes   Right brachioradialis: 2+  Left brachioradialis: 2+  Right biceps: 2+  Left biceps: 2+  Right triceps: 2+  Left triceps: 2+  Right patellar: 2+  Left patellar: 2+  Right achilles: 2+  Left achilles: 2+  Right : 2+  Left : 2+          Assessment/Plan   Independent Review of Radiographic Studies:      I personally reviewed the images from the following studies.    I reviewed her lumbar MRI done on 6/29/2020 which shows a fused segment of L5-S1.  She still has a mild amount of central disc protrusion at L4-L5 with mild lateral recess  stenosis but I do not think it is any more progressed or different than the scan in 2018.  Agree with the report.    Medical Decision Making:      Overall, stable.  We will keep an eye on her and I will see her in 9 months.  If anything changes she will let me know.  If she wants to pursue spinal cord stimulation she will let me know.  If she decides to get her right knee replaced, I asked her to let us know as well.      Diagnoses and all orders for this visit:    1. Postlaminectomy syndrome, lumbar region (Primary)    2. DDD (degenerative disc disease), lumbar    3. History of lumbar fusion      Return in about 9 months (around 4/21/2022) for Face-to-face.

## 2021-07-21 ENCOUNTER — OFFICE VISIT (OUTPATIENT)
Dept: NEUROSURGERY | Facility: CLINIC | Age: 51
End: 2021-07-21

## 2021-07-21 VITALS
TEMPERATURE: 97.7 F | SYSTOLIC BLOOD PRESSURE: 124 MMHG | WEIGHT: 293 LBS | DIASTOLIC BLOOD PRESSURE: 82 MMHG | HEIGHT: 64 IN | BODY MASS INDEX: 50.02 KG/M2

## 2021-07-21 DIAGNOSIS — M51.36 DDD (DEGENERATIVE DISC DISEASE), LUMBAR: ICD-10-CM

## 2021-07-21 DIAGNOSIS — Z98.1 HISTORY OF LUMBAR FUSION: ICD-10-CM

## 2021-07-21 DIAGNOSIS — M96.1 POSTLAMINECTOMY SYNDROME, LUMBAR REGION: Primary | ICD-10-CM

## 2021-07-21 PROCEDURE — 99213 OFFICE O/P EST LOW 20 MIN: CPT | Performed by: NEUROLOGICAL SURGERY

## 2021-08-06 RX ORDER — HYDROXYZINE HYDROCHLORIDE 25 MG/1
25 TABLET, FILM COATED ORAL 3 TIMES DAILY PRN
Qty: 60 TABLET | Refills: 5 | Status: SHIPPED | OUTPATIENT
Start: 2021-08-06 | End: 2021-12-09 | Stop reason: SDUPTHER

## 2021-08-08 RX ORDER — ATORVASTATIN CALCIUM 40 MG/1
40 TABLET, FILM COATED ORAL NIGHTLY
Qty: 30 TABLET | Refills: 0 | Status: SHIPPED | OUTPATIENT
Start: 2021-08-08 | End: 2021-09-02

## 2021-08-08 RX ORDER — AMLODIPINE AND OLMESARTAN MEDOXOMIL 10; 40 MG/1; MG/1
TABLET ORAL
Qty: 30 TABLET | Refills: 0 | Status: SHIPPED | OUTPATIENT
Start: 2021-08-08 | End: 2021-09-02

## 2021-08-08 RX ORDER — OMEPRAZOLE 40 MG/1
CAPSULE, DELAYED RELEASE ORAL
Qty: 60 CAPSULE | Refills: 0 | Status: SHIPPED | OUTPATIENT
Start: 2021-08-08 | End: 2021-09-03

## 2021-09-02 RX ORDER — AMLODIPINE AND OLMESARTAN MEDOXOMIL 10; 40 MG/1; MG/1
TABLET ORAL
Qty: 30 TABLET | Refills: 0 | Status: SHIPPED | OUTPATIENT
Start: 2021-09-02 | End: 2021-09-28 | Stop reason: SDUPTHER

## 2021-09-02 RX ORDER — ATORVASTATIN CALCIUM 40 MG/1
40 TABLET, FILM COATED ORAL NIGHTLY
Qty: 30 TABLET | Refills: 0 | Status: SHIPPED | OUTPATIENT
Start: 2021-09-02 | End: 2021-09-28 | Stop reason: SDUPTHER

## 2021-09-03 RX ORDER — OMEPRAZOLE 40 MG/1
CAPSULE, DELAYED RELEASE ORAL
Qty: 60 CAPSULE | Refills: 0 | Status: SHIPPED | OUTPATIENT
Start: 2021-09-03 | End: 2021-09-28 | Stop reason: SDUPTHER

## 2021-09-28 ENCOUNTER — TELEMEDICINE (OUTPATIENT)
Dept: FAMILY MEDICINE CLINIC | Facility: CLINIC | Age: 51
End: 2021-09-28

## 2021-09-28 DIAGNOSIS — E56.9 VITAMIN DEFICIENCY: ICD-10-CM

## 2021-09-28 DIAGNOSIS — E66.01 OBESITY, CLASS III, BMI 40-49.9 (MORBID OBESITY) (HCC): ICD-10-CM

## 2021-09-28 DIAGNOSIS — F33.1 MODERATE EPISODE OF RECURRENT MAJOR DEPRESSIVE DISORDER (HCC): ICD-10-CM

## 2021-09-28 DIAGNOSIS — Z98.1 HISTORY OF LUMBAR FUSION: ICD-10-CM

## 2021-09-28 DIAGNOSIS — K21.9 GASTROESOPHAGEAL REFLUX DISEASE WITHOUT ESOPHAGITIS: ICD-10-CM

## 2021-09-28 DIAGNOSIS — I10 ESSENTIAL HYPERTENSION: ICD-10-CM

## 2021-09-28 DIAGNOSIS — E55.9 VITAMIN D DEFICIENCY: ICD-10-CM

## 2021-09-28 DIAGNOSIS — R73.01 IMPAIRED FASTING GLUCOSE: Primary | ICD-10-CM

## 2021-09-28 DIAGNOSIS — F41.9 ANXIETY: ICD-10-CM

## 2021-09-28 DIAGNOSIS — E03.9 ACQUIRED HYPOTHYROIDISM: ICD-10-CM

## 2021-09-28 DIAGNOSIS — R79.89 ABNORMAL CBC: ICD-10-CM

## 2021-09-28 DIAGNOSIS — M43.06 LUMBAR SPONDYLOLYSIS: ICD-10-CM

## 2021-09-28 PROBLEM — I26.99 PULMONARY EMBOLUS (HCC): Status: RESOLVED | Noted: 2019-03-21 | Resolved: 2021-09-28

## 2021-09-28 PROCEDURE — 99214 OFFICE O/P EST MOD 30 MIN: CPT | Performed by: PHYSICIAN ASSISTANT

## 2021-09-28 RX ORDER — OMEPRAZOLE 40 MG/1
CAPSULE, DELAYED RELEASE ORAL
Qty: 180 CAPSULE | Refills: 3 | Status: SHIPPED | OUTPATIENT
Start: 2021-09-28 | End: 2022-09-06 | Stop reason: SDUPTHER

## 2021-09-28 RX ORDER — AMLODIPINE AND OLMESARTAN MEDOXOMIL 10; 40 MG/1; MG/1
1 TABLET ORAL DAILY
Qty: 90 TABLET | Refills: 1 | Status: SHIPPED | OUTPATIENT
Start: 2021-09-28 | End: 2021-12-09 | Stop reason: SDUPTHER

## 2021-09-28 RX ORDER — CARVEDILOL 12.5 MG/1
12.5 TABLET ORAL 2 TIMES DAILY WITH MEALS
Qty: 180 TABLET | Refills: 3 | Status: SHIPPED | OUTPATIENT
Start: 2021-09-28 | End: 2022-09-06 | Stop reason: SDUPTHER

## 2021-09-28 RX ORDER — LEVOTHYROXINE SODIUM 88 UG/1
88 TABLET ORAL DAILY
Qty: 90 TABLET | Refills: 3 | Status: SHIPPED | OUTPATIENT
Start: 2021-09-28 | End: 2021-12-28 | Stop reason: SDUPTHER

## 2021-09-28 RX ORDER — ATORVASTATIN CALCIUM 40 MG/1
40 TABLET, FILM COATED ORAL NIGHTLY
Qty: 90 TABLET | Refills: 3 | Status: SHIPPED | OUTPATIENT
Start: 2021-09-28 | End: 2022-09-06 | Stop reason: SDUPTHER

## 2021-09-28 RX ORDER — FLUOXETINE HYDROCHLORIDE 20 MG/1
20 CAPSULE ORAL DAILY
Qty: 90 CAPSULE | Refills: 1 | Status: SHIPPED | OUTPATIENT
Start: 2021-09-28 | End: 2021-11-01 | Stop reason: SDUPTHER

## 2021-09-28 NOTE — PROGRESS NOTES
Subjective   Marisol Carrillo is a 51 y.o. female.   You have chosen to receive care through a telehealth visit.  Do you consent to use a video/audio connection for your medical care today? Yes    History of Present Illness      Since the last visit, she has overall felt anxious.  She has Essential Hypertension and well controlled on current medication, Impaired fasting glucose and will monitor labs to watch for DMII, GERD controlled on PPI Rx, Hyperlipidemia with goals met with current Rx, Hypothyroidism well controlled on current medication and will continue Rx and Vitamin D deficiency and will update labs for continued management.  she has been compliant with current medications have reviewed them.  The patient denies medication side effects.  Will refill medications. There were no vitals taken for this visit.  Failed Cymbalta  Results for orders placed or performed during the hospital encounter of 06/29/20   POC Creatinine    Specimen: Blood   Result Value Ref Range    Creatinine 1.00 0.60 - 1.30 mg/dL     Patient had labs on 7/27/2021 and do want a note white blood cell count was 13.1, neutrophils 9.4, hemoglobin 12.9, platelet count 396, glucose 93, creatinine 1.09, EGFR 59, alkaline phosphatase 126, potassium 4.5, ALT 18, AST 20, urine trace of protein, no red blood cells on micro of urine, cholesterol 183, triglycerides 74, HDL 74, LDL 95, A1c 5.8, TSH 1.7, free T4 1.42, thyroid antibodies negative, ferritin 20, free T3 2.1, vitamin D 22,  BMI Readings from Last 1 Encounters:   07/21/21 53.04 kg/m²     HaReleased by hematology for hx PE----remains on ASA.  Watching B12d recent renal stone around time of lab.  WBC was high    Home bp 130s/70-80  Marisol Carrillo female 51 y.o., There were no vitals taken for this visit.  who presents today for follow up of Depression, Insomnia and Anxiety.  She reports depressed mood, fatigue, anxiety, anhedonia, impaired concentration, excessive worry, unable to relax and  sleep disturbance. Onset of symptoms was approximately several years ago.  She denies current suicidal and homicidal ideation. Risk factors are family history of anxiety and or depression and lifestyle of multiple roles.  Previous treatment includes current Rx. She complains of the following medication side effects: none. The patient has previously been in counseling.. Hydroxyzine helps sleep most of the time.    Had h/a with buspar  Not on Cpap  The following portions of the patient's history were reviewed and updated as appropriate: allergies, current medications, past family history, past medical history, past social history, past surgical history and problem list.    Review of Systems   Constitutional: Positive for fatigue. Negative for activity change, appetite change and unexpected weight change.   HENT: Negative for nosebleeds and trouble swallowing.    Eyes: Negative for pain and visual disturbance.   Respiratory: Negative for chest tightness, shortness of breath and wheezing.    Cardiovascular: Negative for chest pain and palpitations.   Gastrointestinal: Negative for abdominal pain and blood in stool.   Endocrine: Negative.    Genitourinary: Negative for difficulty urinating and hematuria.   Musculoskeletal: Negative for joint swelling.   Skin: Negative for color change and rash.   Allergic/Immunologic: Negative.    Neurological: Negative for syncope and speech difficulty.   Hematological: Negative for adenopathy.   Psychiatric/Behavioral: Positive for decreased concentration, dysphoric mood and sleep disturbance. Negative for agitation and confusion. The patient is nervous/anxious.    All other systems reviewed and are negative.      Objective   Physical Exam  Constitutional:       General: She is not in acute distress.     Appearance: She is well-developed. She is not diaphoretic.   HENT:      Head: Normocephalic and atraumatic.   Eyes:      Conjunctiva/sclera: Conjunctivae normal.   Pulmonary:       Effort: Pulmonary effort is normal. No respiratory distress.   Musculoskeletal:         General: Normal range of motion.      Cervical back: Normal range of motion.   Skin:     General: Skin is dry.   Neurological:      Mental Status: She is alert and oriented to person, place, and time.      Coordination: Coordination normal.   Psychiatric:         Behavior: Behavior normal.         Thought Content: Thought content normal.         Judgment: Judgment normal.         Assessment/Plan   Diagnoses and all orders for this visit:    1. Impaired fasting glucose (Primary)  -     Vitamin B12  -     Folate  -     CBC & Differential  -     Basic Metabolic Panel    2. Essential hypertension  -     Vitamin B12  -     Folate  -     CBC & Differential  -     Basic Metabolic Panel    3. Acquired hypothyroidism  -     Vitamin B12  -     Folate  -     CBC & Differential  -     Basic Metabolic Panel    4. Vitamin deficiency  -     Vitamin B12  -     Folate  -     CBC & Differential  -     Basic Metabolic Panel    5. Vitamin D deficiency  -     Vitamin B12  -     Folate  -     CBC & Differential  -     Basic Metabolic Panel    6. Gastroesophageal reflux disease without esophagitis  -     Vitamin B12  -     Folate  -     CBC & Differential  -     Basic Metabolic Panel    7. Lumbar spondylolysis  -     Vitamin B12  -     Folate  -     CBC & Differential  -     Basic Metabolic Panel    8. History of lumbar fusion  -     Vitamin B12  -     Folate  -     CBC & Differential  -     Basic Metabolic Panel    9. Anxiety  -     Vitamin B12  -     Folate  -     CBC & Differential  -     Basic Metabolic Panel    10. Moderate episode of recurrent major depressive disorder (CMS/[facility])  -     Vitamin B12  -     Folate  -     CBC & Differential  -     Basic Metabolic Panel    11. Obesity, Class III, BMI 40-49.9 (morbid obesity) (CMS/HCC)  -     Vitamin B12  -     Folate  -     CBC & Differential  -     Basic Metabolic Panel    12. Abnormal CBC  -      Vitamin B12  -     Folate  -     CBC & Differential  -     Basic Metabolic Panel    Other orders  -     omeprazole (priLOSEC) 40 MG capsule; 1 PO BID For GERD  Dispense: 180 capsule; Refill: 3  -     amlodipine-olmesartan (RICK) 10-40 MG per tablet; Take 1 tablet by mouth Daily. For BP  Dispense: 90 tablet; Refill: 1  -     carvedilol (Coreg) 12.5 MG tablet; Take 1 tablet by mouth 2 (Two) Times a Day With Meals. For BP  Dispense: 180 tablet; Refill: 3  -     atorvastatin (LIPITOR) 40 MG tablet; Take 1 tablet by mouth Every Night. For cholesterol  Dispense: 90 tablet; Refill: 3  -     levothyroxine (Synthroid) 88 MCG tablet; Take 1 tablet by mouth Daily. For thyroid; give generic  Dispense: 90 tablet; Refill: 3  -     FLUoxetine (PROzac) 20 MG capsule; Take 1 capsule by mouth Daily.  Dispense: 90 capsule; Refill: 1        Need f/u labs on elevated creatinine and WBC elevation  She is back on Vit D in last 2 weeks  Plan, Marisol Carrillo, was seen today.  she was seen for HTN and continue medication, Imparied fasting glucose and plan follow up labs, diet, and exercise, GERD and will continue on PPI medication, Hyperlipidemia and will continue current medication, Hypothyroidism well controlled, continue medication and Vitamin D deficiency and will update labs .  Tapering Zoloft by 50mg  Q 3 days while starting Prozac  Declines mammo and colon cancer screening  F/u 6 weeks

## 2021-10-12 ENCOUNTER — APPOINTMENT (OUTPATIENT)
Dept: VACCINE CLINIC | Facility: HOSPITAL | Age: 51
End: 2021-10-12

## 2021-11-01 ENCOUNTER — TELEPHONE (OUTPATIENT)
Dept: FAMILY MEDICINE CLINIC | Facility: CLINIC | Age: 51
End: 2021-11-01

## 2021-11-01 RX ORDER — FLUOXETINE HYDROCHLORIDE 20 MG/1
60 CAPSULE ORAL DAILY
Qty: 270 CAPSULE | Refills: 1 | Status: SHIPPED | OUTPATIENT
Start: 2021-11-01 | End: 2021-12-09

## 2021-11-01 NOTE — TELEPHONE ENCOUNTER
----- Message from Tomeka Light LPN sent at 11/1/2021  1:07 PM EDT -----  Regarding: FW: Prozac     ----- Message -----  From: Marisol Carrillo  Sent: 11/1/2021  12:37 PM EDT  To: [facility] [place] [facility] Pool  Subject: Prozac                                           When can I possibly up my Prozac dose to 60 mg?  I have been on 40 mg now for 3 weeks today.    Thank you   Marisol Carrillo

## 2021-11-18 DIAGNOSIS — R79.89 ABNORMAL CBC: Primary | ICD-10-CM

## 2021-11-18 DIAGNOSIS — E87.5 SERUM POTASSIUM ELEVATED: ICD-10-CM

## 2021-11-18 DIAGNOSIS — D72.829 LEUKOCYTOSIS, UNSPECIFIED TYPE: ICD-10-CM

## 2021-11-18 PROBLEM — R39.15 URINARY URGENCY: Status: ACTIVE | Noted: 2021-11-18

## 2021-11-18 LAB
BASOPHILS # BLD AUTO: 0.1 X10E3/UL (ref 0–0.2)
BASOPHILS NFR BLD AUTO: 1 %
BUN SERPL-MCNC: 19 MG/DL (ref 6–24)
BUN/CREAT SERPL: 16 (ref 9–23)
CALCIUM SERPL-MCNC: 9.7 MG/DL (ref 8.7–10.2)
CHLORIDE SERPL-SCNC: 101 MMOL/L (ref 96–106)
CO2 SERPL-SCNC: 22 MMOL/L (ref 20–29)
CREAT SERPL-MCNC: 1.16 MG/DL (ref 0.57–1)
EOSINOPHIL # BLD AUTO: 0.1 X10E3/UL (ref 0–0.4)
EOSINOPHIL NFR BLD AUTO: 1 %
ERYTHROCYTE [DISTWIDTH] IN BLOOD BY AUTOMATED COUNT: 15.7 % (ref 11.7–15.4)
FOLATE SERPL-MCNC: 5.6 NG/ML
GLUCOSE SERPL-MCNC: 101 MG/DL (ref 65–99)
HCT VFR BLD AUTO: 42.2 % (ref 34–46.6)
HGB BLD-MCNC: 13 G/DL (ref 11.1–15.9)
IMM GRANULOCYTES # BLD AUTO: 0 X10E3/UL (ref 0–0.1)
IMM GRANULOCYTES NFR BLD AUTO: 0 %
LYMPHOCYTES # BLD AUTO: 2.8 X10E3/UL (ref 0.7–3.1)
LYMPHOCYTES NFR BLD AUTO: 23 %
MCH RBC QN AUTO: 25.7 PG (ref 26.6–33)
MCHC RBC AUTO-ENTMCNC: 30.8 G/DL (ref 31.5–35.7)
MCV RBC AUTO: 84 FL (ref 79–97)
MONOCYTES # BLD AUTO: 0.8 X10E3/UL (ref 0.1–0.9)
MONOCYTES NFR BLD AUTO: 6 %
NEUTROPHILS # BLD AUTO: 8.2 X10E3/UL (ref 1.4–7)
NEUTROPHILS NFR BLD AUTO: 69 %
PLATELET # BLD AUTO: 420 X10E3/UL (ref 150–450)
POTASSIUM SERPL-SCNC: 5.5 MMOL/L (ref 3.5–5.2)
RBC # BLD AUTO: 5.05 X10E6/UL (ref 3.77–5.28)
SODIUM SERPL-SCNC: 140 MMOL/L (ref 134–144)
VIT B12 SERPL-MCNC: 711 PG/ML (ref 232–1245)
WBC # BLD AUTO: 12 X10E3/UL (ref 3.4–10.8)

## 2021-11-18 RX ORDER — FOLIC ACID 1 MG/1
1 TABLET ORAL DAILY
Qty: 90 TABLET | Refills: 3 | Status: SHIPPED | OUTPATIENT
Start: 2021-11-18 | End: 2022-09-06 | Stop reason: SDUPTHER

## 2021-11-23 LAB
BASOPHILS # BLD AUTO: 0.1 X10E3/UL (ref 0–0.2)
BASOPHILS NFR BLD AUTO: 1 %
BUN SERPL-MCNC: 18 MG/DL (ref 6–24)
BUN/CREAT SERPL: 19 (ref 9–23)
CALCIUM SERPL-MCNC: 9.5 MG/DL (ref 8.7–10.2)
CHLORIDE SERPL-SCNC: 101 MMOL/L (ref 96–106)
CO2 SERPL-SCNC: 20 MMOL/L (ref 20–29)
CREAT SERPL-MCNC: 0.95 MG/DL (ref 0.57–1)
EOSINOPHIL # BLD AUTO: 0.2 X10E3/UL (ref 0–0.4)
EOSINOPHIL NFR BLD AUTO: 1 %
ERYTHROCYTE [DISTWIDTH] IN BLOOD BY AUTOMATED COUNT: 15.9 % (ref 11.7–15.4)
GLUCOSE SERPL-MCNC: 98 MG/DL (ref 65–99)
HCT VFR BLD AUTO: 38.5 % (ref 34–46.6)
HGB BLD-MCNC: 12.7 G/DL (ref 11.1–15.9)
IMM GRANULOCYTES # BLD AUTO: 0 X10E3/UL (ref 0–0.1)
IMM GRANULOCYTES NFR BLD AUTO: 0 %
LYMPHOCYTES # BLD AUTO: 2.4 X10E3/UL (ref 0.7–3.1)
LYMPHOCYTES NFR BLD AUTO: 21 %
MCH RBC QN AUTO: 26.7 PG (ref 26.6–33)
MCHC RBC AUTO-ENTMCNC: 33 G/DL (ref 31.5–35.7)
MCV RBC AUTO: 81 FL (ref 79–97)
MONOCYTES # BLD AUTO: 0.7 X10E3/UL (ref 0.1–0.9)
MONOCYTES NFR BLD AUTO: 6 %
NEUTROPHILS # BLD AUTO: 8.2 X10E3/UL (ref 1.4–7)
NEUTROPHILS NFR BLD AUTO: 71 %
PLATELET # BLD AUTO: 393 X10E3/UL (ref 150–450)
POTASSIUM SERPL-SCNC: 4.7 MMOL/L (ref 3.5–5.2)
RBC # BLD AUTO: 4.75 X10E6/UL (ref 3.77–5.28)
SODIUM SERPL-SCNC: 137 MMOL/L (ref 134–144)
WBC # BLD AUTO: 11.5 X10E3/UL (ref 3.4–10.8)

## 2021-12-09 ENCOUNTER — TELEMEDICINE (OUTPATIENT)
Dept: FAMILY MEDICINE CLINIC | Facility: CLINIC | Age: 51
End: 2021-12-09

## 2021-12-09 DIAGNOSIS — E03.9 ACQUIRED HYPOTHYROIDISM: ICD-10-CM

## 2021-12-09 DIAGNOSIS — F41.9 ANXIETY: ICD-10-CM

## 2021-12-09 DIAGNOSIS — F33.0 MILD EPISODE OF RECURRENT MAJOR DEPRESSIVE DISORDER (HCC): ICD-10-CM

## 2021-12-09 DIAGNOSIS — E55.9 VITAMIN D DEFICIENCY: ICD-10-CM

## 2021-12-09 DIAGNOSIS — I10 PRIMARY HYPERTENSION: Primary | ICD-10-CM

## 2021-12-09 DIAGNOSIS — D72.829 LEUKOCYTOSIS, UNSPECIFIED TYPE: ICD-10-CM

## 2021-12-09 DIAGNOSIS — R12 HEARTBURN: ICD-10-CM

## 2021-12-09 DIAGNOSIS — R73.01 IMPAIRED FASTING GLUCOSE: ICD-10-CM

## 2021-12-09 PROCEDURE — 99214 OFFICE O/P EST MOD 30 MIN: CPT | Performed by: PHYSICIAN ASSISTANT

## 2021-12-09 RX ORDER — FLUOXETINE HYDROCHLORIDE 40 MG/1
80 CAPSULE ORAL DAILY
Qty: 180 CAPSULE | Refills: 3 | Status: SHIPPED | OUTPATIENT
Start: 2021-12-09 | End: 2022-03-23

## 2021-12-09 RX ORDER — AMLODIPINE AND OLMESARTAN MEDOXOMIL 10; 40 MG/1; MG/1
1 TABLET ORAL DAILY
Qty: 90 TABLET | Refills: 1 | Status: SHIPPED | OUTPATIENT
Start: 2021-12-09 | End: 2022-01-22

## 2021-12-09 RX ORDER — HYDROXYZINE HYDROCHLORIDE 25 MG/1
25 TABLET, FILM COATED ORAL 3 TIMES DAILY PRN
Qty: 60 TABLET | Refills: 5 | Status: SHIPPED | OUTPATIENT
Start: 2021-12-09 | End: 2022-05-18

## 2021-12-09 NOTE — PROGRESS NOTES
Subjective   Marisol Carrillo is a 51 y.o. female.     History of Present Illness      Since the last visit, she has overall felt anxious.  She has Essential Hypertension and well controlled on current medication, Impaired fasting glucose and will monitor labs to watch for DMII, GERD controlled on PPI Rx, Hyperlipidemia with goals met with current Rx, Hypothyroidism well controlled on current medication and will continue Rx and Vitamin D deficiency and labs are at goal >30 ng/mL.  she has been compliant with current medications have reviewed them.  The patient denies medication side effects.  Will refill medications. There were no vitals taken for this visit.    Results for orders placed or performed during the hospital encounter of 11/18/21   Urine Culture - Urine, Urine, Clean Catch    Specimen: Urine, Clean Catch   Result Value Ref Range    Urine Culture Final report     Result 1 No growth    POCT Urinalysis (manual dipstick)    Specimen: Urine   Result Value Ref Range    Color Yellow Yellow, Straw, Dark Yellow, Marisabel    Clarity, UA Clear Clear    Glucose, UA Negative Negative, 1000 mg/dL (3+) mg/dL    Bilirubin Negative Negative    Ketones, UA Negative Negative    Specific Gravity  1.015 1.005 - 1.030    Blood, UA Negative Negative    pH, Urine 5.0 5.0 - 8.0    Protein, POC Trace (A) Negative mg/dL    Urobilinogen, UA Normal Normal    Leukocytes Trace (A) Negative    Nitrite, UA Negative Negative         BP at urgent care was 129/88 on 11/18/2021--was seen in urgent care for UTI.  Urine culture showed no growth  Patient had labs for refilling her NSAID and note her white blood cell count was elevated and want her to follow-up with hematology---12-28-21----she was dehydrated at this time and wants to repeat her CBC when she is well-hydrated to see if her WBC and neutrophils are in normal range and then see hematology..  Note renal functions and potassium normal range.  Has renal stones---see urologist    Had labs  July and thyroid labs and ab neg.  A1C 5.8  LDL at goal 95     Marisol Carrillo female 51 y.o., There were no vitals taken for this visit.  who presents today for follow up of Depression, Insomnia and Anxiety.  She reports fatigue, anxiety, impaired concentration, excessive worry, unable to relax and sleep disturbance. Onset of symptoms was approximately several months ago. She denies current suicidal and homicidal ideation. Risk factors are family history of anxiety and or depression, lifestyle of multiple roles and family situation/stress.  Previous treatment includes current Rx. She complains of the following medication side effects:none. The patient declines to go to counseling..  Still taking hydroxyzine as needed for insomnia and does help  The following portions of the patient's history were reviewed and updated as appropriate: allergies, current medications, past family history, past medical history, past social history, past surgical history and problem list.    Review of Systems   Constitutional: Negative for activity change, appetite change and unexpected weight change.   HENT: Negative for nosebleeds and trouble swallowing.    Eyes: Negative for pain and visual disturbance.   Respiratory: Negative for chest tightness, shortness of breath and wheezing.    Cardiovascular: Negative for chest pain and palpitations.   Gastrointestinal: Negative for abdominal pain and blood in stool.   Endocrine: Negative.    Genitourinary: Negative for difficulty urinating and hematuria.   Musculoskeletal: Negative for joint swelling.   Skin: Negative for color change and rash.   Allergic/Immunologic: Negative.    Neurological: Negative for syncope and speech difficulty.   Hematological: Negative for adenopathy.   Psychiatric/Behavioral: Positive for dysphoric mood. Negative for agitation and confusion. The patient is nervous/anxious.    All other systems reviewed and are negative.      Objective   Physical  Exam  Constitutional:       General: She is not in acute distress.     Appearance: She is well-developed. She is not diaphoretic.   HENT:      Head: Normocephalic and atraumatic.   Eyes:      Conjunctiva/sclera: Conjunctivae normal.   Pulmonary:      Effort: Pulmonary effort is normal. No respiratory distress.   Musculoskeletal:         General: Normal range of motion.      Cervical back: Normal range of motion.   Skin:     General: Skin is dry.   Neurological:      Mental Status: She is alert and oriented to person, place, and time.      Coordination: Coordination normal.   Psychiatric:         Behavior: Behavior normal.         Thought Content: Thought content normal.         Judgment: Judgment normal.         Assessment/Plan   Diagnoses and all orders for this visit:    1. Primary hypertension (Primary)    2. Acquired hypothyroidism    3. Mild episode of recurrent major depressive disorder (HCC)    4. Anxiety    5. Vitamin D deficiency    6. Impaired fasting glucose    7. Heartburn      Plan, Marisol Carrillo, was seen today.  she was seen for HTN and continue medication, Imparied fasting glucose and plan follow up labs, diet, and exercise, Hyperlipidemia and will continue current medication, Hypothyroidism well controlled, continue medication and Vitamin D deficiency and supplemented.  Raise dose Prozac to 80mg--- if intolerant will refer to our psychiatry department  Okay to check labs and repeat the CBC hydrated in the next month  Note she had labs in July scanned in and LDL cholesterol at goal and thyroid labs at goal and A1c stable at 5.8    Al continue B12 and folic acid so continue hydroxyzine as needed for breakthrough anxiety and insomnia --works  Suggest colon cancer screen and mammo

## 2021-12-28 ENCOUNTER — TELEPHONE (OUTPATIENT)
Dept: FAMILY MEDICINE CLINIC | Facility: CLINIC | Age: 51
End: 2021-12-28

## 2021-12-28 RX ORDER — LEVOTHYROXINE SODIUM 88 UG/1
88 TABLET ORAL DAILY
Qty: 90 TABLET | Refills: 3 | Status: SHIPPED | OUTPATIENT
Start: 2021-12-28 | End: 2022-09-06 | Stop reason: SDUPTHER

## 2021-12-28 NOTE — TELEPHONE ENCOUNTER
----- Message from Marisol Carrillo sent at 12/28/2021 11:34 AM EST -----  Regarding: Generic Levothyroxine  Can you send in my Levothyroxine Generic to my [facility] pharmacy in [place] please?  They do not have record of the prescription needing to be generic due to itching.  Thank you,  Marisol Carrillo

## 2022-01-22 RX ORDER — AMLODIPINE AND OLMESARTAN MEDOXOMIL 10; 40 MG/1; MG/1
TABLET ORAL
Qty: 90 TABLET | Refills: 1 | Status: SHIPPED | OUTPATIENT
Start: 2022-01-22 | End: 2022-06-30 | Stop reason: SDUPTHER

## 2022-02-15 ENCOUNTER — TELEPHONE (OUTPATIENT)
Dept: FAMILY MEDICINE CLINIC | Facility: CLINIC | Age: 52
End: 2022-02-15

## 2022-02-15 DIAGNOSIS — F33.0 MILD EPISODE OF RECURRENT MAJOR DEPRESSIVE DISORDER: ICD-10-CM

## 2022-02-15 DIAGNOSIS — F41.9 ANXIETY: Primary | ICD-10-CM

## 2022-02-15 NOTE — TELEPHONE ENCOUNTER
----- Message from Rosa Garner MA sent at 2/15/2022  2:14 PM EST -----  Regarding: FW: Anxiety     ----- Message -----  From: Marisol Carrillo  Sent: 2/15/2022   2:10 PM EST  To: [facility] [place] [facility] Clinical Pool  Subject: Anxiety                                          Hi  I think it’s time to set up appointment with a specialist for anxiety and depression.  Getting worse as time goes on. Who do you recommend that does video appointments?  Thank you  Marisol Carrillo

## 2022-03-23 ENCOUNTER — TELEMEDICINE (OUTPATIENT)
Dept: PSYCHIATRY | Facility: CLINIC | Age: 52
End: 2022-03-23

## 2022-03-23 DIAGNOSIS — F41.1 GENERALIZED ANXIETY DISORDER: ICD-10-CM

## 2022-03-23 DIAGNOSIS — F51.01 PRIMARY INSOMNIA: ICD-10-CM

## 2022-03-23 DIAGNOSIS — F33.2 SEVERE EPISODE OF RECURRENT MAJOR DEPRESSIVE DISORDER, WITHOUT PSYCHOTIC FEATURES: Primary | ICD-10-CM

## 2022-03-23 PROCEDURE — 90792 PSYCH DIAG EVAL W/MED SRVCS: CPT | Performed by: NURSE PRACTITIONER

## 2022-03-23 RX ORDER — VENLAFAXINE HYDROCHLORIDE 37.5 MG/1
37.5 CAPSULE, EXTENDED RELEASE ORAL DAILY
Qty: 30 CAPSULE | Refills: 0 | Status: SHIPPED | OUTPATIENT
Start: 2022-03-23 | End: 2022-04-20 | Stop reason: SDUPTHER

## 2022-03-23 RX ORDER — TRAZODONE HYDROCHLORIDE 100 MG/1
50-100 TABLET ORAL NIGHTLY PRN
Qty: 30 TABLET | Refills: 0 | Status: SHIPPED | OUTPATIENT
Start: 2022-03-23 | End: 2022-04-20

## 2022-03-23 RX ORDER — FLUOXETINE HYDROCHLORIDE 20 MG/1
CAPSULE ORAL
Qty: 45 CAPSULE | Refills: 0 | Status: SHIPPED | OUTPATIENT
Start: 2022-03-23 | End: 2022-04-20

## 2022-03-23 NOTE — PROGRESS NOTES
This provider is located at [facility], [address] [place] [address].The Patient is seen remotely at home, [address] [facility] [address] via [employer]. Patient is being seen via telehealth and confirm that they are in a secure environment for this session. The patient's condition being diagnosed/treated is appropriate for telemedicine. The provider identified himself/herself: herself as well as her credentials.   The patient gave consent to be seen remotely, and when consent is given they understand that the consent allows for patient identifiable information to be sent to a third party as needed.   They may refuse to be seen remotely at any time. The electronic data is encrypted and password protected, and the patient has been advised of the potential risks to privacy not withstanding such measures.    You have chosen to receive care through a telehealth visit.  Do you consent to use a video/audio connection for your medical care today? Yes. Patient verified name,  and address.       Subjective   Marisol Carrillo is a 52 y.o. female who is here today for initial appointment.     Chief Complaint:  Depression and anxiety     HPI:  History of Present Illness  Patient presents today after being referred by her PCP Tomeka Linares PA-C for depression and anxiety.  Patient states that things started worsening for her in roughly 2016 after her back surgery.  Patient states that she started dealing with significant depression and anxiety due to not being able to work as she had to have a spinal infusion and needed 3 knee replacements over a period of time.  Patient states that also things have been difficult as the past 3 years her daughter has been dealing with Crohn's disease that she was diagnosed at age 9.  Patient also notes that she will lost a son and had a miscarriage and a D&C before her daughter was born that she never mourned the death of.  Patient states that she had  had the gastric bypass surgery in 2012 and lost 100 pounds but then after her back surgery she had gained it back which was very difficult for her.  The patient reports the following symptoms of anxiety: constant anxiety/worry, restlessness/on edge, difficulty concentrating, mind goes blank, irritability and sleep disturbance and have caused impairment in important areas of functioning. The patient reports depressive symptoms including depressed mood, crying spells, insomnia, decreased appetite, overeating, anhedonia, feelings of guilt, feelings of hopelessness, feelings of helplessness, feelings of worthlessness, low energy and difficulty concentrating, and have caused impairment in important areas of functioning.  Depression rated 8/10 with 10 being the worst.  Patient states that she has not been sleeping well as she knows she may get 3 to 4 hours at night.  She reports she has a hard time falling and staying asleep.  She states her appetite varies as her time she overeats or does not eat much.  Patient notes that she cannot seem to find bushra in things recently and with her daughter's Crohn's that has been difficult.  Patient states that she has been picking at her legs and her stomach which she had not done so previously before Prozac.  Patient denies any hypomanic or manic episodes.  Denies any OCD symptoms.  Denies any SI/HI/AH/VH.      Past Psych History: Patient notes that she was doing well may be some depressive and anxiety symptoms before 2016 but did not worsen until then when she hurt her back and was not able to work and required spinal infusion and 3 knee replacements.  Patient states that she was on sertraline for years and then it became ineffective and then recently switched to fluoxetine and was on 80 mg which was too much but now has been on 60 mg and reports no improvement.  Patient reports that she had tried Xanax in the past but weaned herself off in 2018.  She notes that she does not want to  try Ambien and gabapentin as she had bad interactions.  Reports BuSpar was not helpful.  Denies any hospitalization or self-harm.    Substance Abuse: Patient denies.  Rodney reviewed.    Past Social History: Patient was born and raised in [place] with her mother and father.  She notes she had a good childhood growing up with no issues in school.  Patient notes after graduating high school she has been a couple years in college studying computers but then started working as a  for [employer] for 12 years.  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  Patient is currently unemployed.  She is  and  recently  to her second  for 22 years now has 2 stepchildren and 1 daughter that is 11 and had a miscarriage with a boy previously before her daughter was born.  Denies any abuse legal or  history.    Family History:  family history includes Alcohol abuse in her maternal grandmother; Anxiety disorder in her brother and mother; Depression in her brother and mother; Heart disease in her maternal grandmother; Hypertension in her father and mother; Mental illness in her mother; Thyroid disease in her brother and mother.    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   • Chronic low back pain    • Depression    • Dysphagia 2/22/2016   • Epigastric pain 8/30/2017    Added automatically from request for surgery [id number]   • Fatty liver 2018   • GERD (gastroesophageal reflux disease)    • Hernia 2017   • History of infectious mononucleosis    • Hyperlipidemia    • Hypertension    • Hypothyroidism    • Impaired fasting glucose    •  Irritable bowel syndrome    • Kidney calculus     HISTORY OF   • Nausea and vomiting 3/21/2019    Added automatically from request for surgery [id number]   • Nocturnal cough 2016   • Pneumonia     2015 S/P LUMBAR FUSION   • Regurgitation 2016   • Sciatica    • Spinal headache     AFTER MYELOGRAM. BLOOD PATCH X2   • Vitamin D deficiency      Past Surgical History:   Procedure Laterality Date   • BARIATRIC SURGERY      Lapband   •  SECTION     • CHOLECYSTECTOMY N/A 2017    Procedure: CHOLECYSTECTOMY LAPAROSCOPIC;  Surgeon: Jam Ballard MD;  Location:  [facility];  Service:    • DILATION AND CURETTAGE, DIAGNOSTIC / THERAPEUTIC     • ENDOSCOPY N/A 2017    Procedure: ESOPHAGOGASTRODUODENOSCOPY WITH BIOPSY;  Surgeon: Jam Ballard MD;  [facility];  Service:    • ENDOSCOPY N/A 2020    Procedure: ESOPHAGOGASTRODUODENOSCOPY WITH BIOPSIES, SNELL DILATATION 44Fr, 46Fr;  Surgeon: Kirsten Yanez MD;  Location: [facility] ENDOSCOPY;  Service: Gastroenterology;  Laterality: N/A;  PRE:  DYSPHAGIA, ABDOMINAL PAIN, GERD  POST:  IRREGULAR Z-LINE, R/O EOE, ESOPHAGEAL FURROWS   • GASTRIC BANDING REMOVAL N/A 3/25/2019    Procedure: GASTRIC BANDING REMOVAL LAPAROSCOPIC;  Surgeon: Tu Hartman Jr., MD;  Location: [facility] OR;  Service: General   • HERNIA REPAIR      Hiatal   • JOINT REPLACEMENT Left     KNEE   • KNEE ARTHROSCOPY Left     REPAIR OF MENISUCS   • LAPAROSCOPIC GASTRIC BANDING     • LITHOTRIPSY     • LUMBAR FUSION  2015    L5-S1. WITH HARDWARE   • MICRODISCECTOMY LUMBAR     • MI TOTAL KNEE ARTHROPLASTY Left 3/29/2017    Procedure: TOTAL KNEE ARTHROPLASTY;  Surgeon: Zacarias Reeves MD;  Location: [facility] MAIN OR;  Service: Orthopedics   • TONSILLECTOMY     • TOTAL KNEE ARTHROPLASTY REVISION Left 2019    Dr. Chavez   • UPPER GASTROINTESTINAL ENDOSCOPY  2019       Allergies   Allergen Reactions   • Ciprofloxacin Hives   • Hydrocodone-Acetaminophen  Itching   • Ketamine Delirium     MADE HER A LITTLE CRAZY   • Propacetamol Delirium     UNSURE OF RX. AFTER BACK SURGERY   • Propoxyphene-Acetaminophen Itching   • Lisinopril Cough   • Tirosint [Levothyroxine] Itching       Current Medications:   Current Outpatient Medications   Medication Sig Dispense Refill   • amlodipine-olmesartan (RICK) 10-40 MG per tablet TAKE 1 TABLET BY MOUTH DAILY FOR BLOOD PRESSURE 90 tablet 1   • aspirin 81 MG EC tablet Take 81 mg by mouth Daily.     • atorvastatin (LIPITOR) 40 MG tablet Take 1 tablet by mouth Every Night. For cholesterol 90 tablet 3   • carvedilol (Coreg) 12.5 MG tablet Take 1 tablet by mouth 2 (Two) Times a Day With Meals. For  tablet 3   • cholecalciferol (VITAMIN D3) 1000 units tablet Take 1,000 Units by mouth Daily.     • folic acid (FOLVITE) 1 MG tablet Take 1 tablet by mouth Daily. 90 tablet 3   • hydrOXYzine (ATARAX) 25 MG tablet Take 1 tablet by mouth 3 (Three) Times a Day As Needed for Anxiety. 60 tablet 5   • levothyroxine (Synthroid) 88 MCG tablet Take 1 tablet by mouth Daily. For thyroid; give generic 90 tablet 3   • nystatin (MYCOSTATIN) 676492 UNIT/GM powder Apply  topically to the appropriate area as directed 4 (Four) Times a Day. PRN yeast rash 60 g 11   • omeprazole (priLOSEC) 40 MG capsule 1 PO BID For GERD 180 capsule 3   • vitamin B-12 (CYANOCOBALAMIN) 1000 MCG tablet Take 1,000 mcg by mouth Daily.     • FLUoxetine (PROzac) 20 MG capsule Take 2 capsules for 1 week and then 1 capsule for 2 weeks and then stop. 45 capsule 0   • traZODone (DESYREL) 100 MG tablet Take 0.5-1 tablets by mouth At Night As Needed for Sleep. 30 tablet 0   • venlafaxine XR (Effexor XR) 37.5 MG 24 hr capsule Take 1 capsule by mouth Daily. 30 capsule 0     No current facility-administered medications for this visit.       Review of Systems   Psychiatric/Behavioral: Positive for agitation, dysphoric mood and sleep disturbance. The patient is nervous/anxious.    All other  systems reviewed and are negative.      Review of Systems - General ROS: negative for - chills, fever or malaise  Ophthalmic ROS: negative for - loss of vision  ENT ROS: negative for - hearing change  Allergy and Immunology ROS: negative for - hives  Hematological and Lymphatic ROS: negative for - bleeding problems  Endocrine ROS: negative for - skin changes  Respiratory ROS: no cough, shortness of breath, or wheezing  Cardiovascular ROS: no chest pain or dyspnea on exertion  Gastrointestinal ROS: no abdominal pain, change in bowel habits, or black or bloody stools  Genito-Urinary ROS: no dysuria, trouble voiding, or hematuria  Musculoskeletal ROS: negative for - gait disturbance  Neurological ROS: no TIA or stroke symptoms  Dermatological ROS: negative for rash    Objective   Physical Exam  Nursing note reviewed.   Constitutional:       Appearance: Normal appearance.   Neurological:      Mental Status: She is alert.   Psychiatric:         Attention and Perception: Attention and perception normal.         Mood and Affect: Mood is anxious and depressed. Affect is flat.         Speech: Speech normal.         Behavior: Behavior normal. Behavior is cooperative.         Thought Content: Thought content normal.         Cognition and Memory: Cognition and memory normal.       not currently breastfeeding. Due to the remote nature of this encounter (virtual encounter), vitals were unable to be obtained.  Height stated at 64 inches.  Weight stated at 294 pounds.        Result Review :     The following data was reviewed by: MARV Rico on 03/23/2022:  Common labs    Common Labsle 11/17/21 11/17/21 11/22/21 11/22/21    1532 1532 1543 1543   Glucose  101 (A)  98   BUN  19  18   Creatinine  1.16 (A)  0.95   eGFR Non African Am  55 (A)  70   eGFR African Am  63  81   Sodium  140  137   Potassium  5.5 (A)  4.7   Chloride  101  101   Calcium  9.7  9.5   WBC 12.0 (A)  11.5 (A)    Hemoglobin 13.0  12.7    Hematocrit 42.2   38.5    Platelets 420  393    (A) Abnormal value       Comments are available for some flowsheets but are not being displayed.           CMP    CMP 11/17/21 11/22/21   Glucose 101 (A) 98   BUN 19 18   Creatinine 1.16 (A) 0.95   eGFR Non  Am 55 (A) 70   eGFR African Am 63 81   Sodium 140 137   Potassium 5.5 (A) 4.7   Chloride 101 101   Calcium 9.7 9.5   (A) Abnormal value       Comments are available for some flowsheets but are not being displayed.           CBC    CBC 11/17/21 11/22/21   WBC 12.0 (A) 11.5 (A)   RBC 5.05 4.75   Hemoglobin 13.0 12.7   Hematocrit 42.2 38.5   MCV 84 81   MCH 25.7 (A) 26.7   MCHC 30.8 (A) 33.0   RDW 15.7 (A) 15.9 (A)   Platelets 420 393   (A) Abnormal value            CBC w/diff    CBC w/Diff 11/17/21 11/22/21   WBC 12.0 (A) 11.5 (A)   RBC 5.05 4.75   Hemoglobin 13.0 12.7   Hematocrit 42.2 38.5   MCV 84 81   MCH 25.7 (A) 26.7   MCHC 30.8 (A) 33.0   RDW 15.7 (A) 15.9 (A)   Platelets 420 393   Neutrophil Rel % 69 71   Lymphocyte Rel % 23 21   Monocyte Rel % 6 6   Eosinophil Rel % 1 1   Basophil Rel % 1 1   (A) Abnormal value                    Electrolytes    Electrolytes 11/17/21 11/22/21   Sodium 140 137   Potassium 5.5 (A) 4.7   Chloride 101 101   Calcium 9.7 9.5   (A) Abnormal value                UA    Urinalysis 11/18/21   Ketones, UA Negative   Leukocytes, UA Trace (A)   (A) Abnormal value            Data reviewed: PCP notes     Mental Status Exam:   Hygiene:   good  Cooperation:  Cooperative  Eye Contact:  Fair  Psychomotor Behavior:  Appropriate  Affect:  Appropriate  Hopelessness: 6  Speech:  Normal  Thought Process:  Goal directed  Thought Content:  Normal  Suicidal:  None  Homicidal:  None  Hallucinations:  None  Delusion:  None  Memory:  Intact  Orientation:  Person, Place, Time and Situation  Reliability:  good  Insight:  Good and Fair  Judgement:  Good and Fair  Impulse Control:  Good and Fair  Physical/Medical Issues:  Yes see problem list    PHQ-9 Score:   PHQ-9  Total Score:       Patient screened positive for depression based on a PHQ-9 score of  on . Follow-up recommendations include: see notes and medication changes.    PHQ-9 Depression Screening  Little interest or pleasure in doing things? (P) 3   Feeling down, depressed, or hopeless? (P) 3   Trouble falling or staying asleep, or sleeping too much? (P) 3   Feeling tired or having little energy? (P) 3   Poor appetite or overeating? (P) 3   Feeling bad about yourself - or that you are a failure or have let yourself or your family down? (P) 3   Trouble concentrating on things, such as reading the newspaper or watching television? (P) 3   Moving or speaking so slowly that other people could have noticed? Or the opposite - being so fidgety or restless that you have been moving around a lot more than usual? (P) 3   Thoughts that you would be better off dead, or of hurting yourself in some way? (P) 0   PHQ-9 Total Score (P) 24   If you checked off any problems, how difficult have these problems made it for you to do your work, take care of things at home, or get along with other people? (P) Extremely dIfficult     PHQ-9 Total Score: (P) 24      Feeling nervous, anxious or on edge: (P) Nearly every day  Not being able to stop or control worrying: (P) Nearly every day  Worrying too much about different things: (P) Nearly every day  Trouble Relaxing: (P) Nearly every day  Being so restless that it is hard to sit still: (P) Nearly every day  Feeling afraid as if something awful might happen: (P) Nearly every day  Becoming easily annoyed or irritable: (P) Nearly every day  LISA 7 Total Score: (P) 21  If you checked any problems, how difficult have these problems made it for you to do your work, take care of things at home, or get along with other people: (P) Extremely difficult      PROMIS scale screening tool that patient filled out virtually reviewed by this APRN at today's encounter.      Assessment/Plan   Diagnoses and all  orders for this visit:    1. Severe episode of recurrent major depressive disorder, without psychotic features (HCC) (Primary)  -     venlafaxine XR (Effexor XR) 37.5 MG 24 hr capsule; Take 1 capsule by mouth Daily.  Dispense: 30 capsule; Refill: 0  -     Ambulatory Referral to Behavioral Health    2. Generalized anxiety disorder  -     venlafaxine XR (Effexor XR) 37.5 MG 24 hr capsule; Take 1 capsule by mouth Daily.  Dispense: 30 capsule; Refill: 0    3. Primary insomnia  -     traZODone (DESYREL) 100 MG tablet; Take 0.5-1 tablets by mouth At Night As Needed for Sleep.  Dispense: 30 tablet; Refill: 0    Other orders  -     FLUoxetine (PROzac) 20 MG capsule; Take 2 capsules for 1 week and then 1 capsule for 2 weeks and then stop.  Dispense: 45 capsule; Refill: 0        TREATMENT PLAN/GOALS: Continue supportive psychotherapy efforts and medications as indicated. Treatment and medication options discussed during today's visit. Patient ackowledged and verbally consented to continue with current treatment plan and was educated on the importance of compliance with treatment and follow-up appointments.    MEDICATION ISSUES:  We discussed risks, benefits, and side effects of the above medications and the patient was agreeable with the plan. Patient was educated on the importance of compliance with treatment and follow-up appointments.  Patient is agreeable to call the office with any worsening of symptoms or onset of side effects. Patient is agreeable to call 911 or go to the nearest ER should he/she begin having SI/HI.      -Encourage patient that we would taper off Prozac and to take 40 mg for 1 week then 20 mg for 2 weeks and discontinue.  -Begin venlafaxine 37.5 mg XL now for depression and anxiety.  Highly encourage patient if she had any worsening symptoms or side effects or concerns to contact the clinic for sooner appointment she verbalized understanding.  -Begin trazodone 50 to 100 mg at night as needed for  sleep.  -Referral was sent to schedule for psychotherapy.  Patient aware that it will be till Monday as she stated that was fine.      Counseled patient regarding multimodal approach with healthy nutrition, healthy sleep, regular physical activity, social activities, counseling, and medications.      Coping skills reviewed and encouraged positive framing of thoughts     Assisted patient in processing above session content; acknowledged and normalized patient’s thoughts, feelings, and concerns.  Applied  positive coping skills and behavior management in session.  Allowed patient to freely discuss issues without interruption or judgment. Provided safe, confidential environment to facilitate the development of positive therapeutic relationship and encourage open, honest communication. Assisted patient in identifying risk factors which would indicate the need for higher level of care including thoughts to harm self or others and/or self-harming behavior and encouraged patient to contact this office, call 911, or present to the nearest emergency room should any of these events occur. Discussed crisis intervention services and means to access.       We discussed risks, benefits, and side effects of the above medication and the patient was agreeable with the plan.     Return in about 4 weeks (around 4/20/2022), or if symptoms worsen or fail to improve, for Recheck.         MEDS ORDERED DURING VISIT:  New Medications Ordered This Visit   Medications   • venlafaxine XR (Effexor XR) 37.5 MG 24 hr capsule     Sig: Take 1 capsule by mouth Daily.     Dispense:  30 capsule     Refill:  0   • FLUoxetine (PROzac) 20 MG capsule     Sig: Take 2 capsules for 1 week and then 1 capsule for 2 weeks and then stop.     Dispense:  45 capsule     Refill:  0   • traZODone (DESYREL) 100 MG tablet     Sig: Take 0.5-1 tablets by mouth At Night As Needed for Sleep.     Dispense:  30 tablet     Refill:  0           Follow Up   Return in about 4  weeks (around 4/20/2022), or if symptoms worsen or fail to improve, for Recheck.    Patient was given instructions and counseling regarding her condition or for health maintenance advice. Please see specific information pulled into the AVS if appropriate.     This document has been electronically signed by MARV Rico  March 23, 2022 14:00 EDT    Part of this note may be an electronic transcription/translation of spoken language to printed text using the Dragon Dictation System.

## 2022-03-25 ENCOUNTER — TELEMEDICINE (OUTPATIENT)
Dept: PSYCHIATRY | Facility: CLINIC | Age: 52
End: 2022-03-25

## 2022-03-25 DIAGNOSIS — F33.2 SEVERE EPISODE OF RECURRENT MAJOR DEPRESSIVE DISORDER, WITHOUT PSYCHOTIC FEATURES: Primary | ICD-10-CM

## 2022-03-25 PROCEDURE — 90791 PSYCH DIAGNOSTIC EVALUATION: CPT | Performed by: SOCIAL WORKER

## 2022-04-14 NOTE — PROGRESS NOTES
Subjective   Patient ID: Marisol Carrillo is a 52 y.o. female is here today for follow-up of postlaminectomy syndrome.   Pt last seen on 7/21/21 and reported back pain that radiates down lateral sides of bilateral legs and top of feet.  Pt is interested in a SCS.     Today, Ms. Carrillo reports constant back pain that radiates into bilateral legs with intermittent tingling in bilateral feet. She denies weakness and numbness.     Is very nice lady was fused at L5-S1 by me about 8 years ago.  I had referred her to Dr. Chavez who we did her left knee about 3 years ago and it has improved.  She still has a very bad right knee which will need to be replaced at some point but she is not quite ready for it.  Her back and bilateral leg pain is fairly stable.  She does have postlaminectomy syndrome.  Last time we looked in 2020 she just had a central disc protrusion at L4-L5 and we have talked about doing spinal cord stimulation possibly with Dr. Askew when she returns to [facility].  But she wants to keep things the same for now.  We will see her in 9 months.  I did encourage her to try and get the right knee done first before we consider stimulation since the stimulation will help the right knee pain.  If she wants to consider the technique, we will need to get a new MRI and have her discuss it with me but were not at that point yet.  She does not take any narcotics.  I encouraged her to use her recumbent bike.  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          Back Pain  The problem occurs constantly. The problem has been gradually worsening since onset. The pain is present in the lumbar spine. The quality of the pain is described as aching, stabbing, shooting and cramping. The pain radiates to the right thigh, right foot, right knee, left thigh, left knee and left foot. The symptoms are aggravated by position. Associated symptoms include tingling. Pertinent negatives include no chest pain, fever, numbness or  weakness.       The following portions of the patient's history were reviewed and updated as appropriate: allergies, current medications, past family history, past medical history, past social history, past surgical history and problem list.    Review of Systems   Constitutional: Positive for activity change. Negative for fever.   Respiratory: Negative for chest tightness and shortness of breath.    Cardiovascular: Negative for chest pain.   Musculoskeletal: Positive for back pain.   Neurological: Positive for tingling. Negative for weakness and numbness.   Psychiatric/Behavioral: Positive for sleep disturbance.   All other systems reviewed and are negative.      Objective   Physical Exam  Constitutional:       Appearance: She is well-developed.   HENT:      Head: Normocephalic and atraumatic.   Eyes:      Extraocular Movements: EOM normal.      Conjunctiva/sclera: Conjunctivae normal.      Pupils: Pupils are equal, round, and reactive to light.   Neck:      Vascular: No carotid bruit.   Neurological:      Mental Status: She is oriented to person, place, and time.      Coordination: Finger-Nose-Finger Test and Heel to Shin Test normal.      Gait: Gait is intact.      Deep Tendon Reflexes:      Reflex Scores:       Tricep reflexes are 2+ on the right side and 2+ on the left side.       Bicep reflexes are 2+ on the right side and 2+ on the left side.       Brachioradialis reflexes are 2+ on the right side and 2+ on the left side.       Patellar reflexes are 2+ on the right side and 2+ on the left side.       Achilles reflexes are 2+ on the right side and 2+ on the left side.  Psychiatric:         Speech: Speech normal.       Neurologic Exam     Mental Status   Oriented to person, place, and time.   Registration of memory: Good recent and remote memory.   Attention: normal. Concentration: normal.   Speech: speech is normal   Level of consciousness: alert  Knowledge: consistent with education.     Cranial Nerves     CN  II   Visual fields full to confrontation.   Visual acuity: normal    CN III, IV, VI   Pupils are equal, round, and reactive to light.  Extraocular motions are normal.     CN V   Facial sensation intact.   Right corneal reflex: normal  Left corneal reflex: normal    CN VII   Facial expression full, symmetric.   Right facial weakness: none  Left facial weakness: none    CN VIII   Hearing: intact    CN IX, X   Palate: symmetric    CN XI   Right sternocleidomastoid strength: normal  Left sternocleidomastoid strength: normal    CN XII   Tongue: not atrophic  Tongue deviation: none    Motor Exam   Muscle bulk: normal  Right arm tone: normal  Left arm tone: normal  Right leg tone: normal  Left leg tone: normal    Strength   Strength 5/5 except as noted.   Right neck flexion: 4/5  Left neck flexion: 4/5  Right neck extension: 4/5  Left neck extension: 4/5  Right deltoid: 4/5  Left deltoid: 4/5  Right biceps: 4/5  Left biceps: 4/5  Right triceps: 4/5  Left triceps: 4/5  Right wrist flexion: 4/5  Left wrist flexion: 4/5  Right wrist extension: 4/5  Left wrist extension: 4/5  Right interossei: 4/5  Left interossei: 4/5  Right abdominals: 4/5  Left abdominals: 4/5  Right iliopsoas: 4/5  Left iliopsoas: 4/5  Right quadriceps: 4/5  Left quadriceps: 4/5  Right hamstrin/5  Left hamstrin/5  Right glutei: 4/5  Left glutei: 4/5  Right anterior tibial: 4/5  Left anterior tibial: 4/5  Right posterior tibial: 4/5  Left posterior tibial: 4/5  Right peroneal: 4/5  Left peroneal: 4/5  Right gastroc: 4/5  Left gastroc: 4/5    Sensory Exam   Light touch normal.     Gait, Coordination, and Reflexes     Gait  Gait: normal    Coordination   Finger to nose coordination: normal  Heel to shin coordination: normal    Reflexes   Right brachioradialis: 2+  Left brachioradialis: 2+  Right biceps: 2+  Left biceps: 2+  Right triceps: 2+  Left triceps: 2+  Right patellar: 2+  Left patellar: 2+  Right achilles: 2+  Left achilles: 2+  Right :  2+  Left : 2+      Assessment/Plan   Independent Review of Radiographic Studies:      I reviewed her lumbar MRI done on 6/29/2020 which shows a fused segment of L5-S1.  She still has a mild amount of central disc protrusion at L4-L5 with mild lateral recess stenosis but I do not think it is any more progressed or different than the scan in 2018.  Agree with the report.    Medical Decision Making:      We will keep things as is for now.  She will consider getting her right knee done before we consider spinal cord stimulation.  If she wants to consider SCS, she will let us know and we will need to get a new MRI and talk to her about the findings before proceeding with a trial.  In any event she will let us know if she like to go that route.  We will see her in 9 months.      Diagnoses and all orders for this visit:    1. Postlaminectomy syndrome, lumbar region (Primary)    2. History of lumbar fusion    3. DDD (degenerative disc disease), lumbar      Return in about 9 months (around 1/25/2023) for face to face.

## 2022-04-20 ENCOUNTER — TELEMEDICINE (OUTPATIENT)
Dept: PSYCHIATRY | Facility: CLINIC | Age: 52
End: 2022-04-20

## 2022-04-20 DIAGNOSIS — F51.01 PRIMARY INSOMNIA: ICD-10-CM

## 2022-04-20 DIAGNOSIS — F41.1 GENERALIZED ANXIETY DISORDER: ICD-10-CM

## 2022-04-20 DIAGNOSIS — F33.2 SEVERE EPISODE OF RECURRENT MAJOR DEPRESSIVE DISORDER, WITHOUT PSYCHOTIC FEATURES: Primary | ICD-10-CM

## 2022-04-20 PROCEDURE — 99214 OFFICE O/P EST MOD 30 MIN: CPT | Performed by: NURSE PRACTITIONER

## 2022-04-20 RX ORDER — MIRTAZAPINE 7.5 MG/1
7.5 TABLET, FILM COATED ORAL NIGHTLY
Qty: 30 TABLET | Refills: 0 | Status: SHIPPED | OUTPATIENT
Start: 2022-04-20 | End: 2022-05-18

## 2022-04-20 RX ORDER — VENLAFAXINE HYDROCHLORIDE 75 MG/1
75 CAPSULE, EXTENDED RELEASE ORAL DAILY
Qty: 30 CAPSULE | Refills: 0 | Status: SHIPPED | OUTPATIENT
Start: 2022-04-20 | End: 2022-05-18 | Stop reason: SDUPTHER

## 2022-04-20 NOTE — PROGRESS NOTES
"This provider is located at [facility], [address] [place] [address].The Patient is seen remotely at home, [address] [place] [address] via [employer].  Patient is being seen via telehealth and confirm that they are in a secure environment for this session. The patient's condition being diagnosed/treated is appropriate for telemedicine. The provider identified himself/herself: herself as well as her credentials.   The patient gave consent to be seen remotely, and when consent is given they understand that the consent allows for patient identifiable information to be sent to a third party as needed.   They may refuse to be seen remotely at any time. The electronic data is encrypted and password protected, and the patient has been advised of the potential risks to privacy not withstanding such measures.    You have chosen to receive care through a telehealth visit.  Do you consent to use a video/audio connection for your medical care today? Yes. Patient verified Name, , and address.       Chief Complaint  Depression and anxiety     Subjective          Marisol Carrillo presents to [facility] for medication management.     History of Present Illness  Patient presents today reporting that things have been difficult coming off the Prozac as she has been off of it roughly close to a week now.  Patient states physically she has not felt as \"jittery\" which has been an improvement.  Patient however notes coming off the Prozac and being on the trazodone for roughly 10 days made her feel as if she could not move and was unbalanced when walking.  Patient states she stopped the trazodone after 10 days and that went away as she was too tired and weak.  Always encouraged her to reach out to the clinic if she had those side effects or concerns.  Patient states however she is not sleeping and has a hard time sleeping due to racing thoughts.  She " states that she is constantly thinking about her parents passing and what if and not living in the moment.  Patient reports that she is constantly up and down and her anxiety has affected her sleep.  Patient reports that she gets anxious and nervous which is extreme for her and is impairing her daily functioning with racing thoughts.  She states that she lacks motivation as she worries about things she has to get done but has not got up to do them.  Patient notes that she still feels depressed and hopeless and helpless with a lack of focus and concentration issues.  Reports appetite is the same.  Denied any side effects to the Effexor that she is aware of.  Denies any SI/HI/AH/VH.  See PHQ-9 and LISA-7.        Objective   Vital Signs:   There were no vitals taken for this visit.  Due to the remote nature of this encounter (virtual encounter), vitals were unable to be obtained.  Height stated at 64 inches.  Weight stated at 295 pounds.        PHQ-9 Score:   PHQ-9 Total Score: (P) 21     Patient screened positive for depression based on a PHQ-9 score of  on . Follow-up recommendations include: see notes and medication list.    PHQ-9 Depression Screening  Little interest or pleasure in doing things? (P) 3   Feeling down, depressed, or hopeless? (P) 2   Trouble falling or staying asleep, or sleeping too much? (P) 3   Feeling tired or having little energy? (P) 3   Poor appetite or overeating? (P) 3   Feeling bad about yourself - or that you are a failure or have let yourself or your family down? (P) 2   Trouble concentrating on things, such as reading the newspaper or watching television? (P) 3   Moving or speaking so slowly that other people could have noticed? Or the opposite - being so fidgety or restless that you have been moving around a lot more than usual? (P) 2   Thoughts that you would be better off dead, or of hurting yourself in some way? (P) 0   PHQ-9 Total Score (P) 21   If you checked off any problems, how  difficult have these problems made it for you to do your work, take care of things at home, or get along with other people? (P) Very difficult     PHQ-9 Total Score: (P) 21      Feeling nervous, anxious or on edge: (P) Nearly every day  Not being able to stop or control worrying: (P) Nearly every day  Worrying too much about different things: (P) Nearly every day  Trouble Relaxing: (P) Nearly every day  Being so restless that it is hard to sit still: (P) More than half the days  Feeling afraid as if something awful might happen: (P) Nearly every day  Becoming easily annoyed or irritable: (P) Nearly every day  LISA 7 Total Score: (P) 20  If you checked any problems, how difficult have these problems made it for you to do your work, take care of things at home, or get along with other people: (P) Very difficult      PROMIS scale screening tool that patient filled out virtually reviewed by this APRN at today's encounter.      Mental Status Exam:   Hygiene:   good  Cooperation:  Cooperative  Eye Contact:  Good  Psychomotor Behavior:  Appropriate  Affect:  Appropriate  Mood: depressed and anxious  Speech:  Normal  Thought Process:  Goal directed  Thought Content:  Normal  Suicidal:  None  Homicidal:  None  Hallucinations:  None  Delusion:  None  Memory:  Intact  Orientation:  Person, Place, Time and Situation  Reliability:  good  Insight:  Good and Fair  Judgement:  Good and Fair  Impulse Control:  Good and Fair  Physical/Medical Issues:  Yes see problem list     Current Medications:   Current Outpatient Medications   Medication Sig Dispense Refill   • venlafaxine XR (Effexor XR) 75 MG 24 hr capsule Take 1 capsule by mouth Daily. 30 capsule 0   • amlodipine-olmesartan (RICK) 10-40 MG per tablet TAKE 1 TABLET BY MOUTH DAILY FOR BLOOD PRESSURE 90 tablet 1   • aspirin 81 MG EC tablet Take 81 mg by mouth Daily.     • atorvastatin (LIPITOR) 40 MG tablet Take 1 tablet by mouth Every Night. For cholesterol 90 tablet 3   •  carvedilol (Coreg) 12.5 MG tablet Take 1 tablet by mouth 2 (Two) Times a Day With Meals. For  tablet 3   • cholecalciferol (VITAMIN D3) 1000 units tablet Take 1,000 Units by mouth Daily.     • folic acid (FOLVITE) 1 MG tablet Take 1 tablet by mouth Daily. 90 tablet 3   • hydrOXYzine (ATARAX) 25 MG tablet Take 1 tablet by mouth 3 (Three) Times a Day As Needed for Anxiety. 60 tablet 5   • levothyroxine (Synthroid) 88 MCG tablet Take 1 tablet by mouth Daily. For thyroid; give generic 90 tablet 3   • mirtazapine (REMERON) 7.5 MG tablet Take 1 tablet by mouth Every Night. 30 tablet 0   • nystatin (MYCOSTATIN) 544739 UNIT/GM powder Apply  topically to the appropriate area as directed 4 (Four) Times a Day. PRN yeast rash 60 g 11   • omeprazole (priLOSEC) 40 MG capsule 1 PO BID For GERD 180 capsule 3   • vitamin B-12 (CYANOCOBALAMIN) 1000 MCG tablet Take 1,000 mcg by mouth Daily.       No current facility-administered medications for this visit.       Physical Exam  Constitutional:       Appearance: Normal appearance.   Neurological:      Mental Status: She is alert.   Psychiatric:         Attention and Perception: Attention and perception normal.         Mood and Affect: Mood is anxious and depressed. Affect is tearful.         Speech: Speech normal.         Behavior: Behavior is agitated. Behavior is cooperative.         Thought Content: Thought content normal.         Cognition and Memory: Cognition and memory normal.        Result Review :     The following data was reviewed by: MARV Rico on 04/20/2022:  Common labs    Common Labsle 11/17/21 11/17/21 11/22/21 11/22/21    1532 1532 1543 1543   Glucose  101 (A)  98   BUN  19  18   Creatinine  1.16 (A)  0.95   eGFR Non African Am  55 (A)  70   eGFR African Am  63  81   Sodium  140  137   Potassium  5.5 (A)  4.7   Chloride  101  101   Calcium  9.7  9.5   WBC 12.0 (A)  11.5 (A)    Hemoglobin 13.0  12.7    Hematocrit 42.2  38.5    Platelets 420  393    (A)  Abnormal value       Comments are available for some flowsheets but are not being displayed.           CMP    CMP 11/17/21 11/22/21   Glucose 101 (A) 98   BUN 19 18   Creatinine 1.16 (A) 0.95   eGFR Non  Am 55 (A) 70   eGFR African Am 63 81   Sodium 140 137   Potassium 5.5 (A) 4.7   Chloride 101 101   Calcium 9.7 9.5   (A) Abnormal value       Comments are available for some flowsheets but are not being displayed.           CBC    CBC 11/17/21 11/22/21   WBC 12.0 (A) 11.5 (A)   RBC 5.05 4.75   Hemoglobin 13.0 12.7   Hematocrit 42.2 38.5   MCV 84 81   MCH 25.7 (A) 26.7   MCHC 30.8 (A) 33.0   RDW 15.7 (A) 15.9 (A)   Platelets 420 393   (A) Abnormal value            CBC w/diff    CBC w/Diff 11/17/21 11/22/21   WBC 12.0 (A) 11.5 (A)   RBC 5.05 4.75   Hemoglobin 13.0 12.7   Hematocrit 42.2 38.5   MCV 84 81   MCH 25.7 (A) 26.7   MCHC 30.8 (A) 33.0   RDW 15.7 (A) 15.9 (A)   Platelets 420 393   Neutrophil Rel % 69 71   Lymphocyte Rel % 23 21   Monocyte Rel % 6 6   Eosinophil Rel % 1 1   Basophil Rel % 1 1   (A) Abnormal value                    Electrolytes    Electrolytes 11/17/21 11/22/21   Sodium 140 137   Potassium 5.5 (A) 4.7   Chloride 101 101   Calcium 9.7 9.5   (A) Abnormal value            Renal Profile    Renal Profile 11/17/21 11/22/21   BUN 19 18   Creatinine 1.16 (A) 0.95   eGFR Non  Am 55 (A) 70   eGFR  Am 63 81   (A) Abnormal value       Comments are available for some flowsheets but are not being displayed.               UA    Urinalysis 11/18/21   Ketones, UA Negative   Leukocytes, UA Trace (A)   (A) Abnormal value            Data reviewed: PCP notes        Assessment and Plan    Problem List Items Addressed This Visit        Mental Health    Depression - Primary    Relevant Medications    venlafaxine XR (Effexor XR) 75 MG 24 hr capsule    mirtazapine (REMERON) 7.5 MG tablet      Other Visit Diagnoses     Generalized anxiety disorder        Relevant Medications    venlafaxine XR  (Effexor XR) 75 MG 24 hr capsule    mirtazapine (REMERON) 7.5 MG tablet    Primary insomnia        Relevant Medications    mirtazapine (REMERON) 7.5 MG tablet            TREATMENT PLAN/GOALS: Continue supportive psychotherapy efforts and medications as indicated. Treatment and medication options discussed during today's visit. Patient ackowledged and verbally consented to continue with current treatment plan and was educated on the importance of compliance with treatment and follow-up appointments.    MEDICATION ISSUES:  We discussed risks, benefits, and side effects of the above medications and the patient was agreeable with the plan. Patient was educated on the importance of compliance with treatment and follow-up appointments.  Patient is agreeable to call the office with any worsening of symptoms or onset of side effects. Patient is agreeable to call 911 or go to the nearest ER should he/she begin having SI/HI.      -Increase venlafaxine to 75 mg XR daily for depression and anxiety.  Highly encouraged the patient if she had any side effects or worsening symptoms to contact clinic for sooner appointment she verbalized understanding.  -Patient already discontinued trazodone highly encouraged her if she had any side effects that worsened her symptoms or physically worsened her health to discontinue and contact the clinic she verbalized understanding.  -Begin mirtazapine 7.5 mg at night for sleep.  Highly encouraged patient she had any side effects or concerns or worsening symptoms to contact the clinic she verbalized understanding.  -Continue psychotherapy.      Counseled patient regarding multimodal approach with healthy nutrition, healthy sleep, regular physical activity, social activities, counseling, and medications.      Coping skills reviewed and encouraged positive framing of thoughts     Assisted patient in processing above session content; acknowledged and normalized patient’s thoughts, feelings, and  concerns.  Applied  positive coping skills and behavior management in session.  Allowed patient to freely discuss issues without interruption or judgment. Provided safe, confidential environment to facilitate the development of positive therapeutic relationship and encourage open, honest communication. Assisted patient in identifying risk factors which would indicate the need for higher level of care including thoughts to harm self or others and/or self-harming behavior and encouraged patient to contact this office, call 911, or present to the nearest emergency room should any of these events occur. Discussed crisis intervention services and means to access.     MEDS ORDERED DURING VISIT:  New Medications Ordered This Visit   Medications   • venlafaxine XR (Effexor XR) 75 MG 24 hr capsule     Sig: Take 1 capsule by mouth Daily.     Dispense:  30 capsule     Refill:  0   • mirtazapine (REMERON) 7.5 MG tablet     Sig: Take 1 tablet by mouth Every Night.     Dispense:  30 tablet     Refill:  0           Follow Up   Return in about 4 weeks (around 5/18/2022), or if symptoms worsen or fail to improve, for Recheck.    Patient was given instructions and counseling regarding her condition or for health maintenance advice. Please see specific information pulled into the AVS if appropriate.     Some of the data in this electronic note has been brought forward from a previous encounter, any necessary changes have been made, it has been reviewed by this APRN, and it is accurate.      This document has been electronically signed by MARV Rioc  April 20, 2022 12:52 EDT    Part of this note may be an electronic transcription/translation of spoken language to printed text using the Dragon Dictation System.

## 2022-04-25 ENCOUNTER — OFFICE VISIT (OUTPATIENT)
Dept: NEUROSURGERY | Facility: CLINIC | Age: 52
End: 2022-04-25

## 2022-04-25 VITALS
WEIGHT: 293 LBS | HEIGHT: 64 IN | BODY MASS INDEX: 50.02 KG/M2 | TEMPERATURE: 96.8 F | SYSTOLIC BLOOD PRESSURE: 132 MMHG | DIASTOLIC BLOOD PRESSURE: 74 MMHG | HEART RATE: 80 BPM | OXYGEN SATURATION: 99 %

## 2022-04-25 DIAGNOSIS — Z98.1 HISTORY OF LUMBAR FUSION: ICD-10-CM

## 2022-04-25 DIAGNOSIS — M96.1 POSTLAMINECTOMY SYNDROME, LUMBAR REGION: Primary | ICD-10-CM

## 2022-04-25 DIAGNOSIS — M51.36 DDD (DEGENERATIVE DISC DISEASE), LUMBAR: ICD-10-CM

## 2022-04-25 PROCEDURE — 99213 OFFICE O/P EST LOW 20 MIN: CPT | Performed by: NEUROLOGICAL SURGERY

## 2022-04-25 RX ORDER — FLUOXETINE HYDROCHLORIDE 20 MG/1
60 CAPSULE ORAL DAILY
Qty: 270 CAPSULE | Refills: 1 | OUTPATIENT
Start: 2022-04-25

## 2022-05-02 ENCOUNTER — TELEPHONE (OUTPATIENT)
Dept: PSYCHIATRY | Facility: CLINIC | Age: 52
End: 2022-05-02

## 2022-05-02 RX ORDER — QUETIAPINE FUMARATE 25 MG/1
25-50 TABLET, FILM COATED ORAL NIGHTLY PRN
Qty: 60 TABLET | Refills: 0 | Status: SHIPPED | OUTPATIENT
Start: 2022-05-02 | End: 2022-05-18

## 2022-05-02 NOTE — TELEPHONE ENCOUNTER
Patient states she has doubled the dose for the last 4 days and it still isn't helping.  Patient states if provider thinks she should try Seroquel she is ok with giving it a try.

## 2022-05-18 ENCOUNTER — TELEMEDICINE (OUTPATIENT)
Dept: PSYCHIATRY | Facility: CLINIC | Age: 52
End: 2022-05-18

## 2022-05-18 DIAGNOSIS — F41.1 GENERALIZED ANXIETY DISORDER: ICD-10-CM

## 2022-05-18 DIAGNOSIS — F51.01 PRIMARY INSOMNIA: ICD-10-CM

## 2022-05-18 DIAGNOSIS — F42.4 EXCORIATION (SKIN-PICKING) DISORDER: ICD-10-CM

## 2022-05-18 DIAGNOSIS — F33.2 SEVERE EPISODE OF RECURRENT MAJOR DEPRESSIVE DISORDER, WITHOUT PSYCHOTIC FEATURES: Primary | ICD-10-CM

## 2022-05-18 PROCEDURE — 99214 OFFICE O/P EST MOD 30 MIN: CPT | Performed by: NURSE PRACTITIONER

## 2022-05-18 RX ORDER — CALCIUM CARBONATE/VITAMIN D3 600MG-62.5
300-600 CAPSULE ORAL NIGHTLY
Qty: 30 TABLET | Refills: 0 | Status: SHIPPED | OUTPATIENT
Start: 2022-05-18 | End: 2022-06-14

## 2022-05-18 RX ORDER — VENLAFAXINE HYDROCHLORIDE 150 MG/1
150 CAPSULE, EXTENDED RELEASE ORAL DAILY
Qty: 30 CAPSULE | Refills: 0 | Status: SHIPPED | OUTPATIENT
Start: 2022-05-18 | End: 2022-06-14 | Stop reason: SDUPTHER

## 2022-05-18 RX ORDER — ARIPIPRAZOLE 5 MG/1
5 TABLET ORAL NIGHTLY
Qty: 30 TABLET | Refills: 0 | Status: SHIPPED | OUTPATIENT
Start: 2022-05-18 | End: 2022-06-14 | Stop reason: SDUPTHER

## 2022-05-18 NOTE — PROGRESS NOTES
"This provider is located at [facility], [address] [place] [address].The Patient is seen remotely at home, [address] [facility] [place] [address] via [employer].  Patient is being seen via telehealth and confirm that they are in a secure environment for this session. The patient's condition being diagnosed/treated is appropriate for telemedicine. The provider identified himself/herself: herself as well as her credentials.   The patient gave consent to be seen remotely, and when consent is given they understand that the consent allows for patient identifiable information to be sent to a third party as needed.   They may refuse to be seen remotely at any time. The electronic data is encrypted and password protected, and the patient has been advised of the potential risks to privacy not withstanding such measures.    You have chosen to receive care through a telehealth visit.  Do you consent to use a video/audio connection for your medical care today? Yes. Patient verified Name, , and address.       Chief Complaint  Depression and anxiety     Subjective    Marisol Carrillo presents to [facility] for medication management.     History of Present Illness   Patient presents today reporting that she is not feeling any better.  See PHQ-9 and LISA-7 as patient is still reporting severe depression and anxiety.  Patient states that she is constantly feeling sad depressed and hopeless and helpless with irritability and agitation.  She attributes a lot of it to the lack of sleep and anxiety.  She states that her mind is just racing and she has a fear of \"bad things happening\".  Patient states she cannot shut her mind off and cannot go to sleep or stay asleep.  She states she may average 3 hours at night.  Patient notes that she is still not physically jittery but the racing thoughts and constant feeling on edge and depression are making it hard to do " daily ADLs.  Patient states that she has been picking at her skin more now including on her arms and other areas, which she has done so previously.  Denies any side effects to the medications.  Denies any SI/HI/AH/VH.        Objective   Vital Signs:   There were no vitals taken for this visit.  Due to the remote nature of this encounter (virtual encounter), vitals were unable to be obtained.  Height stated at 64 inches.  Weight stated at 295 pounds.        PHQ-9 Score:   PHQ-9 Total Score: (P) 23     Patient screened positive for depression based on a PHQ-9 score of  on . Follow-up recommendations include: see notes and medication list.    PHQ-9 Depression Screening  Little interest or pleasure in doing things? (P) 3   Feeling down, depressed, or hopeless? (P) 3   Trouble falling or staying asleep, or sleeping too much? (P) 3   Feeling tired or having little energy? (P) 3   Poor appetite or overeating? (P) 3   Feeling bad about yourself - or that you are a failure or have let yourself or your family down? (P) 3   Trouble concentrating on things, such as reading the newspaper or watching television? (P) 2   Moving or speaking so slowly that other people could have noticed? Or the opposite - being so fidgety or restless that you have been moving around a lot more than usual? (P) 3   Thoughts that you would be better off dead, or of hurting yourself in some way? (P) 0   PHQ-9 Total Score (P) 23   If you checked off any problems, how difficult have these problems made it for you to do your work, take care of things at home, or get along with other people? (P) Extremely dIfficult     PHQ-9 Total Score: (P) 23      Feeling nervous, anxious or on edge: (P) Nearly every day  Not being able to stop or control worrying: (P) Nearly every day  Worrying too much about different things: (P) Nearly every day  Trouble Relaxing: (P) Nearly every day  Being so restless that it is hard to sit still: (P) More than half the  days  Feeling afraid as if something awful might happen: (P) Nearly every day  Becoming easily annoyed or irritable: (P) Nearly every day  LISA 7 Total Score: (P) 20  If you checked any problems, how difficult have these problems made it for you to do your work, take care of things at home, or get along with other people: (P) Extremely difficult      PROMIS scale screening tool that patient filled out virtually reviewed by this APRN at today's encounter.      Mental Status Exam:   Hygiene:   good  Cooperation:  Cooperative  Eye Contact:  Fair  Psychomotor Behavior:  Appropriate  Affect:  Appropriate  Mood: depressed and anxious  Speech:  Normal  Thought Process:  Goal directed  Thought Content:  Normal  Suicidal:  None  Homicidal:  None  Hallucinations:  None  Delusion:  None  Memory:  Intact  Orientation:  Person, Place, Time and Situation  Reliability:  good  Insight:  Good and Fair  Judgement:  Good and Fair  Impulse Control:  Good and Fair  Physical/Medical Issues:  Yes see problem list     Current Medications:   Current Outpatient Medications   Medication Sig Dispense Refill   • venlafaxine XR (Effexor XR) 150 MG 24 hr capsule Take 1 capsule by mouth Daily. 30 capsule 0   • Acetylcysteine, Nutrient, (N-Acetyl Cysteine) 600 MG tablet Take 300-600 mg by mouth Every Night. 30 tablet 0   • amlodipine-olmesartan (RICK) 10-40 MG per tablet TAKE 1 TABLET BY MOUTH DAILY FOR BLOOD PRESSURE 90 tablet 1   • ARIPiprazole (ABILIFY) 5 MG tablet Take 1 tablet by mouth Every Night. 30 tablet 0   • aspirin 81 MG EC tablet Take 81 mg by mouth Daily.     • atorvastatin (LIPITOR) 40 MG tablet Take 1 tablet by mouth Every Night. For cholesterol 90 tablet 3   • carvedilol (Coreg) 12.5 MG tablet Take 1 tablet by mouth 2 (Two) Times a Day With Meals. For  tablet 3   • cholecalciferol (VITAMIN D3) 1000 units tablet Take 1,000 Units by mouth Daily.     • folic acid (FOLVITE) 1 MG tablet Take 1 tablet by mouth Daily. 90 tablet 3   •  levothyroxine (Synthroid) 88 MCG tablet Take 1 tablet by mouth Daily. For thyroid; give generic 90 tablet 3   • nystatin (MYCOSTATIN) 720017 UNIT/GM powder Apply  topically to the appropriate area as directed 4 (Four) Times a Day. PRN yeast rash 60 g 11   • omeprazole (priLOSEC) 40 MG capsule 1 PO BID For GERD 180 capsule 3   • vitamin B-12 (CYANOCOBALAMIN) 1000 MCG tablet Take 1,000 mcg by mouth Daily.       No current facility-administered medications for this visit.       Physical Exam  Constitutional:       Appearance: Normal appearance.   Neurological:      Mental Status: She is alert.   Psychiatric:         Attention and Perception: Attention and perception normal.         Mood and Affect: Mood is anxious and depressed. Affect is not tearful.         Speech: Speech normal.         Behavior: Behavior is agitated. Behavior is cooperative.         Thought Content: Thought content normal.         Cognition and Memory: Cognition and memory normal.        Result Review :     The following data was reviewed by: MARV Rico on 04/20/2022:  Common labs    Common Labsle 11/17/21 11/17/21 11/22/21 11/22/21    1532 1532 1543 1543   Glucose  101 (A)  98   BUN  19  18   Creatinine  1.16 (A)  0.95   eGFR Non African Am  55 (A)  70   eGFR African Am  63  81   Sodium  140  137   Potassium  5.5 (A)  4.7   Chloride  101  101   Calcium  9.7  9.5   WBC 12.0 (A)  11.5 (A)    Hemoglobin 13.0  12.7    Hematocrit 42.2  38.5    Platelets 420  393    (A) Abnormal value       Comments are available for some flowsheets but are not being displayed.           CMP    CMP 11/17/21 11/22/21   Glucose 101 (A) 98   BUN 19 18   Creatinine 1.16 (A) 0.95   eGFR Non  Am 55 (A) 70   eGFR African Am 63 81   Sodium 140 137   Potassium 5.5 (A) 4.7   Chloride 101 101   Calcium 9.7 9.5   (A) Abnormal value       Comments are available for some flowsheets but are not being displayed.           CBC    CBC 11/17/21 11/22/21   WBC 12.0 (A)  11.5 (A)   RBC 5.05 4.75   Hemoglobin 13.0 12.7   Hematocrit 42.2 38.5   MCV 84 81   MCH 25.7 (A) 26.7   MCHC 30.8 (A) 33.0   RDW 15.7 (A) 15.9 (A)   Platelets 420 393   (A) Abnormal value            CBC w/diff    CBC w/Diff 11/17/21 11/22/21   WBC 12.0 (A) 11.5 (A)   RBC 5.05 4.75   Hemoglobin 13.0 12.7   Hematocrit 42.2 38.5   MCV 84 81   MCH 25.7 (A) 26.7   MCHC 30.8 (A) 33.0   RDW 15.7 (A) 15.9 (A)   Platelets 420 393   Neutrophil Rel % 69 71   Lymphocyte Rel % 23 21   Monocyte Rel % 6 6   Eosinophil Rel % 1 1   Basophil Rel % 1 1   (A) Abnormal value                    Electrolytes    Electrolytes 11/17/21 11/22/21   Sodium 140 137   Potassium 5.5 (A) 4.7   Chloride 101 101   Calcium 9.7 9.5   (A) Abnormal value            Renal Profile    Renal Profile 11/17/21 11/22/21   BUN 19 18   Creatinine 1.16 (A) 0.95   eGFR Non  Am 55 (A) 70   eGFR  Am 63 81   (A) Abnormal value       Comments are available for some flowsheets but are not being displayed.               UA    Urinalysis 11/18/21   Ketones, UA Negative   Leukocytes, UA Trace (A)   (A) Abnormal value            Data reviewed: PCP notes        Assessment and Plan    Problem List Items Addressed This Visit        Mental Health    Depression - Primary    Relevant Medications    venlafaxine XR (Effexor XR) 150 MG 24 hr capsule    ARIPiprazole (ABILIFY) 5 MG tablet      Other Visit Diagnoses     Generalized anxiety disorder        Relevant Medications    venlafaxine XR (Effexor XR) 150 MG 24 hr capsule    ARIPiprazole (ABILIFY) 5 MG tablet    Acetylcysteine, Nutrient, (N-Acetyl Cysteine) 600 MG tablet    Primary insomnia        Relevant Medications    ARIPiprazole (ABILIFY) 5 MG tablet    Acetylcysteine, Nutrient, (N-Acetyl Cysteine) 600 MG tablet    Excoriation (skin-picking) disorder        Relevant Medications    venlafaxine XR (Effexor XR) 150 MG 24 hr capsule    ARIPiprazole (ABILIFY) 5 MG tablet    Acetylcysteine, Nutrient, (N-Acetyl  Cysteine) 600 MG tablet            TREATMENT PLAN/GOALS: Continue supportive psychotherapy efforts and medications as indicated. Treatment and medication options discussed during today's visit. Patient ackowledged and verbally consented to continue with current treatment plan and was educated on the importance of compliance with treatment and follow-up appointments.    MEDICATION ISSUES:  We discussed risks, benefits, and side effects of the above medications and the patient was agreeable with the plan. Patient was educated on the importance of compliance with treatment and follow-up appointments.  Patient is agreeable to call the office with any worsening of symptoms or onset of side effects. Patient is agreeable to call 911 or go to the nearest ER should he/she begin having SI/HI.      -Increase venlafaxine to 150 mg XR daily for depression and anxiety.  Highly encouraged the patient if she had any side effects or worsening symptoms to contact clinic for sooner appointment she verbalized understanding.  -Discontinue Seroquel since not effective.  -Begin aripiprazole 5 mg at night as adjunct for severe depression as patient has now failed and tried sertraline, fluoxetine, bupropion and venlafaxine, buspirone, trazodone, mirtazapine and quetiapine.  This is medically necessary at this time in order to avoid hospitalization.  Highly encouraged patient if she had any side effects or worsening symptoms to contact the clinic.    -Begin  to 600 mg tablets at night for skin picking and anxiety.  Highly encouraged patient she had any issues or concerns to contact the clinic.  -Informed the patient to contact the clinic in 2 weeks to update regarding her mood and anxiety or if anything worsen to call sooner she verbalized understanding.  Also educated if symptoms worsened or any SI to go to the nearest ER she verbalized understanding.  -Inform patient with her insomnia she needs to reach out to her PCP for referral  to a sleep  and for checkup with her PCP for a well visit patient verbalized understanding.      Counseled patient regarding multimodal approach with healthy nutrition, healthy sleep, regular physical activity, social activities, counseling, and medications.      Coping skills reviewed and encouraged positive framing of thoughts     Assisted patient in processing above session content; acknowledged and normalized patient’s thoughts, feelings, and concerns.  Applied  positive coping skills and behavior management in session.  Allowed patient to freely discuss issues without interruption or judgment. Provided safe, confidential environment to facilitate the development of positive therapeutic relationship and encourage open, honest communication. Assisted patient in identifying risk factors which would indicate the need for higher level of care including thoughts to harm self or others and/or self-harming behavior and encouraged patient to contact this office, call 911, or present to the nearest emergency room should any of these events occur. Discussed crisis intervention services and means to access.     MEDS ORDERED DURING VISIT:  New Medications Ordered This Visit   Medications   • venlafaxine XR (Effexor XR) 150 MG 24 hr capsule     Sig: Take 1 capsule by mouth Daily.     Dispense:  30 capsule     Refill:  0   • ARIPiprazole (ABILIFY) 5 MG tablet     Sig: Take 1 tablet by mouth Every Night.     Dispense:  30 tablet     Refill:  0   • Acetylcysteine, Nutrient, (N-Acetyl Cysteine) 600 MG tablet     Sig: Take 300-600 mg by mouth Every Night.     Dispense:  30 tablet     Refill:  0         Follow Up   Return in about 4 weeks (around 6/15/2022), or if symptoms worsen or fail to improve, for Recheck.    Patient was given instructions and counseling regarding her condition or for health maintenance advice. Please see specific information pulled into the AVS if appropriate.     Some of the data in this  electronic note has been brought forward from a previous encounter, any necessary changes have been made, it has been reviewed by this APRN, and it is accurate.      This document has been electronically signed by MARV Rico  May 18, 2022 12:54 EDT    Part of this note may be an electronic transcription/translation of spoken language to printed text using the Dragon Dictation System.

## 2022-06-02 ENCOUNTER — TELEMEDICINE (OUTPATIENT)
Dept: PSYCHIATRY | Facility: CLINIC | Age: 52
End: 2022-06-02

## 2022-06-02 DIAGNOSIS — F41.1 GENERALIZED ANXIETY DISORDER: ICD-10-CM

## 2022-06-02 DIAGNOSIS — F33.2 SEVERE EPISODE OF RECURRENT MAJOR DEPRESSIVE DISORDER, WITHOUT PSYCHOTIC FEATURES: Primary | ICD-10-CM

## 2022-06-02 PROCEDURE — 90834 PSYTX W PT 45 MINUTES: CPT | Performed by: SOCIAL WORKER

## 2022-06-02 NOTE — PROGRESS NOTES
[facility]   Follow-up Progress Note     Date: June 16, 2022  Time In: 11:14AM  Time Out: 12:00 PM      PROGRESS NOTE  Data:  Marisol Carrillo is a 52 y.o. female presenting to [facility] for follow-up with Patti Garcia LCSW. The patient is seen remotely using [facility] My Chart. Patient is being seen via telehealth and stated they are in a secure environment for this session.  Patient is located in her home.  The patient's condition being diagnosed/treated is appropriate for telemedicine. The provider identified herself as well as her credentials. The patient and/or patients guardian consent to be seen remotely, and when consent is given they understand that the consent allows for patient identifiable information to be sent to a third party as needed. They may refuse to be seen remotely at any time. The electronic data is encrypted and password protected, and the patient has been advised of the potential risks to privacy not withstanding such measures.    Today Patient prestend for follow up.  Patient states she has struggled since last session.  Patient stated they have been trying to get her medications adjusted correctly but she has not been able to focus on much else.  Patient stated her anxiety is still through the roof.  Patient stated she often feels overwhelmed through everything.  Patient stated she wants to feel better but feels like things are often too much.  Patient is struggling to sleep and has racing thoughts.  Patient is not caring for herself at all.  Patient stated she does not know what to focus on first whether her past trauma and loss or her current life stressors.  Patient stated she never processed the past pregnancy loss and feels like it is time to do that now.  She stated she does think dealing with the loss needs to be first and she can continue to gain tools to deal with that to apply to other things.   Patient stated she wants help but is sometimes scared because things seem to get darker.  Patient and provider explored ways to take small steps towards caring for herself instead of just her daughter.  Patient identified she has had a knot in her hair for a long time.  Patient and provider explored strategies with thoughts and behaviors and how to begin taking small steps.  Patient and provider discussed transition to next therapist    Clinical Maneuvering/Intervention: Transition planning    Assisted Patient in processing above session content; acknowledged and normalized patient’s thoughts, feelings, and concerns.  Rationalized patient thought process regarding self-care.  Discussed triggers associated with patient's depression.  Also discussed coping skills for patient to implement such as behavioral activation.    Allowed Patient to freely discuss issues  without interruption or judgement with unconditional positive regard, active listening skills, and empathy. Therapist provided a safe, confidential environment to facilitate the development of a positive therapeutic relationship and encouraged open, honest communication. Assisted Patient in identifying risk factors which would indicate the need for higher level of care including thoughts to harm self or others and/or self-harming behavior and encouraged Patient to contact this office, call 911, or present to the nearest emergency room should any of these events occur. Discussed crisis intervention services and means to access. Patient adamantly and convincingly denies current suicidal or homicidal ideation or perceptual disturbance. Assisted Patient in processing session content; acknowledged and normalized Patient’s thoughts, feelings, and concerns by utilizing a person-centered approach in efforts to build appropriate rapport and a positive therapeutic relationship with open and honest communication. Therapist utilized dialectical behavior techniques to  teach and model emotional regulation and relaxation methods. Therapist assisted Patient with identifying and implementing healthier coping strategies.     Assessment   Patient appears to be maintaining relative stability as compared to baseline.  Patient continues to struggle with depression.   As a result, they can be reasonably expected to continue to benefit from treatment and would likely be at increased risk for decompensation otherwise.    Mental Status Exam:   Hygiene:   good  Cooperation:  Cooperative  Eye Contact:  Good  Psychomotor Behavior:  Appropriate  Affect:  Appropriate  Mood: normal  Speech:  Normal  Thought Process:  Goal directed  Thought Content:  Normal  Suicidal:  None  Homicidal:  None  Hallucinations:  None  Delusion:  None  Memory:  Intact  Orientation:  Person, Place, Time and Situation  Reliability:  good  Insight:  Good  Judgement:  Good  Impulse Control:  Good  Physical/Medical Issues:  No      PHQ-Score Total:  PHQ-9 Total Score:      LISA-7:  Feeling nervous, anxious or on edge: (P) Nearly every day  Not being able to stop or control worrying: (P) Nearly every day  Worrying too much about different things: (P) Nearly every day  Trouble Relaxing: (P) Nearly every day  Being so restless that it is hard to sit still: (P) More than half the days  Feeling afraid as if something awful might happen: (P) Nearly every day  Becoming easily annoyed or irritable: (P) Nearly every day  LISA 7 Total Score: (P) 20  If you checked any problems, how difficult have these problems made it for you to do your work, take care of things at home, or get along with other people: (P) Extremely difficult    Patient's Support Network Includes:      Functional Status: Moderate impairment     Progress toward goal: Not at goal    Prognosis: Good with Ongoing Treatment            Impression/Formulation:    VISIT DIAGNOSIS:     ICD-10-CM ICD-9-CM   1. Severe episode of recurrent major depressive disorder, without  psychotic features (HCC)  F33.2 296.33   2. Generalized anxiety disorder  F41.1 300.02        Patient appeared alert and oriented.  Patient is voluntarily requesting to continue outpatient [facility].  Patient is receptive to assistance with maintaining a stable lifestyle.  Patient presents with history of depression.  Patient is agreeable to attend routine therapy sessions.  Patient expressed desire to maintain stability and participate in the therapeutic process.        Crisis Plan:  If symptoms/behaviors persist, Patient will present to the nearest hospital for an assessment. Advised patient of [facility] and assessment services.     Plan:   Patient will continue in individual outpatient therapy with focus on improved functioning and coping skills, maintaining stability, and avoiding decompensation and the need for higher level of care.    Patient will contact this office, call 911 or present to the nearest emergency room should suicidal or homicidal ideations occur. Provide Cognitive Behavioral Therapy and Solution Focused Therapy to improve functioning, maintain stability, and avoid decompensation and the need for higher level of care.     Return in about 1 weeks, or earlier if symptoms worsen or fail to improve.    Recommended Referrals: Patient is referred to new therapist and office        This document has been electronically signed by Patti Garcia LCSW  June 16, 2022 10:24 EDT        Part of this note may be an electronic transcription/translation of spoken language to printed text using the Dragon Dictation System.

## 2022-06-06 ENCOUNTER — TELEPHONE (OUTPATIENT)
Dept: PSYCHIATRY | Facility: CLINIC | Age: 52
End: 2022-06-06

## 2022-06-06 RX ORDER — DOXEPIN HYDROCHLORIDE 50 MG/1
50 CAPSULE ORAL NIGHTLY
Qty: 30 CAPSULE | Refills: 0 | Status: SHIPPED | OUTPATIENT
Start: 2022-06-06 | End: 2022-06-14 | Stop reason: SDUPTHER

## 2022-06-06 NOTE — TELEPHONE ENCOUNTER
Ok Tell her I have sent in doxepin for her sleep since she cant tolerate Seroquel, Trazadone or Remeron. The doxepin will help with anxiety as well. But She needs to f/u with PCP for sleep medicine referral asap as it might be a few months before she can get in.

## 2022-06-06 NOTE — TELEPHONE ENCOUNTER
She needs to reach out to her PCP for a referral to a sleep medicine specialist then. Is sleep her main issue right now in regards to medication not helping or was she referring to something else?

## 2022-06-06 NOTE — TELEPHONE ENCOUNTER
Patient states that she is having lots of anxiety and feels very overwhelmed.  Patient states she feels like she is having panic attacks and horrible thoughts at night and can't sleep. Patient states that the medication for sleep isn't helping her.

## 2022-06-06 NOTE — TELEPHONE ENCOUNTER
Patient states that she tried what provider told her to and it isn't helping. Patient states that she can't sleep. Patient states she doesn't think she can go through with a sleep test at this time because of her emotional state.  Patient states she doesn't know what else she needs to do at this point.  Patient states she feels stuck.

## 2022-06-07 ENCOUNTER — TELEMEDICINE (OUTPATIENT)
Dept: PSYCHIATRY | Facility: CLINIC | Age: 52
End: 2022-06-07

## 2022-06-07 DIAGNOSIS — F41.1 GENERALIZED ANXIETY DISORDER: Primary | ICD-10-CM

## 2022-06-07 PROCEDURE — 90837 PSYTX W PT 60 MINUTES: CPT | Performed by: SOCIAL WORKER

## 2022-06-07 NOTE — PROGRESS NOTES
[facility]   Follow-up Progress Note     Date: June 16, 2022  Time In: 11:01AM  Time Out: 11:56 AM      PROGRESS NOTE  Data:  Marisol Carrillo is a 52 y.o. female presenting to [facility] for follow-up with Patti Garcia LCSW. The patient is seen remotely using [facility] My Chart. Patient is being seen via telehealth and stated they are in a secure environment for this session.  Patient is located in her home.  The patient's condition being diagnosed/treated is appropriate for telemedicine. The provider identified herself as well as her credentials. The patient and/or patients guardian consent to be seen remotely, and when consent is given they understand that the consent allows for patient identifiable information to be sent to a third party as needed. They may refuse to be seen remotely at any time. The electronic data is encrypted and password protected, and the patient has been advised of the potential risks to privacy not withstanding such measures.    Today Patient presented for follow up.  Patient stated her sleep has improved somewhat with medication changes.  Patient and provider discussed ways to continue to improve this.  Patient identified fears and worries related to herself and her daughter.  Patient stated she does not know where to start with self-care and coping.  Patient stated her 3 most important worries are her daughter, her own health and significant fears of her parents passing away.  Patient provider explored small steps to begin taking care of self and how to focus on 1 day at a time.  Patient and provider explored transitioning to new therapist and how she is feeling about this.    Clinical Maneuvering/Intervention: CBT and transition planning    Assisted Patient in processing above session content; acknowledged and normalized patient’s thoughts, feelings, and concerns.  Rationalized patient thought process  regarding medical issues.  Discussed triggers associated with patient's depression and anxiety.  Also discussed coping skills for patient to implement such as behavioral activation and self-care.    Allowed Patient to freely discuss issues  without interruption or judgement with unconditional positive regard, active listening skills, and empathy. Therapist provided a safe, confidential environment to facilitate the development of a positive therapeutic relationship and encouraged open, honest communication. Assisted Patient in identifying risk factors which would indicate the need for higher level of care including thoughts to harm self or others and/or self-harming behavior and encouraged Patient to contact this office, call 911, or present to the nearest emergency room should any of these events occur. Discussed crisis intervention services and means to access. Patient adamantly and convincingly denies current suicidal or homicidal ideation or perceptual disturbance. Assisted Patient in processing session content; acknowledged and normalized Patient’s thoughts, feelings, and concerns by utilizing a person-centered approach in efforts to build appropriate rapport and a positive therapeutic relationship with open and honest communication. Therapist utilized dialectical behavior techniques to teach and model emotional regulation and relaxation methods. Therapist assisted Patient with identifying and implementing healthier coping strategies.     Assessment   Patient appears to be maintaining relative stability as compared to baseline.  Patient continues to struggle with depression and anxiety.   As a result, they can be reasonably expected to continue to benefit from treatment and would likely be at increased risk for decompensation otherwise.    Mental Status Exam:   Hygiene:   good  Cooperation:  Cooperative  Eye Contact:  Good  Psychomotor Behavior:  Appropriate  Affect:  Appropriate  Mood: normal  Speech:   Normal  Thought Process:  Goal directed  Thought Content:  Normal  Suicidal:  None  Homicidal:  None  Hallucinations:  None  Delusion:  None  Memory:  Intact  Orientation:  Person, Place, Time and Situation  Reliability:  good  Insight:  Good  Judgement:  Good  Impulse Control:  Good  Physical/Medical Issues:  No      PHQ-Score Total:  PHQ-9 Total Score:      LISA-7:       Patient's Support Network Includes:      Functional Status: Moderate impairment     Progress toward goal: Not at goal    Prognosis: Good with Ongoing Treatment            Impression/Formulation:    VISIT DIAGNOSIS:     ICD-10-CM ICD-9-CM   1. Generalized anxiety disorder  F41.1 300.02        Patient appeared alert and oriented.  Patient is voluntarily requesting to continue outpatient therapy at [facility].  Patient is receptive to assistance with maintaining a stable lifestyle.  Patient presents with history of depression and anxiety.  Patient is agreeable to attend routine therapy sessions.  Patient expressed desire to maintain stability and participate in the therapeutic process.        Crisis Plan:  If symptoms/behaviors persist, Patient will present to the nearest hospital for an assessment. Advised patient of [facility] and assessment services.     Plan:   Patient will continue in individual outpatient therapy with focus on improved functioning and coping skills, maintaining stability, and avoiding decompensation and the need for higher level of care.    Patient will contact this office, call 911 or present to the nearest emergency room should suicidal or homicidal ideations occur. Provide Cognitive Behavioral Therapy and Solution Focused Therapy to improve functioning, maintain stability, and avoid decompensation and the need for higher level of care.     Return in about 1 weeks, or earlier if symptoms worsen or fail to improve.    Recommended Referrals: Patient will be referred to new  therapist        This document has been electronically signed by Patti Garcia LCSW  June 16, 2022 12:00 EDT        Part of this note may be an electronic transcription/translation of spoken language to printed text using the Dragon Dictation System.

## 2022-06-14 ENCOUNTER — TELEMEDICINE (OUTPATIENT)
Dept: PSYCHIATRY | Facility: CLINIC | Age: 52
End: 2022-06-14

## 2022-06-14 DIAGNOSIS — F41.1 GENERALIZED ANXIETY DISORDER: ICD-10-CM

## 2022-06-14 DIAGNOSIS — F33.2 SEVERE EPISODE OF RECURRENT MAJOR DEPRESSIVE DISORDER, WITHOUT PSYCHOTIC FEATURES: ICD-10-CM

## 2022-06-14 DIAGNOSIS — F42.4 EXCORIATION (SKIN-PICKING) DISORDER: ICD-10-CM

## 2022-06-14 DIAGNOSIS — F51.01 PRIMARY INSOMNIA: ICD-10-CM

## 2022-06-14 PROCEDURE — 99214 OFFICE O/P EST MOD 30 MIN: CPT | Performed by: NURSE PRACTITIONER

## 2022-06-14 RX ORDER — VENLAFAXINE HYDROCHLORIDE 150 MG/1
150 CAPSULE, EXTENDED RELEASE ORAL DAILY
Qty: 30 CAPSULE | Refills: 0 | Status: SHIPPED | OUTPATIENT
Start: 2022-06-14 | End: 2022-07-13 | Stop reason: SDUPTHER

## 2022-06-14 RX ORDER — ARIPIPRAZOLE 10 MG/1
10 TABLET ORAL NIGHTLY
Qty: 30 TABLET | Refills: 0 | Status: SHIPPED | OUTPATIENT
Start: 2022-06-14 | End: 2022-07-13 | Stop reason: SDUPTHER

## 2022-06-14 RX ORDER — DOXEPIN HYDROCHLORIDE 75 MG/1
75 CAPSULE ORAL NIGHTLY
Qty: 30 CAPSULE | Refills: 0 | Status: SHIPPED | OUTPATIENT
Start: 2022-06-14 | End: 2022-07-13 | Stop reason: SDUPTHER

## 2022-06-14 NOTE — PROGRESS NOTES
This provider is located at [facility], [address] [place] [address].The Patient is seen remotely at home, [address] [facility] [address] via [employer].  Patient is being seen via telehealth and confirm that they are in a secure environment for this session. The patient's condition being diagnosed/treated is appropriate for telemedicine. The provider identified himself/herself: herself as well as her credentials.   The patient gave consent to be seen remotely, and when consent is given they understand that the consent allows for patient identifiable information to be sent to a third party as needed.   They may refuse to be seen remotely at any time. The electronic data is encrypted and password protected, and the patient has been advised of the potential risks to privacy not withstanding such measures.    You have chosen to receive care through a telehealth visit.  Do you consent to use a video/audio connection for your medical care today? Yes. Patient verified Name, , and address.       Chief Complaint  Depression and anxiety     Subjective    Marisol Carrillo presents to [facility] for medication management.     History of Present Illness   Patient presents today noting that she has seen a slight improvement with the Effexor and the doxepin and she is less worried but her depression and anxiety are still significant.  She notes she is still feeling hopeless and helpless with depressed mood and wanting to isolate at times.  Patient states that she did go out to dinner with family other night but she wanted to leave as she felt overwhelmed and on edge and anxious.  Patient states that the doxepin does help her fall asleep quickly but she wakes up after 3 to 4 hours and then is up.  Patient states that she stopped the aripiprazole and NAC as she was unsure of what medications to continue at that time.  Patient states it was hard to  tell a difference for her Effexor was increased as well.  She denied any side effects to the medications.  Patient states that she does not feel as if she could see a sleep specialist at this time as it would increase her anxiety adding another doctor on for her.  Made patient aware that I may not be able to get her sleep under control and sleeping adequate she verbalized understanding and was agreeable.  Patient denies any SI/HI/AH/VH.        Objective   Vital Signs:   There were no vitals taken for this visit.  Due to the remote nature of this encounter (virtual encounter), vitals were unable to be obtained.  Height stated at 64 inches.  Weight stated at 295 pounds.        PHQ-9 Score:   PHQ-9 Total Score: (P) 21     Patient screened positive for depression based on a PHQ-9 score of  on . Follow-up recommendations include: see notes and medication list.    PHQ-9 Depression Screening  Little interest or pleasure in doing things? (P) 3   Feeling down, depressed, or hopeless? (P) 3   Trouble falling or staying asleep, or sleeping too much? (P) 3   Feeling tired or having little energy? (P) 3   Poor appetite or overeating? (P) 3   Feeling bad about yourself - or that you are a failure or have let yourself or your family down? (P) 3   Trouble concentrating on things, such as reading the newspaper or watching television? (P) 3   Moving or speaking so slowly that other people could have noticed? Or the opposite - being so fidgety or restless that you have been moving around a lot more than usual?     Thoughts that you would be better off dead, or of hurting yourself in some way? (P) 0   PHQ-9 Total Score (P) 21   If you checked off any problems, how difficult have these problems made it for you to do your work, take care of things at home, or get along with other people? (P) Extremely dIfficult     PHQ-9 Total Score: (P) 21      Feeling nervous, anxious or on edge: (P) Nearly every day  Not being able to stop or control  worrying: (P) Nearly every day  Worrying too much about different things: (P) Nearly every day  Trouble Relaxing: (P) Nearly every day  Being so restless that it is hard to sit still: (P) More than half the days  Feeling afraid as if something awful might happen: (P) Nearly every day  Becoming easily annoyed or irritable: (P) Nearly every day  LISA 7 Total Score: (P) 20  If you checked any problems, how difficult have these problems made it for you to do your work, take care of things at home, or get along with other people: (P) Extremely difficult      PROMIS scale screening tool that patient filled out virtually reviewed by this APRN at today's encounter.      Mental Status Exam:   Hygiene:   good  Cooperation:  Cooperative  Eye Contact:  Fair  Psychomotor Behavior:  Appropriate  Affect:  Appropriate  Mood: depressed and anxious  Speech:  Normal  Thought Process:  Goal directed  Thought Content:  Normal  Suicidal:  None  Homicidal:  None  Hallucinations:  None  Delusion:  None  Memory:  Intact  Orientation:  Person, Place, Time and Situation  Reliability:  good  Insight:  Good and Fair  Judgement:  Good and Fair  Impulse Control:  Good and Fair  Physical/Medical Issues:  Yes see problem list     Current Medications:   Current Outpatient Medications   Medication Sig Dispense Refill   • ARIPiprazole (ABILIFY) 10 MG tablet Take 1 tablet by mouth Every Night. 30 tablet 0   • doxepin (SINEquan) 75 MG capsule Take 1 capsule by mouth Every Night. 30 capsule 0   • venlafaxine XR (Effexor XR) 150 MG 24 hr capsule Take 1 capsule by mouth Daily. 30 capsule 0   • amlodipine-olmesartan (RICK) 10-40 MG per tablet TAKE 1 TABLET BY MOUTH DAILY FOR BLOOD PRESSURE 90 tablet 1   • aspirin 81 MG EC tablet Take 81 mg by mouth Daily.     • atorvastatin (LIPITOR) 40 MG tablet Take 1 tablet by mouth Every Night. For cholesterol 90 tablet 3   • carvedilol (Coreg) 12.5 MG tablet Take 1 tablet by mouth 2 (Two) Times a Day With Meals. For BP  180 tablet 3   • cholecalciferol (VITAMIN D3) 1000 units tablet Take 1,000 Units by mouth Daily.     • folic acid (FOLVITE) 1 MG tablet Take 1 tablet by mouth Daily. 90 tablet 3   • levothyroxine (Synthroid) 88 MCG tablet Take 1 tablet by mouth Daily. For thyroid; give generic 90 tablet 3   • nystatin (MYCOSTATIN) 175448 UNIT/GM powder Apply  topically to the appropriate area as directed 4 (Four) Times a Day. PRN yeast rash 60 g 11   • omeprazole (priLOSEC) 40 MG capsule 1 PO BID For GERD 180 capsule 3   • vitamin B-12 (CYANOCOBALAMIN) 1000 MCG tablet Take 1,000 mcg by mouth Daily.       No current facility-administered medications for this visit.       Physical Exam  Nursing note reviewed.   Constitutional:       Appearance: Normal appearance.   Neurological:      Mental Status: She is alert.   Psychiatric:         Attention and Perception: Attention and perception normal.         Mood and Affect: Mood is anxious and depressed. Affect is not tearful.         Speech: Speech normal.         Behavior: Behavior is agitated. Behavior is cooperative.         Thought Content: Thought content normal.         Cognition and Memory: Cognition and memory normal.        Result Review :     The following data was reviewed by: MARV Rico on 04/20/2022:  Common labs    Common Labsle 11/17/21 11/17/21 11/22/21 11/22/21    1532 1532 1543 1543   Glucose  101 (A)  98   BUN  19  18   Creatinine  1.16 (A)  0.95   eGFR Non African Am  55 (A)  70   eGFR African Am  63  81   Sodium  140  137   Potassium  5.5 (A)  4.7   Chloride  101  101   Calcium  9.7  9.5   WBC 12.0 (A)  11.5 (A)    Hemoglobin 13.0  12.7    Hematocrit 42.2  38.5    Platelets 420  393    (A) Abnormal value       Comments are available for some flowsheets but are not being displayed.           CMP    CMP 11/17/21 11/22/21   Glucose 101 (A) 98   BUN 19 18   Creatinine 1.16 (A) 0.95   eGFR Non  Am 55 (A) 70   eGFR African Am 63 81   Sodium 140 137    Potassium 5.5 (A) 4.7   Chloride 101 101   Calcium 9.7 9.5   (A) Abnormal value       Comments are available for some flowsheets but are not being displayed.           CBC    CBC 11/17/21 11/22/21   WBC 12.0 (A) 11.5 (A)   RBC 5.05 4.75   Hemoglobin 13.0 12.7   Hematocrit 42.2 38.5   MCV 84 81   MCH 25.7 (A) 26.7   MCHC 30.8 (A) 33.0   RDW 15.7 (A) 15.9 (A)   Platelets 420 393   (A) Abnormal value            CBC w/diff    CBC w/Diff 11/17/21 11/22/21   WBC 12.0 (A) 11.5 (A)   RBC 5.05 4.75   Hemoglobin 13.0 12.7   Hematocrit 42.2 38.5   MCV 84 81   MCH 25.7 (A) 26.7   MCHC 30.8 (A) 33.0   RDW 15.7 (A) 15.9 (A)   Platelets 420 393   Neutrophil Rel % 69 71   Lymphocyte Rel % 23 21   Monocyte Rel % 6 6   Eosinophil Rel % 1 1   Basophil Rel % 1 1   (A) Abnormal value                    Electrolytes    Electrolytes 11/17/21 11/22/21   Sodium 140 137   Potassium 5.5 (A) 4.7   Chloride 101 101   Calcium 9.7 9.5   (A) Abnormal value            Renal Profile    Renal Profile 11/17/21 11/22/21   BUN 19 18   Creatinine 1.16 (A) 0.95   eGFR Non  Am 55 (A) 70   eGFR  Am 63 81   (A) Abnormal value       Comments are available for some flowsheets but are not being displayed.               UA    Urinalysis 11/18/21   Ketones, UA Negative   Leukocytes, UA Trace (A)   (A) Abnormal value            Data reviewed: PCP notes        Assessment and Plan    Problem List Items Addressed This Visit        Mental Health    Depression    Relevant Medications    doxepin (SINEquan) 75 MG capsule    ARIPiprazole (ABILIFY) 10 MG tablet    venlafaxine XR (Effexor XR) 150 MG 24 hr capsule      Other Visit Diagnoses     Primary insomnia        Relevant Medications    doxepin (SINEquan) 75 MG capsule    ARIPiprazole (ABILIFY) 10 MG tablet    Excoriation (skin-picking) disorder        Relevant Medications    doxepin (SINEquan) 75 MG capsule    ARIPiprazole (ABILIFY) 10 MG tablet    venlafaxine XR (Effexor XR) 150 MG 24 hr capsule     Generalized anxiety disorder        Relevant Medications    doxepin (SINEquan) 75 MG capsule    ARIPiprazole (ABILIFY) 10 MG tablet    venlafaxine XR (Effexor XR) 150 MG 24 hr capsule            TREATMENT PLAN/GOALS: Continue supportive psychotherapy efforts and medications as indicated. Treatment and medication options discussed during today's visit. Patient ackowledged and verbally consented to continue with current treatment plan and was educated on the importance of compliance with treatment and follow-up appointments.    MEDICATION ISSUES:  We discussed risks, benefits, and side effects of the above medications and the patient was agreeable with the plan. Patient was educated on the importance of compliance with treatment and follow-up appointments.  Patient is agreeable to call the office with any worsening of symptoms or onset of side effects. Patient is agreeable to call 911 or go to the nearest ER should he/she begin having SI/HI.      -Continue venlafaxine 150 mg XL daily for depression and anxiety.  -Restart aripiprazole at 5 mg for 2 to 3 days and then begin taking 10 mg daily as adjunct for severe depression as well as skin picking.  Patient has now failed and tried sertraline, fluoxetine, bupropion and venlafaxine, buspirone, trazodone, mirtazapine and quetiapine.  This is medically necessary at this time in order to avoid hospitalization.  Highly encouraged patient if she had any side effects or worsening symptoms to contact the clinic.    -Discontinue NAC since not effective.  -Increase doxepin to 75 mg nightly for sleep as well as anxiety.  Highly encouraged patient if she had any side effects or worsening symptoms contact clinic she verbalized understanding.  -Informed patient that I may not be able to get her sleep under control at this time without her seeing a sleep specialist patient was made aware and agreeable with this.    Counseled patient regarding multimodal approach with healthy  nutrition, healthy sleep, regular physical activity, social activities, counseling, and medications.      Coping skills reviewed and encouraged positive framing of thoughts     Assisted patient in processing above session content; acknowledged and normalized patient’s thoughts, feelings, and concerns.  Applied  positive coping skills and behavior management in session.  Allowed patient to freely discuss issues without interruption or judgment. Provided safe, confidential environment to facilitate the development of positive therapeutic relationship and encourage open, honest communication. Assisted patient in identifying risk factors which would indicate the need for higher level of care including thoughts to harm self or others and/or self-harming behavior and encouraged patient to contact this office, call 911, or present to the nearest emergency room should any of these events occur. Discussed crisis intervention services and means to access.     MEDS ORDERED DURING VISIT:  New Medications Ordered This Visit   Medications   • doxepin (SINEquan) 75 MG capsule     Sig: Take 1 capsule by mouth Every Night.     Dispense:  30 capsule     Refill:  0   • ARIPiprazole (ABILIFY) 10 MG tablet     Sig: Take 1 tablet by mouth Every Night.     Dispense:  30 tablet     Refill:  0   • venlafaxine XR (Effexor XR) 150 MG 24 hr capsule     Sig: Take 1 capsule by mouth Daily.     Dispense:  30 capsule     Refill:  0         Follow Up   Return in about 4 weeks (around 7/12/2022), or if symptoms worsen or fail to improve, for Recheck.  Highly encouraged the patient if she had any side effects or worsening symptoms to contact the clinic for sooner appointment she verbalized understanding.    Patient was given instructions and counseling regarding her condition or for health maintenance advice. Please see specific information pulled into the AVS if appropriate.     Some of the data in this electronic note has been brought forward from  a previous encounter, any necessary changes have been made, it has been reviewed by this APRN, and it is accurate.      This document has been electronically signed by MARV Rico  June 14, 2022 14:25 EDT    Part of this note may be an electronic transcription/translation of spoken language to printed text using the Dragon Dictation System.

## 2022-06-15 ENCOUNTER — TELEMEDICINE (OUTPATIENT)
Dept: PSYCHIATRY | Facility: CLINIC | Age: 52
End: 2022-06-15

## 2022-06-15 DIAGNOSIS — F41.1 GENERALIZED ANXIETY DISORDER: Primary | ICD-10-CM

## 2022-06-15 PROCEDURE — 90837 PSYTX W PT 60 MINUTES: CPT | Performed by: SOCIAL WORKER

## 2022-06-15 NOTE — PROGRESS NOTES
[facility]   Follow-up Progress Note     Date: June 16, 2022  Time In: 1:01PM  Time Out: 2:05PM      PROGRESS NOTE  Data:  Marisol Carrillo is a 52 y.o. female presenting to [facility] for follow-up with Patti Garcia LCSW. The patient is seen remotely using [facility] My Chart. Patient is being seen via telehealth and stated they are in a secure environment for this session.  Patient is located in her home.  The patient's condition being diagnosed/treated is appropriate for telemedicine. The provider identified herself as well as her credentials. The patient and/or patients guardian consent to be seen remotely, and when consent is given they understand that the consent allows for patient identifiable information to be sent to a third party as needed. They may refuse to be seen remotely at any time. The electronic data is encrypted and password protected, and the patient has been advised of the potential risks to privacy not withstanding such measures.    Today Patient presented for follow up apportionment.  She stated things have been okay and really busy.  She stated she is finally starting to allow her daughter to get out a bit more now that COVID is not as bad.  Patient stated her daughter was able to spend the night with another friend and even though this increased her anxiety she managed it okay.  Patient and provider explored ways to change some of her perspectives on her daughter and her how to improve anxiety.  Patient provider explored how to keep making small steps to improving self care and taking care of her own self.  Patient and provider discussed transition to next therapist and how she is feeling about this.    Clinical Maneuvering/Intervention: CBT and transition planning    Assisted Patient in processing above session content; acknowledged and normalized patient’s thoughts, feelings, and concerns.  Rationalized patient  thought process regarding daughter's medical issues.  Discussed triggers associated with patient's depression and anxiety.  Also discussed coping skills for patient to implement such as behavioral activation.    Allowed Patient to freely discuss issues  without interruption or judgement with unconditional positive regard, active listening skills, and empathy. Therapist provided a safe, confidential environment to facilitate the development of a positive therapeutic relationship and encouraged open, honest communication. Assisted Patient in identifying risk factors which would indicate the need for higher level of care including thoughts to harm self or others and/or self-harming behavior and encouraged Patient to contact this office, call 911, or present to the nearest emergency room should any of these events occur. Discussed crisis intervention services and means to access. Patient adamantly and convincingly denies current suicidal or homicidal ideation or perceptual disturbance. Assisted Patient in processing session content; acknowledged and normalized Patient’s thoughts, feelings, and concerns by utilizing a person-centered approach in efforts to build appropriate rapport and a positive therapeutic relationship with open and honest communication. Therapist utilized dialectical behavior techniques to teach and model emotional regulation and relaxation methods. Therapist assisted Patient with identifying and implementing healthier coping strategies.     Assessment   Patient appears to be maintaining relative stability as compared to baseline.  Patient continues to struggle with depression and anxiety.   As a result, they can be reasonably expected to continue to benefit from treatment and would likely be at increased risk for decompensation otherwise.    Mental Status Exam:   Hygiene:   good  Cooperation:  Cooperative  Eye Contact:  Good  Psychomotor Behavior:  Appropriate  Affect:  Appropriate  Mood:  normal  Speech:  Normal  Thought Process:  Goal directed  Thought Content:  Normal  Suicidal:  None  Homicidal:  None  Hallucinations:  None  Delusion:  None  Memory:  Intact  Orientation:  Person, Place, Time and Situation  Reliability:  good  Insight:  Good  Judgement:  Good  Impulse Control:  Good  Physical/Medical Issues:  No      PHQ-Score Total:  PHQ-9 Total Score:      LISA-7:       Patient's Support Network Includes:      Functional Status: Moderate impairment     Progress toward goal: Not at goal    Prognosis: Good with Ongoing Treatment            Impression/Formulation:    VISIT DIAGNOSIS:     ICD-10-CM ICD-9-CM   1. Generalized anxiety disorder  F41.1 300.02        Patient appeared alert and oriented.  Patient is voluntarily requesting to continue outpatient therapy at [facility].  Patient is receptive to assistance with maintaining a stable lifestyle.  Patient presents with history of depression and anxiety.  Patient is agreeable to attend routine therapy sessions.  Patient expressed desire to maintain stability and participate in the therapeutic process.        Crisis Plan:  If symptoms/behaviors persist, Patient will present to the nearest hospital for an assessment. Advised patient of [facility] ER and assessment services.     Plan:   Patient will continue in individual outpatient therapy with focus on improved functioning and coping skills, maintaining stability, and avoiding decompensation and the need for higher level of care.    Patient will contact this office, call 911 or present to the nearest emergency room should suicidal or homicidal ideations occur. Provide Cognitive Behavioral Therapy and Solution Focused Therapy to improve functioning, maintain stability, and avoid decompensation and the need for higher level of care.     Return in about 1 weeks, or earlier if symptoms worsen or fail to improve.    Recommended Referrals: Patient is referred  to new therapist        This document has been electronically signed by Patti Garcia LCSW  June 16, 2022 12:50 EDT        Part of this note may be an electronic transcription/translation of spoken language to printed text using the Dragon Dictation System.

## 2022-06-30 RX ORDER — AMLODIPINE AND OLMESARTAN MEDOXOMIL 10; 40 MG/1; MG/1
TABLET ORAL
Qty: 90 TABLET | Refills: 0 | Status: SHIPPED | OUTPATIENT
Start: 2022-06-30 | End: 2022-09-06 | Stop reason: SDUPTHER

## 2022-06-30 RX ORDER — AMLODIPINE AND OLMESARTAN MEDOXOMIL 10; 40 MG/1; MG/1
1 TABLET ORAL DAILY
Qty: 30 TABLET | Refills: 0 | Status: SHIPPED | OUTPATIENT
Start: 2022-06-30 | End: 2022-06-30

## 2022-07-03 RX ORDER — FLUOXETINE HYDROCHLORIDE 20 MG/1
60 CAPSULE ORAL DAILY
Qty: 270 CAPSULE | Refills: 1 | OUTPATIENT
Start: 2022-07-03

## 2022-07-12 ENCOUNTER — TELEPHONE (OUTPATIENT)
Dept: PSYCHIATRY | Facility: CLINIC | Age: 52
End: 2022-07-12

## 2022-07-13 ENCOUNTER — TELEMEDICINE (OUTPATIENT)
Dept: PSYCHIATRY | Facility: CLINIC | Age: 52
End: 2022-07-13

## 2022-07-13 DIAGNOSIS — F33.2 SEVERE EPISODE OF RECURRENT MAJOR DEPRESSIVE DISORDER, WITHOUT PSYCHOTIC FEATURES: ICD-10-CM

## 2022-07-13 DIAGNOSIS — F51.01 PRIMARY INSOMNIA: ICD-10-CM

## 2022-07-13 DIAGNOSIS — F42.4 EXCORIATION (SKIN-PICKING) DISORDER: ICD-10-CM

## 2022-07-13 DIAGNOSIS — F41.1 GENERALIZED ANXIETY DISORDER: ICD-10-CM

## 2022-07-13 PROCEDURE — 99214 OFFICE O/P EST MOD 30 MIN: CPT | Performed by: NURSE PRACTITIONER

## 2022-07-13 RX ORDER — VENLAFAXINE HYDROCHLORIDE 150 MG/1
150 CAPSULE, EXTENDED RELEASE ORAL DAILY
Qty: 90 CAPSULE | Refills: 0 | Status: SHIPPED | OUTPATIENT
Start: 2022-07-13 | End: 2022-09-06

## 2022-07-13 RX ORDER — DOXEPIN HYDROCHLORIDE 75 MG/1
75 CAPSULE ORAL NIGHTLY
Qty: 90 CAPSULE | Refills: 0 | Status: SHIPPED | OUTPATIENT
Start: 2022-07-13 | End: 2022-09-06

## 2022-07-13 RX ORDER — ARIPIPRAZOLE 10 MG/1
10 TABLET ORAL NIGHTLY
Qty: 30 TABLET | Refills: 90 | Status: SHIPPED | OUTPATIENT
Start: 2022-07-13 | End: 2022-10-24

## 2022-07-13 NOTE — PROGRESS NOTES
"This provider [facility], [address] [place] [address].The Patient is seen remotely at home, [address] [facility] [address] via "[email or web address]hart.  Patient is being seen via telehealth and confirm that they are in a secure environment for this session. The patient's condition being diagnosed/treated is appropriate for telemedicine. The provider identified himself/herself: herself as well as her credentials.   The patient gave consent to be seen remotely, and when consent is given they understand that the consent allows for patient identifiable information to be sent to a third party as needed.   They may refuse to be seen remotely at any time. The electronic data is encrypted and password protected, and the patient has been advised of the potential risks to privacy not withstanding such measures.    You have chosen to receive care through a telehealth visit.  Do you consent to use a video/audio connection for your medical care today? Yes. Patient verified Name, , and address.       Chief Complaint  Depression and anxiety     Subjective    Marisol Carrillo presents to [facility] for medication management.     History of Present Illness   Patient presents today reporting both the changes in medications helped a \"little better\".  She states with the doxepin it is helping her sleep slightly more and she is getting 5 to 6 hours but still waking up early feeling like she needs more sleep.  Patient notes that she is transitioning for an in person counselor and then will transition her meds to in person.  Patient feels this will be better for her and is planning on seeing a psychiatrist.  Patient states that she slightly feels fatigued at times during the day but will take that as she is getting more sleep.  Patient states that she has seen a slight improvement with her depression but still notes negative feelings and difficulty " taking care of daily activities as well as no interest.  Patient states she is able to take care of her daughter though however.  Patient notes that the skin picking has improved and she is putting Band-Aids over the areas.  And is now taking 10 mg of the aripiprazole.  Patient states her anxiety is still difficult as nights are harder for her as she has increased heart rate and will pace as she feels some of this improvement is due to having more sleep.  Patient still rates her depression at 8-9 anxiety a 9 on a scale of 0-10 with 10 being the worst.  Denies any SI/HI/AH/VH.      Objective   Vital Signs:   There were no vitals taken for this visit.  Due to the remote nature of this encounter (virtual encounter), vitals were unable to be obtained.  Height stated at 64 inches.  Weight stated at 295 pounds.        PHQ-9 Score:   PHQ-9 Total Score:       Patient screened positive for depression based on a PHQ-9 score of  on . Follow-up recommendations include: see notes and medication list.    PHQ-9 Depression Screening  Little interest or pleasure in doing things?     Feeling down, depressed, or hopeless?     Trouble falling or staying asleep, or sleeping too much?     Feeling tired or having little energy?     Poor appetite or overeating?     Feeling bad about yourself - or that you are a failure or have let yourself or your family down?     Trouble concentrating on things, such as reading the newspaper or watching television?     Moving or speaking so slowly that other people could have noticed? Or the opposite - being so fidgety or restless that you have been moving around a lot more than usual?     Thoughts that you would be better off dead, or of hurting yourself in some way?     PHQ-9 Total Score     If you checked off any problems, how difficult have these problems made it for you to do your work, take care of things at home, or get along with other people?       PHQ-9 Total Score:               PROMIS scale  screening tool that patient filled out virtually reviewed by this APRN at today's encounter.      Mental Status Exam:   Hygiene:   good  Cooperation:  Cooperative  Eye Contact:  Fair  Psychomotor Behavior:  Appropriate  Affect:  Appropriate  Mood: depressed and anxious  Speech:  Normal  Thought Process:  Goal directed  Thought Content:  Normal  Suicidal:  None  Homicidal:  None  Hallucinations:  None  Delusion:  None  Memory:  Intact  Orientation:  Person, Place, Time and Situation  Reliability:  good  Insight:  Good and Fair  Judgement:  Good and Fair  Impulse Control:  Good and Fair  Physical/Medical Issues:  Yes see problem list     Current Medications:   Current Outpatient Medications   Medication Sig Dispense Refill   • ARIPiprazole (ABILIFY) 10 MG tablet Take 1 tablet by mouth Every Night. 30 tablet 90   • doxepin (SINEquan) 75 MG capsule Take 1 capsule by mouth Every Night. 90 capsule 0   • venlafaxine XR (Effexor XR) 150 MG 24 hr capsule Take 1 capsule by mouth Daily. 90 capsule 0   • amlodipine-olmesartan (RICK) 10-40 MG per tablet TAKE 1 TABLET BY MOUTH EVERY DAY FOR BLOOD PRESSURE 90 tablet 0   • aspirin 81 MG EC tablet Take 81 mg by mouth Daily.     • atorvastatin (LIPITOR) 40 MG tablet Take 1 tablet by mouth Every Night. For cholesterol 90 tablet 3   • carvedilol (Coreg) 12.5 MG tablet Take 1 tablet by mouth 2 (Two) Times a Day With Meals. For  tablet 3   • cholecalciferol (VITAMIN D3) 1000 units tablet Take 1,000 Units by mouth Daily.     • folic acid (FOLVITE) 1 MG tablet Take 1 tablet by mouth Daily. 90 tablet 3   • levothyroxine (Synthroid) 88 MCG tablet Take 1 tablet by mouth Daily. For thyroid; give generic 90 tablet 3   • nystatin (MYCOSTATIN) 245899 UNIT/GM powder Apply  topically to the appropriate area as directed 4 (Four) Times a Day. PRN yeast rash 60 g 11   • omeprazole (priLOSEC) 40 MG capsule 1 PO BID For GERD 180 capsule 3   • vitamin B-12 (CYANOCOBALAMIN) 1000 MCG tablet Take  1,000 mcg by mouth Daily.       No current facility-administered medications for this visit.       Physical Exam  Nursing note reviewed.   Constitutional:       Appearance: Normal appearance.   Neurological:      Mental Status: She is alert.   Psychiatric:         Attention and Perception: Attention and perception normal.         Mood and Affect: Mood is anxious and depressed. Affect is not tearful.         Speech: Speech normal.         Behavior: Behavior is agitated. Behavior is cooperative.         Thought Content: Thought content normal.         Cognition and Memory: Cognition and memory normal.        Result Review :     The following data was reviewed by: MARV Rico on 04/20/2022:  Common labs    Common Labsle 11/17/21 11/17/21 11/22/21 11/22/21    1532 1532 1543 1543   Glucose  101 (A)  98   BUN  19  18   Creatinine  1.16 (A)  0.95   eGFR Non African Am  55 (A)  70   eGFR African Am  63  81   Sodium  140  137   Potassium  5.5 (A)  4.7   Chloride  101  101   Calcium  9.7  9.5   WBC 12.0 (A)  11.5 (A)    Hemoglobin 13.0  12.7    Hematocrit 42.2  38.5    Platelets 420  393    (A) Abnormal value       Comments are available for some flowsheets but are not being displayed.           CMP    CMP 11/17/21 11/22/21   Glucose 101 (A) 98   BUN 19 18   Creatinine 1.16 (A) 0.95   eGFR Non  Am 55 (A) 70   eGFR African Am 63 81   Sodium 140 137   Potassium 5.5 (A) 4.7   Chloride 101 101   Calcium 9.7 9.5   (A) Abnormal value       Comments are available for some flowsheets but are not being displayed.           CBC    CBC 11/17/21 11/22/21   WBC 12.0 (A) 11.5 (A)   RBC 5.05 4.75   Hemoglobin 13.0 12.7   Hematocrit 42.2 38.5   MCV 84 81   MCH 25.7 (A) 26.7   MCHC 30.8 (A) 33.0   RDW 15.7 (A) 15.9 (A)   Platelets 420 393   (A) Abnormal value            CBC w/diff    CBC w/Diff 11/17/21 11/22/21   WBC 12.0 (A) 11.5 (A)   RBC 5.05 4.75   Hemoglobin 13.0 12.7   Hematocrit 42.2 38.5   MCV 84 81   MCH 25.7 (A)  26.7   MCHC 30.8 (A) 33.0   RDW 15.7 (A) 15.9 (A)   Platelets 420 393   Neutrophil Rel % 69 71   Lymphocyte Rel % 23 21   Monocyte Rel % 6 6   Eosinophil Rel % 1 1   Basophil Rel % 1 1   (A) Abnormal value                    Electrolytes    Electrolytes 11/17/21 11/22/21   Sodium 140 137   Potassium 5.5 (A) 4.7   Chloride 101 101   Calcium 9.7 9.5   (A) Abnormal value            Renal Profile    Renal Profile 11/17/21 11/22/21   BUN 19 18   Creatinine 1.16 (A) 0.95   eGFR Non  Am 55 (A) 70   eGFR  Am 63 81   (A) Abnormal value       Comments are available for some flowsheets but are not being displayed.               UA    Urinalysis 11/18/21   Ketones, UA Negative   Leukocytes, UA Trace (A)   (A) Abnormal value            Data reviewed: PCP notes        Assessment and Plan    Problem List Items Addressed This Visit        Mental Health    Depression    Relevant Medications    ARIPiprazole (ABILIFY) 10 MG tablet    doxepin (SINEquan) 75 MG capsule    venlafaxine XR (Effexor XR) 150 MG 24 hr capsule      Other Visit Diagnoses     Primary insomnia        Relevant Medications    ARIPiprazole (ABILIFY) 10 MG tablet    doxepin (SINEquan) 75 MG capsule    Excoriation (skin-picking) disorder        Relevant Medications    ARIPiprazole (ABILIFY) 10 MG tablet    doxepin (SINEquan) 75 MG capsule    venlafaxine XR (Effexor XR) 150 MG 24 hr capsule    Generalized anxiety disorder        Relevant Medications    ARIPiprazole (ABILIFY) 10 MG tablet    doxepin (SINEquan) 75 MG capsule    venlafaxine XR (Effexor XR) 150 MG 24 hr capsule            TREATMENT PLAN/GOALS: Continue supportive psychotherapy efforts and medications as indicated. Treatment and medication options discussed during today's visit. Patient ackowledged and verbally consented to continue with current treatment plan and was educated on the importance of compliance with treatment and follow-up appointments.    MEDICATION ISSUES:  We discussed risks,  benefits, and side effects of the above medications and the patient was agreeable with the plan. Patient was educated on the importance of compliance with treatment and follow-up appointments.  Patient is agreeable to call the office with any worsening of symptoms or onset of side effects. Patient is agreeable to call 911 or go to the nearest ER should he/she begin having SI/HI.      -Continue venlafaxine 150 mg XL daily for depression and anxiety.  -Continue aripiprazole 10 mg as adjunct for severe depression as well as skin picking.  Patient has now failed and tried sertraline, fluoxetine, bupropion and venlafaxine, buspirone, trazodone, mirtazapine and quetiapine.  This is medically necessary at this time in order to avoid hospitalization.  Highly encouraged patient if she had any side effects or worsening symptoms to contact the clinic.    -Continue doxepin to 75 mg nightly for sleep as well as anxiety.    -Informed patient that I may not be able to get her sleep under control at this time without her seeing a sleep specialist patient was made aware and agreeable with this.  -Patient states she will be following up in person possibly in September with a therapist as well as a psychiatrist.  Will refill medications until she can follow up with someone in person.    Counseled patient regarding multimodal approach with healthy nutrition, healthy sleep, regular physical activity, social activities, counseling, and medications.      Coping skills reviewed and encouraged positive framing of thoughts     Assisted patient in processing above session content; acknowledged and normalized patient’s thoughts, feelings, and concerns.  Applied  positive coping skills and behavior management in session.  Allowed patient to freely discuss issues without interruption or judgment. Provided safe, confidential environment to facilitate the development of positive therapeutic relationship and encourage open, honest communication.  Assisted patient in identifying risk factors which would indicate the need for higher level of care including thoughts to harm self or others and/or self-harming behavior and encouraged patient to contact this office, call 911, or present to the nearest emergency room should any of these events occur. Discussed crisis intervention services and means to access.     MEDS ORDERED DURING VISIT:  New Medications Ordered This Visit   Medications   • ARIPiprazole (ABILIFY) 10 MG tablet     Sig: Take 1 tablet by mouth Every Night.     Dispense:  30 tablet     Refill:  90   • doxepin (SINEquan) 75 MG capsule     Sig: Take 1 capsule by mouth Every Night.     Dispense:  90 capsule     Refill:  0   • venlafaxine XR (Effexor XR) 150 MG 24 hr capsule     Sig: Take 1 capsule by mouth Daily.     Dispense:  90 capsule     Refill:  0         Follow Up   Return in about 4 weeks (around 8/10/2022), or if symptoms worsen or fail to improve, for Recheck.  Highly encouraged the patient if she had any side effects or worsening symptoms to contact the clinic for sooner appointment she verbalized understanding.    Patient was given instructions and counseling regarding her condition or for health maintenance advice. Please see specific information pulled into the AVS if appropriate.     Some of the data in this electronic note has been brought forward from a previous encounter, any necessary changes have been made, it has been reviewed by this APRN, and it is accurate.      This document has been electronically signed by MARV Rico  July 13, 2022 13:32 EDT    Part of this note may be an electronic transcription/translation of spoken language to printed text using the Dragon Dictation System.

## 2022-08-15 ENCOUNTER — TELEMEDICINE (OUTPATIENT)
Dept: PSYCHIATRY | Facility: CLINIC | Age: 52
End: 2022-08-15

## 2022-08-15 DIAGNOSIS — F51.01 PRIMARY INSOMNIA: ICD-10-CM

## 2022-08-15 DIAGNOSIS — F42.4 EXCORIATION (SKIN-PICKING) DISORDER: ICD-10-CM

## 2022-08-15 DIAGNOSIS — F33.2 SEVERE EPISODE OF RECURRENT MAJOR DEPRESSIVE DISORDER, WITHOUT PSYCHOTIC FEATURES: Primary | ICD-10-CM

## 2022-08-15 DIAGNOSIS — F41.1 GENERALIZED ANXIETY DISORDER: ICD-10-CM

## 2022-08-15 PROCEDURE — 99213 OFFICE O/P EST LOW 20 MIN: CPT | Performed by: NURSE PRACTITIONER

## 2022-08-15 NOTE — PROGRESS NOTES
"This provider is located at [facility], [address] [place] [address].The Patient is seen remotely at home, [address] [facility] [address] via [employer].  Patient is being seen via telehealth and confirm that they are in a secure environment for this session. The patient's condition being diagnosed/treated is appropriate for telemedicine. The provider identified himself/herself: herself as well as her credentials.   The patient gave consent to be seen remotely, and when consent is given they understand that the consent allows for patient identifiable information to be sent to a third party as needed.   They may refuse to be seen remotely at any time. The electronic data is encrypted and password protected, and the patient has been advised of the potential risks to privacy not withstanding such measures.    You have chosen to receive care through a telehealth visit.  Do you consent to use a video/audio connection for your medical care today? Yes. Patient verified Name, , and address.       Chief Complaint  Depression and anxiety     Subjective    Marisol Carrillo presents to [facility] for medication management.     History of Present Illness   Patient presents today reporting that she is frustrated because she feels she cannot simply do small things.  She states that she has felt slightly more down at times as there are minor things she just has to do but she does not do them as she just sits and thinks about them.  Patient notes that she just wants to get up and do things instead of feeling worried about them.  She notes that she is getting 5 to 6 hours of sleep but it does vary through the night in the day.  She states \"I am better still not where I would like to be\".  She reports therapy has been going well and notes that she sees the psychiatrist next month.  Patient notes that the skin picking has been better as she has 4-5 " areas on her leg that are healing.  Patient states that she has noticed that she is consciously picking and able to stop.  She denies any side effects with the medications.  When talking with patient about changing some of her medications since her depression is still significant as well as her anxiety she states that she does not want make any changes at this time since she is seeing a psychiatrist next month in person which she preferred.  Denies any SI/HI/AH/VH.      Objective   Vital Signs:   There were no vitals taken for this visit.  Due to the remote nature of this encounter (virtual encounter), vitals were unable to be obtained.  Height stated at 64 inches.  Weight stated at 295 pounds.        PHQ-9 Score:   PHQ-9 Total Score:       Patient screened positive for depression based on a PHQ-9 score of  on . Follow-up recommendations include: see notes and medication list.    PHQ-9 Depression Screening  Little interest or pleasure in doing things?     Feeling down, depressed, or hopeless?     Trouble falling or staying asleep, or sleeping too much?     Feeling tired or having little energy?     Poor appetite or overeating?     Feeling bad about yourself - or that you are a failure or have let yourself or your family down?     Trouble concentrating on things, such as reading the newspaper or watching television?     Moving or speaking so slowly that other people could have noticed? Or the opposite - being so fidgety or restless that you have been moving around a lot more than usual?     Thoughts that you would be better off dead, or of hurting yourself in some way?     PHQ-9 Total Score     If you checked off any problems, how difficult have these problems made it for you to do your work, take care of things at home, or get along with other people?       PHQ-9 Total Score:               PROMIS scale screening tool that patient filled out virtually reviewed by this APRN at today's encounter.      Mental Status  Exam:   Hygiene:   good  Cooperation:  Cooperative  Eye Contact:  Fair  Psychomotor Behavior:  Appropriate  Affect:  Appropriate  Mood: depressed and anxious  Speech:  Normal  Thought Process:  Goal directed  Thought Content:  Normal  Suicidal:  None  Homicidal:  None  Hallucinations:  None  Delusion:  None  Memory:  Intact  Orientation:  Person, Place, Time and Situation  Reliability:  good  Insight:  Good and Fair  Judgement:  Good and Fair  Impulse Control:  Good and Fair  Physical/Medical Issues:  Yes see problem list     Current Medications:   Current Outpatient Medications   Medication Sig Dispense Refill   • amlodipine-olmesartan (RICK) 10-40 MG per tablet TAKE 1 TABLET BY MOUTH EVERY DAY FOR BLOOD PRESSURE 90 tablet 0   • ARIPiprazole (ABILIFY) 10 MG tablet Take 1 tablet by mouth Every Night. 30 tablet 90   • aspirin 81 MG EC tablet Take 81 mg by mouth Daily.     • atorvastatin (LIPITOR) 40 MG tablet Take 1 tablet by mouth Every Night. For cholesterol 90 tablet 3   • carvedilol (Coreg) 12.5 MG tablet Take 1 tablet by mouth 2 (Two) Times a Day With Meals. For  tablet 3   • cholecalciferol (VITAMIN D3) 1000 units tablet Take 1,000 Units by mouth Daily.     • doxepin (SINEquan) 75 MG capsule Take 1 capsule by mouth Every Night. 90 capsule 0   • folic acid (FOLVITE) 1 MG tablet Take 1 tablet by mouth Daily. 90 tablet 3   • levothyroxine (Synthroid) 88 MCG tablet Take 1 tablet by mouth Daily. For thyroid; give generic 90 tablet 3   • nystatin (MYCOSTATIN) 195148 UNIT/GM powder Apply  topically to the appropriate area as directed 4 (Four) Times a Day. PRN yeast rash 60 g 11   • omeprazole (priLOSEC) 40 MG capsule 1 PO BID For GERD 180 capsule 3   • venlafaxine XR (Effexor XR) 150 MG 24 hr capsule Take 1 capsule by mouth Daily. 90 capsule 0   • vitamin B-12 (CYANOCOBALAMIN) 1000 MCG tablet Take 1,000 mcg by mouth Daily.       No current facility-administered medications for this visit.       Physical  Exam  Nursing note reviewed.   Constitutional:       Appearance: Normal appearance.   Neurological:      Mental Status: She is alert.   Psychiatric:         Attention and Perception: Attention and perception normal.         Mood and Affect: Mood is anxious and depressed. Affect is not tearful.         Speech: Speech normal.         Behavior: Behavior is agitated. Behavior is cooperative.         Thought Content: Thought content normal.         Cognition and Memory: Cognition and memory normal.        Result Review :     The following data was reviewed by: MARV Rico on 04/20/2022:  Common labs    Common Labsle 11/17/21 11/17/21 11/22/21 11/22/21    1532 1532 1543 1543   Glucose  101 (A)  98   BUN  19  18   Creatinine  1.16 (A)  0.95   eGFR Non African Am  55 (A)  70   eGFR African Am  63  81   Sodium  140  137   Potassium  5.5 (A)  4.7   Chloride  101  101   Calcium  9.7  9.5   WBC 12.0 (A)  11.5 (A)    Hemoglobin 13.0  12.7    Hematocrit 42.2  38.5    Platelets 420  393    (A) Abnormal value       Comments are available for some flowsheets but are not being displayed.           CMP    CMP 11/17/21 11/22/21   Glucose 101 (A) 98   BUN 19 18   Creatinine 1.16 (A) 0.95   eGFR Non  Am 55 (A) 70   eGFR African Am 63 81   Sodium 140 137   Potassium 5.5 (A) 4.7   Chloride 101 101   Calcium 9.7 9.5   (A) Abnormal value       Comments are available for some flowsheets but are not being displayed.           CBC    CBC 11/17/21 11/22/21   WBC 12.0 (A) 11.5 (A)   RBC 5.05 4.75   Hemoglobin 13.0 12.7   Hematocrit 42.2 38.5   MCV 84 81   MCH 25.7 (A) 26.7   MCHC 30.8 (A) 33.0   RDW 15.7 (A) 15.9 (A)   Platelets 420 393   (A) Abnormal value            CBC w/diff    CBC w/Diff 11/17/21 11/22/21   WBC 12.0 (A) 11.5 (A)   RBC 5.05 4.75   Hemoglobin 13.0 12.7   Hematocrit 42.2 38.5   MCV 84 81   MCH 25.7 (A) 26.7   MCHC 30.8 (A) 33.0   RDW 15.7 (A) 15.9 (A)   Platelets 420 393   Neutrophil Rel % 69 71   Lymphocyte  Rel % 23 21   Monocyte Rel % 6 6   Eosinophil Rel % 1 1   Basophil Rel % 1 1   (A) Abnormal value                    Electrolytes    Electrolytes 11/17/21 11/22/21   Sodium 140 137   Potassium 5.5 (A) 4.7   Chloride 101 101   Calcium 9.7 9.5   (A) Abnormal value            Renal Profile    Renal Profile 11/17/21 11/22/21   BUN 19 18   Creatinine 1.16 (A) 0.95   eGFR Non  Am 55 (A) 70   eGFR  Am 63 81   (A) Abnormal value       Comments are available for some flowsheets but are not being displayed.               UA    Urinalysis 11/18/21   Ketones, UA Negative   Leukocytes, UA Trace (A)   (A) Abnormal value            Data reviewed: PCP notes        Assessment and Plan    Problem List Items Addressed This Visit        Mental Health    Depression - Primary      Other Visit Diagnoses     Primary insomnia        Excoriation (skin-picking) disorder        Generalized anxiety disorder                TREATMENT PLAN/GOALS: Continue supportive psychotherapy efforts and medications as indicated. Treatment and medication options discussed during today's visit. Patient ackowledged and verbally consented to continue with current treatment plan and was educated on the importance of compliance with treatment and follow-up appointments.    MEDICATION ISSUES:  We discussed risks, benefits, and side effects of the above medications and the patient was agreeable with the plan. Patient was educated on the importance of compliance with treatment and follow-up appointments.  Patient is agreeable to call the office with any worsening of symptoms or onset of side effects. Patient is agreeable to call 911 or go to the nearest ER should he/she begin having SI/HI.      -Continue venlafaxine 150 mg XL daily for depression and anxiety.  -Continue aripiprazole 10 mg as adjunct for severe depression as well as skin picking.  Patient has now failed and tried sertraline, fluoxetine, bupropion and venlafaxine, buspirone, trazodone,  mirtazapine and quetiapine.  This is medically necessary at this time in order to avoid hospitalization.  Highly encouraged patient if she had any side effects or worsening symptoms to contact the clinic.    -Continue doxepin  75 mg nightly for sleep as well as anxiety.    -Patient was once again highly encouraged to follow up with a sleep  but she does not want to at this time as she follows up with a psychiatrist in person next month and is going to continue with this regimen at this time.  -90-day supply given to patient last month as she states she has plenty of medication until she follows up with a psychiatrist in person and did not feel as if making any changes to her medication at this time.  Patient was appreciative but stated that she did need someone in person.  Encouraged patient that she would just need to fill out consent for medical records but if she had any issues or concerns to contact the clinic she verbalized understanding.    Counseled patient regarding multimodal approach with healthy nutrition, healthy sleep, regular physical activity, social activities, counseling, and medications.      Coping skills reviewed and encouraged positive framing of thoughts     Assisted patient in processing above session content; acknowledged and normalized patient’s thoughts, feelings, and concerns.  Applied  positive coping skills and behavior management in session.  Allowed patient to freely discuss issues without interruption or judgment. Provided safe, confidential environment to facilitate the development of positive therapeutic relationship and encourage open, honest communication. Assisted patient in identifying risk factors which would indicate the need for higher level of care including thoughts to harm self or others and/or self-harming behavior and encouraged patient to contact this office, call 911, or present to the nearest emergency room should any of these events occur. Discussed  crisis intervention services and means to access.     MEDS ORDERED DURING VISIT:  No orders of the defined types were placed in this encounter.        Follow Up   Return for Next scheduled follow up with a psychiatrist in person as patient prefers .     Patient was given instructions and counseling regarding her condition or for health maintenance advice. Please see specific information pulled into the AVS if appropriate.     Some of the data in this electronic note has been brought forward from a previous encounter, any necessary changes have been made, it has been reviewed by this APRN, and it is accurate.      This document has been electronically signed by MARV Rico  August 15, 2022 11:28 EDT    Part of this note may be an electronic transcription/translation of spoken language to printed text using the Dragon Dictation System.

## 2022-09-06 ENCOUNTER — OFFICE VISIT (OUTPATIENT)
Dept: FAMILY MEDICINE CLINIC | Facility: CLINIC | Age: 52
End: 2022-09-06

## 2022-09-06 VITALS
WEIGHT: 256 LBS | OXYGEN SATURATION: 98 % | DIASTOLIC BLOOD PRESSURE: 80 MMHG | SYSTOLIC BLOOD PRESSURE: 120 MMHG | TEMPERATURE: 97.4 F | HEART RATE: 120 BPM | HEIGHT: 64 IN | BODY MASS INDEX: 43.71 KG/M2 | RESPIRATION RATE: 20 BRPM

## 2022-09-06 DIAGNOSIS — E03.9 ACQUIRED HYPOTHYROIDISM: ICD-10-CM

## 2022-09-06 DIAGNOSIS — E53.8 LOW FOLIC ACID: ICD-10-CM

## 2022-09-06 DIAGNOSIS — K21.9 GASTROESOPHAGEAL REFLUX DISEASE WITHOUT ESOPHAGITIS: ICD-10-CM

## 2022-09-06 DIAGNOSIS — R73.01 IMPAIRED FASTING GLUCOSE: ICD-10-CM

## 2022-09-06 DIAGNOSIS — I10 PRIMARY HYPERTENSION: Primary | ICD-10-CM

## 2022-09-06 DIAGNOSIS — D50.8 OTHER IRON DEFICIENCY ANEMIA: ICD-10-CM

## 2022-09-06 DIAGNOSIS — F33.1 MODERATE EPISODE OF RECURRENT MAJOR DEPRESSIVE DISORDER: ICD-10-CM

## 2022-09-06 DIAGNOSIS — E55.9 VITAMIN D DEFICIENCY: ICD-10-CM

## 2022-09-06 PROCEDURE — 99214 OFFICE O/P EST MOD 30 MIN: CPT | Performed by: PHYSICIAN ASSISTANT

## 2022-09-06 RX ORDER — LEVOTHYROXINE SODIUM 88 UG/1
88 TABLET ORAL DAILY
Qty: 90 TABLET | Refills: 3 | Status: SHIPPED | OUTPATIENT
Start: 2022-09-06

## 2022-09-06 RX ORDER — AMLODIPINE AND OLMESARTAN MEDOXOMIL 10; 40 MG/1; MG/1
1 TABLET ORAL DAILY
Qty: 90 TABLET | Refills: 1 | Status: SHIPPED | OUTPATIENT
Start: 2022-09-06

## 2022-09-06 RX ORDER — OMEPRAZOLE 40 MG/1
CAPSULE, DELAYED RELEASE ORAL
Qty: 180 CAPSULE | Refills: 3 | Status: SHIPPED | OUTPATIENT
Start: 2022-09-06

## 2022-09-06 RX ORDER — ARIPIPRAZOLE 10 MG/1
1 TABLET ORAL
COMMUNITY
Start: 2022-07-19 | End: 2022-09-21

## 2022-09-06 RX ORDER — DOXEPIN HYDROCHLORIDE 75 MG/1
1 CAPSULE ORAL NIGHTLY
COMMUNITY
Start: 2022-07-13 | End: 2022-09-22 | Stop reason: DRUGHIGH

## 2022-09-06 RX ORDER — FOLIC ACID 1 MG/1
1 TABLET ORAL DAILY
Qty: 90 TABLET | Refills: 3 | Status: SHIPPED | OUTPATIENT
Start: 2022-09-06

## 2022-09-06 RX ORDER — TRAMADOL HYDROCHLORIDE 50 MG/1
TABLET ORAL
COMMUNITY
Start: 2022-08-24

## 2022-09-06 RX ORDER — ATORVASTATIN CALCIUM 40 MG/1
40 TABLET, FILM COATED ORAL NIGHTLY
Qty: 90 TABLET | Refills: 3 | Status: SHIPPED | OUTPATIENT
Start: 2022-09-06

## 2022-09-06 RX ORDER — AMLODIPINE AND OLMESARTAN MEDOXOMIL 10; 40 MG/1; MG/1
1 TABLET ORAL DAILY
Qty: 90 TABLET | Refills: 1 | Status: SHIPPED | OUTPATIENT
Start: 2022-09-06 | End: 2022-09-06 | Stop reason: SDUPTHER

## 2022-09-06 RX ORDER — CARVEDILOL 12.5 MG/1
12.5 TABLET ORAL 2 TIMES DAILY WITH MEALS
Qty: 180 TABLET | Refills: 1 | Status: SHIPPED | OUTPATIENT
Start: 2022-09-06

## 2022-09-06 NOTE — PROGRESS NOTES
"Subjective   Marisol Carrillo is a 52 y.o. female.     History of Present Illness    Since the last visit, she has overall felt depressed.  She has Primary Hypertension and well controlled on current medication, Impaired fasting glucose and will monitor labs to watch for DMII, GERD controlled on PPI Rx, Hyperlipidemia with goals met with current Rx, Hypothyroidism and must update labs to continue treatment and Vitamin D deficiency and will update labs for continued management.  she has been compliant with current medications have reviewed them.  The patient denies medication side effects.  Will refill medications. /68   Pulse 120   Temp 97.4 °F (36.3 °C)   Resp 20   Ht 162.6 cm (64\")   Wt 116 kg (256 lb)   SpO2 98%   BMI 43.94 kg/m²   Will need knee replaced right---DR Chavez  Results for orders placed or performed during the hospital encounter of 11/18/21   Urine Culture - Urine, Urine, Clean Catch    Specimen: Urine, Clean Catch   Result Value Ref Range    Urine Culture Final report     Result 1 No growth    POCT Urinalysis (manual dipstick)    Specimen: Urine   Result Value Ref Range    Color Yellow Yellow, Straw, Dark Yellow, Marisabel    Clarity, UA Clear Clear    Glucose, UA Negative Negative, 1000 mg/dL (3+) mg/dL    Bilirubin Negative Negative    Ketones, UA Negative Negative    Specific Gravity  1.015 1.005 - 1.030    Blood, UA Negative Negative    pH, Urine 5.0 5.0 - 8.0    Protein, POC Trace (A) Negative mg/dL    Urobilinogen, UA Normal Normal    Leukocytes Trace (A) Negative    Nitrite, UA Negative Negative   gastric band removed already  Considering gastric sleeve  Saw DR WHITAKER 4-24-22 L DDD. Hx lumbar fusion    Weight up    She will go to [address] [place] [address].  To see psych. She is still having depression and anxiety. Update all labs and the folic acid.   Last labs 11-17-21  Folic acid is in lower range and want you to start prescription folic acid 1 mg daily and sent Rx.  K+--- potassium is high and this " is a concern with your blood pressure medicine.  1 to make sure you are not taking extra potassium over-the-counter and do need to repeat this lab in the next few days.  Also concerned about the elevated white count and are you having any signs of infection?  Do drink water prior to your repeat lab --your renal functions show some dehydration    Will be due later this yr EGD for GERD  We also talked about her weight loss and do want her to consider trying Saxenda due to her inability getting Wegovy at the pharmacy and she has no history of pancreatitis and no gastroparesis and no family history of thyroid cancer or MEN  The following portions of the patient's history were reviewed and updated as appropriate: allergies, current medications, past family history, past medical history, past social history, past surgical history and problem list.    Review of Systems   Constitutional: Positive for fatigue and unexpected weight change. Negative for fever.   HENT: Negative for nosebleeds and trouble swallowing.    Eyes: Negative for visual disturbance.   Respiratory: Negative for choking and stridor.    Cardiovascular: Negative for chest pain.   Gastrointestinal: Negative for blood in stool.   Endocrine: Negative for polydipsia.   Genitourinary: Negative for genital sores and hematuria.   Musculoskeletal: Positive for back pain and gait problem. Negative for joint swelling.   Skin: Negative for color change and rash.   Allergic/Immunologic: Negative for immunocompromised state.   Neurological: Negative for seizures, facial asymmetry and speech difficulty.   Hematological: Negative for adenopathy.   Psychiatric/Behavioral: Positive for dysphoric mood. Negative for behavioral problems, self-injury and suicidal ideas.       Objective   Physical Exam  Vitals and nursing note reviewed.   Constitutional:       General: She is not in acute distress.     Appearance: She is well-developed. She is obese. She is not ill-appearing or  toxic-appearing.   HENT:      Head: Normocephalic.      Right Ear: External ear normal.      Left Ear: External ear normal.      Nose: Nose normal.      Mouth/Throat:      Pharynx: Oropharynx is clear.   Eyes:      General: No scleral icterus.     Conjunctiva/sclera: Conjunctivae normal.      Pupils: Pupils are equal, round, and reactive to light.   Neck:      Thyroid: No thyromegaly.      Vascular: No carotid bruit.   Cardiovascular:      Rate and Rhythm: Normal rate and regular rhythm.      Heart sounds: Normal heart sounds. No murmur heard.  Pulmonary:      Effort: Pulmonary effort is normal. No respiratory distress.      Breath sounds: Normal breath sounds.   Musculoskeletal:         General: No deformity. Normal range of motion.      Cervical back: Normal range of motion and neck supple.      Right lower leg: No edema.      Left lower leg: No edema.   Skin:     General: Skin is warm and dry.      Findings: No rash.   Neurological:      General: No focal deficit present.      Mental Status: She is alert and oriented to person, place, and time. Mental status is at baseline.   Psychiatric:         Mood and Affect: Mood normal.         Behavior: Behavior normal.         Thought Content: Thought content normal.         Judgment: Judgment normal.         Assessment & Plan   Diagnoses and all orders for this visit:    1. Primary hypertension (Primary)    2. Acquired hypothyroidism    3. Impaired fasting glucose    4. Low folic acid    5. Gastroesophageal reflux disease without esophagitis    6. Vitamin D deficiency    7. Moderate episode of recurrent major depressive disorder (HCC)        declines mammogram and colon cancer screening  Plan, Marisol Carrillo, was seen today.  she was seen for HTN and continue medication, Imparied fasting glucose and plan follow up labs, diet, and exercise, GERD and will continue on PPI medication, Hyperlipidemia and will continue current medication, Hypothyroidism and will need to  update labs for continued treatment and Vitamin D deficiency and will update labs .  To have knee replaced [facility]ine Clay ---can send Rx       Answers for HPI/ROS submitted by the patient on 8/30/2022  Please describe your symptoms.: Depression anxiety  Have you had these symptoms before?: Yes  How long have you been having these symptoms?: Greater than 2 weeks  Please list any medications you are currently taking for this condition.: No  What is the primary reason for your visit?: Other

## 2022-09-07 LAB
25(OH)D3+25(OH)D2 SERPL-MCNC: 36.5 NG/ML (ref 30–100)
ALBUMIN SERPL-MCNC: 4.3 G/DL (ref 3.5–5.2)
ALBUMIN/GLOB SERPL: 1.5 G/DL
ALP SERPL-CCNC: 109 U/L (ref 39–117)
ALT SERPL-CCNC: 12 U/L (ref 1–33)
AST SERPL-CCNC: 14 U/L (ref 1–32)
BASOPHILS # BLD AUTO: 0.08 10*3/MM3 (ref 0–0.2)
BASOPHILS NFR BLD AUTO: 0.8 % (ref 0–1.5)
BILIRUB SERPL-MCNC: 0.2 MG/DL (ref 0–1.2)
BUN SERPL-MCNC: 17 MG/DL (ref 6–20)
BUN/CREAT SERPL: 17 (ref 7–25)
CALCIUM SERPL-MCNC: 9.7 MG/DL (ref 8.6–10.5)
CHLORIDE SERPL-SCNC: 103 MMOL/L (ref 98–107)
CHOLEST SERPL-MCNC: 173 MG/DL (ref 0–200)
CO2 SERPL-SCNC: 25 MMOL/L (ref 22–29)
CREAT SERPL-MCNC: 1 MG/DL (ref 0.57–1)
EGFRCR-CYS SERPLBLD CKD-EPI 2021: 68.3 ML/MIN/1.73
EOSINOPHIL # BLD AUTO: 0.15 10*3/MM3 (ref 0–0.4)
EOSINOPHIL NFR BLD AUTO: 1.6 % (ref 0.3–6.2)
ERYTHROCYTE [DISTWIDTH] IN BLOOD BY AUTOMATED COUNT: 16.2 % (ref 12.3–15.4)
FOLATE SERPL-MCNC: 13.7 NG/ML (ref 4.78–24.2)
GLOBULIN SER CALC-MCNC: 2.8 GM/DL
GLUCOSE SERPL-MCNC: 130 MG/DL (ref 65–99)
HBA1C MFR BLD: 5.4 % (ref 4.8–5.6)
HCT VFR BLD AUTO: 38.1 % (ref 34–46.6)
HDLC SERPL-MCNC: 58 MG/DL (ref 40–60)
HGB BLD-MCNC: 11.9 G/DL (ref 12–15.9)
IMM GRANULOCYTES # BLD AUTO: 0.03 10*3/MM3 (ref 0–0.05)
IMM GRANULOCYTES NFR BLD AUTO: 0.3 % (ref 0–0.5)
LDLC SERPL CALC-MCNC: 94 MG/DL (ref 0–100)
LYMPHOCYTES # BLD AUTO: 2.1 10*3/MM3 (ref 0.7–3.1)
LYMPHOCYTES NFR BLD AUTO: 21.7 % (ref 19.6–45.3)
MCH RBC QN AUTO: 26.6 PG (ref 26.6–33)
MCHC RBC AUTO-ENTMCNC: 31.2 G/DL (ref 31.5–35.7)
MCV RBC AUTO: 85 FL (ref 79–97)
MONOCYTES # BLD AUTO: 0.57 10*3/MM3 (ref 0.1–0.9)
MONOCYTES NFR BLD AUTO: 5.9 % (ref 5–12)
NEUTROPHILS # BLD AUTO: 6.74 10*3/MM3 (ref 1.7–7)
NEUTROPHILS NFR BLD AUTO: 69.7 % (ref 42.7–76)
NRBC BLD AUTO-RTO: 0 /100 WBC (ref 0–0.2)
PLATELET # BLD AUTO: 419 10*3/MM3 (ref 140–450)
POTASSIUM SERPL-SCNC: 4.2 MMOL/L (ref 3.5–5.2)
PROT SERPL-MCNC: 7.1 G/DL (ref 6–8.5)
RBC # BLD AUTO: 4.48 10*6/MM3 (ref 3.77–5.28)
SODIUM SERPL-SCNC: 141 MMOL/L (ref 136–145)
T3FREE SERPL-MCNC: 2.5 PG/ML (ref 2–4.4)
T4 FREE SERPL-MCNC: 1.22 NG/DL (ref 0.93–1.7)
TRIGL SERPL-MCNC: 121 MG/DL (ref 0–150)
TSH SERPL DL<=0.005 MIU/L-ACNC: 3 UIU/ML (ref 0.27–4.2)
VIT B12 SERPL-MCNC: 617 PG/ML (ref 211–946)
VLDLC SERPL CALC-MCNC: 21 MG/DL (ref 5–40)
WBC # BLD AUTO: 9.67 10*3/MM3 (ref 3.4–10.8)

## 2022-09-10 LAB
FERRITIN SERPL-MCNC: 9.02 NG/ML (ref 13–150)
IRON SATN MFR SERPL: 5 % (ref 20–50)
IRON SERPL-MCNC: 28 MCG/DL (ref 37–145)
Lab: NORMAL
TIBC SERPL-MCNC: 565 MCG/DL
UIBC SERPL-MCNC: 537 MCG/DL (ref 112–346)
WRITTEN AUTHORIZATION: NORMAL

## 2022-09-22 ENCOUNTER — OFFICE VISIT (OUTPATIENT)
Dept: BARIATRICS/WEIGHT MGMT | Facility: CLINIC | Age: 52
End: 2022-09-22

## 2022-09-22 VITALS
TEMPERATURE: 97.8 F | HEART RATE: 115 BPM | BODY MASS INDEX: 50.02 KG/M2 | OXYGEN SATURATION: 98 % | DIASTOLIC BLOOD PRESSURE: 79 MMHG | RESPIRATION RATE: 16 BRPM | HEIGHT: 64 IN | WEIGHT: 293 LBS | SYSTOLIC BLOOD PRESSURE: 119 MMHG

## 2022-09-22 DIAGNOSIS — R53.83 FATIGUE, UNSPECIFIED TYPE: ICD-10-CM

## 2022-09-22 DIAGNOSIS — K22.70 BARRETT'S ESOPHAGUS WITHOUT DYSPLASIA: ICD-10-CM

## 2022-09-22 DIAGNOSIS — M25.562 CHRONIC PAIN OF LEFT KNEE: ICD-10-CM

## 2022-09-22 DIAGNOSIS — Z98.84 HISTORY OF REMOVAL OF LAPAROSCOPIC GASTRIC BANDING DEVICE: ICD-10-CM

## 2022-09-22 DIAGNOSIS — M54.42 CHRONIC BILATERAL LOW BACK PAIN WITH BILATERAL SCIATICA: ICD-10-CM

## 2022-09-22 DIAGNOSIS — K59.00 CONSTIPATION, UNSPECIFIED CONSTIPATION TYPE: ICD-10-CM

## 2022-09-22 DIAGNOSIS — G89.29 CHRONIC PAIN OF LEFT KNEE: ICD-10-CM

## 2022-09-22 DIAGNOSIS — G89.29 CHRONIC BILATERAL LOW BACK PAIN WITH BILATERAL SCIATICA: ICD-10-CM

## 2022-09-22 DIAGNOSIS — I10 PRIMARY HYPERTENSION: ICD-10-CM

## 2022-09-22 DIAGNOSIS — E66.01 CLASS 3 SEVERE OBESITY DUE TO EXCESS CALORIES WITH SERIOUS COMORBIDITY AND BODY MASS INDEX (BMI) OF 60.0 TO 69.9 IN ADULT: Primary | ICD-10-CM

## 2022-09-22 DIAGNOSIS — M54.41 CHRONIC BILATERAL LOW BACK PAIN WITH BILATERAL SCIATICA: ICD-10-CM

## 2022-09-22 PROBLEM — E66.813 OBESITY, CLASS III, BMI 40-49.9 (MORBID OBESITY): Status: RESOLVED | Noted: 2019-05-07 | Resolved: 2022-09-22

## 2022-09-22 PROCEDURE — 99203 OFFICE O/P NEW LOW 30 MIN: CPT | Performed by: SURGERY

## 2022-09-22 RX ORDER — DOXEPIN HYDROCHLORIDE 100 MG/1
1 CAPSULE ORAL NIGHTLY
COMMUNITY
Start: 2022-09-19 | End: 2022-11-17

## 2022-09-22 RX ORDER — TRAMADOL HYDROCHLORIDE 50 MG/1
50 TABLET ORAL
COMMUNITY
Start: 2022-09-22 | End: 2022-09-22 | Stop reason: SDUPTHER

## 2022-09-22 RX ORDER — BREXPIPRAZOLE 0.5 MG/1
TABLET ORAL DAILY
COMMUNITY
Start: 2022-09-19 | End: 2022-11-17

## 2022-09-22 NOTE — PROGRESS NOTES
[facility] BARIATRIC SURGERY  [address] [address]  [phone]  [address] [id number] [phone]  Dept: [phone]  9/22/2022      Marisol Carrillo.  [id number]  [id number]  1970  female      Chief Complaint   Patient presents with   • Consult        Post-Op Bariatric Surgery:   Marisol Carrillo is status post Laparoscopic Band Removal procedure, performed on 3/25/19     HPI:   Today's weight is (!) 166 kg (366 lb) pounds, today's BMI is Body mass index is 62.79 kg/m².  Marisol Carrillo denies nausea vomiting fever chills chest pain and reports gaining weight after band removal.  She has been dealing with depression and other stresses in her life but now is in therapy and on medication and in much better state of mind.  She is ready to proceed with conversion to another procedure.  She is planning on having knee replacement January of next year and needs to lose weight prior to that.      Diet and Exercise: Diet history reviewed and discussed with the patient. Weight loss/gains to date discussed with the patient.   Supplements: Yes.     Review of Systems   Constitutional: Positive for fatigue.   Gastrointestinal: Positive for constipation.   Musculoskeletal: Positive for arthralgias, back pain, gait problem and joint swelling.   Neurological: Positive for weakness.   All other systems reviewed and are negative.      Patient Active Problem List   Diagnosis   • Essential familial hypercholesterolemia   • Hypertension   • Hypothyroidism   • Adiposity   • Vitamin deficiency   • Impaired fasting glucose   • Vitamin D deficiency   • Lumbosacral radiculopathy   • Gastroesophageal reflux disease   • Constipation   • Diarrhea   • Fatigue   • Heartburn   • Lumbar disc herniation with radiculopathy   • Pain in extremity   • Anxiety   • Depression   • DDD (degenerative disc disease), lumbar   • Spinal headache   • Chronic  bilateral low back pain with bilateral sciatica   • Disc disease, degenerative, lumbar or lumbosacral   • Primary localized osteoarthritis of left knee   • History of lumbar fusion   • Lumbar spondylolysis   • Chronic pain of left knee   • S/P total prosthetic knee replacement not using cement, left   • History of removal of laparoscopic gastric banding device   • Incisional pain   • Nausea   • Medrano's esophagus without dysplasia   • Abdominal pain   • Gastroesophageal reflux disease with esophagitis   • Esophageal dysphagia   • Postlaminectomy syndrome, lumbar region   • Urinary urgency       Past Medical History:   Diagnosis Date   • Acute pulmonary embolism (HCC) 03/2019   • Alopecia    • Anxiety and depression    • Arthritis     OSTEO   • Balance problem     FOOTDROP LEFT FOOT   • Medrano esophagus March2019   • Bilateral knee pain    • Breast mass     RIGHT, MD WATCHING.   • Chronic low back pain    • Depression    • Dysphagia 2/22/2016   • Epigastric pain 8/30/2017    Added automatically from request for surgery [id number]   • Fatty liver 2018   • GERD (gastroesophageal reflux disease)    • Hernia 2017   • History of infectious mononucleosis    • Hyperlipidemia    • Hypertension    • Hypothyroidism    • Impaired fasting glucose    • Irritable bowel syndrome    • Kidney calculus     HISTORY OF   • Nausea and vomiting 3/21/2019    Added automatically from request for surgery [id number]   • Nocturnal cough 2/22/2016   • Pneumonia     2015 S/P LUMBAR FUSION   • Regurgitation 2/22/2016   • Sciatica    • Spinal headache     AFTER MYELOGRAM. BLOOD PATCH X2   • Vitamin D deficiency        The following portions of the patient's history were reviewed and updated as appropriate: allergies, current medications, past family history, past medical history, past social history, past surgical history and problem list.    Vitals:    09/22/22 1450   BP: 119/79   Pulse: 115   Resp: 16   Temp: 97.8 °F (36.6 °C)   SpO2: 98%        Physical Exam  Constitutional:       Appearance: Normal appearance.   HENT:      Head: Normocephalic and atraumatic.   Eyes:      Conjunctiva/sclera: Conjunctivae normal.   Pulmonary:      Effort: Pulmonary effort is normal.   Musculoskeletal:      Right lower leg: Edema present.      Left lower leg: Edema present.   Neurological:      Mental Status: She is alert and oriented to person, place, and time.      Motor: Weakness present.      Gait: Gait abnormal.   Psychiatric:         Mood and Affect: Mood normal.         Behavior: Behavior normal.         Thought Content: Thought content normal.         Judgment: Judgment normal.         Assessment:   Post-op, the patient status post lap band removal March 2019 who we have not seen since that time.  She has gained weight back since the band removal.  She feels like now she is in a better state of mind and ready to get her life back control and get her weight under control.  We went over the different conversion procedures and all of her questions were answered.  Her  was with her visit and is very supportive.  We had a long discussion about the do procedures but also discussed clean eating concentrating on lean protein vegetables and some fruit and also implementing an exercise routine.  We also discussed doing the meal replacement shake diet to reset and get a jumpstart with her weight loss.  She has scheduled knee replacement surgery earlier next year and this would be very beneficial to lose as much weight prior to that time.  We will start the process and will set up for intake appointment..  We also discussed weight loss medications as well.  She was noted to have a low iron and anemic with her primary care doctor.  She will also do stool studies which she has at home to do.  Also discussed upper endoscopy.    Encounter Diagnoses   Name Primary?   • Class 3 severe obesity due to excess calories with serious comorbidity and body mass index (BMI) of  60.0 to 69.9 in adult (HCC) Yes   • History of removal of laparoscopic gastric banding device    • Primary hypertension    • Medrano's esophagus without dysplasia    • Constipation, unspecified constipation type    • Fatigue, unspecified type    • Chronic bilateral low back pain with bilateral sciatica    • Chronic pain of left knee        Plan:     Encouraged patient to be sure to get plenty of lean protein per day through small frequent meals all with a protein source.   Activity restrictions: none.   Recommended patient be sure to get at least 70 grams of protein per day by eating small, frequent meals all with high lean protein choices. Be sure to limit/cut back on daily carbohydrate intake. Discussed with the patient the recommended amount of water per day to intake- half of body weight in ounces. Reviewed vitamin requirements. Be sure to do routine exercise, 150 minutes per week minimum, including both cardio and strength training.     Instructions / Recommendations: dietary counseling recommended, recommended a daily protein intake of  grams, vitamin supplement(s) recommended, recommended exercising at least 150 minutes per week, behavior modifications recommended and instructed to call the office for concerns, questions, or problems.     The patient was instructed to follow up in intake appointment.     The patient was counseled regarding the above procedure. Total time spent on this encounter was  30 minutes including reviewing previous notes, lab results and face to face time spent with the patient.  The majority of time was spent counseling the patient regarding diet and exercise as well as reviewing their medications and their compliance with the procedure.

## 2022-09-27 ENCOUNTER — PREP FOR SURGERY (OUTPATIENT)
Dept: OTHER | Facility: HOSPITAL | Age: 52
End: 2022-09-27

## 2022-09-27 DIAGNOSIS — E66.01 CLASS 3 SEVERE OBESITY DUE TO EXCESS CALORIES WITH SERIOUS COMORBIDITY AND BODY MASS INDEX (BMI) OF 60.0 TO 69.9 IN ADULT: Primary | ICD-10-CM

## 2022-09-27 RX ORDER — PANTOPRAZOLE SODIUM 40 MG/10ML
40 INJECTION, POWDER, LYOPHILIZED, FOR SOLUTION INTRAVENOUS ONCE
Status: CANCELLED | OUTPATIENT
Start: 2022-09-27 | End: 2022-09-27

## 2022-09-27 RX ORDER — SODIUM CHLORIDE 0.9 % (FLUSH) 0.9 %
3-10 SYRINGE (ML) INJECTION AS NEEDED
Status: CANCELLED | OUTPATIENT
Start: 2022-09-27

## 2022-09-27 RX ORDER — CHLORHEXIDINE GLUCONATE 0.12 MG/ML
15 RINSE ORAL SEE ADMIN INSTRUCTIONS
Status: CANCELLED | OUTPATIENT
Start: 2022-09-27

## 2022-09-27 RX ORDER — PROMETHAZINE HYDROCHLORIDE 12.5 MG/1
12.5 TABLET ORAL ONCE AS NEEDED
Status: CANCELLED | OUTPATIENT
Start: 2022-09-27

## 2022-09-27 RX ORDER — PROMETHAZINE HYDROCHLORIDE 25 MG/1
25 SUPPOSITORY RECTAL ONCE AS NEEDED
Status: CANCELLED | OUTPATIENT
Start: 2022-09-27

## 2022-09-27 RX ORDER — SODIUM CHLORIDE, SODIUM LACTATE, POTASSIUM CHLORIDE, CALCIUM CHLORIDE 600; 310; 30; 20 MG/100ML; MG/100ML; MG/100ML; MG/100ML
100 INJECTION, SOLUTION INTRAVENOUS CONTINUOUS
Status: CANCELLED | OUTPATIENT
Start: 2022-09-27

## 2022-09-27 RX ORDER — SODIUM CHLORIDE 0.9 % (FLUSH) 0.9 %
3 SYRINGE (ML) INJECTION EVERY 12 HOURS SCHEDULED
Status: CANCELLED | OUTPATIENT
Start: 2022-09-27

## 2022-09-27 RX ORDER — CEFAZOLIN SODIUM IN 0.9 % NACL 3 G/100 ML
3 INTRAVENOUS SOLUTION, PIGGYBACK (ML) INTRAVENOUS
Status: CANCELLED | OUTPATIENT
Start: 2022-09-27

## 2022-09-27 RX ORDER — METOCLOPRAMIDE HYDROCHLORIDE 5 MG/ML
10 INJECTION INTRAMUSCULAR; INTRAVENOUS ONCE
Status: CANCELLED | OUTPATIENT
Start: 2022-09-27 | End: 2022-09-27

## 2022-09-27 RX ORDER — SCOLOPAMINE TRANSDERMAL SYSTEM 1 MG/1
1 PATCH, EXTENDED RELEASE TRANSDERMAL CONTINUOUS
Status: CANCELLED | OUTPATIENT
Start: 2022-09-27 | End: 2022-09-30

## 2022-09-27 RX ORDER — GABAPENTIN 250 MG/5ML
300 SOLUTION ORAL ONCE
Status: CANCELLED | OUTPATIENT
Start: 2022-09-27 | End: 2022-09-27

## 2022-09-29 ENCOUNTER — PREP FOR SURGERY (OUTPATIENT)
Dept: OTHER | Facility: HOSPITAL | Age: 52
End: 2022-09-29

## 2022-09-29 DIAGNOSIS — K21.9 GASTROESOPHAGEAL REFLUX DISEASE, UNSPECIFIED WHETHER ESOPHAGITIS PRESENT: Primary | ICD-10-CM

## 2022-09-29 RX ORDER — SODIUM CHLORIDE, SODIUM LACTATE, POTASSIUM CHLORIDE, CALCIUM CHLORIDE 600; 310; 30; 20 MG/100ML; MG/100ML; MG/100ML; MG/100ML
30 INJECTION, SOLUTION INTRAVENOUS CONTINUOUS
Status: CANCELLED | OUTPATIENT
Start: 2022-10-18

## 2022-10-04 DIAGNOSIS — Z12.31 ENCOUNTER FOR SCREENING MAMMOGRAM FOR BREAST CANCER: Primary | ICD-10-CM

## 2022-10-18 ENCOUNTER — HOSPITAL ENCOUNTER (OUTPATIENT)
Facility: HOSPITAL | Age: 52
Setting detail: HOSPITAL OUTPATIENT SURGERY
Discharge: HOME | End: 2022-10-18
Attending: SURGERY | Admitting: SURGERY

## 2022-10-18 ENCOUNTER — TELEPHONE (OUTPATIENT)
Dept: FAMILY MEDICINE CLINIC | Facility: CLINIC | Age: 52
End: 2022-10-18

## 2022-10-18 ENCOUNTER — ANESTHESIA EVENT (OUTPATIENT)
Dept: GASTROENTEROLOGY | Facility: HOSPITAL | Age: 52
End: 2022-10-18

## 2022-10-18 ENCOUNTER — ANESTHESIA (OUTPATIENT)
Dept: GASTROENTEROLOGY | Facility: HOSPITAL | Age: 52
End: 2022-10-18

## 2022-10-18 VITALS
SYSTOLIC BLOOD PRESSURE: 123 MMHG | HEIGHT: 64 IN | WEIGHT: 293 LBS | HEART RATE: 90 BPM | BODY MASS INDEX: 50.02 KG/M2 | OXYGEN SATURATION: 97 % | DIASTOLIC BLOOD PRESSURE: 71 MMHG | RESPIRATION RATE: 18 BRPM

## 2022-10-18 DIAGNOSIS — D50.8 OTHER IRON DEFICIENCY ANEMIA: Primary | ICD-10-CM

## 2022-10-18 DIAGNOSIS — K21.9 GASTROESOPHAGEAL REFLUX DISEASE, UNSPECIFIED WHETHER ESOPHAGITIS PRESENT: ICD-10-CM

## 2022-10-18 LAB
EXPIRATION DATE 2: ABNORMAL
EXPIRATION DATE 3: ABNORMAL
EXPIRATION DATE: ABNORMAL
GASTROCULT GAST QL: POSITIVE
HEMOCCULT SP2 STL QL: POSITIVE
HEMOCCULT SP3 STL QL: NEGATIVE
Lab: ABNORMAL

## 2022-10-18 PROCEDURE — 87081 CULTURE SCREEN ONLY: CPT | Performed by: SURGERY

## 2022-10-18 PROCEDURE — 82274 ASSAY TEST FOR BLOOD FECAL: CPT | Performed by: PHYSICIAN ASSISTANT

## 2022-10-18 PROCEDURE — 25010000002 PROPOFOL 10 MG/ML EMULSION: Performed by: ANESTHESIOLOGY

## 2022-10-18 PROCEDURE — 43239 EGD BIOPSY SINGLE/MULTIPLE: CPT | Performed by: SURGERY

## 2022-10-18 RX ORDER — SODIUM CHLORIDE 0.9 % (FLUSH) 0.9 %
10 SYRINGE (ML) INJECTION AS NEEDED
Status: DISCONTINUED | OUTPATIENT
Start: 2022-10-18 | End: 2022-10-18 | Stop reason: HOSPADM

## 2022-10-18 RX ORDER — SODIUM CHLORIDE 0.9 % (FLUSH) 0.9 %
10 SYRINGE (ML) INJECTION EVERY 12 HOURS SCHEDULED
Status: DISCONTINUED | OUTPATIENT
Start: 2022-10-18 | End: 2022-10-18 | Stop reason: HOSPADM

## 2022-10-18 RX ORDER — SODIUM CHLORIDE, SODIUM LACTATE, POTASSIUM CHLORIDE, CALCIUM CHLORIDE 600; 310; 30; 20 MG/100ML; MG/100ML; MG/100ML; MG/100ML
30 INJECTION, SOLUTION INTRAVENOUS CONTINUOUS PRN
Status: DISCONTINUED | OUTPATIENT
Start: 2022-10-18 | End: 2022-10-18 | Stop reason: HOSPADM

## 2022-10-18 RX ORDER — SODIUM CHLORIDE, SODIUM LACTATE, POTASSIUM CHLORIDE, CALCIUM CHLORIDE 600; 310; 30; 20 MG/100ML; MG/100ML; MG/100ML; MG/100ML
30 INJECTION, SOLUTION INTRAVENOUS CONTINUOUS
Status: DISCONTINUED | OUTPATIENT
Start: 2022-10-18 | End: 2022-10-18 | Stop reason: HOSPADM

## 2022-10-18 RX ORDER — PROPOFOL 10 MG/ML
VIAL (ML) INTRAVENOUS AS NEEDED
Status: DISCONTINUED | OUTPATIENT
Start: 2022-10-18 | End: 2022-10-18 | Stop reason: SURG

## 2022-10-18 RX ORDER — LIDOCAINE HYDROCHLORIDE 20 MG/ML
INJECTION, SOLUTION INFILTRATION; PERINEURAL AS NEEDED
Status: DISCONTINUED | OUTPATIENT
Start: 2022-10-18 | End: 2022-10-18 | Stop reason: SURG

## 2022-10-18 RX ADMIN — PROPOFOL 50 MG: 10 INJECTION, EMULSION INTRAVENOUS at 12:09

## 2022-10-18 RX ADMIN — PROPOFOL 200 MG: 10 INJECTION, EMULSION INTRAVENOUS at 12:08

## 2022-10-18 RX ADMIN — PROPOFOL 150 MCG/KG/MIN: 10 INJECTION, EMULSION INTRAVENOUS at 12:08

## 2022-10-18 RX ADMIN — LIDOCAINE HYDROCHLORIDE 60 MG: 20 INJECTION, SOLUTION INFILTRATION; PERINEURAL at 12:08

## 2022-10-18 RX ADMIN — SODIUM CHLORIDE, POTASSIUM CHLORIDE, SODIUM LACTATE AND CALCIUM CHLORIDE 30 ML/HR: 600; 310; 30; 20 INJECTION, SOLUTION INTRAVENOUS at 11:33

## 2022-10-18 NOTE — ANESTHESIA POSTPROCEDURE EVALUATION
Patient: Marisol Carrillo    Procedure Summary     Date: 10/18/22 Room / Location:  [facility]    Anesthesia Start: 1204 Anesthesia Stop: 1217    Procedure: ESOPHAGOGASTRODUODENOSCOPY WITH BIOPSY (Esophagus) Diagnosis:       Gastroesophageal reflux disease, unspecified whether esophagitis present      (Gastroesophageal reflux disease, unspecified whether esophagitis present [K21.9])    Surgeons: Tu Hartman Jr., MD Provider: Stef Clark MD    Anesthesia Type: MAC ASA Status: 3          Anesthesia Type: MAC    Vitals  No vitals data found for the desired time range.          Post Anesthesia Care and Evaluation    Patient location during evaluation: PHASE II  Patient participation: complete - patient participated  Level of consciousness: awake and alert  Pain management: adequate    Airway patency: patent  Anesthetic complications: No anesthetic complications  PONV Status: none  Cardiovascular status: acceptable and hemodynamically stable  Respiratory status: acceptable, nonlabored ventilation and spontaneous ventilation  Hydration status: acceptable

## 2022-10-18 NOTE — ANESTHESIA PREPROCEDURE EVALUATION
Anesthesia Evaluation     Patient summary reviewed and Nursing notes reviewed   history of anesthetic complications:  NPO Solid Status: > 8 hours  NPO Liquid Status: > 6 hours           Airway   Mallampati: III  TM distance: >3 FB  Neck ROM: full  Possible difficult intubation  Dental - normal exam     Pulmonary - normal exam   (+) pulmonary embolism,   Cardiovascular - normal exam    (+) hypertension 2 medications or greater, hyperlipidemia,       Neuro/Psych  (+) headaches, numbness, psychiatric history Anxiety and Depression,    GI/Hepatic/Renal/Endo    (+) obesity, morbid obesity, GERD,  liver disease fatty liver disease, renal disease stones, thyroid problem hypothyroidism    ROS Comment: Hx gastric banding and removal    Musculoskeletal     (+) back pain, chronic pain,       ROS comment: Lumbar DDD/hx lumbar fusion with postlaminectomy syndrome  Abdominal   (+) obese,    Substance History      OB/GYN          Other   arthritis,                    Anesthesia Plan    ASA 3     MAC     intravenous induction     Anesthetic plan, risks, benefits, and alternatives have been provided, discussed and informed consent has been obtained with: patient.        CODE STATUS:

## 2022-10-20 ENCOUNTER — APPOINTMENT (OUTPATIENT)
Dept: WOMENS IMAGING | Facility: HOSPITAL | Age: 52
End: 2022-10-20

## 2022-10-20 LAB — UREASE TISS QL: NEGATIVE

## 2022-10-20 PROCEDURE — 77067 SCR MAMMO BI INCL CAD: CPT | Performed by: RADIOLOGY

## 2022-10-20 PROCEDURE — 77063 BREAST TOMOSYNTHESIS BI: CPT | Performed by: RADIOLOGY

## 2022-10-24 ENCOUNTER — CONSULT (OUTPATIENT)
Dept: BARIATRICS/WEIGHT MGMT | Facility: CLINIC | Age: 52
End: 2022-10-24

## 2022-10-24 VITALS
BODY MASS INDEX: 51.91 KG/M2 | SYSTOLIC BLOOD PRESSURE: 126 MMHG | RESPIRATION RATE: 18 BRPM | WEIGHT: 293 LBS | HEART RATE: 117 BPM | HEIGHT: 63 IN | TEMPERATURE: 97.8 F | DIASTOLIC BLOOD PRESSURE: 81 MMHG

## 2022-10-24 DIAGNOSIS — K21.9 GASTROESOPHAGEAL REFLUX DISEASE WITHOUT ESOPHAGITIS: ICD-10-CM

## 2022-10-24 DIAGNOSIS — E55.9 VITAMIN D DEFICIENCY: ICD-10-CM

## 2022-10-24 DIAGNOSIS — M25.562 CHRONIC PAIN OF LEFT KNEE: ICD-10-CM

## 2022-10-24 DIAGNOSIS — M54.41 CHRONIC BILATERAL LOW BACK PAIN WITH BILATERAL SCIATICA: ICD-10-CM

## 2022-10-24 DIAGNOSIS — G89.29 CHRONIC BILATERAL LOW BACK PAIN WITH BILATERAL SCIATICA: ICD-10-CM

## 2022-10-24 DIAGNOSIS — F33.1 MODERATE EPISODE OF RECURRENT MAJOR DEPRESSIVE DISORDER: ICD-10-CM

## 2022-10-24 DIAGNOSIS — G89.29 CHRONIC PAIN OF LEFT KNEE: ICD-10-CM

## 2022-10-24 DIAGNOSIS — F41.9 ANXIETY: ICD-10-CM

## 2022-10-24 DIAGNOSIS — M54.42 CHRONIC BILATERAL LOW BACK PAIN WITH BILATERAL SCIATICA: ICD-10-CM

## 2022-10-24 DIAGNOSIS — K22.70 BARRETT'S ESOPHAGUS WITHOUT DYSPLASIA: ICD-10-CM

## 2022-10-24 DIAGNOSIS — I10 PRIMARY HYPERTENSION: ICD-10-CM

## 2022-10-24 DIAGNOSIS — R53.83 FATIGUE, UNSPECIFIED TYPE: ICD-10-CM

## 2022-10-24 PROBLEM — F43.10 POSTTRAUMATIC STRESS DISORDER: Status: ACTIVE | Noted: 2022-10-24

## 2022-10-24 PROBLEM — R10.9 ABDOMINAL PAIN: Status: RESOLVED | Noted: 2019-10-02 | Resolved: 2022-10-24

## 2022-10-24 PROBLEM — M19.90 ARTHRITIS: Status: ACTIVE | Noted: 2018-11-07

## 2022-10-24 PROBLEM — E78.00 HYPERCHOLESTEROLEMIA: Status: ACTIVE | Noted: 2018-11-07

## 2022-10-24 PROBLEM — R13.19 ESOPHAGEAL DYSPHAGIA: Status: RESOLVED | Noted: 2020-01-16 | Resolved: 2022-10-24

## 2022-10-24 PROCEDURE — 99215 OFFICE O/P EST HI 40 MIN: CPT | Performed by: NURSE PRACTITIONER

## 2022-10-24 NOTE — PROGRESS NOTES
[facility]ATR[facility] BARIATRIC SURGERY  [address] [address]  [phone]  [address] [address]  [phone]  Dept: [phone]  10/24/2022      Marisol Carrillo.  [id number]  [id number]  1970  female      Chief Complaint of weight gain; unable to maintain weight loss    History of Present Illness:   Marisol is a 52 y.o. female who presents today for evaluation, education and consultation regarding weight loss surgery. The patient is interested in the sleeve gastrectomy.    Diet History:Marisol has been overweight for at least 30 years, has been 35 pounds or more overweight for at least 30 years, has been 100 pounds or more overweight for 15 or more years and started dieting at age 19.  The most weight Marisol lost was 120 pounds with her lapband and maintained the weight loss for a around 18 months. Marisol describes her eating habits as snacking without portion control. Marisol Carrillo has tried Atkins, Weight Watchers, Fasting, Physician monitored, Dietician monitored, reduced calorie, exercising, prescription medications and previous weight loss surgery among others with success of losing up to 120 pounds, but in each instance regained the weight.     See dietician documentation for complete history.    Bariatric Surgery Evaluation: The patient is being seen for an initial visit for bariatric surgery evaluation and education.     Bariatric Co-morbidities:  hypertension, dyslipidemia, back pain, knee pain, GERD, depression and mental health disease    Patient Active Problem List   Diagnosis   • Essential familial hypercholesterolemia   • Hypertension   • Hypothyroidism   • Body mass index (BMI) of 60.0-69.9 in adult (HCC)   • Impaired fasting glucose   • Vitamin D deficiency   • Lumbosacral radiculopathy   • Gastroesophageal reflux disease   • Constipation   • Diarrhea   • Fatigue   • Heartburn   • Lumbar disc herniation  with radiculopathy   • Pain in extremity   • Anxiety   • Depression   • DDD (degenerative disc disease), lumbar   • Spinal headache   • Chronic bilateral low back pain with bilateral sciatica   • Disc disease, degenerative, lumbar or lumbosacral   • Primary localized osteoarthritis of left knee   • History of lumbar fusion   • Lumbar spondylolysis   • Chronic pain of left knee   • S/P total prosthetic knee replacement not using cement, left   • History of removal of laparoscopic gastric banding device   • Incisional pain   • Nausea   • Medrano's esophagus without dysplasia   • Gastroesophageal reflux disease with esophagitis   • Postlaminectomy syndrome, lumbar region   • Urinary urgency   • Arthritis   • Hypercholesterolemia   • Posttraumatic stress disorder       Past Medical History:   Diagnosis Date   • Acute pulmonary embolism (HCC) 2019   • Alopecia    • Anxiety and depression    • Arthritis     OSTEO   • Balance problem     FOOTDROP LEFT FOOT   • Medrano esophagus 2019   • Bilateral knee pain    • Breast mass     RIGHT, MD WATCHING.   • Chronic low back pain    • Depression    • Dysphagia 2016   • Epigastric pain 2017    Added automatically from request for surgery [id number]   • Fatty liver    • GERD (gastroesophageal reflux disease)    • Hernia    • History of infectious mononucleosis    • Hyperlipidemia    • Hypertension    • Hypothyroidism    • Impaired fasting glucose    • Irritable bowel syndrome    • Kidney calculus     HISTORY OF   • Nausea and vomiting 3/21/2019    Added automatically from request for surgery [id number]   • Nocturnal cough 2016   • Pneumonia     2015 S/P LUMBAR FUSION   • Regurgitation 2016   • Sciatica    • Spinal headache     AFTER MYELOGRAM. BLOOD PATCH X2   • Vitamin D deficiency        Past Surgical History:   Procedure Laterality Date   • BARIATRIC SURGERY      Lapband   •  SECTION     • CHOLECYSTECTOMY N/A 2017    Procedure:  CHOLECYSTECTOMY LAPAROSCOPIC;  Surgeon: Jam Ballard MD;  Location:  [facility];  Service:    • DILATION AND CURETTAGE, DIAGNOSTIC / THERAPEUTIC     • ENDOSCOPY N/A 09/01/2017    Procedure: ESOPHAGOGASTRODUODENOSCOPY WITH BIOPSY;  Surgeon: Jam Ballard MD;  [facility];  Service:    • ENDOSCOPY N/A 02/18/2020    Procedure: ESOPHAGOGASTRODUODENOSCOPY WITH BIOPSIES, SNELL DILATATION 44Fr, 46Fr;  Surgeon: Kirsten Yanez MD;  [facility];  Service: Gastroenterology;  Laterality: N/A;  PRE:  DYSPHAGIA, ABDOMINAL PAIN, GERD  POST:  IRREGULAR Z-LINE, R/O EOE, ESOPHAGEAL FURROWS   • ENDOSCOPY N/A 10/18/2022    Procedure: ESOPHAGOGASTRODUODENOSCOPY WITH BIOPSY;  Surgeon: Tu Hartman Jr., MD;  Location: [facility] ENDOSCOPY;  Service: General;  Laterality: N/A;  PRE- GERD, H/O BAND REMOVAL  POST- GASTRITIS   • GASTRIC BANDING REMOVAL N/A 03/25/2019    Procedure: GASTRIC BANDING REMOVAL LAPAROSCOPIC;  Surgeon: Tu Hartman Jr., MD;  Location: [facility] MAIN OR;  Service: General   • HERNIA REPAIR      Hiatal   • JOINT REPLACEMENT Left 2017    KNEE   • KNEE ARTHROSCOPY Left     REPAIR OF MENISUCS   • LITHOTRIPSY     • LUMBAR FUSION  11/2015    L5-S1. WITH HARDWARE   • MICRODISCECTOMY LUMBAR     • W[place] TOTAL KNEE ARTHROPLASTY Left 03/29/2017    Procedure: TOTAL KNEE ARTHROPLASTY;  Surgeon: Zacarias Reeves MD;  Location: [facility] MAIN OR;  Service: Orthopedics   • TONSILLECTOMY     • TOTAL KNEE ARTHROPLASTY REVISION Left 03/2019    Dr. Chavez   • UPPER GASTROINTESTINAL ENDOSCOPY  03/2019       Allergies   Allergen Reactions   • Ciprofloxacin Hives   • Hydrocodone-Acetaminophen Itching   • Ketamine Delirium     MADE HER A LITTLE CRAZY   • Propacetamol Delirium     UNSURE OF RX. AFTER BACK SURGERY   • Propoxyphene-Acetaminophen Itching   • Lisinopril Cough   • Tirosint [Levothyroxine] Itching         Current Outpatient Medications:   •  amlodipine-olmesartan (RICK) 10-40 MG per  tablet, Take 1 tablet by mouth Daily. for blood pressure., Disp: 90 tablet, Rfl: 1  •  atorvastatin (LIPITOR) 40 MG tablet, Take 1 tablet by mouth Every Night. For cholesterol, Disp: 90 tablet, Rfl: 3  •  Brexpiprazole (Rexulti) 0.5 MG tablet, Take  by mouth Daily., Disp: , Rfl:   •  carvedilol (Coreg) 12.5 MG tablet, Take 1 tablet by mouth 2 (Two) Times a Day With Meals. For BP, Disp: 180 tablet, Rfl: 1  •  cholecalciferol (VITAMIN D3) 1000 units tablet, Take 1,000 Units by mouth Daily., Disp: , Rfl:   •  doxepin (SINEquan) 100 MG capsule, Take 1 capsule by mouth Every Night., Disp: , Rfl:   •  folic acid (FOLVITE) 1 MG tablet, Take 1 tablet by mouth Daily., Disp: 90 tablet, Rfl: 3  •  levothyroxine (Synthroid) 88 MCG tablet, Take 1 tablet by mouth Daily. For thyroid; give generic, Disp: 90 tablet, Rfl: 3  •  nystatin (MYCOSTATIN) 284357 UNIT/GM powder, Apply  topically to the appropriate area as directed 4 (Four) Times a Day. PRN yeast rash, Disp: 60 g, Rfl: 11  •  omeprazole (priLOSEC) 40 MG capsule, 1 PO BID For GERD, Disp: 180 capsule, Rfl: 3  •  traMADol (ULTRAM) 50 MG tablet, TAKE 1 TABLET BY MOUTH EVERY 12 HOURS AS NEEDED FOR SEVERE PAIN, Disp: , Rfl:   •  vitamin B-12 (CYANOCOBALAMIN) 1000 MCG tablet, Take 1,000 mcg by mouth Daily., Disp: , Rfl:     Social History     Socioeconomic History   • Marital status:      Spouse name: Nik   • Number of children: 1   • Years of education: Some College   • Highest education level: High school graduate   Tobacco Use   • Smoking status: Never   • Smokeless tobacco: Never   Vaping Use   • Vaping Use: Never used   Substance and Sexual Activity   • Alcohol use: Not Currently     Comment: ON OCCASION   • Drug use: Never   • Sexual activity: Yes     Partners: Male     Birth control/protection: None, Post-menopausal       Family History   Problem Relation Age of Onset   • Anxiety disorder Mother    • Depression Mother    • Hypertension Mother    • Thyroid  disease Mother    • Mental illness Mother    • Hypertension Father    • Anxiety disorder Brother    • Depression Brother    • Thyroid disease Brother    • Alcohol abuse Maternal Grandmother    • Heart disease Maternal Grandmother    • Malig Hyperthermia Neg Hx          Review of Systems:  Review of Systems   Constitutional: Negative for appetite change, fatigue and unexpected weight change.   HENT: Negative.    Eyes: Negative.    Respiratory: Negative.    Cardiovascular: Negative.  Negative for leg swelling.   Gastrointestinal: Negative.  Negative for abdominal distention, abdominal pain, constipation, diarrhea, nausea and vomiting.        Heartburn- controlled with PPI   Endocrine: Negative.    Genitourinary: Negative.  Negative for difficulty urinating, frequency and urgency.   Musculoskeletal: Positive for arthralgias (knee pain) and gait problem. Negative for back pain.   Skin: Negative.    Psychiatric/Behavioral: Negative.    All other systems reviewed and are negative.      Physical Exam:  Vital Signs:  Weight: (!) 166 kg (366 lb)   Body mass index is 65.09 kg/m².  Temp: 97.8 °F (36.6 °C)   Heart Rate: 117   BP: 126/81     Physical Exam  Vitals and nursing note reviewed.   Constitutional:       Appearance: She is well-developed. She is obese.   HENT:      Head: Normocephalic and atraumatic.   Cardiovascular:      Rate and Rhythm: Regular rhythm. Tachycardia present.      Heart sounds: Normal heart sounds.   Pulmonary:      Effort: Pulmonary effort is normal. No respiratory distress.      Breath sounds: Normal breath sounds. No wheezing.   Abdominal:      General: Bowel sounds are normal. There is no distension.      Palpations: Abdomen is soft.      Tenderness: There is no abdominal tenderness.   Musculoskeletal:         General: No deformity.      Cervical back: Normal range of motion.   Skin:     General: Skin is warm and dry.   Neurological:      Mental Status: She is alert and oriented to person, place,  and time.   Psychiatric:         Behavior: Behavior normal.            Assessment:         Marisol Carrillo is a 52 y.o. year old female with medically complicated severe obesity. Weight: (!) 166 kg (366 lb), Body mass index is 65.09 kg/m². and weight related problems including hypertension, dyslipidemia, back pain, knee pain, GERD, depression and mental health disease.    I explained in detail, potential surgical options of interest to the patient including the RNY gastric bypass, sleeve gastrectomy, and gastric band while considering the patient's medical history. At this time, the patient expressed interest in the Laparoscopic Sleeve  All of those procedures can be performed laparoscopically but there is a chance to convert to open if any technical challenges or complications do occur.  Bariatric surgery is not cosmetic surgery but rather a tool to help a patient make a life-long commitment lifestyle changes including diet, exercise, behavior changes, and taking supplemental vitamins and minerals.    Due to the patient's BMI, history, and co-morbidities related to potential surgical complications were evaluated. Due to Marisol Carrillo's risk factors female will obtain the following prior to being scheduled for surgical intervention:    A letter of medical support as well as a history and physical must be obtained from her primary care provider. The patient was given a copy of a sample form, that will suffice as their letter to take to their primary are provider.    A referral for pre-operative psychological evaluation was ordered for the patient to evaluate candidacy as well as provide mental health support, should it be warranted before or after surgery.    EGD udpated 10/18/22 during which gastritis was noted, no signs of barrets despite hisotry, h.pylori testing was negative. All lab work including CBC, CMP, TSH and Hba1c were updated on 9/6/22. Mild iron deficiency anemia was noted, patient is taking an iron  supplement and has a follow up apt with GI to discuss possible colon polyp as cause.  and  EKG, CXR and psychology clearancewere ordered at this time. These will be drawn and patient will be notified with results. Patient will complete new, pre-operative radiology prior to being scheduled for surgery.     Marisol Carrillo was screened for sleep apnea in our office today and based on their results she is low risk for DEMARCUS. The risks, as they relate to chronic hypercapnia r/t untreated DEMARCUS were discussed with the patient and She verbalized understanding.     A pre-operative diagnostic esophagogastroduodenoscopy with biopsy for evaluation will be ordered and scheduled for this patient. The risks and benefits of the procedure were discussed with the patient in detail and all questions were answered.  Possibility of perforation, bleeding, aspiration, anoxic brain injury, respiratory and/or cardiac arrest and death were discussed.   She received handouts regarding, all questions were answered.     Marisol Carrillo verbalized understanding related to COVID-19 pre-procedure testing policies and has consented to a preoperative test 48-72 hours before She's scheduled EGD and bariatric surgical procedure.     The risks, benefits, alternatives, and potential complications of all of the sleeve gastrectomy explained in detail including, but not limited to death, anesthesia and medication adverse effect/DVT, pulmonary embolism, trocar site/incisional hernia, wound infection, abdominal infection, bleeding, failure to lose weight or gain weight and change in body image, metabolic complications with calcium, thiamine, vitamin B12, folate, iron, and anemia.    As this patient is a female who is post-menopausal or has undergone a surgical operation which precluded her from becoming pregnant she was not counseled related to conception and pregnancy.     The patient was advised to start a high protein, low fat and low carbohydrate diet   start routine exercise including but not limited to 150 minutes per week. The patient received a resistance band along with a handout of exercises. The patient was given individualized information by our dietician along with handouts.     The patient was given information regarding the PADMA educational video. PADMA is an internet based educational video which explains the sourgical procedure and answers basic questions regarding the procedure. The patient was provided with instructions and a password to watch the video.    The patient understands the surgical procedures and the different surgical options that are available.  She understands the lifestyle changes that would be required after surgery and has agreed to participate in a pre-operative and postoperative weight management program.  She also expressed understanding of possible risks, had several questions answered and desires to proceed.    I think she is a good candidate for this surgery, and is interested in a sleeve gastrectomy.    Encounter Diagnoses   Name Primary?   • Body mass index (BMI) of 60.0-69.9 in adult (HCC) Yes   • Primary hypertension    • Vitamin D deficiency    • Medrano's esophagus without dysplasia    • Fatigue, unspecified type    • Chronic pain of left knee    • Chronic bilateral low back pain with bilateral sciatica    • Moderate episode of recurrent major depressive disorder (HCC)    • Anxiety    • Gastroesophageal reflux disease without esophagitis        Plan:    Patient will be evaluated by a bariatric dietician psychologist before undergoing a multidisciplinary review of her candidacy. We discussed weight loss requirements prior to surgery and rationale, as well as other program requirements to ensure the safest approach to surgery. We spent time discussing different surgical procedures and plan of care throughout their lifespan to ensure long term success in achieving and maintaining a healthier weight. Patient will proceed with  preoperative lab work, radiology, and endoscopy after obtaining agreed upon specialty, clearances, sleep study, and letter of support from her primary care provider.    Total time spent during this encounter today was 45 minutes and includes preparing for the visit, reviewing tests, performing a medically appropriate examination and/or evaluations, counseling and educating the patient/family/caregiver, ordering medications, tests, or procedures, documenting information in the medical record and independently interpreting results and communicating that information with the patient/family/caregiver  Hillary Nguyen, MARV  10/24/2022

## 2022-10-26 DIAGNOSIS — Z12.31 ENCOUNTER FOR SCREENING MAMMOGRAM FOR BREAST CANCER: ICD-10-CM

## 2022-11-09 ENCOUNTER — HOSPITAL ENCOUNTER (OUTPATIENT)
Dept: CARDIOLOGY | Facility: HOSPITAL | Age: 52
Discharge: HOME | End: 2022-11-09

## 2022-11-09 ENCOUNTER — HOSPITAL ENCOUNTER (OUTPATIENT)
Dept: GENERAL RADIOLOGY | Facility: HOSPITAL | Age: 52
Discharge: HOME | End: 2022-11-09

## 2022-11-09 DIAGNOSIS — M54.42 CHRONIC BILATERAL LOW BACK PAIN WITH BILATERAL SCIATICA: ICD-10-CM

## 2022-11-09 DIAGNOSIS — M25.562 CHRONIC PAIN OF LEFT KNEE: ICD-10-CM

## 2022-11-09 DIAGNOSIS — K22.70 BARRETT'S ESOPHAGUS WITHOUT DYSPLASIA: ICD-10-CM

## 2022-11-09 DIAGNOSIS — G89.29 CHRONIC BILATERAL LOW BACK PAIN WITH BILATERAL SCIATICA: ICD-10-CM

## 2022-11-09 DIAGNOSIS — F41.9 ANXIETY: ICD-10-CM

## 2022-11-09 DIAGNOSIS — G89.29 CHRONIC PAIN OF LEFT KNEE: ICD-10-CM

## 2022-11-09 DIAGNOSIS — K21.9 GASTROESOPHAGEAL REFLUX DISEASE WITHOUT ESOPHAGITIS: ICD-10-CM

## 2022-11-09 DIAGNOSIS — I10 PRIMARY HYPERTENSION: ICD-10-CM

## 2022-11-09 DIAGNOSIS — R53.83 FATIGUE, UNSPECIFIED TYPE: ICD-10-CM

## 2022-11-09 DIAGNOSIS — F33.1 MODERATE EPISODE OF RECURRENT MAJOR DEPRESSIVE DISORDER: ICD-10-CM

## 2022-11-09 DIAGNOSIS — M54.41 CHRONIC BILATERAL LOW BACK PAIN WITH BILATERAL SCIATICA: ICD-10-CM

## 2022-11-09 DIAGNOSIS — E55.9 VITAMIN D DEFICIENCY: ICD-10-CM

## 2022-11-09 LAB — QT INTERVAL: 343 MS

## 2022-11-09 PROCEDURE — 93010 ELECTROCARDIOGRAM REPORT: CPT | Performed by: INTERNAL MEDICINE

## 2022-11-09 PROCEDURE — 93005 ELECTROCARDIOGRAM TRACING: CPT | Performed by: NURSE PRACTITIONER

## 2022-11-09 PROCEDURE — 71046 X-RAY EXAM CHEST 2 VIEWS: CPT

## 2022-11-15 ENCOUNTER — OFFICE VISIT (OUTPATIENT)
Dept: FAMILY MEDICINE CLINIC | Facility: CLINIC | Age: 52
End: 2022-11-15

## 2022-11-15 VITALS
SYSTOLIC BLOOD PRESSURE: 121 MMHG | HEIGHT: 63 IN | RESPIRATION RATE: 16 BRPM | DIASTOLIC BLOOD PRESSURE: 82 MMHG | WEIGHT: 293 LBS | BODY MASS INDEX: 51.91 KG/M2 | TEMPERATURE: 97.5 F | HEART RATE: 105 BPM | OXYGEN SATURATION: 99 %

## 2022-11-15 DIAGNOSIS — R73.01 IMPAIRED FASTING GLUCOSE: ICD-10-CM

## 2022-11-15 DIAGNOSIS — E03.9 ACQUIRED HYPOTHYROIDISM: ICD-10-CM

## 2022-11-15 DIAGNOSIS — D50.0 IRON DEFICIENCY ANEMIA DUE TO CHRONIC BLOOD LOSS: Primary | ICD-10-CM

## 2022-11-15 DIAGNOSIS — R12 HEARTBURN: ICD-10-CM

## 2022-11-15 DIAGNOSIS — I10 PRIMARY HYPERTENSION: ICD-10-CM

## 2022-11-15 DIAGNOSIS — F33.42 RECURRENT MAJOR DEPRESSIVE DISORDER, IN FULL REMISSION: ICD-10-CM

## 2022-11-15 DIAGNOSIS — E55.9 VITAMIN D DEFICIENCY: ICD-10-CM

## 2022-11-15 DIAGNOSIS — E78.2 HYPERLIPIDEMIA, MIXED: ICD-10-CM

## 2022-11-15 PROCEDURE — 99214 OFFICE O/P EST MOD 30 MIN: CPT | Performed by: PHYSICIAN ASSISTANT

## 2022-11-15 NOTE — PROGRESS NOTES
"Subjective   Marisol Carrillo is a 52 y.o. female.     History of Present Illness   Marisol Carrillo 52 y.o. female /82 (BP Location: Left arm, Patient Position: Sitting, Cuff Size: Adult)   Pulse 105   Temp 97.5 °F (36.4 °C) (Oral)   Resp 16   Ht 159.7 cm (62.87\")   Wt (!) 163 kg (360 lb)   SpO2 99%   BMI 64.03 kg/m²  who presents today for gastric sleeve.   she has a history of   Patient Active Problem List   Diagnosis   • Essential familial hypercholesterolemia   • Hypertension   • Hypothyroidism   • Body mass index (BMI) of 60.0-69.9 in adult ([facility])   • Impaired fasting glucose   • Vitamin D deficiency   • Lumbosacral radiculopathy   • Gastroesophageal reflux disease   • Constipation   • Diarrhea   • Fatigue   • Heartburn   • Lumbar disc herniation with radiculopathy   • Pain in extremity   • Anxiety   • Depression   • DDD (degenerative disc disease), lumbar   • Spinal headache   • Chronic bilateral low back pain with bilateral sciatica   • Disc disease, degenerative, lumbar or lumbosacral   • Primary localized osteoarthritis of left knee   • History of lumbar fusion   • Lumbar spondylolysis   • Chronic pain of left knee   • S/P total prosthetic knee replacement not using cement, left   • History of removal of laparoscopic gastric banding device   • Incisional pain   • Nausea   • Medrano's esophagus without dysplasia   • Gastroesophageal reflux disease with esophagitis   • Postlaminectomy syndrome, lumbar region   • Urinary urgency   • Arthritis   • Hypercholesterolemia   • Posttraumatic stress disorder   .  Just had normal EKG 11-9-22    I will copy Dr Hartman on these notes  Had work up 2019 hematologist after Dx PE----noted from post surgical reason---Provoked----did f/u CTA chest 2019 and no further work up recommended---d/t provoked VTE  Now on new meds for depression and anxiety----see DR LIANET Glover MD----doing great and did sign off on gastric sleeve.  Jan 9 to replace right knee --Dr" Kathy  Had cardio work up 2019--echo and cardiolite normal  I did see her for follow-up hypertension and GERD on 9/6/2022 and noted the primary hypertension to medication controlled impaired fasting glucose still watching A1c values yearly, GERD controlled with PPI, mixed hyperlipidemia controlled with medication on atorvastatin, acquired hypothyroidism and is on levothyroxine--- all goals met on labs  She does have low iron stores.  ----did had EGD-----she is on iron BID: she does have episodes blood on toilet  Tissue---see has GI.  Had 2 + FOBT.  appt [place] with Dr Yanez.   The following portions of the patient's history were reviewed and updated as appropriate: allergies, current medications, past family history, past medical history, past social history, past surgical history and problem list.    Review of Systems   Constitutional: Positive for fatigue. Negative for fever.   HENT: Negative for nosebleeds and trouble swallowing.    Eyes: Negative for visual disturbance.   Respiratory: Negative for choking and stridor.    Cardiovascular: Negative for chest pain.   Gastrointestinal: Negative for blood in stool.   Endocrine: Negative for polydipsia.   Genitourinary: Negative for genital sores and hematuria.   Musculoskeletal: Positive for back pain and gait problem. Negative for joint swelling.   Skin: Negative for color change and rash.   Allergic/Immunologic: Negative for immunocompromised state.   Neurological: Negative for seizures, facial asymmetry and speech difficulty.   Hematological: Negative for adenopathy.   Psychiatric/Behavioral: Negative for behavioral problems, dysphoric mood, self-injury and suicidal ideas.       Objective   Physical Exam  Vitals and nursing note reviewed.   Constitutional:       General: She is not in acute distress.     Appearance: She is well-developed. She is obese. She is not ill-appearing or toxic-appearing.   HENT:      Head: Normocephalic.      Right Ear: External ear  normal.      Left Ear: External ear normal.      Nose: Nose normal.      Mouth/Throat:      Pharynx: Oropharynx is clear.   Eyes:      General: No scleral icterus.     Conjunctiva/sclera: Conjunctivae normal.      Pupils: Pupils are equal, round, and reactive to light.   Neck:      Thyroid: No thyromegaly.      Vascular: No carotid bruit.   Cardiovascular:      Rate and Rhythm: Normal rate and regular rhythm.      Heart sounds: Normal heart sounds. No murmur heard.  Pulmonary:      Effort: Pulmonary effort is normal. No respiratory distress.      Breath sounds: Normal breath sounds.   Musculoskeletal:         General: No deformity. Normal range of motion.      Cervical back: Normal range of motion and neck supple.      Right lower leg: Edema present.      Left lower leg: Edema present.      Comments: Trace pedal edema = bilat     Skin:     General: Skin is warm and dry.      Findings: No rash.   Neurological:      General: No focal deficit present.      Mental Status: She is alert and oriented to person, place, and time. Mental status is at baseline.   Psychiatric:         Mood and Affect: Mood normal.         Behavior: Behavior normal.         Thought Content: Thought content normal.         Judgment: Judgment normal.      Comments: So much improved!!!! Doing great         Assessment & Plan   Diagnoses and all orders for this visit:    1. Iron deficiency anemia due to chronic blood loss (Primary)  Comments:  We will update CBC and iron labs today and note taking iron twice daily over-the-counter  Orders:  -     CBC & Differential  -     Ferritin  -     Iron Profile    2. Primary hypertension  Comments:  At treatment goal per September labs    3. Acquired hypothyroidism  Comments:  At treatment goal on levothyroxine    4. Vitamin D deficiency    5. Heartburn    6. Impaired fasting glucose  Comments:  Last A1c was in normal range continue to check yearly    7. Recurrent major depressive disorder, in full remission  ([facility])  Comments:  Per psychiatry    8. Hyperlipidemia, mixed  Comments:  Continue atorvastatin goal met on September labs        I do want to update her labs to look at her iron stores she is taking iron twice daily and will copy Dr. Hartman on results and make sure hemoglobin is in normal range  Also noted in September that her primary hypertension, mixed hyperlipidemia, acquired hypothyroidism, GERD, vitamin D deficiency were all controlled on medication and goals met with labs.  Also note impaired fasting glucose A1c stable and checked yearly  Plan, Marisol Carrillo, was seen today.  she was seen for HTN and continue medication, Imparied fasting glucose and plan follow up labs, diet, and exercise, GERD and will continue on PPI medication, Hyperlipidemia and will continue current medication, Hypothyroidism well controlled, continue medication and Vitamin D deficiency and will update labs .  I will update labs and plan to sign off on her preop clearance for gastric sleeve and to have right knee replaced       Answers for HPI/ROS submitted by the patient on 11/13/2022  Please describe your symptoms.: pre surgery visit  Have you had these symptoms before?: No  How long have you been having these symptoms?: Greater than 2 weeks  Please list any medications you are currently taking for this condition.: .  Please describe any probable cause for these symptoms. : .  What is the primary reason for your visit?: Other

## 2022-11-16 LAB
BASOPHILS # BLD AUTO: 0.06 10*3/MM3 (ref 0–0.2)
BASOPHILS NFR BLD AUTO: 0.8 % (ref 0–1.5)
EOSINOPHIL # BLD AUTO: 0.2 10*3/MM3 (ref 0–0.4)
EOSINOPHIL NFR BLD AUTO: 2.6 % (ref 0.3–6.2)
ERYTHROCYTE [DISTWIDTH] IN BLOOD BY AUTOMATED COUNT: 17.7 % (ref 12.3–15.4)
FERRITIN SERPL-MCNC: 21.3 NG/ML (ref 13–150)
HCT VFR BLD AUTO: 38.7 % (ref 34–46.6)
HGB BLD-MCNC: 12.4 G/DL (ref 12–15.9)
IMM GRANULOCYTES # BLD AUTO: 0.02 10*3/MM3 (ref 0–0.05)
IMM GRANULOCYTES NFR BLD AUTO: 0.3 % (ref 0–0.5)
IRON SATN MFR SERPL: 7 % (ref 20–50)
IRON SERPL-MCNC: 37 MCG/DL (ref 37–145)
LYMPHOCYTES # BLD AUTO: 1.54 10*3/MM3 (ref 0.7–3.1)
LYMPHOCYTES NFR BLD AUTO: 19.7 % (ref 19.6–45.3)
MCH RBC QN AUTO: 27.6 PG (ref 26.6–33)
MCHC RBC AUTO-ENTMCNC: 32 G/DL (ref 31.5–35.7)
MCV RBC AUTO: 86 FL (ref 79–97)
MONOCYTES # BLD AUTO: 0.54 10*3/MM3 (ref 0.1–0.9)
MONOCYTES NFR BLD AUTO: 6.9 % (ref 5–12)
NEUTROPHILS # BLD AUTO: 5.44 10*3/MM3 (ref 1.7–7)
NEUTROPHILS NFR BLD AUTO: 69.7 % (ref 42.7–76)
NRBC BLD AUTO-RTO: 0 /100 WBC (ref 0–0.2)
PLATELET # BLD AUTO: 313 10*3/MM3 (ref 140–450)
RBC # BLD AUTO: 4.5 10*6/MM3 (ref 3.77–5.28)
TIBC SERPL-MCNC: 496 MCG/DL
UIBC SERPL-MCNC: 459 MCG/DL (ref 112–346)
WBC # BLD AUTO: 7.8 10*3/MM3 (ref 3.4–10.8)

## 2022-11-18 ENCOUNTER — APPOINTMENT (OUTPATIENT)
Dept: BARIATRICS/WEIGHT MGMT | Facility: CLINIC | Age: 52
End: 2022-11-18

## 2023-02-13 ENCOUNTER — OFFICE VISIT (OUTPATIENT)
Dept: NEUROSURGERY | Facility: CLINIC | Age: 53
End: 2023-02-13
Payer: COMMERCIAL

## 2023-02-13 VITALS — HEIGHT: 63 IN | OXYGEN SATURATION: 97 % | BODY MASS INDEX: 51.91 KG/M2 | HEART RATE: 83 BPM | WEIGHT: 293 LBS

## 2023-02-13 DIAGNOSIS — M17.10 ARTHRITIS OF KNEE: ICD-10-CM

## 2023-02-13 DIAGNOSIS — Z98.1 HISTORY OF LUMBAR FUSION: ICD-10-CM

## 2023-02-13 DIAGNOSIS — M96.1 POSTLAMINECTOMY SYNDROME, LUMBAR REGION: Primary | ICD-10-CM

## 2023-02-13 DIAGNOSIS — M51.36 DDD (DEGENERATIVE DISC DISEASE), LUMBAR: ICD-10-CM

## 2023-02-13 PROCEDURE — 99214 OFFICE O/P EST MOD 30 MIN: CPT | Performed by: NEUROLOGICAL SURGERY

## 2023-02-13 RX ORDER — CELECOXIB 200 MG/1
1 CAPSULE ORAL EVERY 12 HOURS SCHEDULED
COMMUNITY
Start: 2023-01-09

## 2023-02-13 RX ORDER — SERTRALINE HYDROCHLORIDE 100 MG/1
1 TABLET, FILM COATED ORAL DAILY
COMMUNITY
Start: 2023-01-29

## 2023-02-13 RX ORDER — BREXPIPRAZOLE 1 MG/1
1 TABLET ORAL EVERY MORNING
COMMUNITY
Start: 2023-01-06

## 2023-02-13 RX ORDER — OXYCODONE HYDROCHLORIDE AND ACETAMINOPHEN 5; 325 MG/1; MG/1
TABLET ORAL
COMMUNITY
Start: 2023-01-30

## 2023-02-13 RX ORDER — CEFDINIR 300 MG/1
1 CAPSULE ORAL EVERY 12 HOURS SCHEDULED
COMMUNITY
Start: 2023-02-04

## 2023-03-29 ENCOUNTER — HOSPITAL ENCOUNTER (OUTPATIENT)
Dept: MRI IMAGING | Facility: HOSPITAL | Age: 53
Discharge: HOME | End: 2023-03-29
Payer: MEDICARE

## 2023-03-29 ENCOUNTER — HOSPITAL ENCOUNTER (OUTPATIENT)
Dept: GENERAL RADIOLOGY | Facility: HOSPITAL | Age: 53
Discharge: HOME | End: 2023-03-29
Payer: MEDICARE

## 2023-03-29 DIAGNOSIS — Z98.1 HISTORY OF LUMBAR FUSION: ICD-10-CM

## 2023-03-29 DIAGNOSIS — M51.36 DDD (DEGENERATIVE DISC DISEASE), LUMBAR: ICD-10-CM

## 2023-03-29 PROCEDURE — 72158 MRI LUMBAR SPINE W/O & W/DYE: CPT

## 2023-03-29 PROCEDURE — 0 GADOBENATE DIMEGLUMINE 529 MG/ML SOLUTION: Performed by: NEUROLOGICAL SURGERY

## 2023-03-29 PROCEDURE — 72114 X-RAY EXAM L-S SPINE BENDING: CPT

## 2023-03-29 PROCEDURE — A9577 INJ MULTIHANCE: HCPCS | Performed by: NEUROLOGICAL SURGERY

## 2023-03-29 RX ADMIN — GADOBENATE DIMEGLUMINE 20 ML: 529 INJECTION, SOLUTION INTRAVENOUS at 11:31

## 2023-04-14 ENCOUNTER — PATIENT MESSAGE (OUTPATIENT)
Dept: FAMILY MEDICINE CLINIC | Facility: CLINIC | Age: 53
End: 2023-04-14
Payer: MEDICARE

## 2023-04-15 RX ORDER — NYSTATIN 100000 [USP'U]/G
POWDER TOPICAL 4 TIMES DAILY
Qty: 60 G | Refills: 11 | Status: SHIPPED | OUTPATIENT
Start: 2023-04-15

## 2023-04-15 NOTE — TELEPHONE ENCOUNTER
Bassemt, Generic 4/15/2023 10:32 AM EDT    My pharmacy won’t send an electronic request because it has been over 36 months since it was prescribed.   Thank you Tomeka

## 2023-05-12 NOTE — PROGRESS NOTES
Subjective   Patient ID: Marisol Carrillo is a 53 y.o. female is here today for follow-up.  You have chosen to receive care through a telephone visit. Do you consent to use a telephone visit for your medical care today? Yes     Unable to complete visit using a video connection to the patient. A phone visit was used to complete this visits. Total time of discussion was 11 minutes.     I was calling from the office.  She was at home.  She is making progress with her therapy with her revision knee replacement on the right.  She has low back pain and bilateral leg pain.  We discussed the MRI which did not show any dramatic worsening in terms of adjacent level disease at L4-L5 compared to 2020.  We could reasonably consider spinal cord stimulation if she is ready but she is not.  She wants to lose more weight.  She will speak with her primary care team about possibly using Ozempic.  We will follow her and see her in 6 months.      History of Present Illness    is here today for follow-up.    Review of Systems        Objective     There were no vitals filed for this visit.  There is no height or weight on file to calculate BMI.    Tobacco Use: Low Risk    • Smoking Tobacco Use: Never   • Smokeless Tobacco Use: Never   • Passive Exposure: Not on file          Physical Exam  Neurologic Exam        Assessment & Plan   Independent Review of Radiographic Studies:      I personally reviewed the images from the following studies.     I reviewed the MRI done on 3/29/2022 as well as the x-rays.  The fusion site is stable with no problems with the hardware.  There seems to be a little bit of progression of the stenosis at L4-L5 compared to 2018 but it is fairly stable compared to 2020.  Agree with the report.    Medical Decision Making:      We will sit tight for now.  She will continue her weight loss and her home exercises.  I will see her in 6 months.  If symptoms of sciatica worsen, she will let me know.        Diagnoses and  all orders for this visit:    1. Postlaminectomy syndrome, lumbar region (Primary)    2. History of lumbar fusion    3. DDD (degenerative disc disease), lumbar    4. Arthritis of knee      Return in about 6 months (around 11/17/2023) for Face-to-face.       I reviewed the MRI done on 3/29/2022 as well as the x-rays.  The fusion site is stable with no problems with the hardware.  There seems to be a little bit of progression of the stenosis at L4-L5 compared to 2018 but it is fairly stable compared to 2020.  Agree with the report.

## 2023-05-14 RX ORDER — CARVEDILOL 12.5 MG/1
TABLET ORAL
Qty: 180 TABLET | Refills: 0 | Status: ON HOLD | OUTPATIENT
Start: 2023-05-14

## 2023-05-17 ENCOUNTER — OFFICE VISIT (OUTPATIENT)
Dept: NEUROSURGERY | Facility: CLINIC | Age: 53
End: 2023-05-17
Payer: MEDICARE

## 2023-05-17 DIAGNOSIS — M51.36 DDD (DEGENERATIVE DISC DISEASE), LUMBAR: ICD-10-CM

## 2023-05-17 DIAGNOSIS — Z98.1 HISTORY OF LUMBAR FUSION: ICD-10-CM

## 2023-05-17 DIAGNOSIS — M96.1 POSTLAMINECTOMY SYNDROME, LUMBAR REGION: Primary | ICD-10-CM

## 2023-05-17 DIAGNOSIS — M17.10 ARTHRITIS OF KNEE: ICD-10-CM

## 2023-05-20 ENCOUNTER — APPOINTMENT (OUTPATIENT)
Dept: GENERAL RADIOLOGY | Facility: HOSPITAL | Age: 53
End: 2023-05-20
Payer: COMMERCIAL

## 2023-05-20 PROCEDURE — 96375 TX/PRO/DX INJ NEW DRUG ADDON: CPT

## 2023-05-20 PROCEDURE — 96365 THER/PROPH/DIAG IV INF INIT: CPT

## 2023-05-20 PROCEDURE — 99285 EMERGENCY DEPT VISIT HI MDM: CPT

## 2023-05-20 PROCEDURE — 73610 X-RAY EXAM OF ANKLE: CPT

## 2023-05-20 PROCEDURE — 99152 MOD SED SAME PHYS/QHP 5/>YRS: CPT

## 2023-05-21 ENCOUNTER — HOSPITAL ENCOUNTER (OUTPATIENT)
Facility: HOSPITAL | Age: 53
Setting detail: OBSERVATION
Discharge: NURSING, REHAB OR LONG-TERM CARE | End: 2023-05-25
Attending: EMERGENCY MEDICINE | Admitting: STUDENT IN AN ORGANIZED HEALTH CARE EDUCATION/TRAINING PROGRAM
Payer: COMMERCIAL

## 2023-05-21 ENCOUNTER — APPOINTMENT (OUTPATIENT)
Dept: GENERAL RADIOLOGY | Facility: HOSPITAL | Age: 53
End: 2023-05-21
Payer: COMMERCIAL

## 2023-05-21 DIAGNOSIS — S82.841A ANKLE FRACTURE, BIMALLEOLAR, CLOSED, RIGHT, INITIAL ENCOUNTER: ICD-10-CM

## 2023-05-21 DIAGNOSIS — S82.891B TYPE I OR II OPEN FRACTURE OF RIGHT ANKLE, INITIAL ENCOUNTER: Primary | ICD-10-CM

## 2023-05-21 PROBLEM — Z86.711 HISTORY OF PULMONARY EMBOLISM: Status: ACTIVE | Noted: 2023-05-21

## 2023-05-21 LAB
ABO GROUP BLD: NORMAL
ALBUMIN SERPL-MCNC: 3.9 G/DL (ref 3.5–5.2)
ALBUMIN/GLOB SERPL: 1.3 G/DL
ALP SERPL-CCNC: 115 U/L (ref 39–117)
ALT SERPL W P-5'-P-CCNC: 16 U/L (ref 1–33)
ANION GAP SERPL CALCULATED.3IONS-SCNC: 13.1 MMOL/L (ref 5–15)
ANION GAP SERPL CALCULATED.3IONS-SCNC: 14.1 MMOL/L (ref 5–15)
APTT PPP: 25.6 SECONDS (ref 22.7–35.4)
AST SERPL-CCNC: 17 U/L (ref 1–32)
BASOPHILS # BLD AUTO: 0.06 10*3/MM3 (ref 0–0.2)
BASOPHILS NFR BLD AUTO: 0.6 % (ref 0–1.5)
BILIRUB SERPL-MCNC: <0.2 MG/DL (ref 0–1.2)
BLD GP AB SCN SERPL QL: NEGATIVE
BUN SERPL-MCNC: 14 MG/DL (ref 6–20)
BUN SERPL-MCNC: 14 MG/DL (ref 6–20)
BUN/CREAT SERPL: 16.3 (ref 7–25)
BUN/CREAT SERPL: 17.5 (ref 7–25)
CALCIUM SPEC-SCNC: 8.7 MG/DL (ref 8.6–10.5)
CALCIUM SPEC-SCNC: 8.7 MG/DL (ref 8.6–10.5)
CHLORIDE SERPL-SCNC: 106 MMOL/L (ref 98–107)
CHLORIDE SERPL-SCNC: 108 MMOL/L (ref 98–107)
CO2 SERPL-SCNC: 19.9 MMOL/L (ref 22–29)
CO2 SERPL-SCNC: 20.9 MMOL/L (ref 22–29)
CREAT SERPL-MCNC: 0.8 MG/DL (ref 0.57–1)
CREAT SERPL-MCNC: 0.86 MG/DL (ref 0.57–1)
DEPRECATED RDW RBC AUTO: 52.5 FL (ref 37–54)
DEPRECATED RDW RBC AUTO: 54.2 FL (ref 37–54)
EGFRCR SERPLBLD CKD-EPI 2021: 81.4 ML/MIN/1.73
EGFRCR SERPLBLD CKD-EPI 2021: 88.8 ML/MIN/1.73
EOSINOPHIL # BLD AUTO: 0.02 10*3/MM3 (ref 0–0.4)
EOSINOPHIL NFR BLD AUTO: 0.2 % (ref 0.3–6.2)
ERYTHROCYTE [DISTWIDTH] IN BLOOD BY AUTOMATED COUNT: 16.8 % (ref 12.3–15.4)
ERYTHROCYTE [DISTWIDTH] IN BLOOD BY AUTOMATED COUNT: 17.3 % (ref 12.3–15.4)
GLOBULIN UR ELPH-MCNC: 3 GM/DL
GLUCOSE SERPL-MCNC: 109 MG/DL (ref 65–99)
GLUCOSE SERPL-MCNC: 140 MG/DL (ref 65–99)
HCT VFR BLD AUTO: 38.9 % (ref 34–46.6)
HCT VFR BLD AUTO: 39.2 % (ref 34–46.6)
HGB BLD-MCNC: 12.1 G/DL (ref 12–15.9)
HGB BLD-MCNC: 12.3 G/DL (ref 12–15.9)
IMM GRANULOCYTES # BLD AUTO: 0.04 10*3/MM3 (ref 0–0.05)
IMM GRANULOCYTES NFR BLD AUTO: 0.4 % (ref 0–0.5)
INR PPP: 1.03 (ref 0.9–1.1)
LYMPHOCYTES # BLD AUTO: 1.87 10*3/MM3 (ref 0.7–3.1)
LYMPHOCYTES NFR BLD AUTO: 17.8 % (ref 19.6–45.3)
MCH RBC QN AUTO: 26.9 PG (ref 26.6–33)
MCH RBC QN AUTO: 27.2 PG (ref 26.6–33)
MCHC RBC AUTO-ENTMCNC: 31.1 G/DL (ref 31.5–35.7)
MCHC RBC AUTO-ENTMCNC: 31.4 G/DL (ref 31.5–35.7)
MCV RBC AUTO: 86.6 FL (ref 79–97)
MCV RBC AUTO: 86.7 FL (ref 79–97)
MONOCYTES # BLD AUTO: 0.59 10*3/MM3 (ref 0.1–0.9)
MONOCYTES NFR BLD AUTO: 5.6 % (ref 5–12)
NEUTROPHILS NFR BLD AUTO: 7.9 10*3/MM3 (ref 1.7–7)
NEUTROPHILS NFR BLD AUTO: 75.4 % (ref 42.7–76)
NRBC BLD AUTO-RTO: 0 /100 WBC (ref 0–0.2)
PLATELET # BLD AUTO: 320 10*3/MM3 (ref 140–450)
PLATELET # BLD AUTO: 376 10*3/MM3 (ref 140–450)
PMV BLD AUTO: 10.7 FL (ref 6–12)
PMV BLD AUTO: 9.7 FL (ref 6–12)
POTASSIUM SERPL-SCNC: 3.6 MMOL/L (ref 3.5–5.2)
POTASSIUM SERPL-SCNC: 3.9 MMOL/L (ref 3.5–5.2)
PROT SERPL-MCNC: 6.9 G/DL (ref 6–8.5)
PROTHROMBIN TIME: 13.6 SECONDS (ref 11.7–14.2)
RBC # BLD AUTO: 4.49 10*6/MM3 (ref 3.77–5.28)
RBC # BLD AUTO: 4.52 10*6/MM3 (ref 3.77–5.28)
RH BLD: POSITIVE
SODIUM SERPL-SCNC: 140 MMOL/L (ref 136–145)
SODIUM SERPL-SCNC: 142 MMOL/L (ref 136–145)
T&S EXPIRATION DATE: NORMAL
WBC NRBC COR # BLD: 10.48 10*3/MM3 (ref 3.4–10.8)
WBC NRBC COR # BLD: 11.62 10*3/MM3 (ref 3.4–10.8)

## 2023-05-21 PROCEDURE — 99152 MOD SED SAME PHYS/QHP 5/>YRS: CPT

## 2023-05-21 PROCEDURE — 73600 X-RAY EXAM OF ANKLE: CPT

## 2023-05-21 PROCEDURE — 25010000002 PROPOFOL 10 MG/ML EMULSION: Performed by: EMERGENCY MEDICINE

## 2023-05-21 PROCEDURE — 25010000002 ONDANSETRON PER 1 MG: Performed by: EMERGENCY MEDICINE

## 2023-05-21 PROCEDURE — 80053 COMPREHEN METABOLIC PANEL: CPT | Performed by: EMERGENCY MEDICINE

## 2023-05-21 PROCEDURE — 85610 PROTHROMBIN TIME: CPT | Performed by: EMERGENCY MEDICINE

## 2023-05-21 PROCEDURE — G0378 HOSPITAL OBSERVATION PER HR: HCPCS

## 2023-05-21 PROCEDURE — 25010000002 CEFAZOLIN IN DEXTROSE 2-4 GM/100ML-% SOLUTION: Performed by: EMERGENCY MEDICINE

## 2023-05-21 PROCEDURE — 96365 THER/PROPH/DIAG IV INF INIT: CPT

## 2023-05-21 PROCEDURE — 85025 COMPLETE CBC W/AUTO DIFF WBC: CPT | Performed by: EMERGENCY MEDICINE

## 2023-05-21 PROCEDURE — 86850 RBC ANTIBODY SCREEN: CPT | Performed by: EMERGENCY MEDICINE

## 2023-05-21 PROCEDURE — 36415 COLL VENOUS BLD VENIPUNCTURE: CPT | Performed by: NURSE PRACTITIONER

## 2023-05-21 PROCEDURE — 25010000002 MORPHINE PER 10 MG: Performed by: NURSE PRACTITIONER

## 2023-05-21 PROCEDURE — 96375 TX/PRO/DX INJ NEW DRUG ADDON: CPT

## 2023-05-21 PROCEDURE — 85730 THROMBOPLASTIN TIME PARTIAL: CPT | Performed by: EMERGENCY MEDICINE

## 2023-05-21 PROCEDURE — 85027 COMPLETE CBC AUTOMATED: CPT | Performed by: NURSE PRACTITIONER

## 2023-05-21 PROCEDURE — 96376 TX/PRO/DX INJ SAME DRUG ADON: CPT

## 2023-05-21 PROCEDURE — 86901 BLOOD TYPING SEROLOGIC RH(D): CPT | Performed by: EMERGENCY MEDICINE

## 2023-05-21 PROCEDURE — 86900 BLOOD TYPING SEROLOGIC ABO: CPT | Performed by: EMERGENCY MEDICINE

## 2023-05-21 PROCEDURE — 25010000002 HYDROMORPHONE 1 MG/ML SOLUTION: Performed by: EMERGENCY MEDICINE

## 2023-05-21 RX ORDER — AMOXICILLIN 250 MG
2 CAPSULE ORAL 2 TIMES DAILY
Status: DISCONTINUED | OUTPATIENT
Start: 2023-05-21 | End: 2023-05-25 | Stop reason: HOSPADM

## 2023-05-21 RX ORDER — ACETAMINOPHEN 160 MG/5ML
650 SOLUTION ORAL EVERY 4 HOURS PRN
Status: DISCONTINUED | OUTPATIENT
Start: 2023-05-21 | End: 2023-05-25 | Stop reason: HOSPADM

## 2023-05-21 RX ORDER — SODIUM CHLORIDE 9 MG/ML
100 INJECTION, SOLUTION INTRAVENOUS CONTINUOUS
Status: DISCONTINUED | OUTPATIENT
Start: 2023-05-21 | End: 2023-05-22

## 2023-05-21 RX ORDER — CEFAZOLIN SODIUM 2 G/100ML
2 INJECTION, SOLUTION INTRAVENOUS ONCE
Status: COMPLETED | OUTPATIENT
Start: 2023-05-21 | End: 2023-05-21

## 2023-05-21 RX ORDER — OXYCODONE HYDROCHLORIDE AND ACETAMINOPHEN 5; 325 MG/1; MG/1
1 TABLET ORAL EVERY 6 HOURS PRN
Status: DISCONTINUED | OUTPATIENT
Start: 2023-05-21 | End: 2023-05-22

## 2023-05-21 RX ORDER — MORPHINE SULFATE 2 MG/ML
2 INJECTION, SOLUTION INTRAMUSCULAR; INTRAVENOUS
Status: DISCONTINUED | OUTPATIENT
Start: 2023-05-21 | End: 2023-05-22

## 2023-05-21 RX ORDER — SODIUM CHLORIDE 0.9 % (FLUSH) 0.9 %
10 SYRINGE (ML) INJECTION AS NEEDED
Status: DISCONTINUED | OUTPATIENT
Start: 2023-05-21 | End: 2023-05-25 | Stop reason: HOSPADM

## 2023-05-21 RX ORDER — PANTOPRAZOLE SODIUM 40 MG/1
40 TABLET, DELAYED RELEASE ORAL
Status: DISCONTINUED | OUTPATIENT
Start: 2023-05-21 | End: 2023-05-25 | Stop reason: HOSPADM

## 2023-05-21 RX ORDER — POLYETHYLENE GLYCOL 3350 17 G/17G
17 POWDER, FOR SOLUTION ORAL DAILY PRN
Status: DISCONTINUED | OUTPATIENT
Start: 2023-05-21 | End: 2023-05-25 | Stop reason: HOSPADM

## 2023-05-21 RX ORDER — ACETAMINOPHEN 650 MG/1
650 SUPPOSITORY RECTAL EVERY 4 HOURS PRN
Status: DISCONTINUED | OUTPATIENT
Start: 2023-05-21 | End: 2023-05-25 | Stop reason: HOSPADM

## 2023-05-21 RX ORDER — ATORVASTATIN CALCIUM 20 MG/1
40 TABLET, FILM COATED ORAL NIGHTLY
Status: DISCONTINUED | OUTPATIENT
Start: 2023-05-21 | End: 2023-05-25 | Stop reason: HOSPADM

## 2023-05-21 RX ORDER — SODIUM CHLORIDE 0.9 % (FLUSH) 0.9 %
10 SYRINGE (ML) INJECTION EVERY 12 HOURS SCHEDULED
Status: DISCONTINUED | OUTPATIENT
Start: 2023-05-21 | End: 2023-05-25 | Stop reason: HOSPADM

## 2023-05-21 RX ORDER — LOSARTAN POTASSIUM 100 MG/1
100 TABLET ORAL
Status: DISCONTINUED | OUTPATIENT
Start: 2023-05-21 | End: 2023-05-25 | Stop reason: HOSPADM

## 2023-05-21 RX ORDER — PROPOFOL 10 MG/ML
40 VIAL (ML) INTRAVENOUS ONCE
Status: COMPLETED | OUTPATIENT
Start: 2023-05-21 | End: 2023-05-21

## 2023-05-21 RX ORDER — CALCIUM CARBONATE 500 MG/1
2 TABLET, CHEWABLE ORAL 2 TIMES DAILY PRN
Status: DISCONTINUED | OUTPATIENT
Start: 2023-05-21 | End: 2023-05-25 | Stop reason: HOSPADM

## 2023-05-21 RX ORDER — FOLIC ACID 1 MG/1
1 TABLET ORAL DAILY
Status: DISCONTINUED | OUTPATIENT
Start: 2023-05-21 | End: 2023-05-25 | Stop reason: HOSPADM

## 2023-05-21 RX ORDER — ACETAMINOPHEN 325 MG/1
650 TABLET ORAL EVERY 4 HOURS PRN
Status: DISCONTINUED | OUTPATIENT
Start: 2023-05-21 | End: 2023-05-25 | Stop reason: HOSPADM

## 2023-05-21 RX ORDER — ONDANSETRON 4 MG/1
4 TABLET, FILM COATED ORAL EVERY 6 HOURS PRN
Status: DISCONTINUED | OUTPATIENT
Start: 2023-05-21 | End: 2023-05-25 | Stop reason: HOSPADM

## 2023-05-21 RX ORDER — AMLODIPINE BESYLATE 10 MG/1
10 TABLET ORAL
Status: DISCONTINUED | OUTPATIENT
Start: 2023-05-21 | End: 2023-05-25 | Stop reason: HOSPADM

## 2023-05-21 RX ORDER — PROPOFOL 10 MG/ML
100 VIAL (ML) INTRAVENOUS ONCE
Status: COMPLETED | OUTPATIENT
Start: 2023-05-21 | End: 2023-05-21

## 2023-05-21 RX ORDER — LEVOTHYROXINE SODIUM 88 UG/1
88 TABLET ORAL
Status: DISCONTINUED | OUTPATIENT
Start: 2023-05-21 | End: 2023-05-25 | Stop reason: HOSPADM

## 2023-05-21 RX ORDER — SODIUM CHLORIDE 9 MG/ML
40 INJECTION, SOLUTION INTRAVENOUS AS NEEDED
Status: DISCONTINUED | OUTPATIENT
Start: 2023-05-21 | End: 2023-05-25 | Stop reason: HOSPADM

## 2023-05-21 RX ORDER — CARVEDILOL 12.5 MG/1
12.5 TABLET ORAL 2 TIMES DAILY WITH MEALS
Status: DISCONTINUED | OUTPATIENT
Start: 2023-05-21 | End: 2023-05-25 | Stop reason: HOSPADM

## 2023-05-21 RX ORDER — NYSTATIN 100000 [USP'U]/G
POWDER TOPICAL 4 TIMES DAILY
Status: DISCONTINUED | OUTPATIENT
Start: 2023-05-21 | End: 2023-05-25 | Stop reason: HOSPADM

## 2023-05-21 RX ORDER — BISACODYL 5 MG/1
5 TABLET, DELAYED RELEASE ORAL DAILY PRN
Status: DISCONTINUED | OUTPATIENT
Start: 2023-05-21 | End: 2023-05-25 | Stop reason: HOSPADM

## 2023-05-21 RX ORDER — SERTRALINE HYDROCHLORIDE 100 MG/1
100 TABLET, FILM COATED ORAL DAILY
Status: DISCONTINUED | OUTPATIENT
Start: 2023-05-21 | End: 2023-05-25 | Stop reason: HOSPADM

## 2023-05-21 RX ORDER — BISACODYL 10 MG
10 SUPPOSITORY, RECTAL RECTAL DAILY PRN
Status: DISCONTINUED | OUTPATIENT
Start: 2023-05-21 | End: 2023-05-25 | Stop reason: HOSPADM

## 2023-05-21 RX ORDER — ONDANSETRON 2 MG/ML
4 INJECTION INTRAMUSCULAR; INTRAVENOUS EVERY 6 HOURS PRN
Status: DISCONTINUED | OUTPATIENT
Start: 2023-05-21 | End: 2023-05-25 | Stop reason: HOSPADM

## 2023-05-21 RX ORDER — ONDANSETRON 2 MG/ML
4 INJECTION INTRAMUSCULAR; INTRAVENOUS ONCE
Status: COMPLETED | OUTPATIENT
Start: 2023-05-21 | End: 2023-05-21

## 2023-05-21 RX ADMIN — MORPHINE SULFATE 2 MG: 2 INJECTION, SOLUTION INTRAMUSCULAR; INTRAVENOUS at 13:58

## 2023-05-21 RX ADMIN — Medication 10 ML: at 20:48

## 2023-05-21 RX ADMIN — PROPOFOL 80 MG: 10 INJECTION, EMULSION INTRAVENOUS at 02:55

## 2023-05-21 RX ADMIN — SODIUM CHLORIDE 100 ML/HR: 9 INJECTION, SOLUTION INTRAVENOUS at 05:49

## 2023-05-21 RX ADMIN — Medication 1 MG: at 13:57

## 2023-05-21 RX ADMIN — DOCUSATE SODIUM 50MG AND SENNOSIDES 8.6MG 2 TABLET: 8.6; 5 TABLET, FILM COATED ORAL at 13:57

## 2023-05-21 RX ADMIN — MORPHINE SULFATE 2 MG: 2 INJECTION, SOLUTION INTRAMUSCULAR; INTRAVENOUS at 10:59

## 2023-05-21 RX ADMIN — OXYCODONE AND ACETAMINOPHEN 1 TABLET: 5; 325 TABLET ORAL at 23:44

## 2023-05-21 RX ADMIN — CEFAZOLIN SODIUM 2 G: 2 INJECTION, SOLUTION INTRAVENOUS at 04:08

## 2023-05-21 RX ADMIN — DOCUSATE SODIUM 50MG AND SENNOSIDES 8.6MG 2 TABLET: 8.6; 5 TABLET, FILM COATED ORAL at 20:43

## 2023-05-21 RX ADMIN — PANTOPRAZOLE SODIUM 40 MG: 40 TABLET, DELAYED RELEASE ORAL at 17:50

## 2023-05-21 RX ADMIN — SERTRALINE 100 MG: 100 TABLET, FILM COATED ORAL at 13:58

## 2023-05-21 RX ADMIN — OXYCODONE AND ACETAMINOPHEN 1 TABLET: 5; 325 TABLET ORAL at 15:46

## 2023-05-21 RX ADMIN — ONDANSETRON 4 MG: 2 INJECTION INTRAMUSCULAR; INTRAVENOUS at 02:51

## 2023-05-21 RX ADMIN — OXYCODONE AND ACETAMINOPHEN 1 TABLET: 5; 325 TABLET ORAL at 09:35

## 2023-05-21 RX ADMIN — PROPOFOL 70 MG: 10 INJECTION, EMULSION INTRAVENOUS at 04:00

## 2023-05-21 RX ADMIN — AMLODIPINE BESYLATE 10 MG: 10 TABLET ORAL at 13:57

## 2023-05-21 RX ADMIN — LEVOTHYROXINE SODIUM 88 MCG: 0.09 TABLET ORAL at 13:57

## 2023-05-21 RX ADMIN — MORPHINE SULFATE 2 MG: 2 INJECTION, SOLUTION INTRAMUSCULAR; INTRAVENOUS at 20:48

## 2023-05-21 RX ADMIN — NYSTATIN: 100000 POWDER TOPICAL at 17:50

## 2023-05-21 RX ADMIN — ATORVASTATIN CALCIUM 40 MG: 20 TABLET, FILM COATED ORAL at 20:43

## 2023-05-21 RX ADMIN — MORPHINE SULFATE 2 MG: 2 INJECTION, SOLUTION INTRAMUSCULAR; INTRAVENOUS at 06:24

## 2023-05-21 RX ADMIN — CARVEDILOL 12.5 MG: 12.5 TABLET, FILM COATED ORAL at 10:58

## 2023-05-21 RX ADMIN — HYDROMORPHONE HYDROCHLORIDE 1 MG: 1 INJECTION, SOLUTION INTRAMUSCULAR; INTRAVENOUS; SUBCUTANEOUS at 02:51

## 2023-05-21 RX ADMIN — NYSTATIN: 100000 POWDER TOPICAL at 13:58

## 2023-05-21 RX ADMIN — LOSARTAN POTASSIUM 100 MG: 100 TABLET, FILM COATED ORAL at 13:57

## 2023-05-21 NOTE — ED NOTES
Pt to Er from home via [facility]. Pt reportedly mis-stepped when going down the stairs. However pt did not fall down the stairs, instead landed on her bottom. C/o R ankle pain and swelling. Pulses present. Pt does report some tingling.    Per EMS,  reports that pt drank approx 1/2 bottle of wine tonight.  No LOC, thinners, or striking head.

## 2023-05-21 NOTE — ED PROVIDER NOTES
I have seen and evaluated the patient at bedside history and physical exam are in line with Dr. Linares my supervising physicians.  I assisted Dr. Linares with postreduction and splinting.  Call was placed to the hospitalist to discuss admission.    Patient is resting quietly in no acute distress at time of admission.  Her vital signs are stable patient will be admitted to Dr. Woodruff's care.  It is 4:43 AM.  [facility] agrees to consult Ortho at 7 AM.    Patient will be moved to a [facility] bed at this time.    /91, temp 82, heart rate 110, respirations 17, SPO2 92% (suspect this is secondary to pain medication and the patient resting.)  This was 98% prior to pain medication, moderate sedation postreduction.     Nolberto Lawler III, PA  05/21/23 044

## 2023-05-21 NOTE — ED PROVIDER NOTES
EMERGENCY DEPARTMENT ENCOUNTER    Room Number:  17/17  Date seen:  5/21/2023  PCP: Tomeka Linares, LEONIDAS      HPI:  Chief Complaint: Right ankle pain  A complete HPI/ROS/PMH/PSH/SH/FH are unobtainable due to: None  Context: Marisol Carrillo is a 53 y.o. female who presents to the ED c/o right ankle pain.  Patient fell down 1 step as she was going to her basement.  She sustained acute onset severe pain to the right ankle that is constant.  She denies hitting her head.  She denies having pain elsewhere.          PAST MEDICAL HISTORY  Active Ambulatory Problems     Diagnosis Date Noted    Essential familial hypercholesterolemia 11/21/2015    Hypertension 11/21/2015    Hypothyroidism 11/21/2015    Body mass index (BMI) of 60.0-69.9 in adult (HCC) 11/21/2015    Impaired fasting glucose 12/16/2015    Vitamin D deficiency 12/16/2015    Lumbosacral radiculopathy 01/29/2016    Gastroesophageal reflux disease 02/22/2016    Constipation 02/22/2016    Diarrhea 02/22/2016    Fatigue 02/22/2016    Heartburn 02/22/2016    Lumbar disc herniation with radiculopathy 02/22/2016    Pain in extremity 02/22/2016    Anxiety 02/22/2016    Depression 02/22/2016    DDD (degenerative disc disease), lumbar 12/07/2016    Spinal headache 12/07/2016    Chronic bilateral low back pain with bilateral sciatica 02/10/2017    Disc disease, degenerative, lumbar or lumbosacral 02/10/2017    Primary localized osteoarthritis of left knee 03/30/2017    History of lumbar fusion 08/02/2017    Lumbar spondylolysis 03/09/2018    Chronic pain of left knee 08/24/2018    S/P total prosthetic knee replacement not using cement, left 03/21/2019    History of removal of laparoscopic gastric banding device 04/04/2019    Incisional pain 04/04/2019    Nausea 05/07/2019    Medrano's esophagus without dysplasia 10/02/2019    Gastroesophageal reflux disease with esophagitis 01/16/2020    Postlaminectomy syndrome, lumbar region 07/10/2020    Urinary urgency 11/18/2021     Arthritis 2018    Hypercholesterolemia 2018    Posttraumatic stress disorder 10/24/2022    Arthritis of knee 2023     Resolved Ambulatory Problems     Diagnosis Date Noted    Vitamin deficiency 2015    Dysphagia 2016    Nocturnal cough 2016    Regurgitation 2016    Vomiting 2016    Epigastric pain 2017    Chronic cholecystitis 10/25/2017    Pulmonary embolus 2019    Other dysphagia 2019    Nausea and vomiting 2019    Obesity, Class III, BMI 40-49.9 (morbid obesity) (HCC) 2019    Abdominal pain 10/02/2019    Esophageal dysphagia 2020     Past Medical History:   Diagnosis Date    Acute pulmonary embolism 2019    Alopecia     Anemia 2022    Anxiety and depression     Balance problem     Medrano esophagus 2019    Bilateral knee pain     Breast mass     Cervical disc disorder     Chronic low back pain     Fatty liver 2018    GERD (gastroesophageal reflux disease)     Hernia 2017    History of infectious mononucleosis     Hyperlipidemia     Irritable bowel syndrome     Kidney calculus     Lumbosacral disc disease     Pneumonia     Sciatica          PAST SURGICAL HISTORY  Past Surgical History:   Procedure Laterality Date    BACK SURGERY  2015    microdiscectomy L5-S1 and spinal fusion    BARIATRIC SURGERY      Lapband     SECTION      CHOLECYSTECTOMY N/A 2017    Procedure: CHOLECYSTECTOMY LAPAROSCOPIC;  Surgeon: Jam Ballard MD;  Location: [facility];  Service:     DILATION AND CURETTAGE, DIAGNOSTIC / THERAPEUTIC      ENDOSCOPY N/A 2017    Procedure: ESOPHAGOGASTRODUODENOSCOPY WITH BIOPSY;  Surgeon: Jam Ballard MD;  Location: [facility] ENDOSCOPY;  Service:     ENDOSCOPY N/A 2020    Procedure: ESOPHAGOGASTRODUODENOSCOPY WITH BIOPSIES, SNELL DILATATION 44Fr, 46Fr;  Surgeon: Kirsten Yanez MD;  Location: [facility] ENDOSCOPY;  Service: Gastroenterology;  Laterality: N/A;  PRE:   DYSPHAGIA, ABDOMINAL PAIN, GERD  POST:  IRREGULAR Z-LINE, R/O EOE, ESOPHAGEAL FURROWS    ENDOSCOPY N/A 10/18/2022    Procedure: ESOPHAGOGASTRODUODENOSCOPY WITH BIOPSY;  Surgeon: Tu Hartman Jr., MD;  Location: [facility] ENDOSCOPY;  Service: General;  Laterality: N/A;  PRE- GERD, H/O BAND REMOVAL  POST- GASTRITIS    GASTRIC BANDING REMOVAL N/A 03/25/2019    Procedure: GASTRIC BANDING REMOVAL LAPAROSCOPIC;  Surgeon: Tu Hartman Jr., MD;  Location: [facility] MAIN OR;  Service: General    HERNIA REPAIR      Hiatal    JOINT REPLACEMENT Left 2017    KNEE    KNEE ARTHROSCOPY Left     REPAIR OF MENISUCS    LITHOTRIPSY      LUMBAR FUSION  11/2015    L5-S1. WITH HARDWARE    MICRODISCECTOMY LUMBAR      NH ARTHRP KNE CONDYLE&PLATU MEDIAL&LAT COMPARTMENTS Left 03/29/2017    Procedure: TOTAL KNEE ARTHROPLASTY;  Surgeon: Zacarias Reeves MD;  Location: [facility] MAIN OR;  Service: Orthopedics    SPINAL FUSION  11/20/2015    TONSILLECTOMY      TOTAL KNEE ARTHROPLASTY REVISION Left 03/2019    Dr. Chavez    TRIGGER POINT INJECTION  2012    UPPER GASTROINTESTINAL ENDOSCOPY  03/2019         FAMILY HISTORY  Family History   Problem Relation Age of Onset    Anxiety disorder Mother     Depression Mother     Hypertension Mother     Thyroid disease Mother     Mental illness Mother     Hypertension Father     Anxiety disorder Brother     Depression Brother     Thyroid disease Brother     Alcohol abuse Maternal Grandmother     Heart disease Maternal Grandmother     Malig Hyperthermia Neg Hx          SOCIAL HISTORY  Social History     Socioeconomic History    Marital status:      Spouse name: Nik    Number of children: 1    Years of education: Some College    Highest education level: High school graduate   Tobacco Use    Smoking status: Never    Smokeless tobacco: Never   Vaping Use    Vaping Use: Never used   Substance and Sexual Activity    Alcohol use: Not Currently     Comment: Do not drink alcohol    Drug use: No     Sexual activity: Yes     Partners: Male     Birth control/protection: Post-menopausal, None         ALLERGIES  Ciprofloxacin, Hydrocodone-acetaminophen, Ketamine, Propacetamol, Propoxyphene-acetaminophen, Lisinopril, and Tirosint [levothyroxine]        REVIEW OF SYSTEMS  Review of Systems     All systems reviewed and negative except for those discussed in HPI.       PHYSICAL EXAM  ED Triage Vitals [05/20/23 2332]   Temp Heart Rate Resp BP SpO2   98.2 °F (36.8 °C) 99 18 120/61 98 %      Temp src Heart Rate Source Patient Position BP Location FiO2 (%)   -- -- -- -- --       Physical Exam      GENERAL: no acute distress  HENT: nares patent  EYES: no scleral icterus  CV: regular rhythm, normal rate, 1+ right DP pulse, good capillary refill to the toes bilaterally  RESPIRATORY: normal effort  ABDOMEN: soft  MUSCULOSKELETAL: Right ankle deformity, no proximal tib-fib tenderness on the right  NEURO: alert, moves all extremities, follows commands  PSYCH:  calm, cooperative  SKIN: warm, dry, puncture wound to the medial right ankle    Vital signs and nursing notes reviewed.          LAB RESULTS  No results found for this or any previous visit (from the past 24 hour(s)).    Ordered the above labs and reviewed the results.        RADIOLOGY  XR Ankle 3+ View Right    Result Date: 5/21/2023  RIGHT ANKLE: AP, LATERAL  HISTORY: Fall with right ankle deformity. Ankle pain and swelling.  FINDINGS: There is a tibiotalar fracture dislocation. The talar dome is dislocated completely laterally with respect to the plafond. There is oblique distal fibular diametaphyseal fracture with lateral displacement of the lateral malleolar fragment 1.8 cm in the lateral malleolus maintains its articulation with the lateral talus. There is also displaced transverse fracture through the medial malleolus which is displaced laterally 4 cm. There is generalized soft tissue swelling. Degenerative heel spur is present.      Lateral tibiotalar dislocation  with severely displaced medial and lateral malleolar fractures.  This report was finalized on 5/21/2023 12:27 AM by Dr. Miguel Chua M.D.       Ordered the above noted radiological studies. Reviewed by me in PACS.          PROCEDURES  FX Dislocation    Date/Time: 5/21/2023 3:09 AM  Performed by: Sidney Linares II, MD  Authorized by: Sidney Linares II, MD     Consent:     Consent obtained:  Written    Consent given by:  Patient    Risks discussed:  Nerve damage, pain and vascular damage  Injury:     Injury location:  Ankle    Ankle injury location:  R ankle    Ankle fracture type: bimalleolar    Pre-procedure details:     Distal neurologic exam:  Numbness    Distal perfusion: distal pulses strong and brisk capillary refill      Range of motion: reduced    Sedation:     Sedation type:  Moderate sedation  Procedure details:     Manipulation performed: yes      Reduction successful: yes      X-ray confirmed reduction: yes      Immobilization:  Splint    Splint type:  Ankle stirrup and short leg    Supplies used:  Fiberglass and cotton padding    Attestation: Splint applied and adjusted personally by me    Post-procedure details:     Distal neurologic exam:  Normal    Distal perfusion: distal pulses strong      Range of motion: improved      Procedure completion:  Tolerated well, no immediate complications  Procedural Sedation    Date/Time: 5/21/2023 3:11 AM  Performed by: Sidney Linares II, MD  Authorized by: Sidney Linares II, MD     Consent:     Consent obtained:  Written    Consent given by:  Patient    Risks discussed:  Allergic reaction, dysrhythmia, inadequate sedation, nausea, vomiting, prolonged hypoxia resulting in organ damage and respiratory compromise necessitating ventilatory assistance and intubation  Indications:     Procedure performed:  Fracture reduction    Procedure necessitating sedation performed by:  Physician performing sedation    Intended level of sedation:   Moderate  Pre-sedation assessment:     ASA classification: class 3 - patient with severe systemic disease    Immediate pre-procedure details:     Reassessment: Patient reassessed immediately prior to procedure      Reviewed: vital signs      Verified: bag valve mask available, emergency equipment available, intubation equipment available, IV patency confirmed, oxygen available and suction available    Procedure details (see MAR for exact dosages):     Sedation start time:  5/21/2023 2:57 AM    Preoxygenation:  Nasal cannula    Sedation:  Propofol    Analgesia:  Hydromorphone    Intra-procedure monitoring:  Blood pressure monitoring, cardiac monitor, continuous pulse oximetry, continuous capnometry, frequent LOC assessments and frequent vital sign checks    Intra-procedure events: none      Sedation end time:  5/21/2023 3:07 AM    Total sedation time (minutes):  10  Post-procedure details:     Attendance: Constant attendance by certified staff until patient recovered      Recovery: Patient returned to pre-procedure baseline      Procedure completion:  Tolerated well, no immediate complications  Procedural Sedation    Date/Time: 5/21/2023 4:06 AM  Performed by: Sidney Linares II, MD  Authorized by: Sidney Linares II, MD     Consent:     Consent obtained:  Written    Consent given by:  Patient    Risks discussed:  Allergic reaction, dysrhythmia, nausea, inadequate sedation, vomiting, prolonged hypoxia resulting in organ damage and respiratory compromise necessitating ventilatory assistance and intubation  Indications:     Intended level of sedation:  Moderate  Pre-sedation assessment:     ASA classification: class 3 - patient with severe systemic disease    Immediate pre-procedure details:     Reassessment: Patient reassessed immediately prior to procedure      Reviewed: vital signs      Verified: bag valve mask available, emergency equipment available, intubation equipment available, IV patency confirmed,  oxygen available and suction available    Procedure details (see MAR for exact dosages):     Sedation start time:  5/21/2023 2:56 AM    Preoxygenation:  Nasal cannula    Sedation:  Propofol    Intra-procedure monitoring:  Blood pressure monitoring, cardiac monitor, continuous capnometry, continuous pulse oximetry, frequent LOC assessments and frequent vital sign checks    Intra-procedure events: none      Sedation end time:  5/21/2023 4:06 AM    Total sedation time (minutes):  10  Post-procedure details:     Attendance: Constant attendance by certified staff until patient recovered      Recovery: Patient returned to pre-procedure baseline      Procedure completion:  Tolerated well, no immediate complications  Comments:      Patient required second sedation for optimization of fracture reduction.          MEDICATIONS GIVEN IN ER  Medications   sodium chloride 0.9 % flush 10 mL (has no administration in time range)   Propofol (DIPRIVAN) injection 40 mg (has no administration in time range)   HYDROmorphone (DILAUDID) injection 1 mg (has no administration in time range)   ondansetron (ZOFRAN) injection 4 mg (has no administration in time range)         MEDICAL DECISION MAKING, PROGRESS, and CONSULTS    Discussion below represents my analysis of pertinent findings related to patient's condition, differential diagnosis, treatment plan and final disposition.      Orders placed during this visit:  Orders Placed This Encounter   Procedures    XR Ankle 3+ View Right    Comprehensive Metabolic Panel    Protime-INR    aPTT    CBC Auto Differential    Monitor Blood Pressure    Pulse Oximetry, Continuous    Type & Screen    Insert Peripheral IV    CBC & Differential             Differential diagnosis:    Fracture, dislocation, open fracture          Independent interpretation of labs, radiology studies, and discussions with consultants:  ED Course as of 05/21/23 2016   Sun May 21, 2023   0313 X-ray of the right ankle  independently interpreted myself.  Bimalleolar fracture with significant lateral displacement. [TD]   0439 Discussed the patient's case with MARV Lam with A.  To admit to Dr. Woodruff's care in a [facility] bed.  [facility] will consult Ortho in a few hours. [RC]      ED Course User Index  [RC] Nolberto Lawler III, PA  [TD] Sidney Linares II, MD     Care turned over to TRANG Bruce pending admission and ortho consult.           DIAGNOSIS  Final diagnoses:   Type I or II open fracture of right ankle, initial encounter         DISPOSITION  Admit      Latest Documented Vital Signs:  As of 01:56 EDT  BP- 120/61 HR- 99 Temp- 98.2 °F (36.8 °C) O2 sat- 98%      --    Please note that portions of this were completed with a voice recognition program.       Note Disclaimer: At [facility], we believe that sharing information builds trust and better relationships. You are receiving this note because you are receiving care at [facility] or recently visited. It is possible you will see health information before a provider has talked with you about it. This kind of information can be easy to misunderstand. To help you fully understand what it means for your health, we urge you to discuss this note with your provider.         Sidney Linares II, MD  05/21/23 2017

## 2023-05-21 NOTE — ED NOTES
..Nursing report ED to floor  Marisol Carrillo  53 y.o.  female    HPI :   Chief Complaint   Patient presents with    Ankle Injury       Admitting doctor:   No admitting provider for patient encounter.    Admitting diagnosis:   The encounter diagnosis was Type I or II open fracture of right ankle, initial encounter.    Code status:   Current Code Status       Date Active Code Status Order ID Comments User Context       Prior            Allergies:   Ciprofloxacin, Hydrocodone-acetaminophen, Ketamine, Propacetamol, Propoxyphene-acetaminophen, Lisinopril, and Tirosint [levothyroxine]    Isolation:   No active isolations    Intake and Output  No intake or output data in the 24 hours ending 05/21/23 0419    Weight:       05/20/23  2341   Weight: (!) 157 kg (347 lb)       Most recent vitals:   Vitals:    05/21/23 0411 05/21/23 0412 05/21/23 0415 05/21/23 0416   BP: 155/85   161/91   BP Location:       Pulse:  108 110    Resp:       Temp:       SpO2:  92% 92%    Weight:       Height:           Active LDAs/IV Access:   Lines, Drains & Airways       Active LDAs       Name Placement date Placement time Site Days    Peripheral IV 05/21/23 0236 Left Antecubital 05/21/23 0236  Antecubital  less than 1                    Labs (abnormal labs have a star):   Labs Reviewed   COMPREHENSIVE METABOLIC PANEL - Abnormal; Notable for the following components:       Result Value    Glucose 140 (*)     Chloride 108 (*)     CO2 20.9 (*)     All other components within normal limits    Narrative:     GFR Normal >60  Chronic Kidney Disease <60  Kidney Failure <15     CBC WITH AUTO DIFFERENTIAL - Abnormal; Notable for the following components:    MCHC 31.1 (*)     RDW 17.3 (*)     RDW-SD 54.2 (*)     Lymphocyte % 17.8 (*)     Eosinophil % 0.2 (*)     Neutrophils, Absolute 7.90 (*)     All other components within normal limits   PROTIME-INR - Normal   APTT - Normal   TYPE AND SCREEN   CBC AND DIFFERENTIAL    Narrative:     The following orders  were created for panel order CBC & Differential.  Procedure                               Abnormality         Status                     ---------                               -----------         ------                     CBC Auto Differential[[id number]]        Abnormal            Final result                 Please view results for these tests on the individual orders.       EKG:   No orders to display       Meds given in ED:   Medications   sodium chloride 0.9 % flush 10 mL (has no administration in time range)   ceFAZolin in dextrose (ANCEF) IVPB solution 2 g (2 g Intravenous New Bag 5/21/23 0408)   Propofol (DIPRIVAN) injection 40 mg (80 mg Intravenous Given by Other 5/21/23 0255)   HYDROmorphone (DILAUDID) injection 1 mg (1 mg Intravenous Given 5/21/23 0251)   ondansetron (ZOFRAN) injection 4 mg (4 mg Intravenous Given 5/21/23 0251)   Propofol (DIPRIVAN) injection 100 mg (70 mg Intravenous Given by Other 5/21/23 0400)       Imaging results:  XR Ankle 3+ View Right    Result Date: 5/21/2023  Lateral tibiotalar dislocation with severely displaced medial and lateral malleolar fractures.  This report was finalized on 5/21/2023 12:27 AM by Dr. Miguel Chua M.D.       Ambulatory status:   - non weight bearing right leg    Social issues:   Social History     Socioeconomic History    Marital status:      Spouse name: Nik    Number of children: 1    Years of education: Some College    Highest education level: High school graduate   Tobacco Use    Smoking status: Never    Smokeless tobacco: Never   Vaping Use    Vaping Use: Never used   Substance and Sexual Activity    Alcohol use: Not Currently     Comment: Do not drink alcohol    Drug use: No    Sexual activity: Yes     Partners: Male     Birth control/protection: Post-menopausal, None       NIH Stroke Scale:         Leatha Reyes RN  05/21/23 04:19 EDT

## 2023-05-21 NOTE — PLAN OF CARE
Goal Outcome Evaluation:  Plan of Care Reviewed With: patient        Progress: improving  Outcome Evaluation: A&OX4, BEDREST, NWB RLE, REG DIET, NPO P MIDNIGHT, SX TOMORROW, IVF, PAIN CONTROLLED C NORCO & DILAUDID, NVI, VSS

## 2023-05-21 NOTE — CONSULTS
"    ORTHOPEDIC SURGERY CONSULT      Patient: Marisol Carrillo  Date of Admission: 5/21/2023  1:23 AM  YOB: 1970  Medical Record Number: [id number]  Attending Physician: Israel Wan MD  Consulting Provider: MARV Gillespie    CHIEF COMPLIANT: Ankle fracture, right    HISTORY OF PRESENT ILLINESS: Patient is a 53 y.o. year old female presents to [facility] with above complaints.  Orthopedics was consulted for further evaluation and treatment.    Patient relates that she was walking down a flight of stairs in her basement, missed 1 step, and although she denies falling, somehow ended up her buttocks after \"rolling\" her ankle.  She had immediate onset severe pain and inability to bear weight.  There is visible deformity.  Initial radiographs at [facility] emergency department demonstrated a lateral tibiotalar dislocation associated with severely displaced bimalleolar ankle fracture.  Reduction and splinting did improve alignment orthopedics was consulted for further eval.      Patient denies any protruding bone, or bleeding associated with her fracture.  She noted no drainage or associated skin lesions.    At present, she has pain that is reasonably well managed with medication and rest.  Pain is isolated to the ankle and is nonradicular.  She denies any paresthesias or numbness    Medical history significant for the inclusion of a provoked DVT/PE in 2019 following a knee replacement which was performed by Dr. Chavez.  Also history of GERD with Medrano's esophagus, hypertension, hyperlipidemia, hypothyroidism, morbid obesity with associated Lap-Band placement and subsequent removal.    ALLERGIES:   Allergies   Allergen Reactions   • Ciprofloxacin Hives   • Hydrocodone-Acetaminophen Itching   • Ketamine Delirium     MADE HER A LITTLE CRAZY   • Propacetamol Delirium     UNSURE OF RX. AFTER BACK SURGERY   • Propoxyphene-Acetaminophen Itching   • Lisinopril Cough   • " Tirosint [Levothyroxine] Itching       HOME MEDICATIONS:  Medications Prior to Admission   Medication Sig Dispense Refill Last Dose   • amlodipine-olmesartan (RICK) 10-40 MG per tablet Take 1 tablet by mouth Daily. for blood pressure. 90 tablet 1    • atorvastatin (LIPITOR) 40 MG tablet Take 1 tablet by mouth Every Night. For cholesterol 90 tablet 3 5/20/2023   • carvedilol (COREG) 12.5 MG tablet TAKE 1 TABLET BY MOUTH 2 (TWO) TIMES A DAY WITH MEALS. FOR BLOOD PRESSURE 180 tablet 0 5/20/2023   • folic acid (FOLVITE) 1 MG tablet Take 1 tablet by mouth Daily. 90 tablet 3 5/20/2023   • levothyroxine (Synthroid) 88 MCG tablet Take 1 tablet by mouth Daily. For thyroid; give generic 90 tablet 3 5/20/2023   • nystatin (MYCOSTATIN) 577990 UNIT/GM powder Apply  topically to the appropriate area as directed 4 (Four) Times a Day. PRN yeast rash 60 g 11 5/20/2023   • omeprazole (priLOSEC) 40 MG capsule 1 PO BID For GERD 180 capsule 3 5/20/2023   • Rexulti 1 MG tablet Take 1 mg by mouth Every Morning.   5/20/2023   • sertraline (ZOLOFT) 100 MG tablet Take 1 tablet by mouth Daily.   5/20/2023   • cholecalciferol (VITAMIN D3) 1000 units tablet Take 1,000 Units by mouth Daily.      • doxepin (SINEquan) 100 MG capsule Take 1 capsule by mouth Every Night.      • vitamin B-12 (CYANOCOBALAMIN) 1000 MCG tablet Take 1,000 mcg by mouth Daily.          CURRENT MEDICATIONS:  Scheduled Meds:losartan, 100 mg, Oral, Q24H   And  amLODIPine, 10 mg, Oral, Q24H  atorvastatin, 40 mg, Oral, Nightly  carvedilol, 12.5 mg, Oral, BID With Meals  folic acid, 1 mg, Oral, Daily  levothyroxine, 88 mcg, Oral, Q AM  nystatin, , Topical, 4x Daily  pantoprazole, 40 mg, Oral, BID AC  senna-docusate sodium, 2 tablet, Oral, BID  sertraline, 100 mg, Oral, Daily  sodium chloride, 10 mL, Intravenous, Q12H      Continuous Infusions:sodium chloride, 100 mL/hr, Last Rate: 100 mL/hr (05/21/23 1024)      PRN Meds:.•  acetaminophen **OR** acetaminophen **OR**  acetaminophen  •  senna-docusate sodium **AND** polyethylene glycol **AND** bisacodyl **AND** bisacodyl  •  calcium carbonate  •  Morphine  •  ondansetron **OR** ondansetron  •  oxyCODONE-acetaminophen  •  [COMPLETED] Insert Peripheral IV **AND** sodium chloride  •  sodium chloride  •  sodium chloride    Past Medical History:   Diagnosis Date   • Acute pulmonary embolism 2019   • Alopecia    • Anemia 2022   • Anxiety and depression    • Arthritis     OSTEO   • Balance problem     FOOTDROP LEFT FOOT   • Medrano esophagus 2019   • Bilateral knee pain    • Breast mass     RIGHT, MD WATCHING.   • Cervical disc disorder    • Chronic low back pain    • Depression    • Dysphagia 2016   • Epigastric pain 2017    Added automatically from request for surgery [id number]   • Fatty liver    • GERD (gastroesophageal reflux disease)    • Hernia    • History of infectious mononucleosis    • Hyperlipidemia    • Hypertension    • Hypothyroidism    • Impaired fasting glucose    • Irritable bowel syndrome    • Kidney calculus     HISTORY OF   • Lumbosacral disc disease    • Nausea and vomiting 2019    Added automatically from request for surgery [id number]   • Nocturnal cough 2016   • Pneumonia     2015 S/P LUMBAR FUSION   • Regurgitation 2016   • Sciatica    • Spinal headache     AFTER MYELOGRAM. BLOOD PATCH X2   • Vitamin D deficiency      Past Surgical History:   Procedure Laterality Date   • BACK SURGERY  2015    microdiscectomy L5-S1 and spinal fusion   • BARIATRIC SURGERY      Lapband   •  SECTION     • CHOLECYSTECTOMY N/A 2017    Procedure: CHOLECYSTECTOMY LAPAROSCOPIC;  Surgeon: Jam Ballard MD;  [place] [facility] [place] [facility] [place]  Service:    • DILATION AND CURETTAGE, DIAGNOSTIC / THERAPEUTIC     • ENDOSCOPY N/A 2017    Procedure: ESOPHAGOGASTRODUODENOSCOPY WITH BIOPSY;  Surgeon: Jam Ballard MD;  Location: [facility] ENDOSCOPY;  Service:    • ENDOSCOPY  N/A 02/18/2020    Procedure: ESOPHAGOGASTRODUODENOSCOPY WITH BIOPSIES, SNELL DILATATION 44Fr, 46Fr;  Surgeon: Kirsten Yanez MD;  Location: [facility] ENDOSCOPY;  Service: Gastroenterology;  Laterality: N/A;  PRE:  DYSPHAGIA, ABDOMINAL PAIN, GERD  POST:  IRREGULAR Z-LINE, R/O EOE, ESOPHAGEAL FURROWS   • ENDOSCOPY N/A 10/18/2022    Procedure: ESOPHAGOGASTRODUODENOSCOPY WITH BIOPSY;  Surgeon: Tu Hartman Jr., MD;  Location: [facility] ENDOSCOPY;  Service: General;  Laterality: N/A;  PRE- GERD, H/O BAND REMOVAL  POST- GASTRITIS   • GASTRIC BANDING REMOVAL N/A 03/25/2019    Procedure: GASTRIC BANDING REMOVAL LAPAROSCOPIC;  Surgeon: Tu Hartman Jr., MD;  Location: [facility] MAIN OR;  Service: General   • HERNIA REPAIR      Hiatal   • JOINT REPLACEMENT Left 2017    KNEE   • KNEE ARTHROSCOPY Left     REPAIR OF MENISUCS   • LITHOTRIPSY     • LUMBAR FUSION  11/2015    L5-S1. WITH HARDWARE   • MICRODISCECTOMY LUMBAR     • MD ARTHRP KNE CONDYLE&PLATU MEDIAL&LAT COMPARTMENTS Left 03/29/2017    Procedure: TOTAL KNEE ARTHROPLASTY;  Surgeon: Zacarias Reeves MD;  Location: [facility] MAIN OR;  Service: Orthopedics   • SPINAL FUSION  11/20/2015   • TONSILLECTOMY     • TOTAL KNEE ARTHROPLASTY REVISION Left 03/2019    Dr. Chavez   • TRIGGER POINT INJECTION  2012   • UPPER GASTROINTESTINAL ENDOSCOPY  03/2019     Social History     Occupational History     Employer: UNEMPLOYED   Tobacco Use   • Smoking status: Never   • Smokeless tobacco: Never   Vaping Use   • Vaping Use: Never used   Substance and Sexual Activity   • Alcohol use: Not Currently     Comment: Do not drink alcohol   • Drug use: No   • Sexual activity: Yes     Partners: Male     Birth control/protection: Post-menopausal, None      Social History     Social History Narrative   • Not on file     Family History   Problem Relation Age of Onset   • Anxiety disorder Mother    • Depression Mother    • Hypertension Mother    • Thyroid disease Mother    • Mental illness  Mother    • Hypertension Father    • Anxiety disorder Brother    • Depression Brother    • Thyroid disease Brother    • Alcohol abuse Maternal Grandmother    • Heart disease Maternal Grandmother    • Malig Hyperthermia Neg Hx        REVIEW OF SYSTEMS:    HEENT: Patient denies any headaches, vision changes, change in hearing, or tinnitus, Patient denies epistaxis, sinus pain, hoarseness, or dysphagia   Pulmonary: Patient denies any cough, congestion, acute change in SOA or wheezing.   Cardiovascular: Patient denies any change in chest pain, dyspnea, palpitations, weakness, intolerance of exercise, varicosities, change in murmur   Gastrointestinal:  Patient denies change in appetite, melena, change in bowel habits.   Genital/Urinary: Patient denies dysuria, change in color of urine, change in frequency of urination, pain with urgency, change in incontinence, retention.   Musculoskeletal: Patient denies complaints of acute changes in symptoms of other joints not mentioned above.   Neurological: Patient denies changes in dizziness, tremor, ataxia, or difficulty in speaking or changes in memory.   Endocrine system: Patient denies acute changes in tremors, palpitations, polyuria, polydipsia, polyphagia, diaphoresis, exophthalmos, or goiter.   Psychological: Patient denies thoughts/plans or harming self or other; denies acute changes in depression,  insomnia, night terrors, sd, disorientation.   Skin: Patient denies any bruising, rashes, discoloration, pruritus,or wounds not mentioned in history of present illness or chief complaint above.   Hematopoietic: Patient denies current bleeding, epistaxis, hematuria, or melena.    PHYSICAL EXAM:   Vitals:  Vitals:    05/21/23 0416 05/21/23 0446 05/21/23 0628 05/21/23 1016   BP: 161/91 124/81 154/71 93/60   BP Location:   Right arm Right arm   Patient Position:   Lying Lying   Pulse:  110 109 116   Resp:   18 16   Temp:   98.6 °F (37 °C) 98.1 °F (36.7 °C)   TempSrc:   Oral Oral  "  SpO2:   94% 91%   Weight:   (!) 157 kg (346 lb 2 oz)    Height:   160 cm (62.99\")        General:  53 y.o. female who appears about stated age.    Alert, cooperative, in no acute distress         Head:    Normocephalic, without obvious abnormality, atraumatic   Eyes:            Lids and lashes normal, conjunctivae and sclerae normal, no         icterus, no pallor, corneas clear, PERRLA   Ears:    Ears appear intact with no abnormalities noted   Throat:   No oral lesions, no thrush, oral mucosa moist   Neck:   No adenopathy, supple, trachea midline, no JVD   Back:     Limited exam shows no severe kyphosis present,no visible           erythema, no excessive  tenderness to palpation.    Lungs:     Respirations regular, even and unlabored.     Heart:    Normal rate, Pulses palpable   Chest Wall:    No abnormalities observed.   Abdomen:     Normal bowel sounds, no masses, no organomegaly, soft              non-tender, non-distended, no guarding, no rebound                      tenderness   Rectal:     Deferred   Pulses:   Pulses palpable and equal bilaterally   Skin:   No bleeding, bruising or rash   Lymph nodes:   No palpable adenopathy   Extremities:     Exam of the right lower extremity reveals well-padded posterior slab stirrup splint in place.  There is no visible drainage.  Compartments are compressible proximally.  No significant discomfort with palpation at the level of the knee.  Sensation is intact to light touch in the tibial nerve branch distributions of the plantar surface of the foot as well as the superficial and deep peroneal nerve distributions of the dorsum that are able to be tested with her splint in place.  Capillary refill is brisk.  She maintains normal motor function of the digits.    DIAGNOSTIC TEST:  Admission on 05/21/2023   Component Date Value Ref Range Status   • Glucose 05/21/2023 140 (H)  65 - 99 mg/dL Final   • BUN 05/21/2023 14  6 - 20 mg/dL Final   • Creatinine 05/21/2023 0.80  0.57 - " 1.00 mg/dL Final   • Sodium 05/21/2023 142  136 - 145 mmol/L Final   • Potassium 05/21/2023 3.6  3.5 - 5.2 mmol/L Final   • Chloride 05/21/2023 108 (H)  98 - 107 mmol/L Final   • CO2 05/21/2023 20.9 (L)  22.0 - 29.0 mmol/L Final   • Calcium 05/21/2023 8.7  8.6 - 10.5 mg/dL Final   • Total Protein 05/21/2023 6.9  6.0 - 8.5 g/dL Final   • Albumin 05/21/2023 3.9  3.5 - 5.2 g/dL Final   • ALT (SGPT) 05/21/2023 16  1 - 33 U/L Final   • AST (SGOT) 05/21/2023 17  1 - 32 U/L Final   • Alkaline Phosphatase 05/21/2023 115  39 - 117 U/L Final   • Total Bilirubin 05/21/2023 <0.2  0.0 - 1.2 mg/dL Final   • Globulin 05/21/2023 3.0  gm/dL Final   • A/G Ratio 05/21/2023 1.3  g/dL Final   • BUN/Creatinine Ratio 05/21/2023 17.5  7.0 - 25.0 Final   • Anion Gap 05/21/2023 13.1  5.0 - 15.0 mmol/L Final   • eGFR 05/21/2023 88.8  >60.0 mL/min/1.73 Final   • Protime 05/21/2023 13.6  11.7 - 14.2 Seconds Final   • INR 05/21/2023 1.03  0.90 - 1.10 Final   • PTT 05/21/2023 25.6  22.7 - 35.4 seconds Final   • ABO Type 05/21/2023 AB   Final   • RH type 05/21/2023 Positive   Final   • Antibody Screen 05/21/2023 Negative   Final   • T&S Expiration Date 05/21/2023 5/24/2023 11:59:59 PM   Final   • WBC 05/21/2023 10.48  3.40 - 10.80 10*3/mm3 Final   • RBC 05/21/2023 4.49  3.77 - 5.28 10*6/mm3 Final   • Hemoglobin 05/21/2023 12.1  12.0 - 15.9 g/dL Final   • Hematocrit 05/21/2023 38.9  34.0 - 46.6 % Final   • MCV 05/21/2023 86.6  79.0 - 97.0 fL Final   • MCH 05/21/2023 26.9  26.6 - 33.0 pg Final   • MCHC 05/21/2023 31.1 (L)  31.5 - 35.7 g/dL Final   • RDW 05/21/2023 17.3 (H)  12.3 - 15.4 % Final   • RDW-SD 05/21/2023 54.2 (H)  37.0 - 54.0 fl Final   • MPV 05/21/2023 9.7  6.0 - 12.0 fL Final   • Platelets 05/21/2023 320  140 - 450 10*3/mm3 Final   • Neutrophil % 05/21/2023 75.4  42.7 - 76.0 % Final   • Lymphocyte % 05/21/2023 17.8 (L)  19.6 - 45.3 % Final   • Monocyte % 05/21/2023 5.6  5.0 - 12.0 % Final   • Eosinophil % 05/21/2023 0.2 (L)  0.3 - 6.2 %  Final   • Basophil % 05/21/2023 0.6  0.0 - 1.5 % Final   • Immature Grans % 05/21/2023 0.4  0.0 - 0.5 % Final   • Neutrophils, Absolute 05/21/2023 7.90 (H)  1.70 - 7.00 10*3/mm3 Final   • Lymphocytes, Absolute 05/21/2023 1.87  0.70 - 3.10 10*3/mm3 Final   • Monocytes, Absolute 05/21/2023 0.59  0.10 - 0.90 10*3/mm3 Final   • Eosinophils, Absolute 05/21/2023 0.02  0.00 - 0.40 10*3/mm3 Final   • Basophils, Absolute 05/21/2023 0.06  0.00 - 0.20 10*3/mm3 Final   • Immature Grans, Absolute 05/21/2023 0.04  0.00 - 0.05 10*3/mm3 Final   • nRBC 05/21/2023 0.0  0.0 - 0.2 /100 WBC Final   • WBC 05/21/2023 11.62 (H)  3.40 - 10.80 10*3/mm3 Final   • RBC 05/21/2023 4.52  3.77 - 5.28 10*6/mm3 Final   • Hemoglobin 05/21/2023 12.3  12.0 - 15.9 g/dL Final   • Hematocrit 05/21/2023 39.2  34.0 - 46.6 % Final   • MCV 05/21/2023 86.7  79.0 - 97.0 fL Final   • MCH 05/21/2023 27.2  26.6 - 33.0 pg Final   • MCHC 05/21/2023 31.4 (L)  31.5 - 35.7 g/dL Final   • RDW 05/21/2023 16.8 (H)  12.3 - 15.4 % Final   • RDW-SD 05/21/2023 52.5  37.0 - 54.0 fl Final   • MPV 05/21/2023 10.7  6.0 - 12.0 fL Final   • Platelets 05/21/2023 376  140 - 450 10*3/mm3 Final       No results found.  Narrative & Impression      Patient: ADRIANA HERNANDEZ  Time Out: 04:41  Exam(s): XR RIGHT ANKLE      XR Right Ankle Complete, 3 or More Views     CLINICAL HISTORY:  Reason for exam: post reduction.     TECHNIQUE:  Frontal, lateral and oblique views of the right ankle.     COMPARISON:  Prereduction films.     FINDINGS:  Bones joints: The bimalleolar fracture seen previously has been reduced   with only a few millimeters residual lateral subluxation of the talus   with respect to the tibia, markedly improved since previous.  Soft tissues: Unremarkable.  Other findings: Findings detail is partially obscured by overlying splint   material.     IMPRESSION:   The bimalleolar fracture seen previously has been reduced with only a few   millimeters residual lateral subluxation of  the talus with respect to the   tibia, markedly improved since previous.     IMPRESSION:        Electronically signed by Michael Case MD on 05-21-23 at 0441         ASSESSMENT:  Bimalleolar ankle fracture, right      Type I or II open fracture of right ankle, initial encounter    Hypertension    Hypothyroidism    Body mass index (BMI) of 60.0-69.9 in adult (HCC)    Gastroesophageal reflux disease    Hypercholesterolemia    History of pulmonary embolism        PLAN:    Discussed with the patient my recommendation for operative management of her fractures to restore alignment, facilitate healing, and decrease long-term complications associated with her fracture.    We discussed that, her surgery would likely take place tomorrow given limited availability in the main OR today.  Exact timing to be determined based on surgeon and facility availability tomorrow    We will plan to resume her diet and make her n.p.o. at midnight tonight  Hold anticoagulants in anticipation of her procedure    The above diagnosis and treatment plan was discussed with the patient.  They were educated in treatment options for their condition.   They were given the opportunity to ask questions and were answered to their satisfaction.  They agreed to proceed with the above treatment plan.      Thank you for the opportunity to participate in this patient's care.  Please call our office with any questions or concerns at [phone]      Leonardo Finn, APRANTONIA  Date: 5/21/2023

## 2023-05-21 NOTE — H&P
Patient Name:  Marisol Carrillo  YOB: 1970  MRN:  [id number]  Admit Date:  5/21/2023  Patient Care Team:  Tomeka Linares PA-C as PCP - General (Family Medicine)  Tomeka Linares PA-C as Physician Assistant (Family Medicine)  Rakesh Dumas MD as Surgeon (Neurosurgery)  Zacarias Reeves MD as Consulting Physician (Orthopedic Surgery)  Jam Ballard MD as Surgeon (General Surgery)  Tomeka Linares PA-C as Referring Physician (Family Medicine)  Serina Gill MD as Consulting Physician (Hematology and Oncology)  Gwen Saldana MD as Consulting Physician (Hematology and Oncology)  Shayne Neves MD as Consulting Physician (Urology)  Kirsten Yanez MD as Consulting Physician (Gastroenterology)  Kalia Chavez/MD Renaldo as Surgeon (Orthopedic Surgery)  Abdon Burns II, MD as Consulting Physician (Psychiatry)      Subjective   History Present Illness     Chief Complaint   Patient presents with   • Ankle Injury       Ms. Carrillo is a 53 y.o. woman with a history of provoked DVT/PE in 2019 following a knee surgery, GERD with herrera's esophagus, hypertension, hyperlipidemia, hypothyroidism, morbid obesity s/p lap band placement and subsequent removal who presents after falling down 1 step as she was going down to her basement wherein she experienced acute onset right ankle pain. She was found to have a lateral tibiotalar dislocation with severely displaced medial and lateral malleolar fractures. This was reduced splinted in the ED.    History of Present Illness  Review of Systems   Constitutional: Negative for chills, diaphoresis and fever.   HENT: Negative for postnasal drip and rhinorrhea.    Eyes: Negative.    Respiratory: Negative for cough and shortness of breath.    Cardiovascular: Negative for chest pain and palpitations.   Gastrointestinal: Negative for abdominal pain, constipation, diarrhea and nausea.   Endocrine: Negative.    Genitourinary: Negative for  dysuria, frequency and urgency.   Musculoskeletal: Negative for arthralgias and myalgias.   Skin: Negative for rash and wound.   Allergic/Immunologic: Negative.    Neurological: Negative for light-headedness and headaches.   Hematological: Negative.    Psychiatric/Behavioral: Negative for confusion and decreased concentration.        Personal History     Past Medical History:   Diagnosis Date   • Acute pulmonary embolism 2019   • Alopecia    • Anemia 2022   • Anxiety and depression    • Arthritis     OSTEO   • Balance problem     FOOTDROP LEFT FOOT   • Medrano esophagus 2019   • Bilateral knee pain    • Breast mass     RIGHT, MD WATCHING.   • Cervical disc disorder    • Chronic low back pain    • Depression    • Dysphagia 2016   • Epigastric pain 2017    Added automatically from request for surgery [id number]   • Fatty liver    • GERD (gastroesophageal reflux disease)    • Hernia    • History of infectious mononucleosis    • Hyperlipidemia    • Hypertension    • Hypothyroidism    • Impaired fasting glucose    • Irritable bowel syndrome    • Kidney calculus     HISTORY OF   • Lumbosacral disc disease    • Nausea and vomiting 2019    Added automatically from request for surgery [id number]   • Nocturnal cough 2016   • Pneumonia     2015 S/P LUMBAR FUSION   • Regurgitation 2016   • Sciatica    • Spinal headache     AFTER MYELOGRAM. BLOOD PATCH X2   • Vitamin D deficiency      Past Surgical History:   Procedure Laterality Date   • BACK SURGERY  2015    microdiscectomy L5-S1 and spinal fusion   • BARIATRIC SURGERY      Lapband   •  SECTION     • CHOLECYSTECTOMY N/A 2017    Procedure: CHOLECYSTECTOMY LAPAROSCOPIC;  Surgeon: Jam Ballard MD;  [place] [facility] [place] [facility] [place]  Service:    • DILATION AND CURETTAGE, DIAGNOSTIC / THERAPEUTIC     • ENDOSCOPY N/A 2017    Procedure: ESOPHAGOGASTRODUODENOSCOPY WITH BIOPSY;  Surgeon: Jam Ballard MD;   Location: [facility] ENDOSCOPY;  Service:    • ENDOSCOPY N/A 02/18/2020    Procedure: ESOPHAGOGASTRODUODENOSCOPY WITH BIOPSIES, SNELL DILATATION 44Fr, 46Fr;  Surgeon: Kirsten Yanez MD;  Location: [facility] ENDOSCOPY;  Service: Gastroenterology;  Laterality: N/A;  PRE:  DYSPHAGIA, ABDOMINAL PAIN, GERD  POST:  IRREGULAR Z-LINE, R/O EOE, ESOPHAGEAL FURROWS   • ENDOSCOPY N/A 10/18/2022    Procedure: ESOPHAGOGASTRODUODENOSCOPY WITH BIOPSY;  Surgeon: Tu Hartman Jr., MD;  Location: [facility] ENDOSCOPY;  Service: General;  Laterality: N/A;  PRE- GERD, H/O BAND REMOVAL  POST- GASTRITIS   • GASTRIC BANDING REMOVAL N/A 03/25/2019    Procedure: GASTRIC BANDING REMOVAL LAPAROSCOPIC;  Surgeon: Tu Hartman Jr., MD;  Location: [facility] MAIN OR;  Service: General   • HERNIA REPAIR      Hiatal   • JOINT REPLACEMENT Left 2017    KNEE   • KNEE ARTHROSCOPY Left     REPAIR OF MENISUCS   • LITHOTRIPSY     • LUMBAR FUSION  11/2015    L5-S1. WITH HARDWARE   • MICRODISCECTOMY LUMBAR     • NV ARTHRP KNE CONDYLE&PLATU MEDIAL&LAT COMPARTMENTS Left 03/29/2017    Procedure: TOTAL KNEE ARTHROPLASTY;  Surgeon: Zacarias Reeves MD;  Location: [facility] OR;  Service: Orthopedics   • SPINAL FUSION  11/20/2015   • TONSILLECTOMY     • TOTAL KNEE ARTHROPLASTY REVISION Left 03/2019    Dr. Chavez   • TRIGGER POINT INJECTION  2012   • UPPER GASTROINTESTINAL ENDOSCOPY  03/2019     Family History   Problem Relation Age of Onset   • Anxiety disorder Mother    • Depression Mother    • Hypertension Mother    • Thyroid disease Mother    • Mental illness Mother    • Hypertension Father    • Anxiety disorder Brother    • Depression Brother    • Thyroid disease Brother    • Alcohol abuse Maternal Grandmother    • Heart disease Maternal Grandmother    • Malig Hyperthermia Neg Hx      Social History     Tobacco Use   • Smoking status: Never   • Smokeless tobacco: Never   Vaping Use   • Vaping Use: Never used   Substance Use Topics   • Alcohol use: Not  Currently     Comment: Do not drink alcohol   • Drug use: No     No current facility-administered medications on file prior to encounter.     Current Outpatient Medications on File Prior to Encounter   Medication Sig Dispense Refill   • amlodipine-olmesartan (RICK) 10-40 MG per tablet Take 1 tablet by mouth Daily. for blood pressure. 90 tablet 1   • atorvastatin (LIPITOR) 40 MG tablet Take 1 tablet by mouth Every Night. For cholesterol 90 tablet 3   • carvedilol (COREG) 12.5 MG tablet TAKE 1 TABLET BY MOUTH 2 (TWO) TIMES A DAY WITH MEALS. FOR BLOOD PRESSURE 180 tablet 0   • folic acid (FOLVITE) 1 MG tablet Take 1 tablet by mouth Daily. 90 tablet 3   • levothyroxine (Synthroid) 88 MCG tablet Take 1 tablet by mouth Daily. For thyroid; give generic 90 tablet 3   • nystatin (MYCOSTATIN) 172146 UNIT/GM powder Apply  topically to the appropriate area as directed 4 (Four) Times a Day. PRN yeast rash 60 g 11   • omeprazole (priLOSEC) 40 MG capsule 1 PO BID For GERD 180 capsule 3   • Rexulti 1 MG tablet Take 1 mg by mouth Every Morning.     • sertraline (ZOLOFT) 100 MG tablet Take 1 tablet by mouth Daily.     • cefdinir (OMNICEF) 300 MG capsule Take 1 capsule by mouth Every 12 (Twelve) Hours.     • celecoxib (CeleBREX) 200 MG capsule Take 1 capsule by mouth Every 12 (Twelve) Hours.     • cholecalciferol (VITAMIN D3) 1000 units tablet Take 1,000 Units by mouth Daily.     • doxepin (SINEquan) 100 MG capsule Take 1 capsule by mouth Every Night.     • oxyCODONE-acetaminophen (PERCOCET) 5-325 MG per tablet TAKE 1 TABLET BY MOUTH EVERY 6 HOURS AS NEEDED FOR SEVERE PAIN FOR UP TO 7 DAYS     • traMADol (ULTRAM) 50 MG tablet TAKE 1 TABLET BY MOUTH EVERY 12 HOURS AS NEEDED FOR SEVERE PAIN     • vitamin B-12 (CYANOCOBALAMIN) 1000 MCG tablet Take 1,000 mcg by mouth Daily.       Allergies   Allergen Reactions   • Ciprofloxacin Hives   • Hydrocodone-Acetaminophen Itching   • Ketamine Delirium     MADE HER A LITTLE CRAZY   • Propacetamol  Delirium     UNSURE OF RX. AFTER BACK SURGERY   • Propoxyphene-Acetaminophen Itching   • Lisinopril Cough   • Tirosint [Levothyroxine] Itching       Objective    Objective     Vital Signs  Temp:  [98.2 °F (36.8 °C)-98.6 °F (37 °C)] 98.6 °F (37 °C)  Heart Rate:  [] 109  Resp:  [17-19] 18  BP: (105-161)/(61-98) 154/71  SpO2:  [90 %-100 %] 94 %  on  Flow (L/min):  [2-6] 6;   Device (Oxygen Therapy): nasal cannula  Body mass index is 61.33 kg/m².    Physical Exam  Vitals and nursing note reviewed.   Constitutional:       General: She is not in acute distress.     Appearance: She is not toxic-appearing.   HENT:      Head: Normocephalic and atraumatic.      Mouth/Throat:      Mouth: Mucous membranes are moist.      Pharynx: Oropharynx is clear. No oropharyngeal exudate.   Eyes:      Extraocular Movements: Extraocular movements intact.      Conjunctiva/sclera: Conjunctivae normal.      Pupils: Pupils are equal, round, and reactive to light.   Cardiovascular:      Rate and Rhythm: Normal rate and regular rhythm.      Heart sounds: Normal heart sounds.   Pulmonary:      Effort: Pulmonary effort is normal.      Breath sounds: Normal breath sounds.   Abdominal:      General: Bowel sounds are normal.      Palpations: Abdomen is soft.   Musculoskeletal:         General: Signs of injury present.      Cervical back: Normal range of motion and neck supple.      Right lower leg: No edema.      Left lower leg: No edema.      Comments: Right leg in a spint   Neurological:      General: No focal deficit present.      Mental Status: She is alert and oriented to person, place, and time.   Psychiatric:         Mood and Affect: Mood normal.         Behavior: Behavior normal.         Results Review:  I reviewed the patient's new clinical results.  I reviewed the patient's new imaging results and agree with the interpretation.  I reviewed the patient's other test results and agree with the interpretation  I personally viewed and  interpreted the patient's EKG/Telemetry data  Discussed with ED provider.    Lab Results (last 24 hours)     Procedure Component Value Units Date/Time    CBC & Differential [[id number]]  (Abnormal) Collected: 05/21/23 0235    Specimen: Blood Updated: 05/21/23 0256    Narrative:      The following orders were created for panel order CBC & Differential.  Procedure                               Abnormality         Status                     ---------                               -----------         ------                     CBC Auto Differential[[id number]]        Abnormal            Final result                 Please view results for these tests on the individual orders.    Comprehensive Metabolic Panel [[id number]]  (Abnormal) Collected: 05/21/23 0235    Specimen: Blood Updated: 05/21/23 0316     Glucose 140 mg/dL      BUN 14 mg/dL      Creatinine 0.80 mg/dL      Sodium 142 mmol/L      Potassium 3.6 mmol/L      Chloride 108 mmol/L      CO2 20.9 mmol/L      Calcium 8.7 mg/dL      Total Protein 6.9 g/dL      Albumin 3.9 g/dL      ALT (SGPT) 16 U/L      AST (SGOT) 17 U/L      Alkaline Phosphatase 115 U/L      Total Bilirubin <0.2 mg/dL      Globulin 3.0 gm/dL      A/G Ratio 1.3 g/dL      BUN/Creatinine Ratio 17.5     Anion Gap 13.1 mmol/L      eGFR 88.8 mL/min/1.73     Narrative:      GFR Normal >60  Chronic Kidney Disease <60  Kidney Failure <15      Protime-INR [[id number]]  (Normal) Collected: 05/21/23 0235    Specimen: Blood Updated: 05/21/23 0307     Protime 13.6 Seconds      INR 1.03    aPTT [[id number]]  (Normal) Collected: 05/21/23 0235    Specimen: Blood Updated: 05/21/23 0308     PTT 25.6 seconds     CBC Auto Differential [[id number]]  (Abnormal) Collected: 05/21/23 0235    Specimen: Blood Updated: 05/21/23 0256     WBC 10.48 10*3/mm3      RBC 4.49 10*6/mm3      Hemoglobin 12.1 g/dL      Hematocrit 38.9 %      MCV 86.6 fL      MCH 26.9 pg      MCHC 31.1 g/dL      RDW 17.3 %      RDW-SD 54.2 fl      MPV 9.7 fL       Platelets 320 10*3/mm3      Neutrophil % 75.4 %      Lymphocyte % 17.8 %      Monocyte % 5.6 %      Eosinophil % 0.2 %      Basophil % 0.6 %      Immature Grans % 0.4 %      Neutrophils, Absolute 7.90 10*3/mm3      Lymphocytes, Absolute 1.87 10*3/mm3      Monocytes, Absolute 0.59 10*3/mm3      Eosinophils, Absolute 0.02 10*3/mm3      Basophils, Absolute 0.06 10*3/mm3      Immature Grans, Absolute 0.04 10*3/mm3      nRBC 0.0 /100 WBC     Basic Metabolic Panel [[id number]] Updated: 05/21/23 0714    Specimen: Blood     CBC (No Diff) [[id number]]  (Abnormal) Collected: 05/21/23 0536    Specimen: Blood Updated: 05/21/23 0634     WBC 11.62 10*3/mm3      RBC 4.52 10*6/mm3      Hemoglobin 12.3 g/dL      Hematocrit 39.2 %      MCV 86.7 fL      MCH 27.2 pg      MCHC 31.4 g/dL      RDW 16.8 %      RDW-SD 52.5 fl      MPV 10.7 fL      Platelets 376 10*3/mm3           Imaging Results (Last 24 Hours)     Procedure Component Value Units Date/Time    XR Ankle 2 View Right [[id number]] Collected: 05/21/23 0420     Updated: 05/21/23 0444    Addenda:        ADDENDUM:  Patient: ADRIANA HERNANDEZ  Time Out: 04:44  Exam(s): XR RIGHT ANKLE      Added by Michael Case MD on 5 21 2023 4:44 AM (-05:00)  Addendum: The initial report was for the newer study.  Please disregard.    This is the correct examination below.    EXAM:    XR Right Ankle Complete, 3 or More Views    CLINICAL HISTORY:     Reason for exam: post reduction.    TECHNIQUE:    Frontal, lateral and oblique views of the right ankle.    COMPARISON:  None    FINDINGS:    Bones joints: There is a bimalleolar fracture with 3.4 cm lateral   displacement of the talus with respect to the tibia and 30° lateral   angulation.  The medial and lateral malleoli remain attached to the talus.    The remaining tarsal bones appear intact and normally aligned.  There is   an 8 mm plantar calcaneal enthesophyte.    Soft tissues:  Unremarkable.    Other findings:  Findings detail is partially obscured  by overlying   splint material.    IMPRESSION:         3.4 cm lateral displacement and 30° angulation of the talus and malleoli   with respect to the tibia.    There is overlying splint material in place.      Signed: 05/21/23 0444 by Michael Case MD    Narrative:        Patient: ADRIANA HERNANDEZ  Time Out: 04:20  Exam(s): XR RIGHT ANKLE     EXAM:    XR Right Ankle Complete, 3 or More Views    CLINICAL HISTORY:     Reason for exam: post reduction.    TECHNIQUE:    Frontal, lateral and oblique views of the right ankle.    COMPARISON:    Prereduction films.    FINDINGS:    Bones joints:  The bimalleolar fracture seen previously has been   reduced with only a few millimeters residual lateral subluxation of the   talus with respect to the tibia, markedly improved since previous.    Soft tissues:  Unremarkable.    Other findings:  Findings detail is partially obscured by overlying   splint material.    IMPRESSION:         The bimalleolar fracture seen previously has been reduced with only a   few millimeters residual lateral subluxation of the talus with respect to   the tibia, markedly improved since previous.      Impression:          Electronically signed by Michael Case MD on 05-21-23 at 0420    XR Ankle 2 View Right [[id number]] Collected: 05/21/23 0441     Updated: 05/21/23 0441    Narrative:        Patient: ADRIANA HERNANDEZ  Time Out: 04:41  Exam(s): XR RIGHT ANKLE     XR Right Ankle Complete, 3 or More Views    CLINICAL HISTORY:  Reason for exam: post reduction.    TECHNIQUE:  Frontal, lateral and oblique views of the right ankle.    COMPARISON:  Prereduction films.    FINDINGS:  Bones joints: The bimalleolar fracture seen previously has been reduced   with only a few millimeters residual lateral subluxation of the talus   with respect to the tibia, markedly improved since previous.  Soft tissues: Unremarkable.  Other findings: Findings detail is partially obscured by overlying splint   material.    IMPRESSION:    The bimalleolar fracture seen previously has been reduced with only a few   millimeters residual lateral subluxation of the talus with respect to the   tibia, markedly improved since previous.      Impression:          Electronically signed by Michael Case MD on 05-21-23 at 0441    XR Ankle 3+ View Right [[id number]] Collected: 05/21/23 0025     Updated: 05/21/23 0030    Narrative:      RIGHT ANKLE: AP, LATERAL     HISTORY: Fall with right ankle deformity. Ankle pain and swelling.     FINDINGS: There is a tibiotalar fracture dislocation. The talar dome is  dislocated completely laterally with respect to the plafond. There is  oblique distal fibular diametaphyseal fracture with lateral displacement  of the lateral malleolar fragment 1.8 cm in the lateral malleolus  maintains its articulation with the lateral talus. There is also  displaced transverse fracture through the medial malleolus which is  displaced laterally 4 cm. There is generalized soft tissue swelling.  Degenerative heel spur is present.       Impression:      Lateral tibiotalar dislocation with severely displaced  medial and lateral malleolar fractures.     This report was finalized on 5/21/2023 12:27 AM by Dr. Miguel Chua M.D.             Results for orders placed during the hospital encounter of 03/21/19    Adult Transthoracic Echo Complete W/ Cont if Necessary Per Protocol    Interpretation Summary  · The study is technically difficult for diagnosis. The quality of the study is limited due to poor acoustic windows related to patient body habitus.  · Left ventricle not well visualized. Left ventricular systolic function is normal. Calculated EF = 54.0%. Estimated EF = 60%. Normal left ventricular cavity size noted. Left ventricular diastolic function is normal.  · Normal right ventricular cavity size noted.      No orders to display        Assessment/Plan     Active Hospital Problems    Diagnosis  POA   • **Type I or II open fracture of right  ankle, initial encounter [S34.886H]  Yes   • History of pulmonary embolism [Z86.711]  Yes   • Hypercholesterolemia [E78.00]  Yes   • Gastroesophageal reflux disease [K21.9]  Yes   • Hypertension [I10]  Yes   • Hypothyroidism [E03.9]  Yes   • Body mass index (BMI) of 60.0-69.9 in adult ([facility]) [Z68.44]  Not Applicable      Resolved Hospital Problems   No resolved problems to display.       Ms. Carrillo is a 53 y.o. woman with a history of provoked DVT/PE in 2019 following a knee surgery, GERD with herrera's esophagus, hypertension, hyperlipidemia, hypothyroidism, morbid obesity s/p lap band placement and subsequent removal who presents after falling down 1 step as she was going down to her basement wherein she experienced acute onset right ankle pain. She was found to have a lateral tibiotalar dislocation with severely displaced medial and lateral malleolar fractures. This was reduced splinted in the ED.    · Right ankle fracture with dislocation-reduced and splinted in the ED. Ortho consult. Pain control. Bowel regimen. If she does require surgery, her RCRI is 0 and she would be considered low risk for perioperative cardiac complications.   · Restart home medications.  · I discussed the patient's findings and my recommendations with patient and nursing staff.    VTE Prophylaxis - SCDs.  Code Status - Full code.       Israel Wan MD  [facility]  05/21/23  09:18 EDT

## 2023-05-22 ENCOUNTER — APPOINTMENT (OUTPATIENT)
Dept: GENERAL RADIOLOGY | Facility: HOSPITAL | Age: 53
End: 2023-05-22
Payer: COMMERCIAL

## 2023-05-22 ENCOUNTER — ANESTHESIA EVENT (OUTPATIENT)
Dept: PERIOP | Facility: HOSPITAL | Age: 53
End: 2023-05-22
Payer: COMMERCIAL

## 2023-05-22 ENCOUNTER — ANESTHESIA (OUTPATIENT)
Dept: PERIOP | Facility: HOSPITAL | Age: 53
End: 2023-05-22
Payer: COMMERCIAL

## 2023-05-22 LAB
DEPRECATED RDW RBC AUTO: 52.2 FL (ref 37–54)
ERYTHROCYTE [DISTWIDTH] IN BLOOD BY AUTOMATED COUNT: 16.5 % (ref 12.3–15.4)
HCT VFR BLD AUTO: 34.7 % (ref 34–46.6)
HGB BLD-MCNC: 10.8 G/DL (ref 12–15.9)
MCH RBC QN AUTO: 26.9 PG (ref 26.6–33)
MCHC RBC AUTO-ENTMCNC: 31.1 G/DL (ref 31.5–35.7)
MCV RBC AUTO: 86.3 FL (ref 79–97)
PLATELET # BLD AUTO: 275 10*3/MM3 (ref 140–450)
PMV BLD AUTO: 9.7 FL (ref 6–12)
RBC # BLD AUTO: 4.02 10*6/MM3 (ref 3.77–5.28)
WBC NRBC COR # BLD: 8.34 10*3/MM3 (ref 3.4–10.8)

## 2023-05-22 PROCEDURE — 25010000002 CEFAZOLIN PER 500 MG: Performed by: ORTHOPAEDIC SURGERY

## 2023-05-22 PROCEDURE — C1713 ANCHOR/SCREW BN/BN,TIS/BN: HCPCS | Performed by: ORTHOPAEDIC SURGERY

## 2023-05-22 PROCEDURE — 25010000002 FENTANYL CITRATE (PF) 50 MCG/ML SOLUTION: Performed by: ANESTHESIOLOGY

## 2023-05-22 PROCEDURE — 96376 TX/PRO/DX INJ SAME DRUG ADON: CPT

## 2023-05-22 PROCEDURE — G0378 HOSPITAL OBSERVATION PER HR: HCPCS

## 2023-05-22 PROCEDURE — 0 MEPIVACAINE HCL (PF) 1.5 % SOLUTION: Performed by: ANESTHESIOLOGY

## 2023-05-22 PROCEDURE — 25010000002 MIDAZOLAM PER 1 MG: Performed by: ANESTHESIOLOGY

## 2023-05-22 PROCEDURE — 25010000002 DEXAMETHASONE PER 1 MG: Performed by: ANESTHESIOLOGY

## 2023-05-22 PROCEDURE — 25010000002 CEFAZOLIN IN DEXTROSE 2-4 GM/100ML-% SOLUTION: Performed by: NURSE ANESTHETIST, CERTIFIED REGISTERED

## 2023-05-22 PROCEDURE — 73610 X-RAY EXAM OF ANKLE: CPT

## 2023-05-22 PROCEDURE — 25010000002 FENTANYL CITRATE (PF) 50 MCG/ML SOLUTION: Performed by: NURSE ANESTHETIST, CERTIFIED REGISTERED

## 2023-05-22 PROCEDURE — 25010000002 MORPHINE PER 10 MG: Performed by: NURSE PRACTITIONER

## 2023-05-22 PROCEDURE — 25010000002 ONDANSETRON PER 1 MG: Performed by: NURSE ANESTHETIST, CERTIFIED REGISTERED

## 2023-05-22 PROCEDURE — 25010000002 MORPHINE PER 10 MG: Performed by: INTERNAL MEDICINE

## 2023-05-22 PROCEDURE — 25010000002 DEXAMETHASONE SODIUM PHOSPHATE 20 MG/5ML SOLUTION: Performed by: NURSE ANESTHETIST, CERTIFIED REGISTERED

## 2023-05-22 PROCEDURE — 25010000002 PROPOFOL 10 MG/ML EMULSION: Performed by: NURSE ANESTHETIST, CERTIFIED REGISTERED

## 2023-05-22 PROCEDURE — C1769 GUIDE WIRE: HCPCS | Performed by: ORTHOPAEDIC SURGERY

## 2023-05-22 PROCEDURE — 25010000002 SUGAMMADEX 200 MG/2ML SOLUTION: Performed by: NURSE ANESTHETIST, CERTIFIED REGISTERED

## 2023-05-22 PROCEDURE — 85027 COMPLETE CBC AUTOMATED: CPT | Performed by: STUDENT IN AN ORGANIZED HEALTH CARE EDUCATION/TRAINING PROGRAM

## 2023-05-22 PROCEDURE — 25010000002 ROPIVACAINE PER 1 MG: Performed by: ANESTHESIOLOGY

## 2023-05-22 PROCEDURE — 76000 FLUOROSCOPY <1 HR PHYS/QHP: CPT

## 2023-05-22 DEVICE — LOCKING SCREW
Type: IMPLANTABLE DEVICE | Site: ANKLE | Status: FUNCTIONAL
Brand: VARIAX

## 2023-05-22 DEVICE — WASHER: Type: IMPLANTABLE DEVICE | Site: ANKLE | Status: FUNCTIONAL

## 2023-05-22 DEVICE — BONE SCREW
Type: IMPLANTABLE DEVICE | Site: ANKLE | Status: FUNCTIONAL
Brand: VARIAX

## 2023-05-22 DEVICE — DISTAL LATERAL FIBULA PLATE, 5 HOLE
Type: IMPLANTABLE DEVICE | Site: ANKLE | Status: FUNCTIONAL
Brand: VARIAX

## 2023-05-22 DEVICE — CANNULATED SCREW
Type: IMPLANTABLE DEVICE | Site: ANKLE | Status: FUNCTIONAL
Brand: ASNIS

## 2023-05-22 RX ORDER — SODIUM CHLORIDE 0.9 % (FLUSH) 0.9 %
3-10 SYRINGE (ML) INJECTION AS NEEDED
Status: DISCONTINUED | OUTPATIENT
Start: 2023-05-22 | End: 2023-05-22 | Stop reason: HOSPADM

## 2023-05-22 RX ORDER — DROPERIDOL 2.5 MG/ML
0.62 INJECTION, SOLUTION INTRAMUSCULAR; INTRAVENOUS
Status: DISCONTINUED | OUTPATIENT
Start: 2023-05-22 | End: 2023-05-22 | Stop reason: HOSPADM

## 2023-05-22 RX ORDER — MIDAZOLAM HYDROCHLORIDE 1 MG/ML
INJECTION INTRAMUSCULAR; INTRAVENOUS
Status: COMPLETED | OUTPATIENT
Start: 2023-05-22 | End: 2023-05-22

## 2023-05-22 RX ORDER — DIPHENHYDRAMINE HYDROCHLORIDE 50 MG/ML
12.5 INJECTION INTRAMUSCULAR; INTRAVENOUS
Status: DISCONTINUED | OUTPATIENT
Start: 2023-05-22 | End: 2023-05-22 | Stop reason: HOSPADM

## 2023-05-22 RX ORDER — SODIUM CHLORIDE, SODIUM LACTATE, POTASSIUM CHLORIDE, CALCIUM CHLORIDE 600; 310; 30; 20 MG/100ML; MG/100ML; MG/100ML; MG/100ML
9 INJECTION, SOLUTION INTRAVENOUS CONTINUOUS
Status: DISCONTINUED | OUTPATIENT
Start: 2023-05-22 | End: 2023-05-25 | Stop reason: HOSPADM

## 2023-05-22 RX ORDER — CEFAZOLIN SODIUM 2 G/100ML
INJECTION, SOLUTION INTRAVENOUS AS NEEDED
Status: DISCONTINUED | OUTPATIENT
Start: 2023-05-22 | End: 2023-05-22

## 2023-05-22 RX ORDER — LIDOCAINE HYDROCHLORIDE 20 MG/ML
INJECTION, SOLUTION INFILTRATION; PERINEURAL AS NEEDED
Status: DISCONTINUED | OUTPATIENT
Start: 2023-05-22 | End: 2023-05-22 | Stop reason: SURG

## 2023-05-22 RX ORDER — FENTANYL CITRATE 50 UG/ML
50 INJECTION, SOLUTION INTRAMUSCULAR; INTRAVENOUS
Status: DISCONTINUED | OUTPATIENT
Start: 2023-05-22 | End: 2023-05-22 | Stop reason: HOSPADM

## 2023-05-22 RX ORDER — DEXAMETHASONE SODIUM PHOSPHATE 4 MG/ML
INJECTION, SOLUTION INTRA-ARTICULAR; INTRALESIONAL; INTRAMUSCULAR; INTRAVENOUS; SOFT TISSUE AS NEEDED
Status: DISCONTINUED | OUTPATIENT
Start: 2023-05-22 | End: 2023-05-22 | Stop reason: SURG

## 2023-05-22 RX ORDER — ROPIVACAINE HYDROCHLORIDE 5 MG/ML
INJECTION, SOLUTION EPIDURAL; INFILTRATION; PERINEURAL
Status: COMPLETED | OUTPATIENT
Start: 2023-05-22 | End: 2023-05-22

## 2023-05-22 RX ORDER — OXYCODONE HYDROCHLORIDE AND ACETAMINOPHEN 5; 325 MG/1; MG/1
1 TABLET ORAL EVERY 4 HOURS PRN
Status: DISCONTINUED | OUTPATIENT
Start: 2023-05-22 | End: 2023-05-25 | Stop reason: HOSPADM

## 2023-05-22 RX ORDER — PHENYLEPHRINE HCL IN 0.9% NACL 1 MG/10 ML
SYRINGE (ML) INTRAVENOUS AS NEEDED
Status: DISCONTINUED | OUTPATIENT
Start: 2023-05-22 | End: 2023-05-22 | Stop reason: SURG

## 2023-05-22 RX ORDER — EPHEDRINE SULFATE 50 MG/ML
INJECTION INTRAVENOUS AS NEEDED
Status: DISCONTINUED | OUTPATIENT
Start: 2023-05-22 | End: 2023-05-22 | Stop reason: SURG

## 2023-05-22 RX ORDER — ACETAMINOPHEN 500 MG
1000 TABLET ORAL ONCE
Status: COMPLETED | OUTPATIENT
Start: 2023-05-22 | End: 2023-05-22

## 2023-05-22 RX ORDER — ROCURONIUM BROMIDE 10 MG/ML
INJECTION, SOLUTION INTRAVENOUS AS NEEDED
Status: DISCONTINUED | OUTPATIENT
Start: 2023-05-22 | End: 2023-05-22 | Stop reason: SURG

## 2023-05-22 RX ORDER — SODIUM CHLORIDE 0.9 % (FLUSH) 0.9 %
3 SYRINGE (ML) INJECTION EVERY 12 HOURS SCHEDULED
Status: DISCONTINUED | OUTPATIENT
Start: 2023-05-22 | End: 2023-05-22 | Stop reason: HOSPADM

## 2023-05-22 RX ORDER — HYDROMORPHONE HYDROCHLORIDE 1 MG/ML
0.5 INJECTION, SOLUTION INTRAMUSCULAR; INTRAVENOUS; SUBCUTANEOUS
Status: DISCONTINUED | OUTPATIENT
Start: 2023-05-22 | End: 2023-05-22 | Stop reason: HOSPADM

## 2023-05-22 RX ORDER — HYDRALAZINE HYDROCHLORIDE 20 MG/ML
5 INJECTION INTRAMUSCULAR; INTRAVENOUS
Status: DISCONTINUED | OUTPATIENT
Start: 2023-05-22 | End: 2023-05-22 | Stop reason: HOSPADM

## 2023-05-22 RX ORDER — SODIUM CHLORIDE 9 MG/ML
100 INJECTION, SOLUTION INTRAVENOUS CONTINUOUS
Status: ACTIVE | OUTPATIENT
Start: 2023-05-22 | End: 2023-05-23

## 2023-05-22 RX ORDER — MORPHINE SULFATE 4 MG/ML
2 INJECTION, SOLUTION INTRAMUSCULAR; INTRAVENOUS EVERY 4 HOURS PRN
Status: DISCONTINUED | OUTPATIENT
Start: 2023-05-22 | End: 2023-05-25 | Stop reason: HOSPADM

## 2023-05-22 RX ORDER — EPHEDRINE SULFATE 50 MG/ML
5 INJECTION, SOLUTION INTRAVENOUS ONCE AS NEEDED
Status: DISCONTINUED | OUTPATIENT
Start: 2023-05-22 | End: 2023-05-22 | Stop reason: HOSPADM

## 2023-05-22 RX ORDER — ONDANSETRON 2 MG/ML
INJECTION INTRAMUSCULAR; INTRAVENOUS AS NEEDED
Status: DISCONTINUED | OUTPATIENT
Start: 2023-05-22 | End: 2023-05-22 | Stop reason: SURG

## 2023-05-22 RX ORDER — PROMETHAZINE HYDROCHLORIDE 25 MG/1
25 SUPPOSITORY RECTAL ONCE AS NEEDED
Status: DISCONTINUED | OUTPATIENT
Start: 2023-05-22 | End: 2023-05-22 | Stop reason: HOSPADM

## 2023-05-22 RX ORDER — IPRATROPIUM BROMIDE AND ALBUTEROL SULFATE 2.5; .5 MG/3ML; MG/3ML
3 SOLUTION RESPIRATORY (INHALATION) ONCE AS NEEDED
Status: DISCONTINUED | OUTPATIENT
Start: 2023-05-22 | End: 2023-05-22 | Stop reason: HOSPADM

## 2023-05-22 RX ORDER — MIDAZOLAM HYDROCHLORIDE 1 MG/ML
1 INJECTION INTRAMUSCULAR; INTRAVENOUS
Status: DISCONTINUED | OUTPATIENT
Start: 2023-05-22 | End: 2023-05-22 | Stop reason: HOSPADM

## 2023-05-22 RX ORDER — FAMOTIDINE 10 MG/ML
20 INJECTION, SOLUTION INTRAVENOUS ONCE
Status: COMPLETED | OUTPATIENT
Start: 2023-05-22 | End: 2023-05-22

## 2023-05-22 RX ORDER — CEFAZOLIN SODIUM IN 0.9 % NACL 3 G/100 ML
3 INTRAVENOUS SOLUTION, PIGGYBACK (ML) INTRAVENOUS EVERY 8 HOURS
Status: COMPLETED | OUTPATIENT
Start: 2023-05-22 | End: 2023-05-23

## 2023-05-22 RX ORDER — FENTANYL CITRATE 50 UG/ML
INJECTION, SOLUTION INTRAMUSCULAR; INTRAVENOUS AS NEEDED
Status: DISCONTINUED | OUTPATIENT
Start: 2023-05-22 | End: 2023-05-22 | Stop reason: SURG

## 2023-05-22 RX ORDER — FENTANYL CITRATE 50 UG/ML
50 INJECTION, SOLUTION INTRAMUSCULAR; INTRAVENOUS ONCE AS NEEDED
Status: DISCONTINUED | OUTPATIENT
Start: 2023-05-22 | End: 2023-05-22 | Stop reason: HOSPADM

## 2023-05-22 RX ORDER — NALOXONE HCL 0.4 MG/ML
0.2 VIAL (ML) INJECTION AS NEEDED
Status: DISCONTINUED | OUTPATIENT
Start: 2023-05-22 | End: 2023-05-22 | Stop reason: HOSPADM

## 2023-05-22 RX ORDER — MAGNESIUM HYDROXIDE 1200 MG/15ML
LIQUID ORAL AS NEEDED
Status: DISCONTINUED | OUTPATIENT
Start: 2023-05-22 | End: 2023-05-22 | Stop reason: HOSPADM

## 2023-05-22 RX ORDER — CEFAZOLIN SODIUM 2 G/100ML
INJECTION, SOLUTION INTRAVENOUS AS NEEDED
Status: DISCONTINUED | OUTPATIENT
Start: 2023-05-22 | End: 2023-05-22 | Stop reason: SURG

## 2023-05-22 RX ORDER — ASPIRIN 81 MG/1
81 TABLET, CHEWABLE ORAL 2 TIMES DAILY
Status: DISCONTINUED | OUTPATIENT
Start: 2023-05-23 | End: 2023-05-25 | Stop reason: HOSPADM

## 2023-05-22 RX ORDER — FENTANYL CITRATE 50 UG/ML
INJECTION, SOLUTION INTRAMUSCULAR; INTRAVENOUS
Status: COMPLETED | OUTPATIENT
Start: 2023-05-22 | End: 2023-05-22

## 2023-05-22 RX ORDER — DEXAMETHASONE SODIUM PHOSPHATE 4 MG/ML
INJECTION, SOLUTION INTRA-ARTICULAR; INTRALESIONAL; INTRAMUSCULAR; INTRAVENOUS; SOFT TISSUE
Status: COMPLETED | OUTPATIENT
Start: 2023-05-22 | End: 2023-05-22

## 2023-05-22 RX ORDER — LIDOCAINE HYDROCHLORIDE 10 MG/ML
0.5 INJECTION, SOLUTION EPIDURAL; INFILTRATION; INTRACAUDAL; PERINEURAL ONCE AS NEEDED
Status: DISCONTINUED | OUTPATIENT
Start: 2023-05-22 | End: 2023-05-22 | Stop reason: HOSPADM

## 2023-05-22 RX ORDER — ONDANSETRON 2 MG/ML
4 INJECTION INTRAMUSCULAR; INTRAVENOUS ONCE AS NEEDED
Status: DISCONTINUED | OUTPATIENT
Start: 2023-05-22 | End: 2023-05-22 | Stop reason: HOSPADM

## 2023-05-22 RX ORDER — PROPOFOL 10 MG/ML
VIAL (ML) INTRAVENOUS AS NEEDED
Status: DISCONTINUED | OUTPATIENT
Start: 2023-05-22 | End: 2023-05-22 | Stop reason: SURG

## 2023-05-22 RX ORDER — FLUMAZENIL 0.1 MG/ML
0.2 INJECTION INTRAVENOUS AS NEEDED
Status: DISCONTINUED | OUTPATIENT
Start: 2023-05-22 | End: 2023-05-22 | Stop reason: HOSPADM

## 2023-05-22 RX ORDER — PROMETHAZINE HYDROCHLORIDE 25 MG/1
25 TABLET ORAL ONCE AS NEEDED
Status: DISCONTINUED | OUTPATIENT
Start: 2023-05-22 | End: 2023-05-22 | Stop reason: HOSPADM

## 2023-05-22 RX ORDER — SODIUM CHLORIDE, SODIUM LACTATE, POTASSIUM CHLORIDE, CALCIUM CHLORIDE 600; 310; 30; 20 MG/100ML; MG/100ML; MG/100ML; MG/100ML
INJECTION, SOLUTION INTRAVENOUS CONTINUOUS PRN
Status: DISCONTINUED | OUTPATIENT
Start: 2023-05-22 | End: 2023-05-22 | Stop reason: SURG

## 2023-05-22 RX ORDER — DIPHENOXYLATE HYDROCHLORIDE AND ATROPINE SULFATE 2.5; .025 MG/1; MG/1
1 TABLET ORAL DAILY
Status: DISCONTINUED | OUTPATIENT
Start: 2023-05-22 | End: 2023-05-25 | Stop reason: HOSPADM

## 2023-05-22 RX ORDER — CEFAZOLIN SODIUM IN 0.9 % NACL 3 G/100 ML
3 INTRAVENOUS SOLUTION, PIGGYBACK (ML) INTRAVENOUS ONCE
Status: COMPLETED | OUTPATIENT
Start: 2023-05-22 | End: 2023-05-23

## 2023-05-22 RX ORDER — LABETALOL HYDROCHLORIDE 5 MG/ML
5 INJECTION, SOLUTION INTRAVENOUS
Status: DISCONTINUED | OUTPATIENT
Start: 2023-05-22 | End: 2023-05-22 | Stop reason: HOSPADM

## 2023-05-22 RX ORDER — DEXAMETHASONE SODIUM PHOSPHATE 4 MG/ML
INJECTION, SOLUTION INTRA-ARTICULAR; INTRALESIONAL; INTRAMUSCULAR; INTRAVENOUS; SOFT TISSUE AS NEEDED
Status: DISCONTINUED | OUTPATIENT
Start: 2023-05-22 | End: 2023-05-22

## 2023-05-22 RX ADMIN — CARVEDILOL 12.5 MG: 12.5 TABLET, FILM COATED ORAL at 18:07

## 2023-05-22 RX ADMIN — FENTANYL CITRATE 50 MCG: 50 INJECTION, SOLUTION INTRAMUSCULAR; INTRAVENOUS at 07:39

## 2023-05-22 RX ADMIN — Medication 200 MCG: at 08:29

## 2023-05-22 RX ADMIN — DEXAMETHASONE SODIUM PHOSPHATE 4 MG: 4 INJECTION, SOLUTION INTRA-ARTICULAR; INTRALESIONAL; INTRAMUSCULAR; INTRAVENOUS; SOFT TISSUE at 06:57

## 2023-05-22 RX ADMIN — PROPOFOL 150 MG: 10 INJECTION, EMULSION INTRAVENOUS at 07:39

## 2023-05-22 RX ADMIN — FAMOTIDINE 20 MG: 10 INJECTION INTRAVENOUS at 06:52

## 2023-05-22 RX ADMIN — FENTANYL CITRATE 50 MCG: 50 INJECTION, SOLUTION INTRAMUSCULAR; INTRAVENOUS at 06:57

## 2023-05-22 RX ADMIN — DOCUSATE SODIUM 50MG AND SENNOSIDES 8.6MG 2 TABLET: 8.6; 5 TABLET, FILM COATED ORAL at 20:49

## 2023-05-22 RX ADMIN — CEFAZOLIN SODIUM 3 G: 2 INJECTION, SOLUTION INTRAVENOUS at 07:45

## 2023-05-22 RX ADMIN — Medication 100 MCG: at 08:02

## 2023-05-22 RX ADMIN — ROCURONIUM BROMIDE 50 MG: 10 INJECTION, SOLUTION INTRAVENOUS at 07:39

## 2023-05-22 RX ADMIN — ROPIVACAINE HYDROCHLORIDE 15 ML: 5 INJECTION, SOLUTION EPIDURAL; INFILTRATION; PERINEURAL at 06:57

## 2023-05-22 RX ADMIN — CEFAZOLIN 3 G: 10 INJECTION, POWDER, FOR SOLUTION INTRAVENOUS at 07:24

## 2023-05-22 RX ADMIN — CARVEDILOL 12.5 MG: 12.5 TABLET, FILM COATED ORAL at 05:34

## 2023-05-22 RX ADMIN — DEXAMETHASONE SODIUM PHOSPHATE 8 MG: 4 INJECTION, SOLUTION INTRAMUSCULAR; INTRAVENOUS at 07:45

## 2023-05-22 RX ADMIN — SODIUM CHLORIDE, POTASSIUM CHLORIDE, SODIUM LACTATE AND CALCIUM CHLORIDE 9 ML/HR: 600; 310; 30; 20 INJECTION, SOLUTION INTRAVENOUS at 07:24

## 2023-05-22 RX ADMIN — OXYCODONE HYDROCHLORIDE AND ACETAMINOPHEN 1 TABLET: 5; 325 TABLET ORAL at 18:07

## 2023-05-22 RX ADMIN — Medication 100 MG: at 16:20

## 2023-05-22 RX ADMIN — NYSTATIN: 100000 POWDER TOPICAL at 20:49

## 2023-05-22 RX ADMIN — SODIUM CHLORIDE, POTASSIUM CHLORIDE, SODIUM LACTATE AND CALCIUM CHLORIDE: 600; 310; 30; 20 INJECTION, SOLUTION INTRAVENOUS at 07:31

## 2023-05-22 RX ADMIN — Medication 200 MCG: at 08:33

## 2023-05-22 RX ADMIN — Medication 200 MCG: at 08:08

## 2023-05-22 RX ADMIN — EPHEDRINE SULFATE 10 MG: 50 INJECTION INTRAVENOUS at 08:45

## 2023-05-22 RX ADMIN — NYSTATIN: 100000 POWDER TOPICAL at 12:22

## 2023-05-22 RX ADMIN — OXYCODONE AND ACETAMINOPHEN 1 TABLET: 5; 325 TABLET ORAL at 05:34

## 2023-05-22 RX ADMIN — SODIUM CHLORIDE 100 ML/HR: 9 INJECTION, SOLUTION INTRAVENOUS at 21:39

## 2023-05-22 RX ADMIN — MEPIVACAINE HYDROCHLORIDE 5 ML: 15 INJECTION, SOLUTION EPIDURAL; INFILTRATION at 07:07

## 2023-05-22 RX ADMIN — Medication 1 TABLET: at 16:20

## 2023-05-22 RX ADMIN — MORPHINE SULFATE 2 MG: 4 INJECTION, SOLUTION INTRAMUSCULAR; INTRAVENOUS at 16:21

## 2023-05-22 RX ADMIN — LIDOCAINE HYDROCHLORIDE 100 MG: 20 INJECTION, SOLUTION INFILTRATION; PERINEURAL at 07:39

## 2023-05-22 RX ADMIN — MORPHINE SULFATE 2 MG: 4 INJECTION, SOLUTION INTRAMUSCULAR; INTRAVENOUS at 20:49

## 2023-05-22 RX ADMIN — Medication 10 ML: at 20:49

## 2023-05-22 RX ADMIN — PANTOPRAZOLE SODIUM 40 MG: 40 TABLET, DELAYED RELEASE ORAL at 16:20

## 2023-05-22 RX ADMIN — ATORVASTATIN CALCIUM 40 MG: 20 TABLET, FILM COATED ORAL at 20:49

## 2023-05-22 RX ADMIN — CEFAZOLIN 3 G: 10 INJECTION, POWDER, FOR SOLUTION INTRAVENOUS at 16:21

## 2023-05-22 RX ADMIN — ONDANSETRON 4 MG: 2 INJECTION INTRAMUSCULAR; INTRAVENOUS at 07:39

## 2023-05-22 RX ADMIN — CEFAZOLIN 3 G: 10 INJECTION, POWDER, FOR SOLUTION INTRAVENOUS at 23:39

## 2023-05-22 RX ADMIN — EPHEDRINE SULFATE 20 MG: 50 INJECTION INTRAVENOUS at 08:16

## 2023-05-22 RX ADMIN — ACETAMINOPHEN 1000 MG: 500 TABLET ORAL at 06:52

## 2023-05-22 RX ADMIN — Medication 200 MCG: at 08:36

## 2023-05-22 RX ADMIN — OXYCODONE HYDROCHLORIDE AND ACETAMINOPHEN 1 TABLET: 5; 325 TABLET ORAL at 22:34

## 2023-05-22 RX ADMIN — MORPHINE SULFATE 2 MG: 2 INJECTION, SOLUTION INTRAMUSCULAR; INTRAVENOUS at 03:45

## 2023-05-22 RX ADMIN — MIDAZOLAM 1 MG: 1 INJECTION INTRAMUSCULAR; INTRAVENOUS at 06:57

## 2023-05-22 RX ADMIN — ROPIVACAINE HYDROCHLORIDE 10 ML: 5 INJECTION, SOLUTION EPIDURAL; INFILTRATION; PERINEURAL at 07:14

## 2023-05-22 RX ADMIN — SUGAMMADEX 400 MG: 100 INJECTION, SOLUTION INTRAVENOUS at 08:40

## 2023-05-22 RX ADMIN — SODIUM CHLORIDE 100 ML/HR: 9 INJECTION, SOLUTION INTRAVENOUS at 16:21

## 2023-05-22 RX ADMIN — EPHEDRINE SULFATE 20 MG: 50 INJECTION INTRAVENOUS at 08:11

## 2023-05-22 RX ADMIN — OXYCODONE AND ACETAMINOPHEN 1 TABLET: 5; 325 TABLET ORAL at 14:14

## 2023-05-22 RX ADMIN — SUGAMMADEX 200 MG: 100 INJECTION, SOLUTION INTRAVENOUS at 08:57

## 2023-05-22 RX ADMIN — NYSTATIN: 100000 POWDER TOPICAL at 18:07

## 2023-05-22 NOTE — PLAN OF CARE
Problem: Adult Inpatient Plan of Care  Goal: Plan of Care Review  Outcome: Ongoing, Progressing   Goal Outcome Evaluation:

## 2023-05-22 NOTE — ANESTHESIA PROCEDURE NOTES
Peripheral Block      Patient reassessed immediately prior to procedure    Patient location during procedure: pre-op  Start time: 5/22/2023 7:07 AM  Stop time: 5/22/2023 7:12 AM  Reason for block: at surgeon's request and post-op pain management  Performed by  Anesthesiologist: Sidney Bautista MD  Preanesthetic Checklist  Completed: patient identified and risks and benefits discussed  Prep:  Sterile barriers:gloves  Prep: ChloraPrep  Patient monitoring: blood pressure monitoring, continuous pulse oximetry and EKG  Procedure    Sedation: yes  Performed under: local infiltration  Guidance:ultrasound guided    ULTRASOUND INTERPRETATION.  Using ultrasound guidance a 21 G gauge needle was placed in close proximity to the nerve, at which point, under ultrasound guidance anesthetic was injected in the area of the nerve and spread of the anesthesia was seen on ultrasound in close proximity thereto.  There were no abnormalities seen on ultrasound; a digital image was taken; and the patient tolerated the procedure with no complications. Images:still images obtained, printed/placed on chart    Laterality:right  Block Type:adductor canal block  Injection Technique:single-shot  Needle Type:short-bevel  Needle Gauge:21 G      Medications Used: ropivacaine (NAROPIN) 0.5 % injection - Injection   10 mL - 5/22/2023 7:14:00 AM  Mepivacaine HCl (PF) (CARBOCAINE) 1.5 % injection - Injection   5 mL - 5/22/2023 7:07:00 AM      Medications  Comment:      Post Assessment  Injection Assessment: negative aspiration for heme, no paresthesia on injection and incremental injection  Patient Tolerance:comfortable throughout block  Complications:no  Additional Notes  Ultrasound Interpretation:  Using ultrasound guidance the needle was placed in adductor canal and anesthetic was injected in the area of the saphenous nerve and spread of the anesthetic was seen on ultrasound in close proximity thereto.  There were no abnormalities seen on ultrasound;  a digital image was taken; and the patient tolerated the procedure with no complications.    Block placed for postoperative pain control per surgeon request.

## 2023-05-22 NOTE — NURSING NOTE
Wound/ostomy - consult received for redness to skin fold. Patient reports that area gets reddened at times. Upon assessment patient is noted to have a 3 cm long linear radha of redness to L side of a deep abdominal fold. No drainage, mild break in the skin consistent with intertriginous process. Recommend to keep skin folds clean and dry, apply zinc barrier cream to folds and place a pillow case to wick moisture/prevent skin on skin friction.

## 2023-05-22 NOTE — ANESTHESIA PREPROCEDURE EVALUATION
Anesthesia Evaluation                  Airway   Mallampati: II  Dental      Pulmonary    (+) pulmonary embolism,   (-) sleep apnea, not a smoker    ROS comment: Negative patient screen for DEMARCUS    Cardiovascular     (+) hypertension, hyperlipidemia,       Neuro/Psych  (+) headaches, psychiatric history Anxiety and Depression,    GI/Hepatic/Renal/Endo    (+) morbid obesity, GERD,  thyroid problem hypothyroidism    Musculoskeletal     Abdominal    Substance History      OB/GYN          Other   arthritis,                      Anesthesia Plan    ASA 4     general     (Block placed for postoperative pain control per surgeon request.  )    Anesthetic plan, risks, benefits, and alternatives have been provided, discussed and informed consent has been obtained with: patient.        CODE STATUS:    Code Status (Patient has no pulse and is not breathing): CPR (Attempt to Resuscitate)  Medical Interventions (Patient has pulse or is breathing): Full Support

## 2023-05-22 NOTE — ANESTHESIA POSTPROCEDURE EVALUATION
"Patient: Marisol Carrillo    Procedure Summary     Date: 05/22/23 Room / Location: [facility] OR  / [facility] MAIN OR    Anesthesia Start: 0730 Anesthesia Stop: 0909    Procedure: ANKLE OPEN REDUCTION INTERNAL FIXATION (Right: Ankle) Diagnosis:     Surgeons: Vazquez Hernández MD Provider: Sidney Bautista MD    Anesthesia Type: general ASA Status: 4          Anesthesia Type: general    Vitals  Vitals Value Taken Time   /55 05/22/23 0930   Temp 36.8 °C (98.2 °F) 05/22/23 0910   Pulse 91 05/22/23 0931   Resp 19 05/22/23 0920   SpO2 95 % 05/22/23 0931   Vitals shown include unvalidated device data.        Post Anesthesia Care and Evaluation    Pain management: adequate    Airway patency: patent  Anesthetic complications: No anesthetic complications    Cardiovascular status: acceptable  Respiratory status: acceptable  Hydration status: acceptable    Comments: /75   Pulse 86   Temp 36.8 °C (98.2 °F) (Oral)   Resp 19   Ht 160 cm (62.99\")   Wt (!) 157 kg (346 lb 2 oz)   SpO2 93%   BMI 61.33 kg/m²         "

## 2023-05-22 NOTE — ANESTHESIA PROCEDURE NOTES
Airway  Urgency: elective    Date/Time: 5/22/2023 7:42 AM  Airway not difficult    General Information and Staff    Patient location during procedure: OR  CRNA/CAA: Yahaira Forrester CRNA    Indications and Patient Condition  Indications for airway management: airway protection    Preoxygenated: yes  Mask difficulty assessment: 1 - vent by mask    Final Airway Details  Final airway type: endotracheal airway      Successful airway: ETT  Cuffed: yes   Successful intubation technique: direct laryngoscopy  Facilitating devices/methods: intubating stylet  Endotracheal tube insertion site: oral  Blade: Liza  Blade size: 3  ETT size (mm): 7.0  Cormack-Lehane Classification: grade I - full view of glottis  Placement verified by: chest auscultation and capnometry   Cuff volume (mL): 8  Measured from: lips  Number of attempts at approach: 1  Assessment: lips, teeth, and gum same as pre-op and atraumatic intubation

## 2023-05-22 NOTE — ANESTHESIA PROCEDURE NOTES
Peripheral Block      Patient reassessed immediately prior to procedure    Patient location during procedure: pre-op  Start time: 5/22/2023 6:57 AM  Stop time: 5/22/2023 7:05 AM  Reason for block: at surgeon's request and post-op pain management  Performed by  Anesthesiologist: Sidney Bautista MD  Preanesthetic Checklist  Completed: patient identified and risks and benefits discussed  Prep:  Sterile barriers:gloves  Prep: ChloraPrep  Patient monitoring: blood pressure monitoring, continuous pulse oximetry and EKG  Procedure    Sedation: yes  Performed under: local infiltration  Guidance:ultrasound guided    ULTRASOUND INTERPRETATION.  Using ultrasound guidance a 21 G gauge needle was placed in close proximity to the sciatic nerve, at which point, under ultrasound guidance anesthetic was injected in the area of the nerve and spread of the anesthesia was seen on ultrasound in close proximity thereto.  There were no abnormalities seen on ultrasound; a digital image was taken; and the patient tolerated the procedure with no complications. Images:still images obtained, printed/placed on chart    Laterality:right  Block Type:sciatic and popliteal  Injection Technique:single-shot  Needle Type:short-bevel  Needle Gauge:21 G      Medications Used: ropivacaine (NAROPIN) 0.5 % injection - Injection   15 mL - 5/22/2023 6:57:00 AM  Mepivacaine HCl (PF) (CARBOCAINE) 1.5 % injection - Injection   10 mL - 5/22/2023 6:57:00 AM  dexamethasone (DECADRON) injection - Injection   4 mg - 5/22/2023 6:57:00 AM      Medications  Comment:      Post Assessment  Injection Assessment: negative aspiration for heme, no paresthesia on injection and incremental injection  Patient Tolerance:comfortable throughout block  Complications:no  Additional Notes  Ultrasound Interpretation:  Using ultrasound guidance the needle was placed in close proximity to the Sciatic Nerve in the Popliteal Fossa at which point, under ultrasound guidance anesthetic was  injected in the area of the nerve and spread of the anesthetic was seen on ultrasound in close proximity thereto.  There were no abnormalities seen on ultrasound; a digital image was taken; the patient tolerated the procedure without complications.  Block placed for postoperative pain control per surgeon request.

## 2023-05-22 NOTE — PLAN OF CARE
Goal Outcome Evaluation:   Pain controlled via alternating IV and PO medications. Plan for OR this morning. VSS. Consent signed, CHG bath complete. WCTM.

## 2023-05-22 NOTE — PROGRESS NOTES
Orthopedic Progress Note    Subjective :   Patient seen and examined.  Resting comfortably in her hospital bed.  Patient currently n.p.o. at this time.  Pain controlled with medication.  Plan for OR this morning.  Vital signs stable overnight.    Objective :    Vital signs in last 24 hours:  Temp:  [97.9 °F (36.6 °C)-98.9 °F (37.2 °C)] 98 °F (36.7 °C)  Heart Rate:  [] 91  Resp:  [16-18] 18  BP: ()/(60-82) 131/80  Vitals:    05/21/23 1742 05/21/23 2042 05/22/23 0157 05/22/23 0501   BP: 94/68 108/65 137/80 131/80   BP Location: Right arm Right arm Right arm Right arm   Patient Position: Lying Lying Lying Lying   Pulse: 91 93 93 91   Resp: 16 16 18 18   Temp: 98.5 °F (36.9 °C) 97.9 °F (36.6 °C) 98.9 °F (37.2 °C) 98 °F (36.7 °C)   TempSrc: Oral Oral Oral Oral   SpO2: 91% 94% 96% 98%   Weight:       Height:           PHYSICAL EXAM:  Examination of the right lower extremity was performed.  Patient resting comfortably in her hospital bed.  No acute distress.  She is placed in a well-padded posterior slab stirrup splint.  Compartments are compressible proximally.  Good capillary refill.  She is able to move her toes freely.    LABS:  Results from last 7 days   Lab Units 05/21/23  0536   WBC 10*3/mm3 11.62*   HEMOGLOBIN g/dL 12.3   HEMATOCRIT % 39.2   PLATELETS 10*3/mm3 376     Results from last 7 days   Lab Units 05/21/23  1050   SODIUM mmol/L 140   POTASSIUM mmol/L 3.9   CHLORIDE mmol/L 106   CO2 mmol/L 19.9*   BUN mg/dL 14   CREATININE mg/dL 0.86   GLUCOSE mg/dL 109*   CALCIUM mg/dL 8.7     Results from last 7 days   Lab Units 05/21/23  0235   INR  1.03   APTT seconds 25.6       ASSESSMENT:  Right bimalleolar ankle fracture    Plan:  I did have an extensive discussion with the patient regards her situation in the hospital today.  I have reviewed the above.  Patient currently n.p.o. at this time.  Plan for open reduction internal fixation this morning with Dr. Hernández at 0730.  Risk, benefits, and outcomes  of the procedure were discussed.  She understands these risks and she wishes to proceed.  We will proceed with the above recommendations.  Further recommendations to follow throughout her hospital course.    Boo Solo PA-C    Date: 5/22/2023  Time: 05:32 EDT

## 2023-05-22 NOTE — PROGRESS NOTES
Name: Marisol Carrillo ADMIT: 2023   : 1970  PCP: Tomeka Linares PA-C    MRN: [id number] LOS: 0 days   AGE/SEX: 53 y.o. female  ROOM: [facility]     Subjective   Subjective   Chief Complaint   Patient presents with   • Ankle Injury     She is having increased sensation, pin/needle sensation. Has some firmness sensation in lateral ankle. Pain present but controlled. No CP SOA NVD.     Objective   Objective   Vital Signs  Temp:  [97.9 °F (36.6 °C)-98.9 °F (37.2 °C)] 98.2 °F (36.8 °C)  Heart Rate:  [] 97  Resp:  [16-19] 17  BP: ()/(55-80) 107/69  SpO2:  [90 %-98 %] 95 %  on  Flow (L/min):  [2-4] 2;   Device (Oxygen Therapy): nasal cannula  Body mass index is 61.33 kg/m².    Physical Exam  Vitals and nursing note reviewed.   Constitutional:       General: She is not in acute distress.     Appearance: She is not diaphoretic.   Cardiovascular:      Rate and Rhythm: Normal rate and regular rhythm.   Pulmonary:      Effort: Pulmonary effort is normal.      Breath sounds: No wheezing.   Abdominal:      General: There is no distension.      Palpations: Abdomen is soft.      Tenderness: There is no abdominal tenderness. There is no guarding or rebound.   Musculoskeletal:      Comments: RLE dressed, toes with normal warmth   Skin:     General: Skin is warm and dry.   Neurological:      Mental Status: She is alert. Mental status is at baseline.   Psychiatric:         Mood and Affect: Mood normal.         Behavior: Behavior normal.       Results Review  I reviewed the patient's new clinical results.    Results from last 7 days   Lab Units 23  0458 23  0536 23  0235   WBC 10*3/mm3 8.34 11.62* 10.48   HEMOGLOBIN g/dL 10.8* 12.3 12.1   PLATELETS 10*3/mm3 275 376 320     Results from last 7 days   Lab Units 23  1050 23  0235   SODIUM mmol/L 140 142   POTASSIUM mmol/L 3.9 3.6   CHLORIDE mmol/L 106 108*   CO2 mmol/L 19.9* 20.9*   BUN mg/dL 14 14   CREATININE mg/dL 0.86 0.80    GLUCOSE mg/dL 109* 140*   EGFR mL/min/1.73 81.4 88.8     Results from last 7 days   Lab Units 05/21/23  0235   ALBUMIN g/dL 3.9   BILIRUBIN mg/dL <0.2   ALK PHOS U/L 115   AST (SGOT) U/L 17   ALT (SGPT) U/L 16     Results from last 7 days   Lab Units 05/21/23  1050 05/21/23  0235   CALCIUM mg/dL 8.7 8.7   ALBUMIN g/dL  --  3.9         No results found for: HGBA1C, POCGLU    XR Ankle 2 View Right    Addendum Date: 5/21/2023    ADDENDUM: Patient: ADRIANA HERNANDEZ  Time Out: 04:44 Exam(s): XR RIGHT ANKLE  Added by Michael Case MD on 5 21 2023 4:44 AM (-05:00) Addendum: The initial report was for the newer study.  Please disregard.  This is the correct examination below. EXAM:   XR Right Ankle Complete, 3 or More Views CLINICAL HISTORY:    Reason for exam: post reduction. TECHNIQUE:   Frontal, lateral and oblique views of the right ankle. COMPARISON: None FINDINGS:   Bones joints: There is a bimalleolar fracture with 3.4 cm lateral displacement of the talus with respect to the tibia and 30° lateral angulation.  The medial and lateral malleoli remain attached to the talus. The remaining tarsal bones appear intact and normally aligned.  There is an 8 mm plantar calcaneal enthesophyte.   Soft tissues:  Unremarkable.   Other findings:  Findings detail is partially obscured by overlying splint material. IMPRESSION:     3.4 cm lateral displacement and 30° angulation of the talus and malleoli with respect to the tibia. There is overlying splint material in place.     Result Date: 5/21/2023  Electronically signed by Michael Case MD on 05-21-23 at 0420    XR Ankle 2 View Right    Result Date: 5/21/2023  Electronically signed by Michael Case MD on 05-21-23 at 0441    XR Ankle 3+ View Right    Result Date: 5/22/2023  Status post ORIF of distal fibular and medial malleolar fractures as described. Alignment appears appropriate.  This report was finalized on 5/22/2023 1:24 PM by Dr. Mat Pittman M.D.      XR Ankle 3+ View  Right    Result Date: 5/21/2023  Lateral tibiotalar dislocation with severely displaced medial and lateral malleolar fractures.  This report was finalized on 5/21/2023 12:27 AM by Dr. Miguel Chua M.D.        I have personally reviewed all medications:  Scheduled Medications  losartan, 100 mg, Oral, Q24H   And  amLODIPine, 10 mg, Oral, Q24H  [START ON 5/23/2023] aspirin, 81 mg, Oral, BID  atorvastatin, 40 mg, Oral, Nightly  carvedilol, 12.5 mg, Oral, BID With Meals  ceFAZolin, 3 g, Intravenous, Q8H  folic acid, 1 mg, Oral, Daily  levothyroxine, 88 mcg, Oral, Q AM  nystatin, , Topical, 4x Daily  pantoprazole, 40 mg, Oral, BID AC  senna-docusate sodium, 2 tablet, Oral, BID  sertraline, 100 mg, Oral, Daily  sodium chloride, 10 mL, Intravenous, Q12H      Infusions  lactated ringers, 9 mL/hr, Last Rate: 9 mL/hr (05/22/23 0724)  sodium chloride, 100 mL/hr, Last Rate: 100 mL/hr (05/21/23 1625)      Diet  Diet: Regular/House Diet; Texture: Regular Texture (IDDSI 7); Fluid Consistency: Thin (IDDSI 0)    I have personally reviewed:  [x]  Laboratory   []  Microbiology   [x]  Radiology   [x]  EKG/Telemetry  []  Cardiology/Vascular   []  Pathology    [x]  Records       Assessment/Plan     Active Hospital Problems    Diagnosis  POA   • **Type I or II open fracture of right ankle, initial encounter [S82.621B]  Yes   • History of pulmonary embolism [Z86.711]  Yes   • Hypercholesterolemia [E78.00]  Yes   • Gastroesophageal reflux disease [K21.9]  Yes   • Hypertension [I10]  Yes   • Hypothyroidism [E03.9]  Yes   • Body mass index (BMI) of 60.0-69.9 in adult (HCC) [Z68.44]  Not Applicable      Resolved Hospital Problems   No resolved problems to display.       53 y.o. female admitted with Type I or II open fracture of right ankle, initial encounter.    • Right Bimalleolar Ankle Fracture: Reviewing notes, I do not see any mention of skin lesions or bone protrusion through skin. Underwent ORIF 05/22, op report pending currently.  Will follow up that. Continue perioperative abx per orthopedic surgery. Pain control. Orthopedic surgery following.  • Peripheral Neuropathy: Likely nerve block wearing off. Discussed this with her. She had discussed sciatica/spinal stenosis earlier this month with neurosurgery. Will screen tsh/a1c etc. Monitor progression.  • HTN: Acceptable acutely. Will monitor  • Hypothyroidism: Synthroid  • GERD: PPI  • Obesity  • Ppx: per surgery postop  • Disposition: TBD    Expected discharge date/ time has not been documented.     Rafa Castro MD  [facility]  05/22/23  15:01 EDT    Dictated portions of note using dragon dictation software.

## 2023-05-22 NOTE — PROGRESS NOTES
Nutrition Services    Patient Name:  Marisol Carrillo  YOB: 1970  MRN: [id number]  Admit Date:  5/21/2023  Assessment Date:  05/22/23    Comment: Assessed due to MST 4 (weight loss) and reduced oral intake    Pt is a 52 yo female who fell down her steps resulting in fractured right ankle. S/p ORIF earlier today. Weight loss indicated prior to admission. Reviewed weight index - weight trend is actually up as compared to one year ago (~295 lb), now 346 lb; wt in 360's lb 6-7 months ago. While she is down since that time frame, there has not been significant weight loss. BMI = 61.3, severely obese. PO intake 100% x 2 meals on previous date. On regular/thins. No nutrition intervention warranted for the recent weight change. Will monitor intake & clinical course.    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   • Cervical disc disorder 2012   • Chronic low back pain    • Depression    • Dysphagia 02/22/2016   • Epigastric pain 08/30/2017    Added automatically from request for surgery [id number]   • Fatty liver 2018   • GERD (gastroesophageal reflux disease)    • Hernia 2017   • History of infectious mononucleosis    • Hyperlipidemia    • Hypertension    • Hypothyroidism    • Impaired fasting glucose    • Irritable bowel syndrome    • Kidney calculus     HISTORY OF   • Lumbosacral disc disease 2012   • Nausea and vomiting 03/21/2019    Added automatically from request for surgery [id number]   • Nocturnal cough 02/22/2016   • Pneumonia     2015 S/P LUMBAR FUSION   • Regurgitation  2016   • Sciatica    • Spinal headache     AFTER MYELOGRAM. BLOOD PATCH X2   • Vitamin D deficiency        Past Surgical History:   Procedure Laterality Date   • BACK SURGERY  2015    microdiscectomy L5-S1 and spinal fusion   • BARIATRIC SURGERY      Lapband   •  SECTION     • CHOLECYSTECTOMY N/A 2017    Procedure: CHOLECYSTECTOMY LAPAROSCOPIC;  Surgeon: Jam Ballard MD;  Location:  [facility];  Service:    • DILATION AND CURETTAGE, DIAGNOSTIC / THERAPEUTIC     • ENDOSCOPY N/A 2017    Procedure: ESOPHAGOGASTRODUODENOSCOPY WITH BIOPSY;  Surgeon: Jam Ballard MD;  Location: [facility] ENDOSCOPY;  Service:    • ENDOSCOPY N/A 2020    Procedure: ESOPHAGOGASTRODUODENOSCOPY WITH BIOPSIES, SNELL DILATATION 44Fr, 46Fr;  Surgeon: Kirsten Yanez MD;  [facility];  Service: Gastroenterology;  Laterality: N/A;  PRE:  DYSPHAGIA, ABDOMINAL PAIN, GERD  POST:  IRREGULAR Z-LINE, R/O EOE, ESOPHAGEAL FURROWS   • ENDOSCOPY N/A 10/18/2022    Procedure: ESOPHAGOGASTRODUODENOSCOPY WITH BIOPSY;  Surgeon: Tu Hartman Jr., MD;  Location: [facility] ENDOSCOPY;  Service: General;  Laterality: N/A;  PRE- GERD, H/O BAND REMOVAL  POST- GASTRITIS   • GASTRIC BANDING REMOVAL N/A 2019    Procedure: GASTRIC BANDING REMOVAL LAPAROSCOPIC;  Surgeon: Tu Hartman Jr., MD;  Location: [facility] MAIN OR;  Service: General   • HERNIA REPAIR      Hiatal   • JOINT REPLACEMENT Left 2017    KNEE   • KNEE ARTHROSCOPY Left     REPAIR OF MENISUCS   • LITHOTRIPSY     • LUMBAR FUSION  2015    L5-S1. WITH HARDWARE   • MICRODISCECTOMY LUMBAR     • MD ARTHRP KNE CONDYLE&PLATU MEDIAL&LAT COMPARTMENTS Left 2017    Procedure: TOTAL KNEE ARTHROPLASTY;  Surgeon: Zacarias Reeves MD;  Location: [facility] MAIN OR;  Service: Orthopedics   • SPINAL FUSION  2015   • TONSILLECTOMY     • TOTAL KNEE ARTHROPLASTY REVISION Left 2019    Dr. Chavez   • TRIGGER POINT INJECTION     • UPPER  "GASTROINTESTINAL ENDOSCOPY  03/2019        Encounter Information        Nutrition History:  Diet just advanced. Surgery this morning for R ankle ORIF.    Food Preferences:    Supplements:    Factors Affecting Intake: decreased appetite, pain issues     Anthropometrics        Current Height  Current Weight  BMI kg/m2 Height: 160 cm (62.99\")  Weight: (!) 157 kg (346 lb 2 oz) (05/21/23 0628)  Body mass index is 61.33 kg/m².       BMI Category Obese, Class III (40 or higher)       Admission Weight 346 lb       Ideal Body Weight (IBW) 115 lb (300%)           Usual Body Weight (UBW) 295 lb ~ 1 year ago  360 lb 6 mos ago   Weight Change/Trend Loss, Gain, Other: Net GAIN of 51 lb x 1 yr  Loss of 14 lb/6 mos (3.8%)       Weight History Wt Readings from Last 30 Encounters:   05/21/23 0628 (!) 157 kg (346 lb 2 oz)   05/20/23 2341 (!) 157 kg (347 lb)   02/13/23 1420 (!) 157 kg (346 lb 8 oz)   11/15/22 1035 (!) 163 kg (360 lb)   10/24/22 1125 (!) 166 kg (366 lb)   10/18/22 1125 (!) 164 kg (362 lb)   09/22/22 1450 (!) 166 kg (366 lb)   09/06/22 1057 116 kg (256 lb)   04/25/22 0820 134 kg (295 lb)   11/18/21 1636 134 kg (295 lb)   07/21/21 0840 (!) 140 kg (309 lb)   02/18/20 1022 134 kg (295 lb)   01/16/20 0917 129 kg (285 lb)   12/05/19 0808 132 kg (290 lb)   11/18/19 0830 130 kg (286 lb 12.8 oz)   11/07/19 0853 129 kg (285 lb)   10/18/19 0909 127 kg (281 lb)   10/11/19 0818 129 kg (283 lb 6.4 oz)   10/02/19 0949 129 kg (284 lb 9.6 oz)   05/13/19 0906 127 kg (280 lb)   05/07/19 0906 128 kg (282 lb)   04/16/19 1603 127 kg (279 lb)   04/04/19 1004 127 kg (279 lb)   04/02/19 1344 122 kg (269 lb)   03/26/19 0500 133 kg (293 lb)   03/25/19 1342 132 kg (291 lb)   03/25/19 0500 132 kg (291 lb 14.2 oz)   03/23/19 0530 134 kg (295 lb 4.8 oz)   03/22/19 1531 130 kg (286 lb 9.6 oz)   03/22/19 0514 130 kg (286 lb 3.2 oz)   03/21/19 1754 130 kg (287 lb 1.6 oz)   03/21/19 0756 127 kg (280 lb 5 oz)   01/17/19 0848 132 kg (291 lb)   12/20/18 " 0916 132 kg (291 lb 9.6 oz)   11/30/18 1409 130 kg (286 lb)   11/20/18 1626 132 kg (291 lb)   10/03/18 0952 134 kg (296 lb)   09/10/18 0903 134 kg (296 lb)           --  Tests/Procedures        Tests/Procedures X-Ray, Other: ANKLE OPEN REDUCTION INTERNAL FIXATION 5/22/23     Labs       Pertinent Labs    Results from last 7 days   Lab Units 05/21/23  1050 05/21/23  0235   SODIUM mmol/L 140 142   POTASSIUM mmol/L 3.9 3.6   CHLORIDE mmol/L 106 108*   CO2 mmol/L 19.9* 20.9*   BUN mg/dL 14 14   CREATININE mg/dL 0.86 0.80   CALCIUM mg/dL 8.7 8.7   BILIRUBIN mg/dL  --  <0.2   ALK PHOS U/L  --  115   ALT (SGPT) U/L  --  16   AST (SGOT) U/L  --  17   GLUCOSE mg/dL 109* 140*     Results from last 7 days   Lab Units 05/22/23  0458 05/21/23  0536 05/21/23  0235   HEMOGLOBIN g/dL 10.8*   < > 12.1   HEMATOCRIT % 34.7   < > 38.9   WBC 10*3/mm3 8.34   < > 10.48   ALBUMIN g/dL  --   --  3.9    < > = values in this interval not displayed.     Results from last 7 days   Lab Units 05/22/23  0458 05/21/23  0536 05/21/23  0235   INR   --   --  1.03   APTT seconds  --   --  25.6   PLATELETS 10*3/mm3 275 376 320     No results found for: COVID19  Lab Results   Component Value Date    HGBA1C 5.40 09/06/2022          Medications           Scheduled Medications losartan, 100 mg, Oral, Q24H   And  amLODIPine, 10 mg, Oral, Q24H  [START ON 5/23/2023] aspirin, 81 mg, Oral, BID  atorvastatin, 40 mg, Oral, Nightly  carvedilol, 12.5 mg, Oral, BID With Meals  ceFAZolin, 3 g, Intravenous, Q8H  folic acid, 1 mg, Oral, Daily  levothyroxine, 88 mcg, Oral, Q AM  nystatin, , Topical, 4x Daily  pantoprazole, 40 mg, Oral, BID AC  senna-docusate sodium, 2 tablet, Oral, BID  sertraline, 100 mg, Oral, Daily  sodium chloride, 10 mL, Intravenous, Q12H       Infusions lactated ringers, 9 mL/hr, Last Rate: 9 mL/hr (05/22/23 0724)  sodium chloride, 100 mL/hr, Last Rate: 100 mL/hr (05/21/23 1625)       PRN Medications •  acetaminophen **OR** acetaminophen **OR**  acetaminophen  •  senna-docusate sodium **AND** polyethylene glycol **AND** bisacodyl **AND** bisacodyl  •  calcium carbonate  •  Morphine  •  ondansetron **OR** ondansetron  •  oxyCODONE-acetaminophen  •  [COMPLETED] Insert Peripheral IV **AND** sodium chloride  •  sodium chloride  •  sodium chloride     Physical Findings          Physical Appearance obese, on oxygen therapy   Oral/Mouth Cavity WNL   Edema  lower extremity , 1+ (trace)   Gastrointestinal last bowel movement:5/20   Skin  skin intact, surgical incision R lateral ankle incision   Tubes/Drains/Lines none   NFPE Not applicable at this time   --  Current Nutrition Orders & Evaluation of Intake       Oral Nutrition     Food Allergies NKFA   Current PO Diet Diet: Regular/House Diet; Texture: Regular Texture (IDDSI 7); Fluid Consistency: Thin (IDDSI 0)   Supplement n/a   PO Evaluation     % PO Intake 100% x 2 meals    # of Days Evaluated 5/21 intake   --  PES STATEMENT / NUTRITION DIAGNOSIS      Nutrition Dx Problem  Problem: Overweight/Obesity  Etiology: Other excessive intake of kcal  Signs/Symptoms: BMI    Comment: BMI 61.3   --  NUTRITION INTERVENTION / PLAN OF CARE      Intervention Goal(s) Tolerate PO  and Appropriate weight loss         RD Intervention/Action Interview for preferences and Follow Tx Progress         Prescription/Orders:       PO Diet       Supplements       Snacks       Enteral Nutrition       Parenteral Nutrition    New Prescription Ordered? No changes at this time   --      Monitor/Evaluation Per protocol   Discharge Plan/Needs Pending clinical course   Education Will instruct as appropriate   --    RD to follow per protocol.      Electronically signed by:  Maude Edmonds RD  05/22/23 12:44 EDT

## 2023-05-22 NOTE — CASE MANAGEMENT/SOCIAL WORK
"Discharge Planning Assessment  [facility]     Patient Name: Marisol Carrillo  MRN: [id number]  Today's Date: 5/22/2023    Admit Date: 5/21/2023    Plan: plans pending porogress with PT- CCP will follow for needs   Discharge Needs Assessment     Row Name 05/22/23 1513       Living Environment    People in Home spouse    Name(s) of People in Home Nik sawyer    Current Living Arrangements home    Primary Care Provided by self    Provides Primary Care For child(jason)    Caregiving Concerns 12 year old    Family Caregiver if Needed spouse    Family Caregiver Names Nik sawyer    Quality of Family Relationships helpful;involved;supportive    Able to Return to Prior Arrangements yes       Resource/Environmental Concerns    Resource/Environmental Concerns none    Transportation Concerns none       Transition Planning    Patient/Family Anticipates Transition to home with family    Patient/Family Anticipated Services at Transition     Transportation Anticipated family or friend will provide       Discharge Needs Assessment    Readmission Within the Last 30 Days no previous admission in last 30 days    Equipment Currently Used at Home cane, straight    Concerns to be Addressed denies needs/concerns at this time    Anticipated Changes Related to Illness none    Equipment Needed After Discharge none               Discharge Plan     Row Name 05/22/23 1514       Plan    Plan plans pending porogress with PT- CCP will follow for needs    Patient/Family in Agreement with Plan yes    Plan Comments Spoke with patient at bedside. Introduced self and explained role. Facesheet verified. Patient lives with her , Nik, and 12 year old daughter. They live in a single story house with 4 \"half steps\" to enter. At baseline, she is IADLS. She does have a walker and portable commode if needed. She has used [facility] in the past after a knee replacement. She does not have a SNF history.  Patient plans to " return home at [place]. Awaiting PT eval. CCP to follow up after patient is seen by PT.              Continued Care and Services - Admitted Since 5/21/2023    Coordination has not been started for this encounter.          Demographic Summary     Row Name 05/22/23 1512       General Information    Admission Type observation    Arrived From home    Referral Source admission list    Reason for Consult discharge planning    Preferred Language English               Functional Status     Row Name 05/22/23 1513       Functional Status    Usual Activity Tolerance good    Current Activity Tolerance moderate       Functional Status, IADL    Medications independent    Meal Preparation independent    Housekeeping independent    Laundry independent    Shopping independent       Mental Status    General Appearance WDL WDL               Psychosocial    No documentation.                Abuse/Neglect    No documentation.                Legal    No documentation.                Substance Abuse    No documentation.                Patient Forms    No documentation.                   Huma Hummel RN

## 2023-05-23 PROBLEM — S82.841A BIMALLEOLAR FRACTURE OF RIGHT ANKLE: Status: ACTIVE | Noted: 2023-05-23

## 2023-05-23 LAB
ANION GAP SERPL CALCULATED.3IONS-SCNC: 8 MMOL/L (ref 5–15)
BUN SERPL-MCNC: 14 MG/DL (ref 6–20)
BUN/CREAT SERPL: 19.2 (ref 7–25)
CALCIUM SPEC-SCNC: 8.7 MG/DL (ref 8.6–10.5)
CHLORIDE SERPL-SCNC: 105 MMOL/L (ref 98–107)
CO2 SERPL-SCNC: 26 MMOL/L (ref 22–29)
CREAT SERPL-MCNC: 0.73 MG/DL (ref 0.57–1)
DEPRECATED RDW RBC AUTO: 53.9 FL (ref 37–54)
EGFRCR SERPLBLD CKD-EPI 2021: 99.1 ML/MIN/1.73
ERYTHROCYTE [DISTWIDTH] IN BLOOD BY AUTOMATED COUNT: 16.7 % (ref 12.3–15.4)
GLUCOSE SERPL-MCNC: 143 MG/DL (ref 65–99)
HBA1C MFR BLD: 5.7 % (ref 4.8–5.6)
HCT VFR BLD AUTO: 34.4 % (ref 34–46.6)
HGB BLD-MCNC: 10.8 G/DL (ref 12–15.9)
MCH RBC QN AUTO: 27.3 PG (ref 26.6–33)
MCHC RBC AUTO-ENTMCNC: 31.4 G/DL (ref 31.5–35.7)
MCV RBC AUTO: 86.9 FL (ref 79–97)
PLATELET # BLD AUTO: 276 10*3/MM3 (ref 140–450)
PMV BLD AUTO: 9.7 FL (ref 6–12)
POTASSIUM SERPL-SCNC: 4.4 MMOL/L (ref 3.5–5.2)
RBC # BLD AUTO: 3.96 10*6/MM3 (ref 3.77–5.28)
SODIUM SERPL-SCNC: 139 MMOL/L (ref 136–145)
TSH SERPL DL<=0.05 MIU/L-ACNC: 0.89 UIU/ML (ref 0.27–4.2)
VIT B12 BLD-MCNC: 335 PG/ML (ref 211–946)
WBC NRBC COR # BLD: 15.52 10*3/MM3 (ref 3.4–10.8)

## 2023-05-23 PROCEDURE — G0378 HOSPITAL OBSERVATION PER HR: HCPCS

## 2023-05-23 PROCEDURE — 97530 THERAPEUTIC ACTIVITIES: CPT

## 2023-05-23 PROCEDURE — 84443 ASSAY THYROID STIM HORMONE: CPT | Performed by: INTERNAL MEDICINE

## 2023-05-23 PROCEDURE — 80048 BASIC METABOLIC PNL TOTAL CA: CPT | Performed by: INTERNAL MEDICINE

## 2023-05-23 PROCEDURE — 97162 PT EVAL MOD COMPLEX 30 MIN: CPT

## 2023-05-23 PROCEDURE — 82607 VITAMIN B-12: CPT | Performed by: INTERNAL MEDICINE

## 2023-05-23 PROCEDURE — 85027 COMPLETE CBC AUTOMATED: CPT | Performed by: INTERNAL MEDICINE

## 2023-05-23 PROCEDURE — 83036 HEMOGLOBIN GLYCOSYLATED A1C: CPT | Performed by: INTERNAL MEDICINE

## 2023-05-23 PROCEDURE — 25010000002 MORPHINE PER 10 MG: Performed by: INTERNAL MEDICINE

## 2023-05-23 RX ORDER — AMOXICILLIN 250 MG
2 CAPSULE ORAL 2 TIMES DAILY PRN
Qty: 60 TABLET | Refills: 0 | Status: SHIPPED | OUTPATIENT
Start: 2023-05-23

## 2023-05-23 RX ORDER — OXYCODONE HYDROCHLORIDE AND ACETAMINOPHEN 5; 325 MG/1; MG/1
1-2 TABLET ORAL EVERY 6 HOURS PRN
Qty: 24 TABLET | Refills: 0 | Status: SHIPPED | OUTPATIENT
Start: 2023-05-23

## 2023-05-23 RX ORDER — NALOXONE HYDROCHLORIDE 4 MG/.1ML
SPRAY NASAL
Qty: 2 EACH | Refills: 0 | Status: SHIPPED | OUTPATIENT
Start: 2023-05-23

## 2023-05-23 RX ORDER — ASPIRIN 81 MG/1
81 TABLET, CHEWABLE ORAL 2 TIMES DAILY
Qty: 60 TABLET | Refills: 0 | Status: SHIPPED | OUTPATIENT
Start: 2023-05-23 | End: 2023-06-22

## 2023-05-23 RX ADMIN — Medication 100 MG: at 08:28

## 2023-05-23 RX ADMIN — NYSTATIN: 100000 POWDER TOPICAL at 17:39

## 2023-05-23 RX ADMIN — AMLODIPINE BESYLATE 10 MG: 10 TABLET ORAL at 08:28

## 2023-05-23 RX ADMIN — DOCUSATE SODIUM 50MG AND SENNOSIDES 8.6MG 2 TABLET: 8.6; 5 TABLET, FILM COATED ORAL at 08:28

## 2023-05-23 RX ADMIN — Medication 1 MG: at 08:29

## 2023-05-23 RX ADMIN — PANTOPRAZOLE SODIUM 40 MG: 40 TABLET, DELAYED RELEASE ORAL at 06:50

## 2023-05-23 RX ADMIN — OXYCODONE HYDROCHLORIDE AND ACETAMINOPHEN 1 TABLET: 5; 325 TABLET ORAL at 21:43

## 2023-05-23 RX ADMIN — MORPHINE SULFATE 2 MG: 4 INJECTION, SOLUTION INTRAMUSCULAR; INTRAVENOUS at 19:29

## 2023-05-23 RX ADMIN — Medication 10 ML: at 08:32

## 2023-05-23 RX ADMIN — OXYCODONE HYDROCHLORIDE AND ACETAMINOPHEN 1 TABLET: 5; 325 TABLET ORAL at 05:18

## 2023-05-23 RX ADMIN — POLYETHYLENE GLYCOL 3350 17 G: 17 POWDER, FOR SOLUTION ORAL at 17:38

## 2023-05-23 RX ADMIN — ASPIRIN 81 MG: 81 TABLET, CHEWABLE ORAL at 08:28

## 2023-05-23 RX ADMIN — CARVEDILOL 12.5 MG: 12.5 TABLET, FILM COATED ORAL at 17:38

## 2023-05-23 RX ADMIN — LOSARTAN POTASSIUM 100 MG: 100 TABLET, FILM COATED ORAL at 08:28

## 2023-05-23 RX ADMIN — PANTOPRAZOLE SODIUM 40 MG: 40 TABLET, DELAYED RELEASE ORAL at 17:38

## 2023-05-23 RX ADMIN — MORPHINE SULFATE 2 MG: 4 INJECTION, SOLUTION INTRAMUSCULAR; INTRAVENOUS at 11:09

## 2023-05-23 RX ADMIN — OXYCODONE HYDROCHLORIDE AND ACETAMINOPHEN 1 TABLET: 5; 325 TABLET ORAL at 17:38

## 2023-05-23 RX ADMIN — SERTRALINE 100 MG: 100 TABLET, FILM COATED ORAL at 08:28

## 2023-05-23 RX ADMIN — Medication 1 TABLET: at 08:28

## 2023-05-23 RX ADMIN — ASPIRIN 81 MG: 81 TABLET, CHEWABLE ORAL at 20:58

## 2023-05-23 RX ADMIN — ATORVASTATIN CALCIUM 40 MG: 20 TABLET, FILM COATED ORAL at 20:58

## 2023-05-23 RX ADMIN — OXYCODONE HYDROCHLORIDE AND ACETAMINOPHEN 1 TABLET: 5; 325 TABLET ORAL at 13:32

## 2023-05-23 RX ADMIN — CARVEDILOL 12.5 MG: 12.5 TABLET, FILM COATED ORAL at 08:29

## 2023-05-23 RX ADMIN — OXYCODONE HYDROCHLORIDE AND ACETAMINOPHEN 1 TABLET: 5; 325 TABLET ORAL at 09:24

## 2023-05-23 RX ADMIN — MORPHINE SULFATE 2 MG: 4 INJECTION, SOLUTION INTRAMUSCULAR; INTRAVENOUS at 15:19

## 2023-05-23 RX ADMIN — DOCUSATE SODIUM 50MG AND SENNOSIDES 8.6MG 2 TABLET: 8.6; 5 TABLET, FILM COATED ORAL at 20:57

## 2023-05-23 RX ADMIN — LEVOTHYROXINE SODIUM 88 MCG: 0.09 TABLET ORAL at 05:19

## 2023-05-23 RX ADMIN — Medication 10 ML: at 20:58

## 2023-05-23 RX ADMIN — MORPHINE SULFATE 2 MG: 4 INJECTION, SOLUTION INTRAMUSCULAR; INTRAVENOUS at 01:27

## 2023-05-23 NOTE — CASE MANAGEMENT/SOCIAL WORK
Continued Stay Note  [facility]     Patient Name: Marisol Carrillo  MRN: [id number]  Today's Date: 5/23/2023    Admit Date: 5/21/2023    [facility] & Pre-cert Pending.   Discharge Plan     Row Name 05/23/23 1621       Plan    Plan [facility] -- [facility] & Pre-cert Pending.    Patient/Family in Agreement with Plan yes    Plan Comments Received a call from Demetria who has accepted the patient and will start pre-cert.    Row Name 05/23/23 8258       Plan    Plan SNF -- Pending.    Patient/Family in Agreement with Plan yes    Plan Comments Both [facility] are unable to accept at this time. Updated the patient who's agreeable. CMS Compare SNF List was reviewed and she requests a referral to [facility] SN[facility]. Referral sent in [email or web address]. Spoke with Demetria who will review and f/u with CCP.    Row Name 05/23/23 7482       Plan    Plan SNF -- Referrals Pending.    Patient/Family in Agreement with Plan yes    Plan Comments Physical Therapy eval noted. Discussed with the patient who plans to go to SNF at d/c. CMS Compare SNF List was reviewed and she requests referrals to [facility] and [facility]. Referrals sent in [email or web address]. Spoke with Italia/Zuhair. Await SNFs determinations.               Discharge Codes    No documentation.               Expected Discharge Date and Time     Expected Discharge Date Expected Discharge Time    May 23, 2023             Keesha ESTRADA, RN

## 2023-05-23 NOTE — PROGRESS NOTES
Name: Marisol Carrillo ADMIT: 2023   : 1970  PCP: Tomeka Linares PA-C    MRN: [id number] LOS: 0 days   AGE/SEX: 53 y.o. female  ROOM: [facility]     Subjective   Subjective   Chief Complaint   Patient presents with   • Ankle Injury     No CP SOA NVD.     Objective   Objective   Vital Signs  Temp:  [98.2 °F (36.8 °C)-98.5 °F (36.9 °C)] 98.4 °F (36.9 °C)  Heart Rate:  [93-99] 93  Resp:  [16-17] 16  BP: ()/(59-83) 115/75  SpO2:  [94 %-96 %] 94 %  on  Flow (L/min):  [2] 2;   Device (Oxygen Therapy): room air  Body mass index is 61.33 kg/m².    Physical Exam  Vitals and nursing note reviewed.   Constitutional:       General: She is not in acute distress.     Appearance: She is not diaphoretic.   Cardiovascular:      Rate and Rhythm: Normal rate and regular rhythm.   Pulmonary:      Effort: Pulmonary effort is normal.      Breath sounds: No wheezing.   Abdominal:      General: There is no distension.      Palpations: Abdomen is soft.      Tenderness: There is no abdominal tenderness. There is no guarding or rebound.   Musculoskeletal:      Comments: RLE dressed   Skin:     General: Skin is warm and dry.   Neurological:      Mental Status: She is alert. Mental status is at baseline.   Psychiatric:         Mood and Affect: Mood normal.         Behavior: Behavior normal.       Results Review  I reviewed the patient's new clinical results.    Results from last 7 days   Lab Units 23  0511 23  0458 23  0536 23  0235   WBC 10*3/mm3 15.52* 8.34 11.62* 10.48   HEMOGLOBIN g/dL 10.8* 10.8* 12.3 12.1   PLATELETS 10*3/mm3 276 275 376 320     Results from last 7 days   Lab Units 23  0511 23  1050 23  0235   SODIUM mmol/L 139 140 142   POTASSIUM mmol/L 4.4 3.9 3.6   CHLORIDE mmol/L 105 106 108*   CO2 mmol/L 26.0 19.9* 20.9*   BUN mg/dL 14 14 14   CREATININE mg/dL 0.73 0.86 0.80   GLUCOSE mg/dL 143* 109* 140*   EGFR mL/min/1.73 99.1 81.4 88.8     Results from last 7 days   Lab  Units 05/21/23  0235   ALBUMIN g/dL 3.9   BILIRUBIN mg/dL <0.2   ALK PHOS U/L 115   AST (SGOT) U/L 17   ALT (SGPT) U/L 16     Results from last 7 days   Lab Units 05/23/23  0511 05/21/23  1050 05/21/23  0235   CALCIUM mg/dL 8.7 8.7 8.7   ALBUMIN g/dL  --   --  3.9         Hemoglobin A1C   Date/Time Value Ref Range Status   05/23/2023 0511 5.70 (H) 4.80 - 5.60 % Final       XR Ankle 3+ View Right    Result Date: 5/22/2023  Status post ORIF of distal fibular and medial malleolar fractures as described. Alignment appears appropriate.  This report was finalized on 5/22/2023 1:24 PM by Dr. Mat Pittman M.D.        I have personally reviewed all medications:  Scheduled Medications  losartan, 100 mg, Oral, Q24H   And  amLODIPine, 10 mg, Oral, Q24H  aspirin, 81 mg, Oral, BID  atorvastatin, 40 mg, Oral, Nightly  carvedilol, 12.5 mg, Oral, BID With Meals  folic acid, 1 mg, Oral, Daily  levothyroxine, 88 mcg, Oral, Q AM  multivitamin, 1 tablet, Oral, Daily  nystatin, , Topical, 4x Daily  pantoprazole, 40 mg, Oral, BID AC  senna-docusate sodium, 2 tablet, Oral, BID  sertraline, 100 mg, Oral, Daily  sodium chloride, 10 mL, Intravenous, Q12H  thiamine, 100 mg, Oral, Daily      Infusions  lactated ringers, 9 mL/hr, Last Rate: 9 mL/hr (05/22/23 0724)  sodium chloride, 100 mL/hr, Last Rate: 100 mL/hr (05/22/23 2139)      Diet  Diet: Regular/House Diet; Texture: Regular Texture (IDDSI 7); Fluid Consistency: Thin (IDDSI 0)    I have personally reviewed:  [x]  Laboratory   []  Microbiology   []  Radiology   []  EKG/Telemetry  []  Cardiology/Vascular   []  Pathology    []  Records       Assessment/Plan     Active Hospital Problems    Diagnosis  POA   • **Bimalleolar fracture of right ankle [S88.250D]  Yes   • Type I or II open fracture of right ankle, initial encounter [S82.891B]  Yes   • History of pulmonary embolism [Z86.711]  Yes   • Hypercholesterolemia [E78.00]  Yes   • Gastroesophageal reflux disease [K21.9]  Yes   •  Hypertension [I10]  Yes   • Hypothyroidism [E03.9]  Yes   • Body mass index (BMI) of 60.0-69.9 in adult (HCC) [Z68.44]  Not Applicable      Resolved Hospital Problems   No resolved problems to display.       53 y.o. female admitted with Bimalleolar fracture of right ankle.    • Right Bimalleolar Ankle Fracture: s/p ORIF 05/22. Pain control. Orthopedic surgery following.  • HTN: Acceptable acutely. Will monitor  • Hypothyroidism: Synthroid  • GERD: PPI  • Obesity  • Ppx: ASA per surgery postop  • Disposition: SNF/pending placement    Expected Discharge Date: 5/23/2023; Expected Discharge Time:      Rafa Castro MD  [facility]  05/23/23  10:37 EDT    Dictated portions of note using dragon dictation software.

## 2023-05-23 NOTE — CASE MANAGEMENT/SOCIAL WORK
Continued Stay Note  [facility]     Patient Name: Marisol Carrillo  MRN: [id number]  Today's Date: 5/23/2023    Admit Date: 5/21/2023    Plan: SNF -- Pending.   Discharge Plan     Row Name 05/23/23 1554       Plan    Plan SNF -- Pending.    Patient/Family in Agreement with Plan yes    Plan Comments Both the [facility] are unable to accept at this time. Updated the patient who's agreeable. CMS Compare SNF List was reviewed and she [facility]. Referral sent in [employer]. Spoke with Kirsten/Sarah who will review and f/u with CCP.    Row Name 05/23/23 8002       Plan    Plan SNF -- Referrals Pending.    Patient/Family in Agreement with Plan yes    Plan Comments Physical Therapy eval noted. Discussed with the patient who plans to go to SNF at d/c. CMS Compare SNF List was reviewed and she requests referrals to [facility] and the [facility] SNF. Referrals sent in [employer]. Spoke with Italia/Zuhiar. Await SNFs determinations.               Discharge Codes    No documentation.               Expected Discharge Date and Time     Expected Discharge Date Expected Discharge Time    May 23, 2023             Keesha ESTRADA RN

## 2023-05-23 NOTE — DISCHARGE PLACEMENT REQUEST
"Marisol Carrillo (53 y.o. Female)     Date of Birth   1970    Social Security Number       Address   [address] [place] [address]    Home Phone   [phone]    MRN   [id number]       [employer]   Gn[employer]ism    Marital Status                               Admission Date   5/21/23    Admission Type   Emergency    Admitting Provider   Israel Wan MD    Attending Provider   Rafa Castro MD    Department, Room/Bed   [address], P790/1       Discharge Date       Discharge Disposition   [place] [facility]    [place]charge Destination                               Attending Provider: Rafa Castro MD    Allergies: Ciprofloxacin, Hydrocodone-acetaminophen, Ketamine, Propacetamol, Propoxyphene-acetaminophen, Lisinopril, Tirosint [Levothyroxine]    Isolation: None   Infection: None   Code Status: CPR    Ht: 160 cm (62.99\")   Wt: 157 kg (346 lb 2 oz)    Admission Cmt: None   Principal Problem: Bimalleolar fracture of right ankle [S82.841A]                 Active Insurance as of 5/21/2023     Primary Coverage     Payor Plan Insurance Group Employer/Plan Group    [facility] BLUE CROSS ANTH [employer] CROSS BLUE SHIELD PPO [id number]     Payor Plan Address Payor Plan Phone Number Payor Plan Fax Number Effective Dates    [address] [phone]  1/1/2023 - None Entered    [facility] [id number]       Subscriber Name Subscriber Birth Date Member ID       LILLYMARILEE PARKERNIK 1970 [id number]           Secondary Coverage     Payor Plan Insurance Group Employer/Plan Group    MEDICARE MEDICARE A & B      Payor Plan Address Payor Plan Phone Number Payor Plan Fax Number Effective Dates    [address] [phone]  2/1/2018 - None Entered    [facility] [id number]       Subscriber Name Subscriber Birth Date Member ID       MARISOL CARRILLO LIANET 1970 [id number]                 Emergency Contacts      (Rel.) Home Phone Work Phone Mobile Phone    Nik Carrillo " (Spouse) -- -- [phone]

## 2023-05-23 NOTE — OP NOTE
ANKLE OPEN REDUCTION INTERNAL FIXATION  Procedure Note    Marisol Carrillo  5/22/2023    Pre-op Diagnosis: Right bimalleolar ankle fracture.   Post-op Diagnosis: Same  Procedure: Open reduction and internal fixation of right bimalleolar ankle fracture, 12149.  Surgeon:  Vazquez Hernández MD  Anesthesia: General, Anesthesiologist: Sidney Bautista MD  CRNA: Yahaira Forrester CRNA  Staff: Circulator: Marcella Mclaughlin RN; Sandeep William RN  Scrub Person: Alan Peters  Vendor Representative: Ruben Heath  Assistant: Boo Solo PA-C CFA  Estimated Blood Loss: 25 mL  Specimens: * No orders in the log *  Drains: none  Complications: None    Components Utilized:    Implant Name Type Inv. Item Serial No.  Lot No. LRB No. Used Action   SCRW BONE VARIAX T10 3.5X16MM - [id number] Implant SCRW BONE VARIAX T10 3.5X16MM  GLENDA KYLEE . Right 3 Implanted   PLT FIB VARIX D/L 5H 101MM - [id number] Implant PLT FIB VARIX D/L 5H 101MM  GLENDA KYLEE . Right 1 Implanted   SCRW LK BONE VARIAX T10 3.5X16MM - [id number] Implant SCRW LK BONE VARIAX T10 3.5X16MM  GLENDA KYLEE . Right 2 Implanted   SCRW LK BONE VARIAX T10 3.5X18MM - [id number] Implant SCRW LK BONE VARIAX T10 3.5X18MM  GLENDA KYLEE . Right 1 Implanted   SCRW LK BONE VARIAX T10 3.5X14MM - [id number] Implant SCRW LK BONE VARIAX T10 3.5X14MM  GLENDA KYLEE . Right 3 Implanted   SCRW LK BONE VARIAX T10 3.5X12MM - [id number] Implant SCRW LK BONE VARIAX T10 3.5X12MM  GLENDA KYLEE . Right 1 Implanted   WASHR ASNIS3 TI 4MM - [id number] Implant WASHR ASNIS3 TI 4MM  GLENDA KYLEE . Right 1 Implanted   SCRW MATT ASNIS3 1/3THRD TI 4X38 STRL - [id number] Implant SCRW MATT ASNIS3 1/3THRD TI 4X38 STRL  GLENDA KYLEE . Right 1 Implanted       Indication for Procedure:  This patient is a 53 y.o. female who had tripped and fallen resulting in a right comminuted displaced bimalleolar ankle fracture.  Provisional reduction was performed in the emergency room.  The patient  was admitted for surgical intervention.  It is felt she would benefit from ORIF of the right bimalleolar ankle fracture.  Surgical options and non-surgical options were discussed in detail and to the patient's satisfaction.  Surgical intervention was recommended based on the patient's injury and functional status.      The risks and benefits of surgery were discussed with patient and informed consent was obtained.  Risks include but are not limited to, infection, bleeding, nerve injury, blood clots, risks associated with anesthesia, need for further surgery, persistent pain, and possibly death.    Protocols for intravenous antibiotics and venous thrombosis were followed for this patient.  IV antibiotics were infused prior to surgery and will be discontinued within 24 hours of completion of the surgical procedure.       DESCRIPTION OF PROCEDURE:     The patient was seen in Preoperative Holding Area where their surgical site was marked. Preoperative antibiotics were received. H&P and consent updated.  A preoperative nerve block was performed.  She was taken to the Operating Room and provided general anesthesia on the Operating Room table.  A calf high tourniquet was placed on the right side.  The right lower extremity was prepped and draped in a typical sterile fashion.  A timeout was performed confirming the correct surgical site and procedure.  The leg was elevated and exsanguinated.  Tourniquet was inflated to 250 mmHg.  A direct lateral longitudinal incision was made over the fibula.  Incision was taken down through skin and subcutaneous tissues.  Fracture site was identified.  It was thoroughly irrigated and debrided.  It was provisionally reduced with a bone clamp.  A lag screw was placed from anterior to posterior across the fracture plane.  Next a neutralization plate was placed directly on the fibula laterally.  Was confirmed to be in the appropriate position with fluoroscopy.  Next a series of unicortical  distal locking screws were placed followed by bicortical nonlocking screws proximally.  The remainder of the holes approximately were stabilized with locking bicortical screws.  Anatomic reduction was noted.  External rotation stress test was normalized.  No syndesmotic screws or sutures were needed.  There was a displaced medial malleolus fracture noted.    At this point, focus was placed on the medial side.  1 inch incision was made over the medial malleolus.  Fracture site was identified.  Periosteum was flipped into the fracture.  This was excised.  Hematoma was irrigated from the wound.  I utilized a dental pick to help reduce the fracture.  It was stabilized with a series of K wires.  Next, a 38 mm partially-threaded 4.0 mm cannulated screw with washer was placed to secure the medial malleolus.  Excellent compression was noted.  Anatomic reduction was noted.  At this point, final AP, oblique, and lateral fluoroscopic images were taken confirming appropriate reduction of the fracture and placement of hardware.  Tourniquet was released.  Hemostasis was noted.  Wounds were copiously irrigated.  Deep tissue was closed with 0 Vicryl suture.  Subtenons tissues were closed with 2-0 Vicryl suture.  Skin was closed with staples.  Xeroform, 4 x 4's, ABD pads, cast padding, and a well-padded posterior splint was applied.  Sponge and needle counts were appropriate.  The patient was subsequently awakened from general anesthesia in stable condition.  She was taken the PACU postoperatively.    Postoperative Plan:  Protocols for intravenous antibiotics and venous thrombosis were followed for this patient.  IV antibiotics were infused prior to surgery and will be discontinued within 24 hours of completion of the surgical procedure.  Thrombosis prophylaxis will be initiated within 24 hours of the completion of the surgical procedure.  She will be on aspirin 81 mg p.o. twice daily for 14 days.  The patient will be  nonweightbearing on the right lower extremity.    Boo Solo PA-C assisted for the entire surgical procedure.  He was necessary for retraction, implant placement, and closure of the wound.    No complications were encountered during the surgical procedure.    Vazquez Hernández MD

## 2023-05-23 NOTE — PROGRESS NOTES
Orthopedic Progress Note    Subjective :   Patient seen and examined.  Resting comfortably in her hospital bed.  Pain controlled with medication.  Has been voiding with the purewick in place.  Discharge assessment with case management was performed yesterday.  Current plan is for possible discharge home with family.  Awaiting physical therapy recommendations.  No acute issues overnight.    Objective :    Vital signs in last 24 hours:  Temp:  [98.2 °F (36.8 °C)-98.7 °F (37.1 °C)] 98.4 °F (36.9 °C)  Heart Rate:  [85-99] 93  Resp:  [16-19] 16  BP: ()/(55-83) 139/83  Vitals:    05/22/23 1746 05/22/23 2129 05/23/23 0117 05/23/23 0514   BP: 114/77 101/70 (!) 89/59 139/83   BP Location: Right arm Right arm Right arm Right arm   Patient Position: Lying Lying Lying Lying   Pulse: 99 93 95 93   Resp: 16 16 16 16   Temp: 98.5 °F (36.9 °C) 98.4 °F (36.9 °C) 98.4 °F (36.9 °C) 98.4 °F (36.9 °C)   TempSrc: Oral Oral Oral Oral   SpO2: 95% 94% 96% 94%   Weight:       Height:           PHYSICAL EXAM:  Patient is calm, in no acute distress, awake and oriented x 3.  Dressing clean, dry and intact.  Presents in a well-padded well fitting splint.  No signs of infection.  Swelling is appropriate.  Ecchymosis is appropriate in amount.  Sensation is intact distally.  Good capillary refill.  Foot warm and well-perfused.  Able to move her toes freely.    LABS:  Results from last 7 days   Lab Units 05/23/23  0511   WBC 10*3/mm3 15.52*   HEMOGLOBIN g/dL 10.8*   HEMATOCRIT % 34.4   PLATELETS 10*3/mm3 276     Results from last 7 days   Lab Units 05/23/23  0511   SODIUM mmol/L 139   POTASSIUM mmol/L 4.4   CHLORIDE mmol/L 105   CO2 mmol/L 26.0   BUN mg/dL 14   CREATININE mg/dL 0.73   GLUCOSE mg/dL 143*   CALCIUM mg/dL 8.7     Results from last 7 days   Lab Units 05/21/23  0235   INR  1.03   APTT seconds 25.6     Study Result    Narrative & Impression   EXAMINATION: 3 VIEWS OF THE RIGHT ANKLE     HISTORY: ORIF     FINDINGS: Coned-down AP  and lateral radiographs of the right ankle were  obtained and demonstrate plate and screw fixation of the distal right  fibula with a single lag screw within the medial malleolus and distal  tibia. Alignment appears appropriate. 15 seconds of fluoroscopy time was  used to acquire 5 images.     IMPRESSION:  Status post ORIF of distal fibular and medial malleolar  fractures as described. Alignment appears appropriate.     This report was finalized on 5/22/2023 1:24 PM by Dr. Mat Pittman M.D.       ASSESSMENT:  Status post right ankle open reduction internal fixation, POD #1    Plan:  Patient to be seen and evaluated by physical therapy this morning.  Awaiting their recommendations on discharge recommendations.  Case management following along.  Current plan is for home with home health care as she has supportive family.    Continue SCDs.  Continue aspirin 81 mg 1 tablet p.o. twice daily for 30 days for DVT prophylaxis.    Patient to remain nonweightbearing to her right lower extremity.    Patient to follow-up in the office in 2 weeks after discharge for repeat x-rays as well as staple removal.    We will sign off from the orthopedic standpoint.  Please call if needed.  Thank you very much for allowing us to be a part of the patient's care.    Boo Solo PA-C    Date: 5/23/2023  Time: 07:17 EDT

## 2023-05-23 NOTE — THERAPY EVALUATION
Patient Name: Marisol Carrillo  : 1970    MRN: [id number]                              Today's Date: 2023       Admit Date: 2023    Visit Dx:     ICD-10-CM ICD-9-CM   1. Type I or II open fracture of right ankle, initial encounter  S82.891B 824.9   2. Ankle fracture, bimalleolar, closed, right, initial encounter  S82.581X 824.4     Patient Active Problem List   Diagnosis   • Essential familial hypercholesterolemia   • Hypertension   • Hypothyroidism   • Body mass index (BMI) of 60.0-69.9 in adult (HCC)   • Impaired fasting glucose   • Vitamin D deficiency   • Lumbosacral radiculopathy   • Gastroesophageal reflux disease   • Constipation   • Diarrhea   • Fatigue   • Heartburn   • Lumbar disc herniation with radiculopathy   • Pain in extremity   • Anxiety   • Depression   • DDD (degenerative disc disease), lumbar   • Spinal headache   • Chronic bilateral low back pain with bilateral sciatica   • Disc disease, degenerative, lumbar or lumbosacral   • Primary localized osteoarthritis of left knee   • History of lumbar fusion   • Lumbar spondylolysis   • Chronic pain of left knee   • S/P total prosthetic knee replacement not using cement, left   • History of removal of laparoscopic gastric banding device   • Incisional pain   • Nausea   • Medrano's esophagus without dysplasia   • Gastroesophageal reflux disease with esophagitis   • Postlaminectomy syndrome, lumbar region   • Urinary urgency   • Arthritis   • Hypercholesterolemia   • Posttraumatic stress disorder   • Arthritis of knee   • Type I or II open fracture of right ankle, initial encounter   • History of pulmonary embolism   • Bimalleolar fracture of right ankle     Past Medical History:   Diagnosis Date   • Acute pulmonary embolism 2019   • Alopecia    • Anemia 2022   • Anxiety and depression    • Arthritis     OSTEO   • Balance problem     FOOTDROP LEFT FOOT   • Medrano esophagus 2019   • Bilateral knee pain    • Breast mass      MD DAYNE WATCHING.   • Cervical disc disorder    • Chronic low back pain    • Depression    • Dysphagia 2016   • Epigastric pain 2017    Added automatically from request for surgery [id number]   • Fatty liver    • GERD (gastroesophageal reflux disease)    • Hernia    • History of infectious mononucleosis    • Hyperlipidemia    • Hypertension    • Hypothyroidism    • Impaired fasting glucose    • Irritable bowel syndrome    • Kidney calculus     HISTORY OF   • Lumbosacral disc disease    • Nausea and vomiting 2019    Added automatically from request for surgery [id number]   • Nocturnal cough 2016   • Pneumonia      S/P LUMBAR FUSION   • Regurgitation 2016   • Sciatica    • Spinal headache     AFTER MYELOGRAM. BLOOD PATCH X2   • Vitamin D deficiency      Past Surgical History:   Procedure Laterality Date   • ANKLE OPEN REDUCTION INTERNAL FIXATION Right 2023    Procedure: ANKLE OPEN REDUCTION INTERNAL FIXATION;  Surgeon: Vazquez Hernández MD;  Location: [facility] MAIN OR;  Service: Orthopedics;  Laterality: Right;   • BACK SURGERY  2015    microdiscectomy L5-S1 and spinal fusion   • BARIATRIC SURGERY      Lapband   •  SECTION     • CHOLECYSTECTOMY N/A 2017    Procedure: CHOLECYSTECTOMY LAPAROSCOPIC;  Surgeon: Jam Ballard MD;  Location: [facility] OR OSC;  Service:    • DILATION AND CURETTAGE, DIAGNOSTIC / THERAPEUTIC     • ENDOSCOPY N/A 2017    Procedure: ESOPHAGOGASTRODUODENOSCOPY WITH BIOPSY;  Surgeon: Jam Ballard MD;  Location: [facility] ENDOSCOPY;  Service:    • ENDOSCOPY N/A 2020    Procedure: ESOPHAGOGASTRODUODENOSCOPY WITH BIOPSIES, SNELL DILATATION 44Fr, 46Fr;  Surgeon: Kirsten Yanez MD;  Location: [facility] ENDOSCOPY;  Service: Gastroenterology;  Laterality: N/A;  PRE:  DYSPHAGIA, ABDOMINAL PAIN, GERD  POST:  IRREGULAR Z-LINE, R/O EOE, ESOPHAGEAL FURROWS   • ENDOSCOPY N/A 10/18/2022    Procedure: ESOPHAGOGASTRODUODENOSCOPY WITH  BIOPSY;  Surgeon: Tu Hartman Jr., MD;  Location: [facility] ENDOSCOPY;  Service: General;  Laterality: N/A;  PRE- GERD, H/O BAND REMOVAL  POST- GASTRITIS   • GASTRIC BANDING REMOVAL N/A 03/25/2019    Procedure: GASTRIC BANDING REMOVAL LAPAROSCOPIC;  Surgeon: Tu Hartman Jr., MD;  Location: [facility] MAIN OR;  Service: General   • HERNIA REPAIR      Hiatal   • JOINT REPLACEMENT Left 2017    KNEE   • KNEE ARTHROSCOPY Left     REPAIR OF MENISUCS   • LITHOTRIPSY     • LUMBAR FUSION  11/2015    L5-S1. WITH HARDWARE   • MICRODISCECTOMY LUMBAR     • MS ARTHRP KNE CONDYLE&PLATU MEDIAL&LAT COMPARTMENTS Left 03/29/2017    Procedure: TOTAL KNEE ARTHROPLASTY;  Surgeon: Zacarias Reeves MD;  Location: [facility] MAIN OR;  Service: Orthopedics   • SPINAL FUSION  11/20/2015   • TONSILLECTOMY     • TOTAL KNEE ARTHROPLASTY REVISION Left 03/2019    Dr. Chavez   • TRIGGER POINT INJECTION  2012   • UPPER GASTROINTESTINAL ENDOSCOPY  03/2019      General Information     Row Name 05/23/23 1058          Physical Therapy Time and Intention    Document Type evaluation  -     Mode of Treatment individual therapy;physical therapy  -[facility] Name 05/23/23 1058          General Information    Patient Profile Reviewed yes  -     Prior Level of Function independent:;gait;transfer;bed mobility  -     Existing Precautions/Restrictions fall;non-weight bearing;right  -     Barriers to Rehab medically complex;previous functional deficit  -[facility] Name 05/23/23 1058          Home Main Entrance    Number of Stairs, Main Entrance three   built ramp but not WC accessible  -[facility] Name 05/23/23 1058          Stairs Within Home, Primary    Number of Stairs, Within Home, Primary none  -     Row Name 05/23/23 1058          Cognition    Orientation Status (Cognition) oriented x 4  -[facility] Name 05/23/23 1058          Safety Issues, Functional Mobility    Impairments Affecting Function (Mobility) balance;endurance/activity  tolerance;strength;pain;range of motion (ROM)  -           User Key  (r) = Recorded By, (t) = Taken By, (c) = Cosigned By    Initials Name Provider Type     Janis Carr PT Physical Therapist               Mobility     Row Name 05/23/23 1058          Bed Mobility    Bed Mobility supine-sit;sit-supine;scooting/bridging  -     Scooting/Bridging Sharon (Bed Mobility) 2 person assist;verbal cues;maximum assist (25% patient effort)  -     Supine-Sit Sharon (Bed Mobility) minimum assist (75% patient effort);1 person assist;verbal cues  -     Sit-Supine Sharon (Bed Mobility) moderate assist (50% patient effort);2 person assist;verbal cues  -     Assistive Device (Bed Mobility) draw sheet;head of bed elevated;bed rails  -     Row Name 05/23/23 1058          Sit-Stand Transfer    Sit-Stand Sharon (Transfers) dependent (less than 25% patient effort);2 person assist;verbal cues  -     Assistive Device (Sit-Stand Transfers) walker, front-wheeled  -     Comment, (Sit-Stand Transfer) Attempted 3 STS - unable to clear bed on any attempt, difficulty maintaining NWB status  -[facility] Name 05/23/23 1058          Mobility    Extremity Weight-bearing Status right lower extremity  -     Right Lower Extremity (Weight-bearing Status) non weight-bearing (NWB)  -           User Key  (r) = Recorded By, (t) = Taken By, (c) = Cosigned By    Initials Name Provider Type     Janis Carr PT Physical Therapist               Obj/Interventions     Row Name 05/23/23 1059          Range of Motion Comprehensive    General Range of Motion lower extremity range of motion deficits identified  -     Comment, General Range of Motion RLE splinted  -     Row Name 05/23/23 1059          Strength Comprehensive (MMT)    General Manual Muscle Testing (MMT) Assessment lower extremity strength deficits identified  -     Comment, General Manual Muscle Testing (MMT) Assessment Expected post-op strength  deficits, BLE grossly 3+/5  -[facility] Name 05/23/23 1059          Balance    Balance Assessment sitting static balance;sitting dynamic balance  -     Static Sitting Balance standby assist;verbal cues  -     Dynamic Sitting Balance contact guard;verbal cues  -     Position, Sitting Balance sitting edge of bed  -     Balance Interventions sitting;static;dynamic  -[facility] Name 05/23/23 1059          Sensory Assessment (Somatosensory)    Sensory Assessment (Somatosensory) other (see comments)  RLE numbness, able to wiggle toes  -           User Key  (r) = Recorded By, (t) = Taken By, (c) = Cosigned By    Initials Name Provider Type     Janis Carr, PT Physical Therapist               Goals/Plan     Row Name 05/23/23 1108          Bed Mobility Goal 1 (PT)    Activity/Assistive Device (Bed Mobility Goal 1, PT) bed mobility activities, all  -     [place] Level/Cues Needed (Bed Mobility Goal 1, PT) contact guard required  -     Time Frame (Bed Mobility Goal 1, PT) 1 week  -[facility] Name 05/23/23 1108          Transfer Goal 1 (PT)    Activity/Assistive Device (Transfer Goal 1, PT) transfers, all  -     [place] Level/Cues Needed (Transfer Goal 1, PT) moderate assist (50-74% patient effort)  -     Time Frame (Transfer Goal 1, PT) 2 weeks  -[facility] Name 05/23/23 1108          Therapy Assessment/Plan (PT)    Planned Therapy Interventions (PT) balance training;bed mobility training;home exercise program;gait training;patient/family education;strengthening;transfer training  -           User Key  (r) = Recorded By, (t) = Taken By, (c) = Cosigned By    Initials Name Provider Type     Jains Carr, PT Physical Therapist               Clinical Impression     Row Name 05/23/23 1100          Pain    Pretreatment Pain Rating 0/10 - no pain  -     Posttreatment Pain Rating 3/10  -     Pain Location - Side/Orientation Right  -     Pain Location lower  -     Pain Location -  extremity  -     Pain Intervention(s) Repositioned;Rest  -     Row Name 05/23/23 1100          Plan of Care Review    Plan of Care Reviewed With patient;spouse  -     Outcome Evaluation Pt is a 52 yo F admitted following a fall - missed a step. Pt with R tibiotalar dislocation and severely displaced bimalleolar ankle fx, now POD 1 R ankke ORIF and is NWB. Pt lives with her  and reports independence at BL with no AD. Pt reports she had a R TKA in January and has had 3 previous L knee surgeries with that leg being weaker at BL. Pt has 3 MINA through garage and  built a ramp, though does not believe it is WC accessible. Also unsure if bathrooms in the home are WC accessible. Pt presents to PT with impaired strength, endurance, and pain limiting overall mobility. Pt reports she has not been OOB yet this admission, also reports she is not very active at BL - has not returned to PLOF after R TKA earlier this year. Pt required min A to transfer to EOB with heavy use of bedrails. Attempted 3x STS with max-dep A x2 and rwx - unable to clear bed even minimally. TCs given for RLE NWB with difficulty maintaining throughout session. Pt assisted back to supine, completed a few rolls with mod A to fix sheets and max A scooted up in bed. Pt is not safe to return home at this time - would likely be bedbound as she is unable to safety stand to pivot to WC and lacks UE strength to slide to WC using a slide board. Also concerned about pt's eventual ability to use a knee scooter with recent R TKA. Pt significantly below BL and would benefit from SNF placement at [place] - spoke with pt/family and CCP following session. PT will continue to follow to progress mobility as tolerated.  -     Row Name 05/23/23 1100          Therapy Assessment/Plan (PT)    Patient/Family Therapy Goals Statement (PT) Return to PLOF  -     Rehab Potential (PT) fair, will monitor progress closely  -     Criteria for Skilled Interventions Met  (PT) yes  -     Therapy Frequency (PT) 5 times/wk  -     Row Name 05/23/23 1100          Vital Signs    O2 Delivery Pre Treatment room air  -     O2 Delivery Intra Treatment room air  -     O2 Delivery Post Treatment room air  -     Row Name 05/23/23 1100          Positioning and Restraints    Pre-Treatment Position in bed  -     Post Treatment Position bed  -     In Bed notified nsg;fowlers;call light within reach;encouraged to call for assist;exit alarm on;with family/caregiver  -           User Key  (r) = Recorded By, (t) = Taken By, (c) = Cosigned By    Initials Name Provider Type    Janis Morales, PT Physical Therapist               Outcome Measures     Row Name 05/23/23 1109 05/23/23 0833       How much help from another person do you currently need...    Turning from your back to your side while in flat bed without using bedrails? 2  - 3  -EK    Moving from lying on back to sitting on the side of a flat bed without bedrails? 2  - 3  -EK    Moving to and from a bed to a chair (including a wheelchair)? 1  - 2  -EK    Standing up from a chair using your arms (e.g., wheelchair, bedside chair)? 1  - 2  -EK    Climbing 3-5 steps with a railing? 1  - 1  -EK    To walk in hospital room? 1  - 2  -EK    AM-PAC 6 Clicks Score (PT) 8  - 13  -EK    Highest level of mobility 3 --> Sat at edge of bed  - 4 --> Transferred to chair/commode  -    Row Name 05/23/23 1109          Functional Assessment    Outcome Measure Options AM-PAC 6 Clicks Basic Mobility (PT)  -           User Key  (r) = Recorded By, (t) = Taken By, (c) = Cosigned By    Initials Name Provider Type    Zeina Benito, ROSE Registered Nurse     Janis Carr PT Physical Therapist                             Physical Therapy Education     Title: PT OT SLP Therapies (In Progress)     Topic: Physical Therapy (In Progress)     Point: Mobility training (Done)     Learning Progress Summary           Patient Acceptance,  TB,E,D, VU,NR by  at 5/23/2023 1110                   Point: Home exercise program (Not Started)     Learner Progress:  Not documented in this visit.          Point: Body mechanics (Done)     Learning Progress Summary           Patient Acceptance, TB,E,D, VU,NR by  at 5/23/2023 1110                   Point: Precautions (Done)     Learning Progress Summary           Patient Acceptance, TB,E,D, VU,NR by  at 5/23/2023 1110                               User Key     Initials Effective Dates Name Provider Type Discipline     04/08/22 -  Janis Carr, PT Physical Therapist PT              PT Recommendation and Plan  Planned Therapy Interventions (PT): balance training, bed mobility training, home exercise program, gait training, patient/family education, strengthening, transfer training  Plan of Care Reviewed With: patient, spouse  Outcome Evaluation: Pt is a 52 yo F admitted following a fall - missed a step. Pt with R tibiotalar dislocation and severely displaced bimalleolar ankle fx, now POD 1 R ankke ORIF and is NWB. Pt lives with her  and reports independence at BL with no AD. Pt reports she had a R TKA in January and has had 3 previous L knee surgeries with that leg being weaker at BL. Pt has 3 MINA through garage and  built a ramp, though does not believe it is WC accessible. Also unsure if bathrooms in the home are WC accessible. Pt presents to PT with impaired strength, endurance, and pain limiting overall mobility. Pt reports she has not been OOB yet this admission, also reports she is not very active at BL - has not returned to PLOF after R TKA earlier this year. Pt required min A to transfer to EOB with heavy use of bedrails. Attempted 3x STS with max-dep A x2 and rwx - unable to clear bed even minimally. TCs given for RLE NWB with difficulty maintaining throughout session. Pt assisted back to supine, completed a few rolls with mod A to fix sheets and max A scooted up in bed. Pt is not  safe to return home at this time - would likely be bedbound as she is unable to safety stand to pivot to WC and lacks UE strength to slide to WC using a slide board. Also concerned about pt's eventual ability to use a knee scooter with recent R TKA. Pt significantly below BL and would benefit from SNF placement at [place] - spoke with pt/family and CCP following session. PT will continue to follow to progress mobility as tolerated.     Time Calculation:    PT Charges     Row Name 05/23/23 1110             Time Calculation    Start Time 1007  -      Stop Time 1026  -      Time Calculation (min) 19 min  -BH      PT Received On 05/23/23  -      PT - Next Appointment 05/24/23  -      PT Goal Re-Cert Due Date 05/30/23  -         Time Calculation- PT    Total Timed Code Minutes- PT 15 minute(s)  -         Timed Charges    19363 - PT Therapeutic Activity Minutes 15  -BH         Untimed Charges    PT Eval/Re-eval Minutes 5  -BH         Total Minutes    Timed Charges Total Minutes 15  -BH      Untimed Charges Total Minutes 5  -BH       Total Minutes 20  -BH            User Key  (r) = Recorded By, (t) = Taken By, (c) = Cosigned By    Initials Name Provider Type     Janis Carr, PT Physical Therapist              Therapy Charges for Today     Code Description Service Date Service Provider Modifiers Qty    20460810487 HC PT THERAPEUTIC ACT EA 15 MIN 5/23/2023 Janis Carr, PT GP 1    96225973246 HC PT EVAL MOD COMPLEXITY 3 5/23/2023 Janis Carr, PT GP 1    03480531833 HC PT THER SUPP EA 15 MIN 5/23/2023 Janis Carr, PT GP 1          PT G-Codes  Outcome Measure Options: AM-PAC 6 Clicks Basic Mobility (PT)  AM-PAC 6 Clicks Score (PT): 8  PT Discharge Summary  Anticipated Discharge Disposition (PT): skilled nursing facility    Janis Carr PT  5/23/2023

## 2023-05-23 NOTE — PLAN OF CARE
Goal Outcome Evaluation:  Plan of Care Reviewed With: patient      Vss, nvi, PRN percocet and morphine given for pain, RLE elevated, bowel regimen started, voiding per pw, pt will need SNF at d/c, educated on bp meds and monitoring.   Progress: improving

## 2023-05-23 NOTE — PLAN OF CARE
Goal Outcome Evaluation:  Plan of Care Reviewed With: patient, spouse           Outcome Evaluation: Pt is a 54 yo F admitted following a fall - missed a step. Pt with R tibiotalar dislocation and severely displaced bimalleolar ankle fx, now POD 1 R ankke ORIF and is NWB. Pt lives with her  and reports independence at [place] with no AD. Pt reports she had a R TKA in January and has had 3 previous L knee surgeries with that leg being weaker at BL. Pt has 3 MINA through garage and  built a ramp, though does not believe it is WC accessible. Also unsure if bathrooms in the home are WC accessible. Pt presents to PT with impaired strength, endurance, and pain limiting overall mobility. Pt reports she has not been OOB yet this admission, also reports she is not very active at BL - has not returned to PLOF after R TKA earlier this year. Pt required min A to transfer to EOB with heavy use of bedrails. Attempted 3x STS with max-dep A x2 and rwx - unable to clear bed even minimally. TCs given for RLE NWB with difficulty maintaining throughout session. Pt assisted back to supine, completed a few rolls with mod A to fix sheets and max A scooted up in bed. Pt is not safe to return home at this time - would likely be bedbound as she is unable to safety stand to pivot to WC and lacks UE strength to slide to WC using a slide board. Also concerned about pt's eventual ability to use a knee scooter with recent R TKA. Pt significantly below BL and would benefit from SNF placement at [place] - spoke with pt/family and CCP following session. PT will continue to follow to progress mobility as tolerated.

## 2023-05-23 NOTE — PLAN OF CARE
Goal Outcome Evaluation:  Plan of Care Reviewed With: patient        Progress: improving  Outcome Evaluation: VSS. Pt has been voiding per brandi. PRN pain meds given as per order. Dressing is c/d/i. Educated on b/p monitoring, medication management and falls precaution. Discharge plan pending. Will catke note of any changes.

## 2023-05-23 NOTE — DISCHARGE SUMMARY
Date of Admission: 5/21/2023  Date of Discharge:  5/25/2023  Primary Care Physician: Tomeka Linares, PA-C     Discharge Diagnosis:  Active Hospital Problems    Diagnosis  POA   • **Bimalleolar fracture of right ankle [S82.841A]  Yes   • Type I or II open fracture of right ankle, initial encounter [S82.891B]  Yes   • History of pulmonary embolism [Z86.711]  Yes   • Hypercholesterolemia [E78.00]  Yes   • Gastroesophageal reflux disease [K21.9]  Yes   • Hypertension [I10]  Yes   • Hypothyroidism [E03.9]  Yes   • Body mass index (BMI) of 60.0-69.9 in adult (HCC) [Z68.44]  Not Applicable      Resolved Hospital Problems   No resolved problems to display.       Presenting Problem/History of Present Illness:  Type I or II open fracture of right ankle, initial encounter [S82.891B]     Ms. Carrillo is a 53 y.o. woman with a history of provoked DVT/PE in 2019 following a knee surgery, GERD with herrera's esophagus, hypertension, hyperlipidemia, hypothyroidism, morbid obesity s/p lap band placement and subsequent removal who presents after falling down 1 step as she was going down to her basement wherein she experienced acute onset right ankle pain. She was found to have a lateral tibiotalar dislocation with severely displaced medial and lateral malleolar fractures. This was reduced splinted in the ED.    Hospital Course:  The patient is a 53 y.o. female who presented with right bimalleolar ankle fracture.  She was admitted and orthopedic surgery consulted.  On 5/22 she underwent open reduction and internal fixation of the right bimalleolar ankle fracture.  She is to remain nonweightbearing to her right lower extremity and needs to stay on twice daily aspirin for prophylaxis.  She will follow-up with orthopedic surgery in clinic in 2 weeks.    She worked with PT and SNF recommended.    She was reporting some symptoms of peripheral neuropathy which is likely related to nerve block wearing off.  Her A1c was 5.7 TSH and B12 were  okay    Exam Today:  Constitutional:       General: She is not in acute distress.     Appearance: She is not diaphoretic.   Cardiovascular:      Rate and Rhythm: Normal rate and regular rhythm.   Pulmonary:      Effort: Pulmonary effort is normal.      Breath sounds: No wheezing.   Abdominal:      General: There is no distension.      Palpations: Abdomen is soft.      Tenderness: There is no abdominal tenderness. There is no guarding or rebound.   Musculoskeletal:      Comments: RLE dressed, toes with normal warmth   Skin:     General: Skin is warm and dry.   Neurological:      Mental Status: She is alert. Mental status is at baseline.   Psychiatric:         Mood and Affect: Mood normal.         Behavior: Behavior normal.     Results:  XR R Ankle  Lateral tibiotalar dislocation with severely displaced  medial and lateral malleolar fractures.    Procedures Performed:  Procedure(s):  ANKLE OPEN REDUCTION INTERNAL FIXATION       Consults:   Consults     Date and Time Order Name Status Description    5/21/2023  4:36 AM Inpatient Orthopedic Surgery Consult Completed     5/21/2023  4:07 AM [facility] (on-call MD unless specified) Details             Discharge Disposition:  SNF    Discharge Medications:     Discharge Medications      New Medications      Instructions Start Date   aspirin 81 MG chewable tablet   81 mg, Oral, 2 Times Daily      naloxone 4 MG/0.1ML nasal spray  Commonly known as: NARCAN   Call 911. Don't prime. [place] in 1 nostril for overdose. Repeat in 2-3 minutes in other nostril if no or minimal breathing/responsiveness.      oxyCODONE-acetaminophen 5-325 MG per tablet  Commonly known as: PERCOCET   1-2 tablets, Oral, Every 6 Hours PRN      sennosides-docusate 8.6-50 MG per tablet  Commonly known as: PERICOLACE   2 tablets, Oral, 2 Times Daily PRN         Continue These Medications      Instructions Start Date   amlodipine-olmesartan 10-40 MG per tablet  Commonly known as: RICK   1 tablet, Oral, Daily, for blood  pressure.      atorvastatin 40 MG tablet  Commonly known as: LIPITOR   40 mg, Oral, Nightly, For cholesterol      carvedilol 12.5 MG tablet  Commonly known as: COREG   TAKE 1 TABLET BY MOUTH 2 (TWO) TIMES A DAY WITH MEALS. FOR BLOOD PRESSURE      cholecalciferol 25 MCG (1000 UT) tablet  Commonly known as: VITAMIN D3   1,000 Units, Oral, Daily      doxepin 100 MG capsule  Commonly known as: SINEquan   1 capsule, Oral, Nightly      folic acid 1 MG tablet  Commonly known as: FOLVITE   1 mg, Oral, Daily      levothyroxine 88 MCG tablet  Commonly known as: Synthroid   88 mcg, Oral, Daily, For thyroid; give generic      nystatin 577260 UNIT/GM powder  Commonly known as: MYCOSTATIN   Topical, 4 Times Daily, PRN yeast rash      omeprazole 40 MG capsule  Commonly known as: priLOSEC   1 PO BID For GERD      Rexulti 1 MG tablet  Generic drug: Brexpiprazole   1 tablet, Oral, Every Morning      sertraline 100 MG tablet  Commonly known as: ZOLOFT   1 tablet, Oral, Daily      vitamin B-12 1000 MCG tablet  Commonly known as: CYANOCOBALAMIN   1,000 mcg, Oral, Daily             Discharge Diet:   Diet Instructions     Diet: Regular/House Diet; Regular Texture (IDDSI 7); Thin (IDDSI 0)      Discharge Diet: Regular/House Diet    Texture: Regular Texture (IDDSI 7)    Fluid Consistency: Thin (IDDSI 0)          Activity at Discharge:   Activity Instructions     Other Activity Instructions      Activity Instructions: Follow orthopedic surgery instructions. MIKE PETTY.          Follow-up Appointments:  Additional Instructions for the Follow-ups that You Need to Schedule     Ambulatory Referral to Home Health   As directed      Face to Face Visit Date: 5/23/2023    Follow-up provider for Plan of Care?: I treated the patient in an acute care facility and will not continue treatment after discharge.    Follow-up provider: CLINTON LEMONS [[id number]]    Reason/Clinical Findings: right ankle fracture    Describe mobility limitations that make leaving  home difficult: see above    Nursing/Therapeutic Services Requested: Physical Therapy Occupational Therapy    PT orders: Therapeutic exercise Strengthening    Occupational orders: Strengthening    Frequency: 1 Week 1            Follow-up Information     Vazquez Hernández MD Follow up in 2 week(s).    Specialty: Orthopedic Surgery  Contact information:  [address] [facility] [id number] [phone]             Tomeka Linares PA-C Follow up.    Specialty: Family Medicine  Contact information:  [address] [address] [address]  [phone]                         Test Results Pending at Discharge:       Ryan Claire MD  05/25/23  15:33 EDT    Time Spent on Discharge Activities: >30 minutes    Dictated portions using Dragon dictation software.

## 2023-05-23 NOTE — CASE MANAGEMENT/SOCIAL WORK
Continued Stay Note  [facility]     Patient Name: Marisol Carrillo  MRN: [id number]  Today's Date: 5/23/2023    Admit Date: 5/21/2023    Plan: SNF -- Referrals Pending.   Discharge Plan     Row Name 05/23/23 1332       Plan    Plan SNF -- Referrals Pending.    Patient/Family in Agreement with Plan yes    Plan Comments Physical Therapy eval noted. Discussed with the patient who plans to go to SNF at d/c. CMS Compare SNF List was reviewed and she requests referrals to [facility] and [facility]. Referrals sent in [facility]. Spoke with [facility]. Await SNFs determinations.               Discharge Codes    No documentation.               Expected Discharge Date and Time     Expected Discharge Date Expected Discharge Time    May 23, 2023             Keesha ESTRADA RN

## 2023-05-24 PROCEDURE — G0378 HOSPITAL OBSERVATION PER HR: HCPCS

## 2023-05-24 PROCEDURE — 97530 THERAPEUTIC ACTIVITIES: CPT

## 2023-05-24 PROCEDURE — 25010000002 MORPHINE PER 10 MG: Performed by: INTERNAL MEDICINE

## 2023-05-24 RX ADMIN — ASPIRIN 81 MG: 81 TABLET, CHEWABLE ORAL at 07:57

## 2023-05-24 RX ADMIN — CARVEDILOL 12.5 MG: 12.5 TABLET, FILM COATED ORAL at 07:55

## 2023-05-24 RX ADMIN — OXYCODONE HYDROCHLORIDE AND ACETAMINOPHEN 1 TABLET: 5; 325 TABLET ORAL at 04:59

## 2023-05-24 RX ADMIN — BISACODYL 5 MG: 5 TABLET ORAL at 04:59

## 2023-05-24 RX ADMIN — DOCUSATE SODIUM 50MG AND SENNOSIDES 8.6MG 2 TABLET: 8.6; 5 TABLET, FILM COATED ORAL at 07:55

## 2023-05-24 RX ADMIN — MORPHINE SULFATE 2 MG: 4 INJECTION, SOLUTION INTRAMUSCULAR; INTRAVENOUS at 08:07

## 2023-05-24 RX ADMIN — PANTOPRAZOLE SODIUM 40 MG: 40 TABLET, DELAYED RELEASE ORAL at 17:32

## 2023-05-24 RX ADMIN — NYSTATIN: 100000 POWDER TOPICAL at 07:57

## 2023-05-24 RX ADMIN — OXYCODONE HYDROCHLORIDE AND ACETAMINOPHEN 1 TABLET: 5; 325 TABLET ORAL at 20:56

## 2023-05-24 RX ADMIN — Medication 1 MG: at 07:58

## 2023-05-24 RX ADMIN — MORPHINE SULFATE 2 MG: 4 INJECTION, SOLUTION INTRAMUSCULAR; INTRAVENOUS at 17:32

## 2023-05-24 RX ADMIN — AMLODIPINE BESYLATE 10 MG: 10 TABLET ORAL at 20:55

## 2023-05-24 RX ADMIN — ATORVASTATIN CALCIUM 40 MG: 20 TABLET, FILM COATED ORAL at 20:35

## 2023-05-24 RX ADMIN — PANTOPRAZOLE SODIUM 40 MG: 40 TABLET, DELAYED RELEASE ORAL at 07:54

## 2023-05-24 RX ADMIN — Medication 100 MG: at 07:57

## 2023-05-24 RX ADMIN — CARVEDILOL 12.5 MG: 12.5 TABLET, FILM COATED ORAL at 17:32

## 2023-05-24 RX ADMIN — MORPHINE SULFATE 2 MG: 4 INJECTION, SOLUTION INTRAMUSCULAR; INTRAVENOUS at 23:13

## 2023-05-24 RX ADMIN — MORPHINE SULFATE 2 MG: 4 INJECTION, SOLUTION INTRAMUSCULAR; INTRAVENOUS at 00:46

## 2023-05-24 RX ADMIN — BISACODYL 10 MG: 10 SUPPOSITORY RECTAL at 09:58

## 2023-05-24 RX ADMIN — OXYCODONE HYDROCHLORIDE AND ACETAMINOPHEN 1 TABLET: 5; 325 TABLET ORAL at 09:46

## 2023-05-24 RX ADMIN — SERTRALINE 100 MG: 100 TABLET, FILM COATED ORAL at 07:56

## 2023-05-24 RX ADMIN — ASPIRIN 81 MG: 81 TABLET, CHEWABLE ORAL at 20:35

## 2023-05-24 RX ADMIN — NYSTATIN: 100000 POWDER TOPICAL at 17:32

## 2023-05-24 RX ADMIN — MORPHINE SULFATE 2 MG: 4 INJECTION, SOLUTION INTRAMUSCULAR; INTRAVENOUS at 11:58

## 2023-05-24 RX ADMIN — OXYCODONE HYDROCHLORIDE AND ACETAMINOPHEN 1 TABLET: 5; 325 TABLET ORAL at 14:24

## 2023-05-24 RX ADMIN — LOSARTAN POTASSIUM 100 MG: 100 TABLET, FILM COATED ORAL at 20:55

## 2023-05-24 RX ADMIN — LEVOTHYROXINE SODIUM 88 MCG: 0.09 TABLET ORAL at 05:16

## 2023-05-24 RX ADMIN — Medication 1 TABLET: at 07:56

## 2023-05-24 RX ADMIN — POLYETHYLENE GLYCOL 3350 17 G: 17 POWDER, FOR SOLUTION ORAL at 07:54

## 2023-05-24 RX ADMIN — NYSTATIN: 100000 POWDER TOPICAL at 11:58

## 2023-05-24 RX ADMIN — Medication 10 ML: at 20:35

## 2023-05-24 NOTE — THERAPY TREATMENT NOTE
Patient Name: Marisol Carrillo  : 1970    MRN: [id number]                              Today's Date: 2023       Admit Date: 2023    Visit Dx:     ICD-10-CM ICD-9-CM   1. Type I or II open fracture of right ankle, initial encounter  S82.891B 824.9   2. Ankle fracture, bimalleolar, closed, right, initial encounter  S82.244E 824.4     Patient Active Problem List   Diagnosis   • Essential familial hypercholesterolemia   • Hypertension   • Hypothyroidism   • Body mass index (BMI) of 60.0-69.9 in adult (HCC)   • Impaired fasting glucose   • Vitamin D deficiency   • Lumbosacral radiculopathy   • Gastroesophageal reflux disease   • Constipation   • Diarrhea   • Fatigue   • Heartburn   • Lumbar disc herniation with radiculopathy   • Pain in extremity   • Anxiety   • Depression   • DDD (degenerative disc disease), lumbar   • Spinal headache   • Chronic bilateral low back pain with bilateral sciatica   • Disc disease, degenerative, lumbar or lumbosacral   • Primary localized osteoarthritis of left knee   • History of lumbar fusion   • Lumbar spondylolysis   • Chronic pain of left knee   • S/P total prosthetic knee replacement not using cement, left   • History of removal of laparoscopic gastric banding device   • Incisional pain   • Nausea   • Medrano's esophagus without dysplasia   • Gastroesophageal reflux disease with esophagitis   • Postlaminectomy syndrome, lumbar region   • Urinary urgency   • Arthritis   • Hypercholesterolemia   • Posttraumatic stress disorder   • Arthritis of knee   • Type I or II open fracture of right ankle, initial encounter   • History of pulmonary embolism   • Bimalleolar fracture of right ankle     Past Medical History:   Diagnosis Date   • Acute pulmonary embolism 2019   • Alopecia    • Anemia 2022   • Anxiety and depression    • Arthritis     OSTEO   • Balance problem     FOOTDROP LEFT FOOT   • Medrano esophagus 2019   • Bilateral knee pain    • Breast mass      MD DAYNE WATCHING.   • Cervical disc disorder    • Chronic low back pain    • Depression    • Dysphagia 2016   • Epigastric pain 2017    Added automatically from request for surgery [id number]   • Fatty liver    • GERD (gastroesophageal reflux disease)    • Hernia    • History of infectious mononucleosis    • Hyperlipidemia    • Hypertension    • Hypothyroidism    • Impaired fasting glucose    • Irritable bowel syndrome    • Kidney calculus     HISTORY OF   • Lumbosacral disc disease    • Nausea and vomiting 2019    Added automatically from request for surgery [id number]   • Nocturnal cough 2016   • Pneumonia      S/P LUMBAR FUSION   • Regurgitation 2016   • Sciatica    • Spinal headache     AFTER MYELOGRAM. BLOOD PATCH X2   • Vitamin D deficiency      Past Surgical History:   Procedure Laterality Date   • ANKLE OPEN REDUCTION INTERNAL FIXATION Right 2023    Procedure: ANKLE OPEN REDUCTION INTERNAL FIXATION;  Surgeon: Vazquez Hernández MD;  Location: [facility] MAIN OR;  Service: Orthopedics;  Laterality: Right;   • BACK SURGERY  2015    microdiscectomy L5-S1 and spinal fusion   • BARIATRIC SURGERY      Lapband   •  SECTION     • CHOLECYSTECTOMY N/A 2017    Procedure: CHOLECYSTECTOMY LAPAROSCOPIC;  Surgeon: Jam Ballard MD;  Location: [facility] OR OSC;  Service:    • DILATION AND CURETTAGE, DIAGNOSTIC / THERAPEUTIC     • ENDOSCOPY N/A 2017    Procedure: ESOPHAGOGASTRODUODENOSCOPY WITH BIOPSY;  Surgeon: Jam Ballard MD;  Location: [facility] ENDOSCOPY;  Service:    • ENDOSCOPY N/A 2020    Procedure: ESOPHAGOGASTRODUODENOSCOPY WITH BIOPSIES, SNELL DILATATION 44Fr, 46Fr;  Surgeon: Kirsten Yanez MD;  Location: [facility] ENDOSCOPY;  Service: Gastroenterology;  Laterality: N/A;  PRE:  DYSPHAGIA, ABDOMINAL PAIN, GERD  POST:  IRREGULAR Z-LINE, R/O EOE, ESOPHAGEAL FURROWS   • ENDOSCOPY N/A 10/18/2022    Procedure: ESOPHAGOGASTRODUODENOSCOPY WITH  BIOPSY;  Surgeon: Tu Hartman Jr., MD;  Location: [facility];  Service: General;  Laterality: N/A;  PRE- GERD, H/O BAND REMOVAL  POST- GASTRITIS   • GASTRIC BANDING REMOVAL N/A 03/25/2019    Procedure: GASTRIC BANDING REMOVAL LAPAROSCOPIC;  Surgeon: Tu Hartman Jr., MD;  Location: [facility] MAIN OR;  Service: General   • HERNIA REPAIR      [place]   • JOINT REPLACEMENT Left 2017    KNEE   • KNEE ARTHROSCOPY Left     REPAIR OF MENISUCS   • LITHOTRIPSY     • LUMBAR FUSION  11/2015    L5-S1. WITH HARDWARE   • MICRODISCECTOMY LUMBAR     • LA ARTHRP KNE CONDYLE&PLATU MEDIAL&LAT COMPARTMENTS Left 03/29/2017    Procedure: TOTAL KNEE ARTHROPLASTY;  Surgeon: Zacarias Reeves MD;  Location: [facility] MAIN OR;  Service: Orthopedics   • SPINAL FUSION  11/20/2015   • TONSILLECTOMY     • TOTAL KNEE ARTHROPLASTY REVISION Left 03/2019    Dr. Chavez   • TRIGGER POINT INJECTION  2012   • UPPER GASTROINTESTINAL ENDOSCOPY  03/2019      General Information     [facility] Name 05/24/23 1445          Physical Therapy Time and Intention    Document Type therapy note (daily note)  -     Mode of Treatment individual therapy;physical therapy  -     Row Name 05/24/23 1445          General Information    Patient Profile Reviewed yes  -     Existing Precautions/Restrictions fall;non-weight bearing;right  -     Row Name 05/24/23 1445          Cognition    Orientation Status (Cognition) oriented x 4  -     Row Name 05/24/23 1445          Safety Issues, Functional Mobility    Impairments Affecting Function (Mobility) balance;endurance/activity tolerance;strength;pain;range of motion (ROM)  -           User Key  (r) = Recorded By, (t) = Taken By, (c) = Cosigned By    Initials Name Provider Type     Janis Carr PT Physical Therapist               Mobility     Row Name 05/24/23 1446          Bed Mobility    Bed Mobility supine-sit;sit-supine  -     Supine-Sit [place] (Bed Mobility) standby assist;verbal cues  -      Sit-Supine [place] (Bed Mobility) verbal cues;moderate assist (50% patient effort);1 person assist  -     Assistive Device (Bed Mobility) head of bed elevated;bed rails  -     Comment, (Bed Mobility) Assist getting BLE back into bed  -[facility] Name 05/24/23 1446          Transfers    Comment, (Transfers) Attempted sliding L foot on floor - non-skid socks making it difficult and pt unable to hop on LLE 2/2 weakness  -[facility] Name 05/24/23 1446          Sit-Stand Transfer    Sit-Stand [place] (Transfers) 2 person assist;verbal cues;maximum assist (25% patient effort);moderate assist (50% patient effort)  -     Assistive Device (Sit-Stand Transfers) walker, front-wheeled  -     Comment, (Sit-Stand Transfer) 3 STS - first trial with HHA and last 2 trials with rwx - able to achieve full upright posture today and stood for ~20 seconds on each attempt  -BH     Row Name 05/24/23 1446          Mobility    Extremity Weight-bearing Status right lower extremity  -     Right Lower Extremity (Weight-bearing Status) non weight-bearing (NWB)  -           User Key  (r) = Recorded By, (t) = Taken By, (c) = Cosigned By    Initials Name Provider Type     Janis Carr PT Physical Therapist               Obj/Interventions     Row Name 05/24/23 1450          Motor Skills    Therapeutic Exercise --  Educated on LE exercises to perform in bed throughout the day  -           User Key  (r) = Recorded By, (t) = Taken By, (c) = Cosigned By    Initials Name Provider Type     Janis Carr PT Physical Therapist               Goals/Plan    No documentation.                Clinical Impression     [facility] Name 05/24/23 1450          Pain    Pretreatment Pain Rating 2/10  -     Posttreatment Pain Rating 7/10  -     Pain Location - Side/Orientation Right  -     Pain Location - ankle  -     Pain Intervention(s) Repositioned;Rest  -BH     Row Name 05/24/23 2460          Plan of Care Review    Plan of Care  Reviewed With patient  -     Progress improving  -     Outcome Evaluation Pt seen for PT this PM and demo'd significant improvements. Pt transferred to EOB with SBA and stood 3x with mod-max A x2. Used HHA on first attempt and rwx on last 2 attempts. Pt able to maintain NWB RLE status throughout session with PT's foot under pt's for TCs. Pt able to stand for ~20 seconds on each trial, c/o LLE fatigue and weakness, unable to tolerate hopping LLE and difficulty sliding foot on floor with non-skid socks on. Pt returned to supine with mod A x1 for LEs and assisted with repositioning. Pt educated on LE exercises to perform throughout the day. PT will continue to follow to progress mobility as tolerated. Anticipate DC to SNF.  -     Row Name 05/24/23 1455          Vital Signs    O2 Delivery Pre Treatment room air  -     O2 Delivery Intra Treatment room air  -     O2 Delivery Post Treatment room air  -     Row Name 05/24/23 1454          Positioning and Restraints    Pre-Treatment Position in bed  -     Post Treatment Position bed  -     In Bed fowlers;call light within reach;encouraged to call for assist;exit alarm on  -           User Key  (r) = Recorded By, (t) = Taken By, (c) = Cosigned By    Initials Name Provider Type     Janis Carr, PT Physical Therapist               Outcome Measures     Row Name 05/24/23 6658          How much help from another person do you currently need...    Turning from your back to your side while in flat bed without using bedrails? 3  -     Moving from lying on back to sitting on the side of a flat bed without bedrails? 3  -BH     Moving to and from a bed to a chair (including a wheelchair)? 2  -BH     Standing up from a chair using your arms (e.g., wheelchair, bedside chair)? 2  -BH     Climbing 3-5 steps with a railing? 1  -     To walk in hospital room? 1  -     AM-PAC 6 Clicks Score (PT) 12  -     Highest level of mobility 4 --> Transferred to  chair/commode  -     Row Name 05/24/23 1456          Functional Assessment    Outcome Measure Options AM-PAC 6 Clicks Basic Mobility (PT)  -           User Key  (r) = Recorded By, (t) = Taken By, (c) = Cosigned By    Initials Name Provider Type     Janis Carr PT Physical Therapist                             Physical Therapy Education     Title: PT OT SLP Therapies (Done)     Topic: Physical Therapy (Done)     Point: Mobility training (Done)     Learning Progress Summary           Patient Acceptance, E,TB,D, VU,NR by  at 5/24/2023 1456    Acceptance, TB,E,D, VU,NR by  at 5/23/2023 1110                   Point: Home exercise program (Done)     Learning Progress Summary           Patient Acceptance, E,TB,D, VU,NR by  at 5/24/2023 1456                   Point: Body mechanics (Done)     Learning Progress Summary           Patient Acceptance, E,TB,D, VU,NR by  at 5/24/2023 1456    Acceptance, TB,E,D, VU,NR by  at 5/23/2023 1110                   Point: Precautions (Done)     Learning Progress Summary           Patient Acceptance, E,TB,D, VU,NR by  at 5/24/2023 1456    Acceptance, TB,E,D, VU,NR by  at 5/23/2023 1110                               User Key     Initials Effective Dates Name Provider Type [facility] 04/08/22 -  Janis Carr PT Physical Therapist PT              PT Recommendation and Plan  Planned Therapy Interventions (PT): balance training, bed mobility training, home exercise program, gait training, patient/family education, strengthening, transfer training  Plan of Care Reviewed With: patient  Progress: improving  Outcome Evaluation: Pt seen for PT this PM and demo'd significant improvements. Pt transferred to EOB with SBA and stood 3x with mod-max A x2. Used HHA on first attempt and rwx on last 2 attempts. Pt able to maintain NWB RLE status throughout session with PT's foot under pt's for TCs. Pt able to stand for ~20 seconds on each trial, c/o LLE fatigue and  weakness, unable to tolerate hopping LLE and difficulty sliding foot on floor with non-skid socks on. Pt returned to supine with mod A x1 for LEs and assisted with repositioning. Pt educated on LE exercises to perform throughout the day. PT will continue to follow to progress mobility as tolerated. Anticipate DC to SNF.     Time Calculation:    PT Charges     Row Name 05/24/23 1456             Time Calculation    Start Time 1344  -      Stop Time 1359  -      Time Calculation (min) 15 min  -      PT Received On 05/24/23  -      PT - Next Appointment 05/25/23  -         Time Calculation- PT    Total Timed Code Minutes- PT 15 minute(s)  -         Timed Charges    79111 - PT Therapeutic Activity Minutes 15  -BH         Total Minutes    Timed Charges Total Minutes 15  -BH       Total Minutes 15  -BH            User Key  (r) = Recorded By, (t) = Taken By, (c) = Cosigned By    Initials Name Provider Type     Janis Carr, PT Physical Therapist              Therapy Charges for Today     Code Description Service Date Service Provider Modifiers Qty    74600226520 HC PT THERAPEUTIC ACT EA 15 MIN 5/23/2023 Janis Carr, PT GP 1    25376067385 HC PT EVAL MOD COMPLEXITY 3 5/23/2023 Janis Carr, PT GP 1    93350131613 HC PT THER SUPP EA 15 MIN 5/23/2023 Janis Carr, PT GP 1    86087767776 HC PT THERAPEUTIC ACT EA 15 MIN 5/24/2023 Janis Carr, PT GP 1    85245299561  PT THER SUPP EA 15 MIN 5/24/2023 Janis Carr, PT GP 1          PT G-Codes  Outcome Measure Options: AM-PAC 6 Clicks Basic Mobility (PT)  AM-PAC 6 Clicks Score (PT): 12  PT Discharge Summary  Anticipated Discharge Disposition (PT): skilled nursing facility    Janis Carr PT  5/24/2023

## 2023-05-24 NOTE — CASE MANAGEMENT/SOCIAL WORK
Continued Stay Note  [facility]     Patient Name: Marisol Carrillo  MRN: [id number]  Today's Date: 5/24/2023    Admit Date: 5/21/2023    Plan: [facility] -- Accepted & Pre-cert Pending.   Discharge Plan     Row Name 05/24/23 1550       Plan    Plan [facility] -- Accepted & Pre-cert Pending.    Patient/Family in Agreement with Plan yes    Plan Comments Spoke with Kirsten/Sarah who said pre-cert is still pending.               Discharge Codes    No documentation.               Expected Discharge Date and Time     Expected Discharge Date Expected Discharge Time    May 24, 2023             Keesha ESTRADA RN

## 2023-05-24 NOTE — PLAN OF CARE
Goal Outcome Evaluation:  Plan of Care Reviewed With: patient        Progress: improving  Outcome Evaluation: VSS. Pt able to stand with PT today.  BM today.  Pain meds as ordered.  Awaiting pre cert for snf.

## 2023-05-24 NOTE — PROGRESS NOTES
Name: Marisol Carrillo ADMIT: 2023   : 1970  PCP: Tomeka Linares PA-C    MRN: [id number] LOS: 0 days   AGE/SEX: 53 y.o. female  ROOM: [facility]     Subjective   Subjective   Chief Complaint   Patient presents with   • Ankle Injury     No CP SOA NVD.     Objective   Objective   Vital Signs  Temp:  [98 °F (36.7 °C)-99 °F (37.2 °C)] 98 °F (36.7 °C)  Heart Rate:  [80-87] 81  Resp:  [16-18] 18  BP: ()/(64-78) 112/73  SpO2:  [91 %-94 %] 94 %  on   ;   Device (Oxygen Therapy): room air  Body mass index is 61.33 kg/m².    Physical Exam  Vitals and nursing note reviewed.   Constitutional:       General: She is not in acute distress.     Appearance: She is not diaphoretic.   Cardiovascular:      Rate and Rhythm: Normal rate and regular rhythm.   Pulmonary:      Effort: Pulmonary effort is normal.      Breath sounds: No wheezing.   Abdominal:      General: There is no distension.      Palpations: Abdomen is soft.      Tenderness: There is no abdominal tenderness. There is no guarding or rebound.   Musculoskeletal:      Comments: RLE dressed   Skin:     General: Skin is warm and dry.   Neurological:      Mental Status: She is alert. Mental status is at baseline.   Psychiatric:         Mood and Affect: Mood normal.         Behavior: Behavior normal.       Results Review  I reviewed the patient's new clinical results.    Results from last 7 days   Lab Units 23  0511 23  0458 23  0536 23  0235   WBC 10*3/mm3 15.52* 8.34 11.62* 10.48   HEMOGLOBIN g/dL 10.8* 10.8* 12.3 12.1   PLATELETS 10*3/mm3 276 275 376 320     Results from last 7 days   Lab Units 23  0511 23  1050 23  0235   SODIUM mmol/L 139 140 142   POTASSIUM mmol/L 4.4 3.9 3.6   CHLORIDE mmol/L 105 106 108*   CO2 mmol/L 26.0 19.9* 20.9*   BUN mg/dL 14 14 14   CREATININE mg/dL 0.73 0.86 0.80   GLUCOSE mg/dL 143* 109* 140*   EGFR mL/min/1.73 99.1 81.4 88.8     Results from last 7 days   Lab Units 23  4269    ALBUMIN g/dL 3.9   BILIRUBIN mg/dL <0.2   ALK PHOS U/L 115   AST (SGOT) U/L 17   ALT (SGPT) U/L 16     Results from last 7 days   Lab Units 05/23/23  0511 05/21/23  1050 05/21/23  0235   CALCIUM mg/dL 8.7 8.7 8.7   ALBUMIN g/dL  --   --  3.9         Hemoglobin A1C   Date/Time Value Ref Range Status   05/23/2023 0511 5.70 (H) 4.80 - 5.60 % Final       No radiology results for the last day    I have personally reviewed all medications:  Scheduled Medications  losartan, 100 mg, Oral, Q24H   And  amLODIPine, 10 mg, Oral, Q24H  aspirin, 81 mg, Oral, BID  atorvastatin, 40 mg, Oral, Nightly  carvedilol, 12.5 mg, Oral, BID With Meals  folic acid, 1 mg, Oral, Daily  levothyroxine, 88 mcg, Oral, Q AM  multivitamin, 1 tablet, Oral, Daily  nystatin, , Topical, 4x Daily  pantoprazole, 40 mg, Oral, BID AC  senna-docusate sodium, 2 tablet, Oral, BID  sertraline, 100 mg, Oral, Daily  sodium chloride, 10 mL, Intravenous, Q12H  thiamine, 100 mg, Oral, Daily      Infusions  lactated ringers, 9 mL/hr, Last Rate: 9 mL/hr (05/22/23 0724)      Diet  Diet: Regular/House Diet; Texture: Regular Texture (IDDSI 7); Fluid Consistency: Thin (IDDSI 0)    I have personally reviewed:  [x]  Laboratory   []  Microbiology   []  Radiology   []  EKG/Telemetry  []  Cardiology/Vascular   []  Pathology    []  Records       Assessment/Plan     Active Hospital Problems    Diagnosis  POA   • **Bimalleolar fracture of right ankle [S82.961A]  Yes   • Type I or II open fracture of right ankle, initial encounter [S82.511B]  Yes   • History of pulmonary embolism [Z86.711]  Yes   • Hypercholesterolemia [E78.00]  Yes   • Gastroesophageal reflux disease [K21.9]  Yes   • Hypertension [I10]  Yes   • Hypothyroidism [E03.9]  Yes   • Body mass index (BMI) of 60.0-69.9 in adult (HCC) [Z68.44]  Not Applicable      Resolved Hospital Problems   No resolved problems to display.       53 y.o. female admitted with Bimalleolar fracture of right ankle.    • Right Bimalleolar  Ankle Fracture: s/p ORIF 05/22. Pain control. Orthopedic surgery following.  • HTN: Acceptable acutely. Will monitor  • Hypothyroidism: Synthroid  • GERD: PPI  • Obesity  • Ppx: ASA per surgery postop  • Disposition: SNF/pending placement    Expected Discharge Date: 5/24/2023; Expected Discharge Time:      Rafa Castro MD  [facility]  05/24/23  14:15 EDT    Dictated portions of note using dragon dictation software.

## 2023-05-24 NOTE — PLAN OF CARE
Goal Outcome Evaluation:         Circ checks to right foot WDL, pt. Has been medicated PRN with Morphine and Percocet. No drainage to dressing noted. Movement is present and adequate to right toes.Right foot remains elevated.

## 2023-05-24 NOTE — PLAN OF CARE
Goal Outcome Evaluation:  Plan of Care Reviewed With: patient        Progress: improving  Outcome Evaluation: Pt seen for PT this PM and demo'd significant improvements. Pt transferred to EOB with SBA and stood 3x with mod-max A x2. Used HHA on first attempt and rwx on last 2 attempts. Pt able to maintain NWB RLE status throughout session with PT's foot under pt's for TCs. Pt able to stand for ~20 seconds on each trial, c/o LLE fatigue and weakness, unable to tolerate hopping LLE and difficulty sliding foot on floor with non-skid socks on. Pt returned to supine with mod A x1 for LEs and assisted with repositioning. Pt educated on LE exercises to perform throughout the day. PT will continue to follow to progress mobility as tolerated. Anticipate DC to SNF.

## 2023-05-25 VITALS
TEMPERATURE: 98.2 F | HEART RATE: 81 BPM | DIASTOLIC BLOOD PRESSURE: 61 MMHG | BODY MASS INDEX: 51.91 KG/M2 | HEIGHT: 63 IN | RESPIRATION RATE: 18 BRPM | OXYGEN SATURATION: 95 % | SYSTOLIC BLOOD PRESSURE: 101 MMHG | WEIGHT: 293 LBS

## 2023-05-25 LAB
ANION GAP SERPL CALCULATED.3IONS-SCNC: 8 MMOL/L (ref 5–15)
BUN SERPL-MCNC: 14 MG/DL (ref 6–20)
BUN/CREAT SERPL: 20 (ref 7–25)
CALCIUM SPEC-SCNC: 8.5 MG/DL (ref 8.6–10.5)
CHLORIDE SERPL-SCNC: 104 MMOL/L (ref 98–107)
CO2 SERPL-SCNC: 30 MMOL/L (ref 22–29)
CREAT SERPL-MCNC: 0.7 MG/DL (ref 0.57–1)
DEPRECATED RDW RBC AUTO: 49.6 FL (ref 37–54)
EGFRCR SERPLBLD CKD-EPI 2021: 104.2 ML/MIN/1.73
ERYTHROCYTE [DISTWIDTH] IN BLOOD BY AUTOMATED COUNT: 16.3 % (ref 12.3–15.4)
GLUCOSE SERPL-MCNC: 97 MG/DL (ref 65–99)
HCT VFR BLD AUTO: 31.6 % (ref 34–46.6)
HGB BLD-MCNC: 10.1 G/DL (ref 12–15.9)
MCH RBC QN AUTO: 26.9 PG (ref 26.6–33)
MCHC RBC AUTO-ENTMCNC: 32 G/DL (ref 31.5–35.7)
MCV RBC AUTO: 84.3 FL (ref 79–97)
PLATELET # BLD AUTO: 271 10*3/MM3 (ref 140–450)
PMV BLD AUTO: 9.5 FL (ref 6–12)
POTASSIUM SERPL-SCNC: 4 MMOL/L (ref 3.5–5.2)
RBC # BLD AUTO: 3.75 10*6/MM3 (ref 3.77–5.28)
SODIUM SERPL-SCNC: 142 MMOL/L (ref 136–145)
WBC NRBC COR # BLD: 9.36 10*3/MM3 (ref 3.4–10.8)

## 2023-05-25 PROCEDURE — 85027 COMPLETE CBC AUTOMATED: CPT | Performed by: INTERNAL MEDICINE

## 2023-05-25 PROCEDURE — 80048 BASIC METABOLIC PNL TOTAL CA: CPT | Performed by: INTERNAL MEDICINE

## 2023-05-25 PROCEDURE — G0378 HOSPITAL OBSERVATION PER HR: HCPCS

## 2023-05-25 RX ADMIN — NYSTATIN: 100000 POWDER TOPICAL at 08:14

## 2023-05-25 RX ADMIN — Medication 1 MG: at 08:14

## 2023-05-25 RX ADMIN — NYSTATIN: 100000 POWDER TOPICAL at 12:45

## 2023-05-25 RX ADMIN — LOSARTAN POTASSIUM 100 MG: 100 TABLET, FILM COATED ORAL at 08:12

## 2023-05-25 RX ADMIN — ASPIRIN 81 MG: 81 TABLET, CHEWABLE ORAL at 08:13

## 2023-05-25 RX ADMIN — AMLODIPINE BESYLATE 10 MG: 10 TABLET ORAL at 08:13

## 2023-05-25 RX ADMIN — Medication 100 MG: at 08:13

## 2023-05-25 RX ADMIN — OXYCODONE HYDROCHLORIDE AND ACETAMINOPHEN 1 TABLET: 5; 325 TABLET ORAL at 03:09

## 2023-05-25 RX ADMIN — OXYCODONE HYDROCHLORIDE AND ACETAMINOPHEN 1 TABLET: 5; 325 TABLET ORAL at 16:41

## 2023-05-25 RX ADMIN — OXYCODONE HYDROCHLORIDE AND ACETAMINOPHEN 1 TABLET: 5; 325 TABLET ORAL at 12:14

## 2023-05-25 RX ADMIN — LEVOTHYROXINE SODIUM 88 MCG: 0.09 TABLET ORAL at 06:38

## 2023-05-25 RX ADMIN — CARVEDILOL 12.5 MG: 12.5 TABLET, FILM COATED ORAL at 08:13

## 2023-05-25 RX ADMIN — DOCUSATE SODIUM 50MG AND SENNOSIDES 8.6MG 2 TABLET: 8.6; 5 TABLET, FILM COATED ORAL at 08:12

## 2023-05-25 RX ADMIN — OXYCODONE HYDROCHLORIDE AND ACETAMINOPHEN 1 TABLET: 5; 325 TABLET ORAL at 07:27

## 2023-05-25 RX ADMIN — SERTRALINE 100 MG: 100 TABLET, FILM COATED ORAL at 08:14

## 2023-05-25 RX ADMIN — Medication 1 TABLET: at 08:14

## 2023-05-25 RX ADMIN — PANTOPRAZOLE SODIUM 40 MG: 40 TABLET, DELAYED RELEASE ORAL at 16:40

## 2023-05-25 RX ADMIN — PANTOPRAZOLE SODIUM 40 MG: 40 TABLET, DELAYED RELEASE ORAL at 06:38

## 2023-05-25 RX ADMIN — Medication 10 ML: at 08:16

## 2023-05-25 NOTE — PROGRESS NOTES
"Physicians Statement of Medical Necessity for  Ambulance Transportation    GENERAL INFORMATION     Name: Marisol Carrillo  YOB: 1970  Medicare #: see facesheet  Transport Date: 05/25/23  (Valid for round trips this date, or for scheduled repetitive trips for 60 days from the date signed below.)  Origin: [facility] 7P 790  Destination: [facility]  Is the Patient's stay covered under Medicare Part A (PPS/DRG?)Yes  Closest appropriate facility? Yes  If this a hosp-hosp transfer? No  Is this a hospice patient? No    MEDICAL NECESSITY QUESTIONAIRE    Ambulance Transportation is medically necessary only if other means of transportation are contraindicated or would be potentially harmful to the patient.  To meet this requirement, the patient must be either \"bed confined\" or suffer from a condition such that transport by means other than an ambulance is contraindicated by the patient's condition.  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   • Cervical disc disorder 2012   • Chronic low back pain    • Depression    • Dysphagia 02/22/2016   • Epigastric pain 08/30/2017    Added automatically from request for surgery [id number]   • Fatty liver 2018   • GERD (gastroesophageal reflux disease)    • Hernia 2017   • History of infectious mononucleosis    • Hyperlipidemia    • Hypertension    • " Hypothyroidism    • Impaired fasting glucose    • Irritable bowel syndrome    • Kidney calculus     HISTORY OF   • Lumbosacral disc disease    • Nausea and vomiting 2019    Added automatically from request for surgery [id number]   • Nocturnal cough 2016   • Pneumonia     2015 S/P LUMBAR FUSION   • Regurgitation 2016   • Sciatica    • Spinal headache     AFTER MYELOGRAM. BLOOD PATCH X2   • Vitamin D deficiency       Past Surgical History:   Procedure Laterality Date   • ANKLE OPEN REDUCTION INTERNAL FIXATION Right 2023    Procedure: ANKLE OPEN REDUCTION INTERNAL FIXATION;  Surgeon: Vazquez Hernández MD;  Location: [facility] OR;  Service: Orthopedics;  Laterality: Right;   • BACK SURGERY  2015    microdiscectomy L5-S1 and spinal fusion   • BARIATRIC SURGERY      Lapband   •  SECTION     • CHOLECYSTECTOMY N/A 2017    Procedure: CHOLECYSTECTOMY LAPAROSCOPIC;  Surgeon: Jam Ballard MD;  Location: [facility] OR OSC;  Service:    • DILATION AND CURETTAGE, DIAGNOSTIC / THERAPEUTIC     • ENDOSCOPY N/A 2017    Procedure: ESOPHAGOGASTRODUODENOSCOPY WITH BIOPSY;  Surgeon: Jam Ballard MD;  Location: [facility] ENDOSCOPY;  Service:    • ENDOSCOPY N/A 2020    Procedure: ESOPHAGOGASTRODUODENOSCOPY WITH BIOPSIES, SNELL DILATATION 44Fr, 46Fr;  Surgeon: Kirsten Yanez MD;  Location: [facility] ENDOSCOPY;  Service: Gastroenterology;  Laterality: N/A;  PRE:  DYSPHAGIA, ABDOMINAL PAIN, GERD  POST:  IRREGULAR Z-LINE, R/O EOE, ESOPHAGEAL FURROWS   • ENDOSCOPY N/A 10/18/2022    Procedure: ESOPHAGOGASTRODUODENOSCOPY WITH BIOPSY;  Surgeon: Tu Hartman Jr., MD;  Location: [facility] ENDOSCOPY;  Service: General;  Laterality: N/A;  PRE- GERD, H/O BAND REMOVAL  POST- GASTRITIS   • GASTRIC BANDING REMOVAL N/A 2019    Procedure: GASTRIC BANDING REMOVAL LAPAROSCOPIC;  Surgeon: Tu Hartman Jr., MD;  Location: [facility] MAIN OR;  Service: General   • HERNIA REPAIR      Hiatal  "  • JOINT REPLACEMENT Left 2017    KNEE   • KNEE ARTHROSCOPY Left     REPAIR OF MENISUCS   • LITHOTRIPSY     • LUMBAR FUSION  11/2015    L5-S1. WITH HARDWARE   • MICRODISCECTOMY LUMBAR     • MT ARTHRP KNE CONDYLE&PLATU MEDIAL&LAT COMPARTMENTS Left 03/29/2017    Procedure: TOTAL KNEE ARTHROPLASTY;  Surgeon: Zacarias Reeves MD;  Location: [facility];  Service: Orthopedics   • SPINAL FUSION  11/20/2015   • TONSILLECTOMY     • TOTAL KNEE ARTHROPLASTY REVISION Left 03/2019    Dr. Chavez   • TRIGGER POINT INJECTION  2012   • UPPER GASTROINTESTINAL ENDOSCOPY  03/2019      2) Is this patient \"bed confined\" as defined below?Yes   To be \"bed confined\" the patient must satisfy all three of the following criteria:  (1) unable to get up from bed without assistance; AND (2) unable to ambulate;  AND (3) unable to sit in a chair or wheelchair.  3) Can this patient safely be transported by car or wheelchair van (I.e., may safely sit during transport, without an attendant or monitoring?)No   4. In addition to completing questions 1-3 above, please check any of the following conditions that apply*:          *Note: supporting documentation for any boxes checked must be maintained in the patient's medical records Non-healed fractures, Moderate/severe pain on movement, Medical attendant required, Morbid obesity requires additional personnel/equipment to safely handle patient and Orthopedic device (backboard, halo, pins, traction, brace, wedge, etc.) required special handling during transport      SIGNATURE OF PHYSICIAN OR OTHER AUTHORIZED HEALTHCARE PROFESSIONAL    I certify that the above information is true and correct based on my evaluation of this patient, and represent that the patient requires transport by ambulance and that other forms of transport are contraindicated.  I understand that this information will be used by the Centers for Medicare and Medicaid Services (CMS) to support the determiniation of medical necessity " for ambulance services, and I represent that I have personal knowledge of the patient's condition at the time of transport.    X   If this box is checked, I also certify that the patient is physically or mentally incapable of signing the ambulance service's claim form and that the institution with which I am affiliated has furnished care, services or assistance to the patient.  My signature below is made on behalf of the patient pursuant to 42 .36(b)(4). In accordance with 42 .37, the specific reason(s) that the patient is physically or mentally incapable of signing the claim for is as follows: post op, NWB    Signature of Physician or Healthcare Professional  Date/Time:   05/25/23 14:11 EDT      (For Scheduled repetitive transport, this form is not valid for transports performed more than 60 days after this date).                                                                                                                                            --------------------------------------------------------------------------------------------  Printed Name and Credentials of Physician or Authorized Healthcare Professional     *Form must be signed by patient's attending physician for scheduled, repetitive transports,.  For non-repetitive ambulance transports, if unable to obtain the signature of the attending physician, any of the following may sign (please select below):     Physician  Clinical Nurse Specialist  Registered Nurse     Physician Assistant  Discharge Planner  Licensed Practical Nurse     Nurse Practitioner

## 2023-05-25 NOTE — PLAN OF CARE
Goal Outcome Evaluation:         Patient is alert and oriented.  Vitals are stable.  Dressing is dry and intact.  Medicatied with analgesics for pain with positive results. Discharging to a skilled nursing facility for rehabilitation.

## 2023-05-25 NOTE — PROGRESS NOTES
Continued Stay Note  [facility]     Patient Name: Marisol Carrillo  MRN: [id number]  Today's Date: 5/25/2023    Admit Date: 5/21/2023    Plan: [facility] -- Accepted & Pre-cert Pending.   Discharge Plan     Row Name 05/25/23 1522       Plan    Final Discharge Disposition Code 03 - skilled nursing facility (SNF)    Final Note Precert approved per Kirsten/Sarah for [facility]. [facility] to transport to SNF at 5pm. Packet on chart.               Discharge Codes    No documentation.               Expected Discharge Date and Time     Expected Discharge Date Expected Discharge Time    May 25, 2023             Hillary Ho RN

## 2023-05-25 NOTE — PROGRESS NOTES
" LOS: 0 days     Name: Marisol Carrillo  Age: 53 y.o.  Sex: female  :  1970  MRN: [id number]         Primary Care Physician: Tomeka Linares PA-C    Subjective   Subjective  No new complaints or acute overnight events.  Awaiting precertification for rehab    Objective   Vital Signs  Temp:  [97.4 °F (36.3 °C)-98.6 °F (37 °C)] 98.2 °F (36.8 °C)  Heart Rate:  [77-89] 77  Resp:  [16-18] 18  BP: (110-137)/(70-81) 110/70  Body mass index is 61.33 kg/m².    Objective:  General Appearance:  Comfortable and in no acute distress.    Vital signs: (most recent): Blood pressure 110/70, pulse 77, temperature 98.2 °F (36.8 °C), temperature source Oral, resp. rate 18, height 160 cm (62.99\"), weight (!) 157 kg (346 lb 2 oz), SpO2 94 %, not currently breastfeeding.    Lungs:  Normal effort and normal respiratory rate.    Heart: Normal rate.  Regular rhythm.    Abdomen: Abdomen is soft.  Bowel sounds are normal.   There is no abdominal tenderness.     Extremities: There is no dependent edema or local swelling.    Neurological: Patient is alert and oriented to person, place and time.    Skin:  Warm and dry.              Results Review:       I reviewed the patient's new clinical results.    Results from last 7 days   Lab Units 23  0646 23  0511 23  0458 23  0536 23  0235   WBC 10*3/mm3 9.36 15.52* 8.34 11.62* 10.48   HEMOGLOBIN g/dL 10.1* 10.8* 10.8* 12.3 12.1   PLATELETS 10*3/mm3 271 276 275 376 320     Results from last 7 days   Lab Units 23  0646 23  0511 23  1050 23  0235   SODIUM mmol/L 142 139 140 142   POTASSIUM mmol/L 4.0 4.4 3.9 3.6   CHLORIDE mmol/L 104 105 106 108*   CO2 mmol/L 30.0* 26.0 19.9* 20.9*   BUN mg/dL 14 14 14 14   CREATININE mg/dL 0.70 0.73 0.86 0.80   CALCIUM mg/dL 8.5* 8.7 8.7 8.7   GLUCOSE mg/dL 97 143* 109* 140*     Results from last 7 days   Lab Units 23  0235   INR  1.03             Scheduled Meds:   losartan, 100 mg, Oral, Q24H   " And  amLODIPine, 10 mg, Oral, Q24H  aspirin, 81 mg, Oral, BID  atorvastatin, 40 mg, Oral, Nightly  carvedilol, 12.5 mg, Oral, BID With Meals  folic acid, 1 mg, Oral, Daily  levothyroxine, 88 mcg, Oral, Q AM  multivitamin, 1 tablet, Oral, Daily  nystatin, , Topical, 4x Daily  pantoprazole, 40 mg, Oral, BID AC  senna-docusate sodium, 2 tablet, Oral, BID  sertraline, 100 mg, Oral, Daily  sodium chloride, 10 mL, Intravenous, Q12H  thiamine, 100 mg, Oral, Daily      PRN Meds:   •  acetaminophen **OR** acetaminophen **OR** acetaminophen  •  senna-docusate sodium **AND** polyethylene glycol **AND** bisacodyl **AND** bisacodyl  •  calcium carbonate  •  Morphine  •  ondansetron **OR** ondansetron  •  oxyCODONE-acetaminophen  •  [COMPLETED] Insert Peripheral IV **AND** sodium chloride  •  sodium chloride  •  sodium chloride  Continuous Infusions:  lactated ringers, 9 mL/hr, Last Rate: 9 mL/hr (05/22/23 0724)        Assessment & Plan   Active Hospital Problems    Diagnosis  POA   • **Bimalleolar fracture of right ankle [S82.841A]  Yes   • Type I or II open fracture of right ankle, initial encounter [S82.891B]  Yes   • History of pulmonary embolism [Z86.711]  Yes   • Hypercholesterolemia [E78.00]  Yes   • Gastroesophageal reflux disease [K21.9]  Yes   • Hypertension [I10]  Yes   • Hypothyroidism [E03.9]  Yes   • Body mass index (BMI) of 60.0-69.9 in adult ([facility]) [Z68.44]  Not Applicable      Resolved Hospital Problems   No resolved problems to display.       Assessment & Plan       53 y.o. female admitted with Bimalleolar fracture of right ankle.     • Right Bimalleolar Ankle Fracture: s/p ORIF 05/22. Pain control. Orthopedic surgery following.  • HTN: Acceptable acutely. Will monitor  • Hypothyroidism: Synthroid  • GERD: PPI  • Obesity  • Ppx: ASA per surgery postop  • Disposition: SNF/pending placement      Expected Discharge Date: 5/25/2023; Expected Discharge Time:      Ryan Claire MD  [facility]  05/25/23  13:10 EDT

## 2023-05-25 NOTE — PLAN OF CARE
Goal Outcome Evaluation:  Plan of Care Reviewed With: patient           Outcome Evaluation: Patient 53 y.o./female, POD3 ANKLE OPEN REDUCTION INTERNAL FIXATION - Right, alert and oriented x 4, vital signs stable, voiding per purewick, complaint of pain, PRN Percocet given, dressing clean, dry and intact, needs attended.

## 2023-07-21 RX ORDER — AMLODIPINE AND OLMESARTAN MEDOXOMIL 10; 40 MG/1; MG/1
1 TABLET ORAL DAILY
Qty: 90 TABLET | Refills: 1 | OUTPATIENT
Start: 2023-07-21

## 2023-10-02 ENCOUNTER — OFFICE VISIT (OUTPATIENT)
Dept: FAMILY MEDICINE CLINIC | Facility: CLINIC | Age: 53
End: 2023-10-02
Payer: COMMERCIAL

## 2023-10-02 VITALS
HEIGHT: 63 IN | SYSTOLIC BLOOD PRESSURE: 124 MMHG | OXYGEN SATURATION: 98 % | WEIGHT: 293 LBS | DIASTOLIC BLOOD PRESSURE: 80 MMHG | TEMPERATURE: 97.5 F | BODY MASS INDEX: 51.91 KG/M2 | RESPIRATION RATE: 16 BRPM | HEART RATE: 96 BPM

## 2023-10-02 DIAGNOSIS — E03.9 ACQUIRED HYPOTHYROIDISM: ICD-10-CM

## 2023-10-02 DIAGNOSIS — K21.9 GASTROESOPHAGEAL REFLUX DISEASE WITHOUT ESOPHAGITIS: ICD-10-CM

## 2023-10-02 DIAGNOSIS — M51.16 LUMBAR DISC HERNIATION WITH RADICULOPATHY: ICD-10-CM

## 2023-10-02 DIAGNOSIS — F41.9 ANXIETY: ICD-10-CM

## 2023-10-02 DIAGNOSIS — E78.2 HYPERLIPIDEMIA, MIXED: ICD-10-CM

## 2023-10-02 DIAGNOSIS — R73.01 IMPAIRED FASTING GLUCOSE: ICD-10-CM

## 2023-10-02 DIAGNOSIS — D50.8 OTHER IRON DEFICIENCY ANEMIA: ICD-10-CM

## 2023-10-02 DIAGNOSIS — E53.8 LOW FOLIC ACID: ICD-10-CM

## 2023-10-02 DIAGNOSIS — F33.0 MILD EPISODE OF RECURRENT MAJOR DEPRESSIVE DISORDER: ICD-10-CM

## 2023-10-02 DIAGNOSIS — I10 PRIMARY HYPERTENSION: Primary | ICD-10-CM

## 2023-10-02 DIAGNOSIS — E55.9 VITAMIN D DEFICIENCY: ICD-10-CM

## 2023-10-02 DIAGNOSIS — Z12.31 ENCOUNTER FOR SCREENING MAMMOGRAM FOR BREAST CANCER: ICD-10-CM

## 2023-10-02 PROCEDURE — 1159F MED LIST DOCD IN RCRD: CPT | Performed by: PHYSICIAN ASSISTANT

## 2023-10-02 PROCEDURE — 99214 OFFICE O/P EST MOD 30 MIN: CPT | Performed by: PHYSICIAN ASSISTANT

## 2023-10-02 PROCEDURE — 3079F DIAST BP 80-89 MM HG: CPT | Performed by: PHYSICIAN ASSISTANT

## 2023-10-02 PROCEDURE — 3074F SYST BP LT 130 MM HG: CPT | Performed by: PHYSICIAN ASSISTANT

## 2023-10-02 PROCEDURE — 1160F RVW MEDS BY RX/DR IN RCRD: CPT | Performed by: PHYSICIAN ASSISTANT

## 2023-10-02 RX ORDER — AMLODIPINE AND OLMESARTAN MEDOXOMIL 10; 40 MG/1; MG/1
1 TABLET ORAL DAILY
Qty: 90 TABLET | Refills: 1 | Status: SHIPPED | OUTPATIENT
Start: 2023-10-02

## 2023-10-02 RX ORDER — FOLIC ACID 1 MG/1
1 TABLET ORAL DAILY
Qty: 90 TABLET | Refills: 3 | Status: SHIPPED | OUTPATIENT
Start: 2023-10-02

## 2023-10-02 RX ORDER — SERTRALINE HYDROCHLORIDE 100 MG/1
200 TABLET, FILM COATED ORAL DAILY
Qty: 180 TABLET | Refills: 3 | Status: SHIPPED | OUTPATIENT
Start: 2023-10-02

## 2023-10-02 RX ORDER — ATORVASTATIN CALCIUM 40 MG/1
40 TABLET, FILM COATED ORAL NIGHTLY
Qty: 90 TABLET | Refills: 3 | Status: SHIPPED | OUTPATIENT
Start: 2023-10-02

## 2023-10-02 RX ORDER — TRAMADOL HYDROCHLORIDE 50 MG/1
50 TABLET ORAL EVERY 12 HOURS PRN
COMMUNITY
Start: 2023-09-12

## 2023-10-02 RX ORDER — CARVEDILOL 12.5 MG/1
12.5 TABLET ORAL 2 TIMES DAILY WITH MEALS
Qty: 180 TABLET | Refills: 1 | Status: SHIPPED | OUTPATIENT
Start: 2023-10-02

## 2023-10-02 RX ORDER — LEVOTHYROXINE SODIUM 88 UG/1
88 TABLET ORAL DAILY
Qty: 90 TABLET | Refills: 3 | Status: SHIPPED | OUTPATIENT
Start: 2023-10-02

## 2023-10-02 RX ORDER — TIZANIDINE 4 MG/1
4 TABLET ORAL EVERY 8 HOURS PRN
COMMUNITY
Start: 2023-09-26

## 2023-10-02 RX ORDER — OMEPRAZOLE 40 MG/1
CAPSULE, DELAYED RELEASE ORAL
Qty: 180 CAPSULE | Refills: 3 | Status: SHIPPED | OUTPATIENT
Start: 2023-10-02

## 2023-10-02 NOTE — PROGRESS NOTES
"Subjective   Marisol Carrillo is a 53 y.o. female.     History of Present Illness    Since the last visit, she has overall felt well.  She has Primary Hypertension and well controlled on current medication, Impaired fasting glucose and will monitor labs to watch for DMII, GERD controlled on PPI Rx, Hyperlipidemia with goals met with current Rx, Hypothyroidism well controlled on current medication and will continue Rx, Seasonal allergies and doing well on their medication , and Vitamin D deficiency and labs are at goal >30 ng/mL.  she has been compliant with current medications have reviewed them.  The patient denies medication side effects.  Will refill medications. /88   Pulse 96   Temp 97.5 °F (36.4 °C)   Resp 16   Ht 160 cm (62.99\")   Wt (!) 143 kg (315 lb)   LMP  (LMP Unknown)   SpO2 98%   BMI 55.82 kg/m²   Patient did see Dr. Parish [facility]o following up on displaced bimalleolar fracture of right lower leg.  This was treated surgically by Dr. Mera on 5/22/2023 is ankle open reduction  She stopped [facility] by [facility]----she noted shaking and elevated glucose on this..  She is working hard on weight loss---down 60 labs  BP has been great-110/77   Also reviewed labs that were performed through psychiatry on 9/22/2023 noting her CBC was in normal range with white blood cell count 8.5, hemoglobin 13.3, platelets 338  CMP same date glucose 95, creatinine 0.84, sodium 140, potassium 4.9, EGFR 84, calcium 9.7, alkaline phosphatase 156, AST 23, ALT 17.  Lipid panel with total cholesterol 186, triglycerides 158, HDL 61, LDL 98.  Hemoglobin A1c is 6.0%  Her last hemoglobin A1c was 5/23/2023 at 5.7%  Patient had Cardiolite stress test 3/22/2019 noting no myocardial ischemia.  Last EKG reviewed from 11/9/2022 noting no change from prior EKG  Results for orders placed or performed during the hospital encounter of 05/21/23   Comprehensive Metabolic Panel    Specimen: Blood   Result Value Ref Range    " Glucose 140 (H) 65 - 99 mg/dL    BUN 14 6 - 20 mg/dL    Creatinine 0.80 0.57 - 1.00 mg/dL    Sodium 142 136 - 145 mmol/L    Potassium 3.6 3.5 - 5.2 mmol/L    Chloride 108 (H) 98 - 107 mmol/L    CO2 20.9 (L) 22.0 - 29.0 mmol/L    Calcium 8.7 8.6 - 10.5 mg/dL    Total Protein 6.9 6.0 - 8.5 g/dL    Albumin 3.9 3.5 - 5.2 g/dL    ALT (SGPT) 16 1 - 33 U/L    AST (SGOT) 17 1 - 32 U/L    Alkaline Phosphatase 115 39 - 117 U/L    Total Bilirubin <0.2 0.0 - 1.2 mg/dL    Globulin 3.0 gm/dL    A/G Ratio 1.3 g/dL    BUN/Creatinine Ratio 17.5 7.0 - 25.0    Anion Gap 13.1 5.0 - 15.0 mmol/L    eGFR 88.8 >60.0 mL/min/1.73   Protime-INR    Specimen: Blood   Result Value Ref Range    Protime 13.6 11.7 - 14.2 Seconds    INR 1.03 0.90 - 1.10   aPTT    Specimen: Blood   Result Value Ref Range    PTT 25.6 22.7 - 35.4 seconds   CBC Auto Differential    Specimen: Blood   Result Value Ref Range    WBC 10.48 3.40 - 10.80 10*3/mm3    RBC 4.49 3.77 - 5.28 10*6/mm3    Hemoglobin 12.1 12.0 - 15.9 g/dL    Hematocrit 38.9 34.0 - 46.6 %    MCV 86.6 79.0 - 97.0 fL    MCH 26.9 26.6 - 33.0 pg    MCHC 31.1 (L) 31.5 - 35.7 g/dL    RDW 17.3 (H) 12.3 - 15.4 %    RDW-SD 54.2 (H) 37.0 - 54.0 fl    MPV 9.7 6.0 - 12.0 fL    Platelets 320 140 - 450 10*3/mm3    Neutrophil % 75.4 42.7 - 76.0 %    Lymphocyte % 17.8 (L) 19.6 - 45.3 %    Monocyte % 5.6 5.0 - 12.0 %    Eosinophil % 0.2 (L) 0.3 - 6.2 %    Basophil % 0.6 0.0 - 1.5 %    Immature Grans % 0.4 0.0 - 0.5 %    Neutrophils, Absolute 7.90 (H) 1.70 - 7.00 10*3/mm3    Lymphocytes, Absolute 1.87 0.70 - 3.10 10*3/mm3    Monocytes, Absolute 0.59 0.10 - 0.90 10*3/mm3    Eosinophils, Absolute 0.02 0.00 - 0.40 10*3/mm3    Basophils, Absolute 0.06 0.00 - 0.20 10*3/mm3    Immature Grans, Absolute 0.04 0.00 - 0.05 10*3/mm3    nRBC 0.0 0.0 - 0.2 /100 WBC   Basic Metabolic Panel    Specimen: Blood   Result Value Ref Range    Glucose 109 (H) 65 - 99 mg/dL    BUN 14 6 - 20 mg/dL    Creatinine 0.86 0.57 - 1.00 mg/dL    Sodium  140 136 - 145 mmol/L    Potassium 3.9 3.5 - 5.2 mmol/L    Chloride 106 98 - 107 mmol/L    CO2 19.9 (L) 22.0 - 29.0 mmol/L    Calcium 8.7 8.6 - 10.5 mg/dL    BUN/Creatinine Ratio 16.3 7.0 - 25.0    Anion Gap 14.1 5.0 - 15.0 mmol/L    eGFR 81.4 >60.0 mL/min/1.73   CBC (No Diff)    Specimen: Blood   Result Value Ref Range    WBC 11.62 (H) 3.40 - 10.80 10*3/mm3    RBC 4.52 3.77 - 5.28 10*6/mm3    Hemoglobin 12.3 12.0 - 15.9 g/dL    Hematocrit 39.2 34.0 - 46.6 %    MCV 86.7 79.0 - 97.0 fL    MCH 27.2 26.6 - 33.0 pg    MCHC 31.4 (L) 31.5 - 35.7 g/dL    RDW 16.8 (H) 12.3 - 15.4 %    RDW-SD 52.5 37.0 - 54.0 fl    MPV 10.7 6.0 - 12.0 fL    Platelets 376 140 - 450 10*3/mm3   CBC (No Diff)    Specimen: Blood   Result Value Ref Range    WBC 8.34 3.40 - 10.80 10*3/mm3    RBC 4.02 3.77 - 5.28 10*6/mm3    Hemoglobin 10.8 (L) 12.0 - 15.9 g/dL    Hematocrit 34.7 34.0 - 46.6 %    MCV 86.3 79.0 - 97.0 fL    MCH 26.9 26.6 - 33.0 pg    MCHC 31.1 (L) 31.5 - 35.7 g/dL    RDW 16.5 (H) 12.3 - 15.4 %    RDW-SD 52.2 37.0 - 54.0 fl    MPV 9.7 6.0 - 12.0 fL    Platelets 275 140 - 450 10*3/mm3   CBC (No Diff)    Specimen: Blood   Result Value Ref Range    WBC 15.52 (H) 3.40 - 10.80 10*3/mm3    RBC 3.96 3.77 - 5.28 10*6/mm3    Hemoglobin 10.8 (L) 12.0 - 15.9 g/dL    Hematocrit 34.4 34.0 - 46.6 %    MCV 86.9 79.0 - 97.0 fL    MCH 27.3 26.6 - 33.0 pg    MCHC 31.4 (L) 31.5 - 35.7 g/dL    RDW 16.7 (H) 12.3 - 15.4 %    RDW-SD 53.9 37.0 - 54.0 fl    MPV 9.7 6.0 - 12.0 fL    Platelets 276 140 - 450 10*3/mm3   Basic Metabolic Panel    Specimen: Blood   Result Value Ref Range    Glucose 143 (H) 65 - 99 mg/dL    BUN 14 6 - 20 mg/dL    Creatinine 0.73 0.57 - 1.00 mg/dL    Sodium 139 136 - 145 mmol/L    Potassium 4.4 3.5 - 5.2 mmol/L    Chloride 105 98 - 107 mmol/L    CO2 26.0 22.0 - 29.0 mmol/L    Calcium 8.7 8.6 - 10.5 mg/dL    BUN/Creatinine Ratio 19.2 7.0 - 25.0    Anion Gap 8.0 5.0 - 15.0 mmol/L    eGFR 99.1 >60.0 mL/min/1.73   Vitamin B12    Specimen:  "Blood   Result Value Ref Range    Vitamin B-12 335 211 - 946 pg/mL   TSH Rfx On Abnormal To Free T4    Specimen: Blood   Result Value Ref Range    TSH 0.890 0.270 - 4.200 uIU/mL   Hemoglobin A1c    Specimen: Blood   Result Value Ref Range    Hemoglobin A1C 5.70 (H) 4.80 - 5.60 %   CBC (No Diff)    Specimen: Blood   Result Value Ref Range    WBC 9.36 3.40 - 10.80 10*3/mm3    RBC 3.75 (L) 3.77 - 5.28 10*6/mm3    Hemoglobin 10.1 (L) 12.0 - 15.9 g/dL    Hematocrit 31.6 (L) 34.0 - 46.6 %    MCV 84.3 79.0 - 97.0 fL    MCH 26.9 26.6 - 33.0 pg    MCHC 32.0 31.5 - 35.7 g/dL    RDW 16.3 (H) 12.3 - 15.4 %    RDW-SD 49.6 37.0 - 54.0 fl    MPV 9.5 6.0 - 12.0 fL    Platelets 271 140 - 450 10*3/mm3   Basic Metabolic Panel    Specimen: Blood   Result Value Ref Range    Glucose 97 65 - 99 mg/dL    BUN 14 6 - 20 mg/dL    Creatinine 0.70 0.57 - 1.00 mg/dL    Sodium 142 136 - 145 mmol/L    Potassium 4.0 3.5 - 5.2 mmol/L    Chloride 104 98 - 107 mmol/L    CO2 30.0 (H) 22.0 - 29.0 mmol/L    Calcium 8.5 (L) 8.6 - 10.5 mg/dL    BUN/Creatinine Ratio 20.0 7.0 - 25.0    Anion Gap 8.0 5.0 - 15.0 mmol/L    eGFR 104.2 >60.0 mL/min/1.73   Type & Screen    Specimen: Blood   Result Value Ref Range    ABO Type AB     RH type Positive     Antibody Screen Negative     T&S Expiration Date 5/24/2023 11:59:59 PM    Marisolher LIANET Carrillo female 53 y.o., /88   Pulse 96   Temp 97.5 °F (36.4 °C)   Resp 16   Ht 160 cm (62.99\")   Wt (!) 143 kg (315 lb)   LMP  (LMP Unknown)   SpO2 98%   BMI 55.82 kg/m²   who presents today for follow up of Depression and Anxiety.  She reports overall anxiety and depression is helped with Zoloft 200 mg daily.  She has tried several medications including antipsychotics with [place] and just not tolerant or side effects her best medication is still going to be the sertraline.  I will take over prescribing her sertraline I am just noting that its not 100% for her but is acceptable. Onset of symptoms was approximately " several years ago. She denies current suicidal and homicidal ideation. Risk factors are family history of anxiety and or depression and lifestyle of multiple roles.  Previous treatment includes current Rx. She complains of the following medication side effects:none. The patient has previously been in counseling..  Had work up 2019 hematologist after Dx PE----noted from post surgical reason---Provoked----did f/u CTA chest 2019 and no further work up recommended---d/t provoked VTE  She does have low iron stores. ----did had EGD-----she is on iron BID:   Had cardio work up 2019--echo and cardiolite normal   Last visit with Dr. Fantasma Bianchi neurosurgeon was 5/17/2023 noting she is postlaminectomy syndrome, lumbar region--and history of lumbar fusion.  He noted her MRI performed 3/29/2023 was of minimal change from prior MRI.  Made note to me about possibly helping her lose weight with semaglutide.  In to follow-up in 6  Lab Results   Component Value Date    TSH 0.890 05/23/2023       The following portions of the patient's history were reviewed and updated as appropriate: allergies, current medications, past family history, past medical history, past social history, past surgical history, and problem list.    Review of Systems   Constitutional:  Negative for diaphoresis.   HENT:  Negative for nosebleeds and trouble swallowing.    Eyes:  Negative for blurred vision and visual disturbance.   Respiratory:  Negative for choking.    Gastrointestinal:  Negative for blood in stool.   Musculoskeletal:  Positive for arthralgias, back pain and joint swelling.   Allergic/Immunologic: Negative for immunocompromised state.   Neurological:  Negative for facial asymmetry and speech difficulty.   Psychiatric/Behavioral:  Negative for self-injury and suicidal ideas.      Objective   Physical Exam  Vitals and nursing note reviewed.   Constitutional:       General: She is not in acute distress.     Appearance: She is well-developed. She  is obese. She is not ill-appearing or toxic-appearing.   HENT:      Head: Normocephalic.      Right Ear: External ear normal.      Left Ear: External ear normal.      Nose: Nose normal.      Mouth/Throat:      Pharynx: Oropharynx is clear.   Eyes:      General: No scleral icterus.     Conjunctiva/sclera: Conjunctivae normal.      Pupils: Pupils are equal, round, and reactive to light.   Neck:      Thyroid: No thyromegaly.   Cardiovascular:      Rate and Rhythm: Normal rate and regular rhythm.      Heart sounds: Normal heart sounds. No murmur heard.  Pulmonary:      Effort: Pulmonary effort is normal. No respiratory distress.      Breath sounds: Normal breath sounds.   Musculoskeletal:         General: No deformity. Normal range of motion.      Cervical back: Normal range of motion and neck supple.   Skin:     General: Skin is warm and dry.      Findings: No rash.   Neurological:      General: No focal deficit present.      Mental Status: She is alert and oriented to person, place, and time. Mental status is at baseline.   Psychiatric:         Mood and Affect: Mood normal.         Behavior: Behavior normal.         Thought Content: Thought content normal.         Judgment: Judgment normal.         Assessment & Plan   Diagnoses and all orders for this visit:    1. Primary hypertension (Primary)  -     Cancel: Comprehensive metabolic panel; Future  -     Cancel: Lipid panel; Future  -     Cancel: CBC and Differential; Future  -     Cancel: TSH; Future  -     Cancel: Hemoglobin A1c; Future  -     Cancel: T4, Free; Future  -     Cancel: Vitamin D,25-Hydroxy; Future  -     Cancel: Vitamin B12; Future  -     Cancel: Folate; Future  -     Cancel: Urinalysis With Microscopic - Urine, Clean Catch; Future  -     Cancel: Magnesium; Future  -     Comprehensive metabolic panel; Future  -     Lipid panel; Future  -     CBC and Differential; Future  -     TSH; Future  -     Hemoglobin A1c; Future  -     T4, Free; Future  -      Vitamin D,25-Hydroxy; Future  -     Vitamin B12; Future  -     Folate; Future  -     Urinalysis With Microscopic - Urine, Clean Catch; Future  -     Magnesium; Future  -     Ferritin; Future  -     Iron Profile; Future    2. Hyperlipidemia, mixed  -     Cancel: Comprehensive metabolic panel; Future  -     Cancel: Lipid panel; Future  -     Cancel: CBC and Differential; Future  -     Cancel: TSH; Future  -     Cancel: Hemoglobin A1c; Future  -     Cancel: T4, Free; Future  -     Cancel: Vitamin D,25-Hydroxy; Future  -     Cancel: Vitamin B12; Future  -     Cancel: Folate; Future  -     Cancel: Urinalysis With Microscopic - Urine, Clean Catch; Future  -     Cancel: Magnesium; Future  -     Comprehensive metabolic panel; Future  -     Lipid panel; Future  -     CBC and Differential; Future  -     TSH; Future  -     Hemoglobin A1c; Future  -     T4, Free; Future  -     Vitamin D,25-Hydroxy; Future  -     Vitamin B12; Future  -     Folate; Future  -     Urinalysis With Microscopic - Urine, Clean Catch; Future  -     Magnesium; Future  -     Ferritin; Future  -     Iron Profile; Future    3. Acquired hypothyroidism  -     Cancel: Comprehensive metabolic panel; Future  -     Cancel: Lipid panel; Future  -     Cancel: CBC and Differential; Future  -     Cancel: TSH; Future  -     Cancel: Hemoglobin A1c; Future  -     Cancel: T4, Free; Future  -     Cancel: Vitamin D,25-Hydroxy; Future  -     Cancel: Vitamin B12; Future  -     Cancel: Folate; Future  -     Cancel: Urinalysis With Microscopic - Urine, Clean Catch; Future  -     Cancel: Magnesium; Future  -     Comprehensive metabolic panel; Future  -     Lipid panel; Future  -     CBC and Differential; Future  -     TSH; Future  -     Hemoglobin A1c; Future  -     T4, Free; Future  -     Vitamin D,25-Hydroxy; Future  -     Vitamin B12; Future  -     Folate; Future  -     Urinalysis With Microscopic - Urine, Clean Catch; Future  -     Magnesium; Future  -     Ferritin;  Future  -     Iron Profile; Future    4. Impaired fasting glucose  -     Cancel: Comprehensive metabolic panel; Future  -     Cancel: Lipid panel; Future  -     Cancel: CBC and Differential; Future  -     Cancel: TSH; Future  -     Cancel: Hemoglobin A1c; Future  -     Cancel: T4, Free; Future  -     Cancel: Vitamin D,25-Hydroxy; Future  -     Cancel: Vitamin B12; Future  -     Cancel: Folate; Future  -     Cancel: Urinalysis With Microscopic - Urine, Clean Catch; Future  -     Cancel: Magnesium; Future  -     Comprehensive metabolic panel; Future  -     Lipid panel; Future  -     CBC and Differential; Future  -     TSH; Future  -     Hemoglobin A1c; Future  -     T4, Free; Future  -     Vitamin D,25-Hydroxy; Future  -     Vitamin B12; Future  -     Folate; Future  -     Urinalysis With Microscopic - Urine, Clean Catch; Future  -     Magnesium; Future  -     Ferritin; Future  -     Iron Profile; Future    5. Gastroesophageal reflux disease without esophagitis  -     Cancel: Comprehensive metabolic panel; Future  -     Cancel: Lipid panel; Future  -     Cancel: CBC and Differential; Future  -     Cancel: TSH; Future  -     Cancel: Hemoglobin A1c; Future  -     Cancel: T4, Free; Future  -     Cancel: Vitamin D,25-Hydroxy; Future  -     Cancel: Vitamin B12; Future  -     Cancel: Folate; Future  -     Cancel: Urinalysis With Microscopic - Urine, Clean Catch; Future  -     Cancel: Magnesium; Future  -     Comprehensive metabolic panel; Future  -     Lipid panel; Future  -     CBC and Differential; Future  -     TSH; Future  -     Hemoglobin A1c; Future  -     T4, Free; Future  -     Vitamin D,25-Hydroxy; Future  -     Vitamin B12; Future  -     Folate; Future  -     Urinalysis With Microscopic - Urine, Clean Catch; Future  -     Magnesium; Future  -     Ferritin; Future  -     Iron Profile; Future    6. Vitamin D deficiency  -     Cancel: Comprehensive metabolic panel; Future  -     Cancel: Lipid panel; Future  -      Cancel: CBC and Differential; Future  -     Cancel: TSH; Future  -     Cancel: Hemoglobin A1c; Future  -     Cancel: T4, Free; Future  -     Cancel: Vitamin D,25-Hydroxy; Future  -     Cancel: Vitamin B12; Future  -     Cancel: Folate; Future  -     Cancel: Urinalysis With Microscopic - Urine, Clean Catch; Future  -     Cancel: Magnesium; Future  -     Comprehensive metabolic panel; Future  -     Lipid panel; Future  -     CBC and Differential; Future  -     TSH; Future  -     Hemoglobin A1c; Future  -     T4, Free; Future  -     Vitamin D,25-Hydroxy; Future  -     Vitamin B12; Future  -     Folate; Future  -     Urinalysis With Microscopic - Urine, Clean Catch; Future  -     Magnesium; Future  -     Ferritin; Future  -     Iron Profile; Future    7. Anxiety  -     Cancel: Comprehensive metabolic panel; Future  -     Cancel: Lipid panel; Future  -     Cancel: CBC and Differential; Future  -     Cancel: TSH; Future  -     Cancel: Hemoglobin A1c; Future  -     Cancel: T4, Free; Future  -     Cancel: Vitamin D,25-Hydroxy; Future  -     Cancel: Vitamin B12; Future  -     Cancel: Folate; Future  -     Cancel: Urinalysis With Microscopic - Urine, Clean Catch; Future  -     Cancel: Magnesium; Future  -     Comprehensive metabolic panel; Future  -     Lipid panel; Future  -     CBC and Differential; Future  -     TSH; Future  -     Hemoglobin A1c; Future  -     T4, Free; Future  -     Vitamin D,25-Hydroxy; Future  -     Vitamin B12; Future  -     Folate; Future  -     Urinalysis With Microscopic - Urine, Clean Catch; Future  -     Magnesium; Future  -     Ferritin; Future  -     Iron Profile; Future    8. Mild episode of recurrent major depressive disorder  -     Cancel: Comprehensive metabolic panel; Future  -     Cancel: Lipid panel; Future  -     Cancel: CBC and Differential; Future  -     Cancel: TSH; Future  -     Cancel: Hemoglobin A1c; Future  -     Cancel: T4, Free; Future  -     Cancel: Vitamin D,25-Hydroxy;  Future  -     Cancel: Vitamin B12; Future  -     Cancel: Folate; Future  -     Cancel: Urinalysis With Microscopic - Urine, Clean Catch; Future  -     Cancel: Magnesium; Future  -     Comprehensive metabolic panel; Future  -     Lipid panel; Future  -     CBC and Differential; Future  -     TSH; Future  -     Hemoglobin A1c; Future  -     T4, Free; Future  -     Vitamin D,25-Hydroxy; Future  -     Vitamin B12; Future  -     Folate; Future  -     Urinalysis With Microscopic - Urine, Clean Catch; Future  -     Magnesium; Future  -     Ferritin; Future  -     Iron Profile; Future    9. Lumbar disc herniation with radiculopathy  -     Cancel: Comprehensive metabolic panel; Future  -     Cancel: Lipid panel; Future  -     Cancel: CBC and Differential; Future  -     Cancel: TSH; Future  -     Cancel: Hemoglobin A1c; Future  -     Cancel: T4, Free; Future  -     Cancel: Vitamin D,25-Hydroxy; Future  -     Cancel: Vitamin B12; Future  -     Cancel: Folate; Future  -     Cancel: Urinalysis With Microscopic - Urine, Clean Catch; Future  -     Cancel: Magnesium; Future  -     Comprehensive metabolic panel; Future  -     Lipid panel; Future  -     CBC and Differential; Future  -     TSH; Future  -     Hemoglobin A1c; Future  -     T4, Free; Future  -     Vitamin D,25-Hydroxy; Future  -     Vitamin B12; Future  -     Folate; Future  -     Urinalysis With Microscopic - Urine, Clean Catch; Future  -     Magnesium; Future  -     Ferritin; Future  -     Iron Profile; Future    10. Low folic acid  -     Comprehensive metabolic panel; Future  -     Lipid panel; Future  -     CBC and Differential; Future  -     TSH; Future  -     Hemoglobin A1c; Future  -     T4, Free; Future  -     Vitamin D,25-Hydroxy; Future  -     Vitamin B12; Future  -     Folate; Future  -     Urinalysis With Microscopic - Urine, Clean Catch; Future  -     Magnesium; Future  -     Ferritin; Future  -     Iron Profile; Future    11. Other iron deficiency anemia  -      Comprehensive metabolic panel; Future  -     Lipid panel; Future  -     CBC and Differential; Future  -     TSH; Future  -     Hemoglobin A1c; Future  -     T4, Free; Future  -     Vitamin D,25-Hydroxy; Future  -     Vitamin B12; Future  -     Folate; Future  -     Urinalysis With Microscopic - Urine, Clean Catch; Future  -     Magnesium; Future  -     Ferritin; Future  -     Iron Profile; Future    12. Encounter for screening mammogram for breast cancer  -     Mammo Screening Digital Tomosynthesis Bilateral With CAD    Other orders  -     sertraline (ZOLOFT) 100 MG tablet; Take 2 tablets by mouth Daily. For anxiety and depression  Dispense: 180 tablet; Refill: 3        Continue Zoloft 200 mg daily for anxiety and depression and does help  Followed by Ortho for the ankle fracture  Sees neurosurgery for the lumbar DDD and history of spinal fusion  Continue folic acid, B12, vitamin D and will place orders for lab values on next lab order  We did discuss metformin the mechanism of action and can consider prescribing in future for impaired fasting glucose treatment  Plan, Marisol Carrillo, was seen today.  she was seen for HTN and continue medication, Imparied fasting glucose and plan follow up labs, diet, and exercise, GERD and will continue on PPI medication, Hyperlipidemia and will continue current medication, Hypothyroidism well controlled, continue medication, and Vitamin D deficiency and supplemented.  She continues taking iron I will update labs noting prior  I work-up       Answers submitted by the patient for this visit:  Other (Submitted on 9/30/2023)  Please describe your symptoms.: Medication refills  Have you had these symptoms before?: No  How long have you been having these symptoms?: 1-4 days  Primary Reason for Visit (Submitted on 9/30/2023)  What is the primary reason for your visit?: Other

## 2023-10-02 NOTE — PATIENT INSTRUCTIONS
"Hypertension, Adult  High blood pressure (hypertension) is when the force of blood pumping through the arteries is too strong. The arteries are the blood vessels that carry blood from the heart throughout the body. Hypertension forces the heart to work harder to pump blood and may cause arteries to become narrow or stiff. Untreated or uncontrolled hypertension can lead to a heart attack, heart failure, a stroke, kidney disease, and other problems.  A blood pressure reading consists of a higher number over a lower number. Ideally, your blood pressure should be below 120/80. The first (\"top\") number is called the systolic pressure. It is a measure of the pressure in your arteries as your heart beats. The second (\"bottom\") number is called the diastolic pressure. It is a measure of the pressure in your arteries as the heart relaxes.  What are the causes?  The exact cause of this condition is not known. There are some conditions that result in high blood pressure.  What increases the risk?  Certain factors may make you more likely to develop high blood pressure. Some of these risk factors are under your control, including:  Smoking.  Not getting enough exercise or physical activity.  Being overweight.  Having too much fat, sugar, calories, or salt (sodium) in your diet.  Drinking too much alcohol.  Other risk factors include:  Having a personal history of heart disease, diabetes, high cholesterol, or kidney disease.  Stress.  Having a family history of high blood pressure and high cholesterol.  Having obstructive sleep apnea.  Age. The risk increases with age.  What are the signs or symptoms?  High blood pressure may not cause symptoms. Very high blood pressure (hypertensive crisis) may cause:  Headache.  Fast or irregular heartbeats (palpitations).  Shortness of breath.  Nosebleed.  Nausea and vomiting.  Vision changes.  Severe chest pain, dizziness, and seizures.  How is this diagnosed?  This condition is diagnosed by " measuring your blood pressure while you are seated, with your arm resting on a flat surface, your legs uncrossed, and your feet flat on the floor. The cuff of the blood pressure monitor will be placed directly against the skin of your upper arm at the level of your heart. Blood pressure should be measured at least twice using the same arm. Certain conditions can cause a difference in blood pressure between your right and left arms.  If you have a high blood pressure reading during one visit or you have normal blood pressure with other risk factors, you may be asked to:  Return on a different day to have your blood pressure checked again.  Monitor your blood pressure at home for 1 week or longer.  If you are diagnosed with hypertension, you may have other blood or imaging tests to help your health care provider understand your overall risk for other conditions.  How is this treated?  This condition is treated by making healthy lifestyle changes, such as eating healthy foods, exercising more, and reducing your alcohol intake. You may be referred for counseling on a healthy diet and physical activity.  Your health care provider may prescribe medicine if lifestyle changes are not enough to get your blood pressure under control and if:  Your systolic blood pressure is above 130.  Your diastolic blood pressure is above 80.  Your personal target blood pressure may vary depending on your medical conditions, your age, and other factors.  Follow these instructions at home:  Eating and drinking    Eat a diet that is high in fiber and potassium, and low in sodium, added sugar, and fat. An example of this eating plan is called the DASH diet. DASH stands for Dietary Approaches to Stop Hypertension. To eat this way:  Eat plenty of fresh fruits and vegetables. Try to fill one half of your plate at each meal with fruits and vegetables.  Eat whole grains, such as whole-wheat pasta, brown rice, or whole-grain bread. Fill about one  fourth of your plate with whole grains.  Eat or drink low-fat dairy products, such as skim milk or low-fat yogurt.  Avoid fatty cuts of meat, processed or cured meats, and poultry with skin. Fill about one fourth of your plate with lean proteins, such as fish, chicken without skin, beans, eggs, or tofu.  Avoid pre-made and processed foods. These tend to be higher in sodium, added sugar, and fat.  Reduce your daily sodium intake. Many people with hypertension should eat less than 1,500 mg of sodium a day.  Do not drink alcohol if:  Your health care provider tells you not to drink.  You are pregnant, may be pregnant, or are planning to become pregnant.  If you drink alcohol:  Limit how much you have to:  0-1 drink a day for women.  0-2 drinks a day for men.  Know how much alcohol is in your drink. In the [place] one drink equals one 12 oz bottle of beer (355 mL), one 5 oz glass of wine (148 mL), or one 1½ oz glass of hard liquor (44 mL).  Lifestyle    Work with your health care provider to maintain a healthy body weight or to lose weight. Ask what an ideal weight is for you.  Get at least 30 minutes of exercise that causes your heart to beat faster (aerobic exercise) most days of the week. Activities may include walking, swimming, or biking.  Include exercise to strengthen your muscles (resistance exercise), such as Pilates or lifting weights, as part of your weekly exercise routine. Try to do these types of exercises for 30 minutes at least 3 days a week.  Do not use any products that contain nicotine or tobacco. These products include cigarettes, chewing tobacco, and vaping devices, such as e-cigarettes. If you need help quitting, ask your health care provider.  Monitor your blood pressure at home as told by your health care provider.  Keep all follow-up visits. This is important.  Medicines  Take over-the-counter and prescription medicines only as told by your health care provider. Follow directions carefully. Blood  pressure medicines must be taken as prescribed.  Do not skip doses of blood pressure medicine. Doing this puts you at risk for problems and can make the medicine less effective.  Ask your health care provider about side effects or reactions to medicines that you should watch for.  Contact a health care provider if you:  Think you are having a reaction to a medicine you are taking.  Have headaches that keep coming back (recurring).  Feel dizzy.  Have swelling in your ankles.  Have trouble with your vision.  Get help right away if you:  Develop a severe headache or confusion.  Have unusual weakness or numbness.  Feel faint.  Have severe pain in your chest or abdomen.  Vomit repeatedly.  Have trouble breathing.  These symptoms may be an emergency. Get help right away. Call 911.  Do not wait to see if the symptoms will go away.  Do not drive yourself to the hospital.  Summary  Hypertension is when the force of blood pumping through your arteries is too strong. If this condition is not controlled, it may put you at risk for serious complications.  Your personal target blood pressure may vary depending on your medical conditions, your age, and other factors. For most people, a normal blood pressure is less than 120/80.  Hypertension is treated with lifestyle changes, medicines, or a combination of both. Lifestyle changes include losing weight, eating a healthy, low-sodium diet, exercising more, and limiting alcohol.  This information is not intended to replace advice given to you by your health care provider. Make sure you discuss any questions you have with your health care provider.  Document Revised: 10/25/2022 Document Reviewed: 10/25/2022  Elsevier Patient Education © 2023 [employer]

## 2023-10-24 ENCOUNTER — TRANSCRIBE ORDERS (OUTPATIENT)
Dept: ADMINISTRATIVE | Facility: HOSPITAL | Age: 53
End: 2023-10-24
Payer: MEDICARE

## 2023-10-24 DIAGNOSIS — S82.841D CLOSED DISPLACED BIMALLEOLAR FRACTURE, RIGHT, WITH ROUTINE HEALING, SUBSEQUENT ENCOUNTER: Primary | ICD-10-CM

## 2023-11-01 ENCOUNTER — HOSPITAL ENCOUNTER (OUTPATIENT)
Dept: CT IMAGING | Facility: HOSPITAL | Age: 53
Discharge: HOME | End: 2023-11-01
Admitting: ORTHOPAEDIC SURGERY
Payer: COMMERCIAL

## 2023-11-01 DIAGNOSIS — S82.841D CLOSED DISPLACED BIMALLEOLAR FRACTURE, RIGHT, WITH ROUTINE HEALING, SUBSEQUENT ENCOUNTER: ICD-10-CM

## 2023-11-01 PROCEDURE — 73700 CT LOWER EXTREMITY W/O DYE: CPT

## 2023-11-09 ENCOUNTER — PATIENT MESSAGE (OUTPATIENT)
Dept: FAMILY MEDICINE CLINIC | Facility: CLINIC | Age: 53
End: 2023-11-09
Payer: MEDICARE

## 2023-11-15 RX ORDER — METHYLPREDNISOLONE 4 MG/1
TABLET ORAL
Qty: 21 TABLET | Refills: 0 | Status: SHIPPED | OUTPATIENT
Start: 2023-11-15

## 2023-11-15 NOTE — TELEPHONE ENCOUNTER
From: Marisol Carrillo  Sent: 11/15/2023 4:24 PM EST  To: [facility]  Subject: ENT    I will try whatever you recommend   Thank you

## 2023-11-20 ENCOUNTER — OFFICE VISIT (OUTPATIENT)
Dept: NEUROSURGERY | Facility: CLINIC | Age: 53
End: 2023-11-20
Payer: COMMERCIAL

## 2023-11-20 VITALS
SYSTOLIC BLOOD PRESSURE: 132 MMHG | DIASTOLIC BLOOD PRESSURE: 76 MMHG | HEIGHT: 64 IN | WEIGHT: 293 LBS | BODY MASS INDEX: 50.02 KG/M2 | RESPIRATION RATE: 20 BRPM

## 2023-11-20 DIAGNOSIS — Z98.1 HISTORY OF LUMBAR FUSION: ICD-10-CM

## 2023-11-20 DIAGNOSIS — M51.36 DDD (DEGENERATIVE DISC DISEASE), LUMBAR: ICD-10-CM

## 2023-11-20 DIAGNOSIS — M17.10 ARTHRITIS OF KNEE: ICD-10-CM

## 2023-11-20 DIAGNOSIS — M96.1 POSTLAMINECTOMY SYNDROME, LUMBAR REGION: Primary | ICD-10-CM

## 2023-11-20 PROCEDURE — 99213 OFFICE O/P EST LOW 20 MIN: CPT | Performed by: NEUROLOGICAL SURGERY

## 2023-11-20 NOTE — PROGRESS NOTES
"Subjective   Patient ID: Marisol Carrillo is a 53 y.o. female is here today for follow-up.    Its been about 8 years since she underwent an L5-S1 fusion.  She has persistent back and leg pain probably attributable to her postlaminectomy syndrome.  But she also has a lot of other things going on such as her and knee arthritis, recovery from a revision knee replacement on the right and now a fractured ankle that is not healing.  Dr. Mera did the surgery on the ankle she is going to be using a bone stimulator.  She still has back and leg pain.  She does not feel given the fact that there are a lot of other issues going on that she is ready to proceed with a spinal cord stimulator trial.  So we will hold off and see her again in 6 months.          Back Pain  Associated symptoms include leg pain, numbness and weakness. Pertinent negatives include no fever.   Leg Pain   Associated symptoms include numbness.       The following portions of the patient's history were reviewed and updated as appropriate: allergies, current medications, past family history, past medical history, past social history, past surgical history, and problem list.    Review of Systems   Constitutional:  Negative for fever.   Musculoskeletal:  Positive for back pain.   Neurological:  Positive for weakness and numbness.   All other systems reviewed and are negative.          Objective     Vitals:    11/20/23 1300   BP: 132/76   BP Location: Left arm   Patient Position: Sitting   Cuff Size: Adult   Resp: 20   Weight: (!) 143 kg (315 lb)   Height: 162.6 cm (64.02\")   PainSc:   6   PainLoc: Back     Body mass index is 54.04 kg/m².    Tobacco Use: Low Risk  (11/20/2023)    Patient History     Smoking Tobacco Use: Never     Smokeless Tobacco Use: Never     Passive Exposure: Not on file          Physical Exam  Constitutional:       Appearance: She is well-developed.   HENT:      Head: Normocephalic and atraumatic.   Eyes:      Extraocular Movements: EOM " normal.      Conjunctiva/sclera: Conjunctivae normal.      Pupils: Pupils are equal, round, and reactive to light.   Neck:      Vascular: No carotid bruit.   Neurological:      Mental Status: She is oriented to person, place, and time.      Coordination: Finger-Nose-Finger Test and Heel to Shin Test normal.      Gait: Gait is intact.      Deep Tendon Reflexes:      Reflex Scores:       Tricep reflexes are 2+ on the right side and 2+ on the left side.       Bicep reflexes are 2+ on the right side and 2+ on the left side.       Brachioradialis reflexes are 2+ on the right side and 2+ on the left side.       Patellar reflexes are 2+ on the right side and 2+ on the left side.       Achilles reflexes are 2+ on the right side and 2+ on the left side.  Psychiatric:         Speech: Speech normal.       Neurologic Exam     Mental Status   Oriented to person, place, and time.   Registration of memory: Good recent and remote memory.   Attention: normal. Concentration: normal.   Speech: speech is normal   Level of consciousness: alert  Knowledge: consistent with education.     Cranial Nerves     CN II   Visual fields full to confrontation.   Visual acuity: normal    CN III, IV, VI   Pupils are equal, round, and reactive to light.  Extraocular motions are normal.     CN V   Facial sensation intact.   Right corneal reflex: normal  Left corneal reflex: normal    CN VII   Facial expression full, symmetric.   Right facial weakness: none  Left facial weakness: none    CN VIII   Hearing: intact    CN IX, X   Palate: symmetric    CN XI   Right sternocleidomastoid strength: normal  Left sternocleidomastoid strength: normal    CN XII   Tongue: not atrophic  Tongue deviation: none    Motor Exam   Muscle bulk: normal  Right arm tone: normal  Left arm tone: normal  Right leg tone: normal  Left leg tone: normal    Strength   Strength 5/5 except as noted.   Right neck flexion: 4/5  Left neck flexion: 4/5  Right neck extension: 4/5  Left neck  extension: 4/5  Right deltoid: 4/5  Left deltoid: 4/5  Right biceps: 4/5  Left biceps: 4/5  Right triceps: 4/5  Left triceps: 4/5  Right wrist flexion: 4/5  Left wrist flexion: 4/5  Right wrist extension: 4/5  Left wrist extension: 4/5  Right interossei: 4/5  Left interossei: 4/5  Right abdominals: 4/5  Left abdominals: 4/5  Right iliopsoas: 4/5  Left iliopsoas: 4/5  Right quadriceps: 4/5  Left quadriceps: 4/5  Right hamstrin/5  Left hamstrin/5  Right glutei: 4/5  Left glutei: 4/5  Right anterior tibial: 4/5  Left anterior tibial: 4/5  Right posterior tibial: 4/5  Left posterior tibial: 4/5  Right peroneal: 4/5  Left peroneal: 4/5  Right gastroc: 4/5  Left gastroc: 4/5    Sensory Exam   Light touch normal.     Gait, Coordination, and Reflexes     Gait  Gait: normal    Coordination   Finger to nose coordination: normal  Heel to shin coordination: normal    Reflexes   Right brachioradialis: 2+  Left brachioradialis: 2+  Right biceps: 2+  Left biceps: 2+  Right triceps: 2+  Left triceps: 2+  Right patellar: 2+  Left patellar: 2+  Right achilles: 2+  Left achilles: 2+  Right : 2+  Left : 2+          Assessment & Plan   Independent Review of Radiographic Studies:      I personally reviewed the images from the following studies.     I reviewed the MRI done on 3/29/2022 as well as the x-rays.  The fusion site is stable with no problems with the hardware.  There seems to be a little bit of progression of the stenosis at L4-L5 compared to 2018 but it is fairly stable compared to 2020.  Agree with the report.     Medical Decision Making:      Again, there is a lot on her plate right now.  We will see her again in 6 months.  If she feels she is ready to to pursue spinal cord stimulation, we can get a trial arranged.  We are not at that point.    Diagnoses and all orders for this visit:    1. Postlaminectomy syndrome, lumbar region (Primary)    2. History of lumbar fusion    3. DDD (degenerative disc disease),  lumbar    4. Arthritis of knee      Return in about 6 months (around 5/20/2024) for face to face.

## 2023-12-04 ENCOUNTER — HOSPITAL ENCOUNTER (OUTPATIENT)
Dept: MAMMOGRAPHY | Facility: HOSPITAL | Age: 53
Discharge: HOME | End: 2023-12-04
Admitting: PHYSICIAN ASSISTANT
Payer: COMMERCIAL

## 2023-12-04 ENCOUNTER — APPOINTMENT (OUTPATIENT)
Dept: OTHER | Facility: HOSPITAL | Age: 53
End: 2023-12-04
Payer: COMMERCIAL

## 2023-12-04 PROCEDURE — 77067 SCR MAMMO BI INCL CAD: CPT

## 2023-12-04 PROCEDURE — 77063 BREAST TOMOSYNTHESIS BI: CPT

## 2024-02-05 ENCOUNTER — TRANSCRIBE ORDERS (OUTPATIENT)
Dept: ADMINISTRATIVE | Facility: HOSPITAL | Age: 54
End: 2024-02-05
Payer: MEDICARE

## 2024-02-05 DIAGNOSIS — S82.64XK CLOSED NONDISPLACED FRACTURE OF LATERAL MALLEOLUS OF RIGHT FIBULA WITH NONUNION: Primary | ICD-10-CM

## 2024-02-27 ENCOUNTER — APPOINTMENT (OUTPATIENT)
Dept: CT IMAGING | Facility: HOSPITAL | Age: 54
End: 2024-02-27
Payer: COMMERCIAL

## 2024-02-27 ENCOUNTER — HOSPITAL ENCOUNTER (EMERGENCY)
Facility: HOSPITAL | Age: 54
Discharge: HOME | End: 2024-02-27
Attending: EMERGENCY MEDICINE | Admitting: EMERGENCY MEDICINE
Payer: COMMERCIAL

## 2024-02-27 ENCOUNTER — APPOINTMENT (OUTPATIENT)
Dept: GENERAL RADIOLOGY | Facility: HOSPITAL | Age: 54
End: 2024-02-27
Payer: COMMERCIAL

## 2024-02-27 VITALS
OXYGEN SATURATION: 97 % | WEIGHT: 293 LBS | HEIGHT: 64 IN | SYSTOLIC BLOOD PRESSURE: 117 MMHG | TEMPERATURE: 97.9 F | BODY MASS INDEX: 50.02 KG/M2 | HEART RATE: 72 BPM | RESPIRATION RATE: 22 BRPM | DIASTOLIC BLOOD PRESSURE: 92 MMHG

## 2024-02-27 DIAGNOSIS — F41.9 ANXIETY: ICD-10-CM

## 2024-02-27 DIAGNOSIS — R07.89 ATYPICAL CHEST PAIN: Primary | ICD-10-CM

## 2024-02-27 LAB
ALBUMIN SERPL-MCNC: 4 G/DL (ref 3.5–5.2)
ALBUMIN/GLOB SERPL: 1.3 G/DL
ALP SERPL-CCNC: 136 U/L (ref 39–117)
ALT SERPL W P-5'-P-CCNC: 15 U/L (ref 1–33)
ANION GAP SERPL CALCULATED.3IONS-SCNC: 11 MMOL/L (ref 5–15)
AST SERPL-CCNC: 18 U/L (ref 1–32)
BACTERIA UR QL AUTO: ABNORMAL /HPF
BASOPHILS # BLD AUTO: 0.05 10*3/MM3 (ref 0–0.2)
BASOPHILS NFR BLD AUTO: 0.4 % (ref 0–1.5)
BILIRUB SERPL-MCNC: 0.2 MG/DL (ref 0–1.2)
BILIRUB UR QL STRIP: NEGATIVE
BUN SERPL-MCNC: 24 MG/DL (ref 6–20)
BUN/CREAT SERPL: 25.8 (ref 7–25)
CALCIUM SPEC-SCNC: 9.7 MG/DL (ref 8.6–10.5)
CHLORIDE SERPL-SCNC: 107 MMOL/L (ref 98–107)
CLARITY UR: CLEAR
CO2 SERPL-SCNC: 21 MMOL/L (ref 22–29)
COLOR UR: YELLOW
CREAT SERPL-MCNC: 0.93 MG/DL (ref 0.57–1)
D DIMER PPP FEU-MCNC: 0.81 MCGFEU/ML (ref 0–0.53)
DEPRECATED RDW RBC AUTO: 49.5 FL (ref 37–54)
EGFRCR SERPLBLD CKD-EPI 2021: 73.6 ML/MIN/1.73
EOSINOPHIL # BLD AUTO: 0.16 10*3/MM3 (ref 0–0.4)
EOSINOPHIL NFR BLD AUTO: 1.4 % (ref 0.3–6.2)
ERYTHROCYTE [DISTWIDTH] IN BLOOD BY AUTOMATED COUNT: 14.8 % (ref 12.3–15.4)
GLOBULIN UR ELPH-MCNC: 3.1 GM/DL
GLUCOSE SERPL-MCNC: 119 MG/DL (ref 65–99)
GLUCOSE UR STRIP-MCNC: NEGATIVE MG/DL
HCT VFR BLD AUTO: 42.7 % (ref 34–46.6)
HGB BLD-MCNC: 13.4 G/DL (ref 12–15.9)
HGB UR QL STRIP.AUTO: NEGATIVE
HOLD SPECIMEN: NORMAL
HYALINE CASTS UR QL AUTO: ABNORMAL /LPF
IMM GRANULOCYTES # BLD AUTO: 0.02 10*3/MM3 (ref 0–0.05)
IMM GRANULOCYTES NFR BLD AUTO: 0.2 % (ref 0–0.5)
KETONES UR QL STRIP: NEGATIVE
LEUKOCYTE ESTERASE UR QL STRIP.AUTO: ABNORMAL
LYMPHOCYTES # BLD AUTO: 1.97 10*3/MM3 (ref 0.7–3.1)
LYMPHOCYTES NFR BLD AUTO: 17.4 % (ref 19.6–45.3)
MCH RBC QN AUTO: 28.2 PG (ref 26.6–33)
MCHC RBC AUTO-ENTMCNC: 31.4 G/DL (ref 31.5–35.7)
MCV RBC AUTO: 89.9 FL (ref 79–97)
MONOCYTES # BLD AUTO: 0.74 10*3/MM3 (ref 0.1–0.9)
MONOCYTES NFR BLD AUTO: 6.6 % (ref 5–12)
NEUTROPHILS NFR BLD AUTO: 74 % (ref 42.7–76)
NEUTROPHILS NFR BLD AUTO: 8.35 10*3/MM3 (ref 1.7–7)
NITRITE UR QL STRIP: NEGATIVE
PH UR STRIP.AUTO: 5.5 [PH] (ref 5–8)
PLATELET # BLD AUTO: 303 10*3/MM3 (ref 140–450)
PMV BLD AUTO: 9.5 FL (ref 6–12)
POTASSIUM SERPL-SCNC: 4.3 MMOL/L (ref 3.5–5.2)
PROT SERPL-MCNC: 7.1 G/DL (ref 6–8.5)
PROT UR QL STRIP: NEGATIVE
QT INTERVAL: 356 MS
QTC INTERVAL: 429 MS
RBC # BLD AUTO: 4.75 10*6/MM3 (ref 3.77–5.28)
RBC # UR STRIP: ABNORMAL /HPF
REF LAB TEST METHOD: ABNORMAL
SODIUM SERPL-SCNC: 139 MMOL/L (ref 136–145)
SP GR UR STRIP: 1.02 (ref 1–1.03)
SQUAMOUS #/AREA URNS HPF: ABNORMAL /HPF
TROPONIN T SERPL HS-MCNC: <6 NG/L
TSH SERPL DL<=0.05 MIU/L-ACNC: 3.62 UIU/ML (ref 0.27–4.2)
UROBILINOGEN UR QL STRIP: ABNORMAL
WBC # UR STRIP: ABNORMAL /HPF
WBC NRBC COR # BLD AUTO: 11.29 10*3/MM3 (ref 3.4–10.8)

## 2024-02-27 PROCEDURE — 85379 FIBRIN DEGRADATION QUANT: CPT | Performed by: PHYSICIAN ASSISTANT

## 2024-02-27 PROCEDURE — 81001 URINALYSIS AUTO W/SCOPE: CPT | Performed by: PHYSICIAN ASSISTANT

## 2024-02-27 PROCEDURE — 71045 X-RAY EXAM CHEST 1 VIEW: CPT

## 2024-02-27 PROCEDURE — 99285 EMERGENCY DEPT VISIT HI MDM: CPT

## 2024-02-27 PROCEDURE — 80050 GENERAL HEALTH PANEL: CPT | Performed by: PHYSICIAN ASSISTANT

## 2024-02-27 PROCEDURE — 93005 ELECTROCARDIOGRAM TRACING: CPT | Performed by: EMERGENCY MEDICINE

## 2024-02-27 PROCEDURE — 71275 CT ANGIOGRAPHY CHEST: CPT

## 2024-02-27 PROCEDURE — 93010 ELECTROCARDIOGRAM REPORT: CPT | Performed by: INTERNAL MEDICINE

## 2024-02-27 PROCEDURE — 25510000001 IOPAMIDOL PER 1 ML: Performed by: EMERGENCY MEDICINE

## 2024-02-27 PROCEDURE — 84484 ASSAY OF TROPONIN QUANT: CPT | Performed by: PHYSICIAN ASSISTANT

## 2024-02-27 RX ORDER — HYDROXYZINE HYDROCHLORIDE 25 MG/1
25 TABLET, FILM COATED ORAL EVERY 6 HOURS PRN
Qty: 15 TABLET | Refills: 0 | Status: SHIPPED | OUTPATIENT
Start: 2024-02-27 | End: 2024-03-03

## 2024-02-27 RX ADMIN — IOPAMIDOL 60 ML: 755 INJECTION, SOLUTION INTRAVENOUS at 14:00
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
## 2024-02-27 NOTE — DISCHARGE INSTRUCTIONS
Do not take hydroxyzine with other allergy medications.  Light activity for the next few days.  Your lab work here is unremarkable.  Follow-up with your primary care provider next week to recheck your symptoms.

## 2024-03-05 ENCOUNTER — OFFICE VISIT (OUTPATIENT)
Dept: FAMILY MEDICINE CLINIC | Facility: CLINIC | Age: 54
End: 2024-03-05
Payer: COMMERCIAL

## 2024-03-05 VITALS
OXYGEN SATURATION: 98 % | TEMPERATURE: 97.9 F | HEART RATE: 95 BPM | SYSTOLIC BLOOD PRESSURE: 130 MMHG | RESPIRATION RATE: 16 BRPM | WEIGHT: 293 LBS | BODY MASS INDEX: 50.02 KG/M2 | HEIGHT: 64 IN | DIASTOLIC BLOOD PRESSURE: 80 MMHG

## 2024-03-05 DIAGNOSIS — F41.0 PANIC ATTACK: Primary | ICD-10-CM

## 2024-03-05 DIAGNOSIS — E66.01 MORBID (SEVERE) OBESITY DUE TO EXCESS CALORIES: ICD-10-CM

## 2024-03-05 DIAGNOSIS — I10 PRIMARY HYPERTENSION: ICD-10-CM

## 2024-03-05 RX ORDER — SEMAGLUTIDE 0.25 MG/.5ML
0.25 INJECTION, SOLUTION SUBCUTANEOUS WEEKLY
Qty: 2 ML | Refills: 0 | Status: SHIPPED | OUTPATIENT
Start: 2024-03-05

## 2024-03-05 RX ORDER — LORAZEPAM 1 MG/1
TABLET ORAL
Qty: 10 TABLET | Refills: 0 | Status: SHIPPED | OUTPATIENT
Start: 2024-03-05

## 2024-03-05 NOTE — PROGRESS NOTES
"Subjective   Marisol Carrillo is a 54 y.o. female.     Panic Attack  Pertinent negatives include no diaphoresis.      Marisol Carrillo 54 y.o. female presents today for Emergency Room follow up.  she was treated 2-27-24 for chest pain  .  I reviewed all of the labs and diagnostic testing and noted:  CTA chest  D dimer was slightly elevated  Right ankle ----not healing----to consider joint replacement  OTHERWISE NORMAL ECG -  Sinus rhythm  Low voltage, precordial leads  No change from previous tracing  Electronically Signed By: Jillian Small ([facility]) 27-Feb-2024 16:17:32  Date and Time of Study: 2024-02-27 10:55:27    The patient's medications were not changed:  Current outpatient and discharge medications have been reconciled for the patient.  Reviewed by: Tomeka Linares PA-C    she does not have a follow up appointment with a specialist:     Just had bad news prior to ER-----family stress with her step son  ---concern for 4 yr old granddaughter.     Lab Results   Component Value Date    WBC 11.29 (H) 02/27/2024    HGB 13.4 02/27/2024    HCT 42.7 02/27/2024    MCV 89.9 02/27/2024     02/27/2024     Had COVID ---went to  1-21-24---resolved  CTA chest from 2-17-24  IMPRESSION:  No evidence for pulmonary thromboembolic disease or acute  abnormality in the chest. There are diminished lung volumes and there is  mild basilar and lingular atelectasis.     Radiation dose reduction techniques were utilized, including automated  exposure control and exposure modulation based on body size.   Marisol Carrillo female 54 y.o., /80   Pulse 95   Temp 97.9 °F (36.6 °C) (Temporal)   Resp 16   Ht 162.6 cm (64\")   Wt (!) 145 kg (320 lb)   LMP  (LMP Unknown)   SpO2 98%   BMI 54.93 kg/m²   who presents today for follow up of Depression, Anxiety, and Panic Attacks.  She reports overall anxiety and depression is improved on the Zoloft but still gets breakthrough symptoms and just had a panic attack and went to the " emergency room noting have already had her on hydroxyzine and BuSpar in the past.. Onset of symptoms was approximately several years ago. She denies current suicidal and homicidal ideation. Risk factors are family history of anxiety and or depression and lifestyle of multiple roles.  Previous treatment includes current Rx. She complains of the following medication side effects:none. The patient is currently in counseling..     she tried Lexapro or Celexa. Failed Cymbalta,Prozac. Has already been on Buspar first; already tried Hydroxyzine   This report was finalized on 2/27/2024 2:44 PM by Dr. Miguel Chua M.D on Workstation: [id number]   Patient had Cardiolite stress test 3/22/2019 noting no myocardial ischemia.     Saw ENT DR Billy for ears 11-30-23  Lab Results   Component Value Date    HGBA1C 5.70 (H) 05/23/2023   No sleep apnea  Lapband removed    No history of thyroid cancer in her family or M EN ,she has never had pancreatitis and no history of gastroparesis  The following portions of the patient's history were reviewed and updated as appropriate: allergies, current medications, past family history, past medical history, past social history, past surgical history, and problem list.    Review of Systems   Constitutional:  Negative for diaphoresis.   HENT:  Negative for nosebleeds and trouble swallowing.    Eyes:  Negative for blurred vision and visual disturbance.   Respiratory:  Negative for choking.    Gastrointestinal:  Negative for blood in stool.   Allergic/Immunologic: Negative for immunocompromised state.   Neurological:  Negative for facial asymmetry and speech difficulty.   Psychiatric/Behavioral:  Negative for self-injury and suicidal ideas.        Objective   Physical Exam  Vitals and nursing note reviewed.   Constitutional:       General: She is not in acute distress.     Appearance: She is well-developed. She is obese. She is not ill-appearing or toxic-appearing.   HENT:      Head:  Normocephalic.      Right Ear: External ear normal.      Left Ear: External ear normal.      Nose: Nose normal.      Mouth/Throat:      Pharynx: Oropharynx is clear.   Eyes:      General: No scleral icterus.     Conjunctiva/sclera: Conjunctivae normal.      Pupils: Pupils are equal, round, and reactive to light.   Neck:      Thyroid: No thyromegaly.   Cardiovascular:      Rate and Rhythm: Normal rate and regular rhythm.      Heart sounds: Normal heart sounds. No murmur heard.  Pulmonary:      Effort: Pulmonary effort is normal. No respiratory distress.      Breath sounds: Normal breath sounds.   Musculoskeletal:         General: No deformity. Normal range of motion.      Cervical back: Normal range of motion and neck supple.   Skin:     General: Skin is warm and dry.      Findings: No rash.   Neurological:      General: No focal deficit present.      Mental Status: She is alert and oriented to person, place, and time. Mental status is at baseline.   Psychiatric:         Mood and Affect: Mood normal.         Behavior: Behavior normal.         Thought Content: Thought content normal.         Judgment: Judgment normal.           Assessment & Plan   Diagnoses and all orders for this visit:    1. Panic attack (Primary)    2. Primary hypertension    Other orders  -     Semaglutide-Weight Management (Wegovy) 0.25 MG/0.5ML solution auto-injector; Inject 0.5 mL under the skin into the appropriate area as directed 1 (One) Time Per Week. Inject 0.25 mg SQ weekly x4  Dispense: 2 mL; Refill: 0    Consider Vrylar---for add on -----for anxiety and depression  Zoloft is her best med for anxiety and depression  Start semaglutide and will send to compound pharmacy to help her with her obesity treatment I do think that would help her back pain and she is got a right ankle this not healing properly,,,,, she already has hypertension and prediabetes weight loss would help.  We discussed the risk of possible pancreatitis  Because she went  to the emergency room with a panic attack and I have already had her on BuSpar and hydroxyzine will prescribe Ativan 1 mg as needed for panic attacks only and give her a quantity of 10  I updated controlled substance contract  The patient has read and signed the [facility] Controlled Substance Contract.  I will continue to see patient for regular follow up appointments.  They are well controlled on their medication.  JOON has been reviewed by me and is updated every 3 months. The patient is aware of the potential for addiction and dependence.  Continue same meds for hypertension as noted last visit  I do already have a lab order will update labs and update her A1c

## 2024-03-05 NOTE — PATIENT INSTRUCTIONS
Generalized Anxiety Disorder, Adult  Generalized anxiety disorder (LISA) is a mental health condition. Unlike normal worries, anxiety related to LISA is not triggered by a specific event. These worries do not fade or get better with time. LISA interferes with relationships, work, and school.  LISA symptoms can vary from mild to severe. People with severe LISA can have intense waves of anxiety with physical symptoms that are similar to panic attacks.  What are the causes?  The exact cause of LISA is not known, but the following are believed to have an impact:  Differences in natural brain chemicals.  Genes passed down from parents to children.  Differences in the way threats are perceived.  Development and stress during childhood.  Personality.  What increases the risk?  The following factors may make you more likely to develop this condition:  Being female.  Having a family history of anxiety disorders.  Being very shy.  Experiencing very stressful life events, such as the death of a loved one.  Having a very stressful family environment.  What are the signs or symptoms?  People with LISA often worry excessively about many things in their lives, such as their health and family. Symptoms may also include:  Mental and emotional symptoms:  Worrying excessively about natural disasters.  Fear of being late.  Difficulty concentrating.  Fears that others are judging your performance.  Physical symptoms:  Fatigue.  Headaches, muscle tension, muscle twitches, trembling, or feeling shaky.  Feeling like your heart is pounding or beating very fast.  Feeling out of breath or like you cannot take a deep breath.  Having trouble falling asleep or staying asleep, or experiencing restlessness.  Sweating.  Nausea, diarrhea, or irritable bowel syndrome (IBS).  Behavioral symptoms:  Experiencing erratic moods or irritability.  Avoidance of new situations.  Avoidance of people.  Extreme difficulty making decisions.  How is this diagnosed?  This  condition is diagnosed based on your symptoms and medical history. You will also have a physical exam. Your health care provider may perform tests to rule out other possible causes of your symptoms.  To be diagnosed with LISA, a person must have anxiety that:  Is out of his or her control.  Affects several different aspects of his or her life, such as work and relationships.  Causes distress that makes him or her unable to take part in normal activities.  Includes at least three symptoms of LISA, such as restlessness, fatigue, trouble concentrating, irritability, muscle tension, or sleep problems.  Before your health care provider can confirm a diagnosis of LISA, these symptoms must be present more days than they are not, and they must last for 6 months or longer.  How is this treated?  This condition may be treated with:  Medicine. Antidepressant medicine is usually prescribed for long-term daily control. Anti-anxiety medicines may be added in severe cases, especially when panic attacks occur.  Talk therapy (psychotherapy). Certain types of talk therapy can be helpful in treating LISA by providing support, education, and guidance. Options include:  Cognitive behavioral therapy (CBT). People learn coping skills and self-calming techniques to ease their physical symptoms. They learn to identify unrealistic thoughts and behaviors and to replace them with more appropriate thoughts and behaviors.  Acceptance and commitment therapy (ACT). This treatment teaches people how to be mindful as a way to cope with unwanted thoughts and feelings.  Biofeedback. This process trains you to manage your body's response (physiological response) through breathing techniques and relaxation methods. You will work with a therapist while machines are used to monitor your physical symptoms.  Stress management techniques. These include yoga, meditation, and exercise.  A mental health specialist can help determine which treatment is best for you.  Some people see improvement with one type of therapy. However, other people require a combination of therapies.  Follow these instructions at home:  Lifestyle  Maintain a consistent routine and schedule.  Anticipate stressful situations. Create a plan and allow extra time to work with your plan.  Practice stress management or self-calming techniques that you have learned from your therapist or your health care provider.  Exercise regularly and spend time outdoors.  Eat a healthy diet that includes plenty of vegetables, fruits, whole grains, low-fat dairy products, and lean protein.  Do not eat a lot of foods that are high in fat, added sugar, or salt (sodium).  Drink plenty of water.  Avoid alcohol. Alcohol can increase anxiety.  Avoid caffeine and certain over-the-counter cold medicines. These may make you feel worse. Ask your pharmacist which medicines to avoid.  General instructions  Take over-the-counter and prescription medicines only as told by your health care provider.  Understand that you are likely to have setbacks. Accept this and be kind to yourself as you persist to take better care of yourself.  Anticipate stressful situations. Create a plan and allow extra time to work with your plan.  Recognize and accept your accomplishments, even if you  them as small.  Spend time with people who care about you.  Keep all follow-up visits. This is important.  Where to find more information  [place] of [place] Health: www.nimh.nih.gov  Substance Abuse and Mental Health Services: www.samhsa.gov  Contact a health care provider if:  Your symptoms do not get better.  Your symptoms get worse.  You have signs of depression, such as:  A persistently sad or irritable mood.  Loss of enjoyment in activities that used to bring you bushra.  Change in weight or eating.  Changes in sleeping habits.  Get help right away if:  You have thoughts about hurting yourself or others.  If you ever feel like you may hurt  yourself or others, or have thoughts about taking your own life, get help right away. Go to your nearest emergency department or:  Call your local emergency services (691 in the [place]  Call a suicide crisis helpline, such as the National Suicide Prevention Lifeline at 1-924.655.8494 or 614 in the [place] This is open 24 hours a day in the [place]  Text the Crisis Text Line at 340897 (in the [place]  Summary  Generalized anxiety disorder (LISA) is a mental health condition that involves worry that is not triggered by a specific event.  People with LISA often worry excessively about many things in their lives, such as their health and family.  LISA may cause symptoms such as restlessness, trouble concentrating, sleep problems, frequent sweating, nausea, diarrhea, headaches, and trembling or muscle twitching.  A mental health specialist can help determine which treatment is best for you. Some people see improvement with one type of therapy. However, other people require a combination of therapies.  This information is not intended to replace advice given to you by your health care provider. Make sure you discuss any questions you have with your health care provider.  Document Revised: 07/13/2022 Document Reviewed: 04/10/2022  Elsevier Patient Education © 2023 [employer] Inc[employer]

## 2024-03-11 ENCOUNTER — HOSPITAL ENCOUNTER (OUTPATIENT)
Dept: CT IMAGING | Facility: HOSPITAL | Age: 54
Discharge: HOME | End: 2024-03-11
Admitting: ORTHOPAEDIC SURGERY
Payer: COMMERCIAL

## 2024-03-11 DIAGNOSIS — S82.64XK CLOSED NONDISPLACED FRACTURE OF LATERAL MALLEOLUS OF RIGHT FIBULA WITH NONUNION: ICD-10-CM

## 2024-03-11 PROCEDURE — 73700 CT LOWER EXTREMITY W/O DYE: CPT

## 2024-04-02 ENCOUNTER — OFFICE VISIT (OUTPATIENT)
Dept: FAMILY MEDICINE CLINIC | Facility: CLINIC | Age: 54
End: 2024-04-02
Payer: COMMERCIAL

## 2024-04-02 VITALS
HEART RATE: 78 BPM | SYSTOLIC BLOOD PRESSURE: 124 MMHG | BODY MASS INDEX: 50.02 KG/M2 | WEIGHT: 293 LBS | OXYGEN SATURATION: 96 % | DIASTOLIC BLOOD PRESSURE: 80 MMHG | TEMPERATURE: 97.7 F | HEIGHT: 64 IN | RESPIRATION RATE: 16 BRPM

## 2024-04-02 DIAGNOSIS — J30.9 CHRONIC ALLERGIC RHINITIS: ICD-10-CM

## 2024-04-02 DIAGNOSIS — R73.01 IMPAIRED FASTING GLUCOSE: ICD-10-CM

## 2024-04-02 DIAGNOSIS — K21.9 GASTROESOPHAGEAL REFLUX DISEASE WITHOUT ESOPHAGITIS: ICD-10-CM

## 2024-04-02 DIAGNOSIS — E03.9 ACQUIRED HYPOTHYROIDISM: ICD-10-CM

## 2024-04-02 DIAGNOSIS — F41.0 PANIC ATTACK: ICD-10-CM

## 2024-04-02 DIAGNOSIS — F33.0 MILD EPISODE OF RECURRENT MAJOR DEPRESSIVE DISORDER: ICD-10-CM

## 2024-04-02 DIAGNOSIS — F41.9 ANXIETY: ICD-10-CM

## 2024-04-02 DIAGNOSIS — E55.9 VITAMIN D DEFICIENCY: ICD-10-CM

## 2024-04-02 DIAGNOSIS — I10 PRIMARY HYPERTENSION: Primary | ICD-10-CM

## 2024-04-02 DIAGNOSIS — E66.01 MORBID (SEVERE) OBESITY DUE TO EXCESS CALORIES: ICD-10-CM

## 2024-04-02 RX ORDER — LEVOTHYROXINE SODIUM 88 UG/1
88 TABLET ORAL DAILY
Qty: 90 TABLET | Refills: 3 | Status: SHIPPED | OUTPATIENT
Start: 2024-04-02

## 2024-04-02 RX ORDER — AMOXICILLIN AND CLAVULANATE POTASSIUM 875; 125 MG/1; MG/1
1 TABLET, FILM COATED ORAL EVERY 12 HOURS SCHEDULED
Qty: 20 TABLET | Refills: 5 | Status: SHIPPED | OUTPATIENT
Start: 2024-04-02

## 2024-04-02 RX ORDER — SEMAGLUTIDE 0.5 MG/.5ML
0.5 INJECTION, SOLUTION SUBCUTANEOUS WEEKLY
Qty: 2 ML | Refills: 2 | Status: SHIPPED | OUTPATIENT
Start: 2024-04-02

## 2024-04-02 RX ORDER — FLUTICASONE PROPIONATE 50 MCG
SPRAY, SUSPENSION (ML) NASAL
Qty: 33.3 ML | Refills: 3 | Status: SHIPPED | OUTPATIENT
Start: 2024-04-02

## 2024-04-02 RX ORDER — CARVEDILOL 12.5 MG/1
12.5 TABLET ORAL 2 TIMES DAILY WITH MEALS
Qty: 180 TABLET | Refills: 1 | Status: SHIPPED | OUTPATIENT
Start: 2024-04-02

## 2024-04-02 RX ORDER — NYSTATIN 100000 [USP'U]/G
POWDER TOPICAL 4 TIMES DAILY
Qty: 60 G | Refills: 11 | Status: SHIPPED | OUTPATIENT
Start: 2024-04-02

## 2024-04-02 RX ORDER — AMLODIPINE AND OLMESARTAN MEDOXOMIL 10; 40 MG/1; MG/1
1 TABLET ORAL DAILY
Qty: 90 TABLET | Refills: 1 | Status: SHIPPED | OUTPATIENT
Start: 2024-04-02

## 2024-04-02 RX ORDER — SERTRALINE HYDROCHLORIDE 100 MG/1
200 TABLET, FILM COATED ORAL DAILY
Qty: 180 TABLET | Refills: 3 | Status: SHIPPED | OUTPATIENT
Start: 2024-04-02

## 2024-04-02 NOTE — PATIENT INSTRUCTIONS
"Hypertension, Adult  High blood pressure (hypertension) is when the force of blood pumping through the arteries is too strong. The arteries are the blood vessels that carry blood from the heart throughout the body. Hypertension forces the heart to work harder to pump blood and may cause arteries to become narrow or stiff. Untreated or uncontrolled hypertension can lead to a heart attack, heart failure, a stroke, kidney disease, and other problems.  A blood pressure reading consists of a higher number over a lower number. Ideally, your blood pressure should be below 120/80. The first (\"top\") number is called the systolic pressure. It is a measure of the pressure in your arteries as your heart beats. The second (\"bottom\") number is called the diastolic pressure. It is a measure of the pressure in your arteries as the heart relaxes.  What are the causes?  The exact cause of this condition is not known. There are some conditions that result in high blood pressure.  What increases the risk?  Certain factors may make you more likely to develop high blood pressure. Some of these risk factors are under your control, including:  Smoking.  Not getting enough exercise or physical activity.  Being overweight.  Having too much fat, sugar, calories, or salt (sodium) in your diet.  Drinking too much alcohol.  Other risk factors include:  Having a personal history of heart disease, diabetes, high cholesterol, or kidney disease.  Stress.  Having a family history of high blood pressure and high cholesterol.  Having obstructive sleep apnea.  Age. The risk increases with age.  What are the signs or symptoms?  High blood pressure may not cause symptoms. Very high blood pressure (hypertensive crisis) may cause:  Headache.  Fast or irregular heartbeats (palpitations).  Shortness of breath.  Nosebleed.  Nausea and vomiting.  Vision changes.  Severe chest pain, dizziness, and seizures.  How is this diagnosed?  This condition is diagnosed by " measuring your blood pressure while you are seated, with your arm resting on a flat surface, your legs uncrossed, and your feet flat on the floor. The cuff of the blood pressure monitor will be placed directly against the skin of your upper arm at the level of your heart. Blood pressure should be measured at least twice using the same arm. Certain conditions can cause a difference in blood pressure between your right and left arms.  If you have a high blood pressure reading during one visit or you have normal blood pressure with other risk factors, you may be asked to:  Return on a different day to have your blood pressure checked again.  Monitor your blood pressure at home for 1 week or longer.  If you are diagnosed with hypertension, you may have other blood or imaging tests to help your health care provider understand your overall risk for other conditions.  How is this treated?  This condition is treated by making healthy lifestyle changes, such as eating healthy foods, exercising more, and reducing your alcohol intake. You may be referred for counseling on a healthy diet and physical activity.  Your health care provider may prescribe medicine if lifestyle changes are not enough to get your blood pressure under control and if:  Your systolic blood pressure is above 130.  Your diastolic blood pressure is above 80.  Your personal target blood pressure may vary depending on your medical conditions, your age, and other factors.  Follow these instructions at home:  Eating and drinking    Eat a diet that is high in fiber and potassium, and low in sodium, added sugar, and fat. An example of this eating plan is called the DASH diet. DASH stands for Dietary Approaches to Stop Hypertension. To eat this way:  Eat plenty of fresh fruits and vegetables. Try to fill one half of your plate at each meal with fruits and vegetables.  Eat whole grains, such as whole-wheat pasta, brown rice, or whole-grain bread. Fill about one  fourth of your plate with whole grains.  Eat or drink low-fat dairy products, such as skim milk or low-fat yogurt.  Avoid fatty cuts of meat, processed or cured meats, and poultry with skin. Fill about one fourth of your plate with lean proteins, such as fish, chicken without skin, beans, eggs, or tofu.  Avoid pre-made and processed foods. These tend to be higher in sodium, added sugar, and fat.  Reduce your daily sodium intake. Many people with hypertension should eat less than 1,500 mg of sodium a day.  Do not drink alcohol if:  Your health care provider tells you not to drink.  You are pregnant, may be pregnant, or are planning to become pregnant.  If you drink alcohol:  Limit how much you have to:  0-1 drink a day for women.  0-2 drinks a day for men.  Know how much alcohol is in your drink. In the [place] one drink equals one 12 oz bottle of beer (355 mL), one 5 oz glass of wine (148 mL), or one 1½ oz glass of hard liquor (44 mL).  Lifestyle    Work with your health care provider to maintain a healthy body weight or to lose weight. Ask what an ideal weight is for you.  Get at least 30 minutes of exercise that causes your heart to beat faster (aerobic exercise) most days of the week. Activities may include walking, swimming, or biking.  Include exercise to strengthen your muscles (resistance exercise), such as Pilates or lifting weights, as part of your weekly exercise routine. Try to do these types of exercises for 30 minutes at least 3 days a week.  Do not use any products that contain nicotine or tobacco. These products include cigarettes, chewing tobacco, and vaping devices, such as e-cigarettes. If you need help quitting, ask your health care provider.  Monitor your blood pressure at home as told by your health care provider.  Keep all follow-up visits. This is important.  Medicines  Take over-the-counter and prescription medicines only as told by your health care provider. Follow directions carefully. Blood  pressure medicines must be taken as prescribed.  Do not skip doses of blood pressure medicine. Doing this puts you at risk for problems and can make the medicine less effective.  Ask your health care provider about side effects or reactions to medicines that you should watch for.  Contact a health care provider if you:  Think you are having a reaction to a medicine you are taking.  Have headaches that keep coming back (recurring).  Feel dizzy.  Have swelling in your ankles.  Have trouble with your vision.  Get help right away if you:  Develop a severe headache or confusion.  Have unusual weakness or numbness.  Feel faint.  Have severe pain in your chest or abdomen.  Vomit repeatedly.  Have trouble breathing.  These symptoms may be an emergency. Get help right away. Call 911.  Do not wait to see if the symptoms will go away.  Do not drive yourself to the hospital.  Summary  Hypertension is when the force of blood pumping through your arteries is too strong. If this condition is not controlled, it may put you at risk for serious complications.  Your personal target blood pressure may vary depending on your medical conditions, your age, and other factors. For most people, a normal blood pressure is less than 120/80.  Hypertension is treated with lifestyle changes, medicines, or a combination of both. Lifestyle changes include losing weight, eating a healthy, low-sodium diet, exercising more, and limiting alcohol.  This information is not intended to replace advice given to you by your health care provider. Make sure you discuss any questions you have with your health care provider.  Document Revised: 10/25/2022 Document Reviewed: 10/25/2022  [employer] Patient Education 2023 [employer]    Medicare Wellness  Personal Prevention Plan of Service     Date of Office Visit:    Encounter Provider:  Tomeka Linares PA-C  Place of Service:  [facility]  Patient Name: Marisol MARTINI Lorena  :   1970    As part of the Medicare Wellness portion of your visit today, we are providing you with this personalized preventive plan of services (PPPS). This plan is based upon recommendations of the [place] Preventive Services Task Force (USPSTF) and the Advisory Committee on Immunization Practices (ACIP).    This lists the preventive care services that should be considered, and provides dates of when you are due. Items listed as completed are up-to-date and do not require any further intervention.    Health Maintenance   Topic Date Due    HEPATITIS C SCREENING  Never done    PAP SMEAR  09/10/2019    TDAP/TD VACCINES (2 - Td or Tdap) 07/15/2020    COLORECTAL CANCER SCREENING  10/18/2023    ZOSTER VACCINE (2 of 2) 12/07/2023    INFLUENZA VACCINE  08/01/2024    MAMMOGRAM  12/04/2024    BMI FOLLOWUP  03/06/2025    LIPID PANEL  03/28/2025    ANNUAL WELLNESS VISIT  04/02/2025    GASTROSCOPY (EGD)  10/18/2025    COVID-19 Vaccine  Completed    Pneumococcal Vaccine 0-64  Aged Out       No orders of the defined types were placed in this encounter.      Return in about 3 months (around 7/2/2024).

## 2024-04-02 NOTE — PROGRESS NOTES
"Subjective   Marisol Carrillo is a 54 y.o. female.     History of Present Illness    Since the last visit, she has overall felt tired.  She has Primary Hypertension and well controlled on current medication, Impaired fasting glucose and will monitor labs to watch for DMII, GERD controlled on PPI Rx, Hyperlipidemia with goals met with current Rx, Hypothyroidism well controlled on current medication and will continue Rx, Seasonal allergies and doing well on their medication , and Vitamin D deficiency and labs are at goal >30 ng/mL.  she has been compliant with current medications have reviewed them.  The patient denies medication side effects.  Will refill medications. /86   Pulse 78   Temp 97.7 °F (36.5 °C)   Resp 16   Ht 162.6 cm (64\")   Wt (!) 150 kg (330 lb)   LMP  (LMP Unknown)   SpO2 96%   BMI 56.64 kg/m² .        Need to exercise in chair  Results for orders placed or performed during the hospital encounter of 02/27/24   Comprehensive Metabolic Panel    Specimen: Blood   Result Value Ref Range    Glucose 119 (H) 65 - 99 mg/dL    BUN 24 (H) 6 - 20 mg/dL    Creatinine 0.93 0.57 - 1.00 mg/dL    Sodium 139 136 - 145 mmol/L    Potassium 4.3 3.5 - 5.2 mmol/L    Chloride 107 98 - 107 mmol/L    CO2 21.0 (L) 22.0 - 29.0 mmol/L    Calcium 9.7 8.6 - 10.5 mg/dL    Total Protein 7.1 6.0 - 8.5 g/dL    Albumin 4.0 3.5 - 5.2 g/dL    ALT (SGPT) 15 1 - 33 U/L    AST (SGOT) 18 1 - 32 U/L    Alkaline Phosphatase 136 (H) 39 - 117 U/L    Total Bilirubin 0.2 0.0 - 1.2 mg/dL    Globulin 3.1 gm/dL    A/G Ratio 1.3 g/dL    BUN/Creatinine Ratio 25.8 (H) 7.0 - 25.0    Anion Gap 11.0 5.0 - 15.0 mmol/L    eGFR 73.6 >60.0 mL/min/1.73   Single High Sensitivity Troponin T    Specimen: Blood   Result Value Ref Range    HS Troponin T <6 <14 ng/L   CBC Auto Differential    Specimen: Blood   Result Value Ref Range    WBC 11.29 (H) 3.40 - 10.80 10*3/mm3    RBC 4.75 3.77 - 5.28 10*6/mm3    Hemoglobin 13.4 12.0 - 15.9 g/dL    Hematocrit " 42.7 34.0 - 46.6 %    MCV 89.9 79.0 - 97.0 fL    MCH 28.2 26.6 - 33.0 pg    MCHC 31.4 (L) 31.5 - 35.7 g/dL    RDW 14.8 12.3 - 15.4 %    RDW-SD 49.5 37.0 - 54.0 fl    MPV 9.5 6.0 - 12.0 fL    Platelets 303 140 - 450 10*3/mm3    Neutrophil % 74.0 42.7 - 76.0 %    Lymphocyte % 17.4 (L) 19.6 - 45.3 %    Monocyte % 6.6 5.0 - 12.0 %    Eosinophil % 1.4 0.3 - 6.2 %    Basophil % 0.4 0.0 - 1.5 %    Immature Grans % 0.2 0.0 - 0.5 %    Neutrophils, Absolute 8.35 (H) 1.70 - 7.00 10*3/mm3    Lymphocytes, Absolute 1.97 0.70 - 3.10 10*3/mm3    Monocytes, Absolute 0.74 0.10 - 0.90 10*3/mm3    Eosinophils, Absolute 0.16 0.00 - 0.40 10*3/mm3    Basophils, Absolute 0.05 0.00 - 0.20 10*3/mm3    Immature Grans, Absolute 0.02 0.00 - 0.05 10*3/mm3   TSH    Specimen: Blood   Result Value Ref Range    TSH 3.620 0.270 - 4.200 uIU/mL   D-dimer, Quantitative    Specimen: Blood   Result Value Ref Range    D-Dimer, Quantitative 0.81 (H) 0.00 - 0.53 MCGFEU/mL   Urinalysis With Culture If Indicated - Urine, Clean Catch    Specimen: Urine, Clean Catch   Result Value Ref Range    Color, UA Yellow Yellow, Straw    Appearance, UA Clear Clear    pH, UA 5.5 5.0 - 8.0    Specific Gravity, UA 1.020 1.005 - 1.030    Glucose, UA Negative Negative    Ketones, UA Negative Negative    Bilirubin, UA Negative Negative    Blood, UA Negative Negative    Protein, UA Negative Negative    Leuk Esterase, UA Trace (A) Negative    Nitrite, UA Negative Negative    Urobilinogen, UA 0.2 E.U./dL 0.2 - 1.0 E.U./dL   Urinalysis, Microscopic Only - Urine, Clean Catch    Specimen: Urine, Clean Catch   Result Value Ref Range    RBC, UA 0-2 None Seen, 0-2 /HPF    WBC, UA 3-5 (A) None Seen, 0-2 /HPF    Bacteria, UA None Seen None Seen /HPF    Squamous Epithelial Cells, UA 0-2 None Seen, 0-2 /HPF    Hyaline Casts, UA None Seen None Seen /LPF    Methodology Automated Microscopy    [place] Urine Culture Tube - Urine, Clean Catch    Specimen: Urine, Clean Catch   Result Value Ref  Range    Extra Tube Hold for add-ons.    ECG 12 Lead Chest Pain   Result Value Ref Range    QT Interval 356 ms    QTC Interval 429 ms   Your complete blood count is in normal range,, iron is stable, kidney functions in normal range.  Glucose and hemoglobin A1c average glucose for 2 to 3 months are in prediabetes range.... A1c increased from last labs.  Cholesterol at goal.  Thyroid labs are in acceptable range.  Vitamin levels in acceptable range.  Will discuss at appointment     She is on iron  Had work up 2019 hematologist after Dx PE----noted from post surgical reason---Provoked----did f/u CTA chest 2019 and no further work up recommended---d/t provoked VTE  She does have low iron stores. ----did had EGD-----she is on iron BID:   Last visit with Dr. Cancino  neurosurgeon was 5/17/2023 noting she is postlaminectomy syndrome, lumbar region--and history of lumbar fusion   PRN Ativan for panic attacks worked.  I added last time  OTHERWISE NORMAL ECG -  Sinus rhythm  Low voltage, precordial leads  No change from previous tracing  Electronically Signed By: Jillian Small ([facility]) 27-Feb-2024 16:17:32  Date and Time of Study: 2024-02-27 10:55:27  Patient had Cardiolite stress test 3/22/2019 noting no myocardial ischemia.      does best on Zoloft for anxiety and depression.  Has been to psych.  Intol Reluxi   she tried Lexapro or Celexa. Failed Cymbalta,Prozac. Has already been on Buspar first; already tried Hydroxyzine       Went to  for URI 3-29-24  Still has and more ear pressure----also sees ENT, DR Billy  On Flonase  The following portions of the patient's history were reviewed and updated as appropriate: allergies, current medications, past family history, past medical history, past social history, past surgical history, and problem list.    Review of Systems   Constitutional:  Positive for fatigue. Negative for diaphoresis.   HENT:  Negative for nosebleeds and trouble swallowing.    Eyes:  Negative for blurred  vision and visual disturbance.   Respiratory:  Negative for choking.    Gastrointestinal:  Negative for blood in stool.   Musculoskeletal:  Positive for arthralgias, back pain and joint swelling.   Allergic/Immunologic: Negative for immunocompromised state.   Neurological:  Negative for facial asymmetry and speech difficulty.   Psychiatric/Behavioral:  Positive for dysphoric mood. Negative for self-injury and suicidal ideas.        Objective   Physical Exam  Vitals and nursing note reviewed.   Constitutional:       General: She is not in acute distress.     Appearance: She is well-developed. She is obese. She is not ill-appearing or toxic-appearing.   HENT:      Head: Normocephalic.      Right Ear: External ear normal.      Left Ear: External ear normal.      Nose: Nose normal.      Mouth/Throat:      Pharynx: Oropharynx is clear.   Eyes:      General: No scleral icterus.     Conjunctiva/sclera: Conjunctivae normal.      Pupils: Pupils are equal, round, and reactive to light.   Neck:      Thyroid: No thyromegaly.   Cardiovascular:      Rate and Rhythm: Normal rate and regular rhythm.      Heart sounds: Normal heart sounds. No murmur heard.  Pulmonary:      Effort: Pulmonary effort is normal. No respiratory distress.      Breath sounds: Normal breath sounds. No rales.   Musculoskeletal:         General: No deformity. Normal range of motion.      Cervical back: Normal range of motion and neck supple.      Right lower leg: Edema present.   Skin:     General: Skin is warm and dry.      Findings: No rash.   Neurological:      General: No focal deficit present.      Mental Status: She is alert and oriented to person, place, and time. Mental status is at baseline.   Psychiatric:         Behavior: Behavior normal.         Thought Content: Thought content normal.         Judgment: Judgment normal.           Assessment & Plan   Diagnoses and all orders for this visit:    1. Primary hypertension (Primary)    2. Vitamin D  deficiency    3. Gastroesophageal reflux disease without esophagitis    4. Impaired fasting glucose    5. Panic attack    6. Mild episode of recurrent major depressive disorder    7. Morbid (severe) obesity due to excess calories    8. Acquired hypothyroidism    Other orders  -     levothyroxine (Synthroid) 88 MCG tablet; Take 1 tablet by mouth Daily. For thyroid; give generic  Dispense: 90 tablet; Refill: 3  -     amoxicillin-clavulanate (AUGMENTIN) 875-125 MG per tablet; Take 1 tablet by mouth Every 12 (Twelve) Hours. One PO BID for infection with food  Dispense: 20 tablet; Refill: 5  -     Semaglutide-Weight Management (Wegovy) 0.5 MG/0.5ML solution auto-injector; Inject 0.5 mL under the skin into the appropriate area as directed 1 (One) Time Per Week. 0.5mg SC weekly X4  Dispense: 2 mL; Refill: 2  -     fluticasone (FLONASE) 50 MCG/ACT nasal spray; Administer 2 sprays in each nostril for each dose once daily for allergies  Dispense: 33.3 mL; Refill: 3  -     amlodipine-olmesartan (RICK) 10-40 MG per tablet; Take 1 tablet by mouth Daily. for blood pressure.  Dispense: 90 tablet; Refill: 1  -     carvedilol (COREG) 12.5 MG tablet; Take 1 tablet by mouth 2 (Two) Times a Day With Meals. For blood pressure  Dispense: 180 tablet; Refill: 1  -     nystatin (MYCOSTATIN) 801124 UNIT/GM powder; Apply  topically to the appropriate area as directed 4 (Four) Times a Day. PRN yeast rash  Dispense: 60 g; Refill: 11  -     sertraline (ZOLOFT) 100 MG tablet; Take 2 tablets by mouth Daily. For anxiety and depression  Dispense: 180 tablet; Refill: 3      Continue iron and just updated labs working well  Increase semaglutide to 0.5 mg she is down 4 pounds and tolerating well just had injection #4  Updated all labs on 3/28/2024  Continue Zoloft that is her best medication for anxiety and depression and Ativan for panic attacks also works well  Marisol Posadas, was seen today.  she was seen for HTN and continue medication,  Imparied fasting glucose and plan follow up labs, diet, and exercise, GERD and will continue on PPI medication, Hyperlipidemia and will continue current medication, Hypothyroidism well controlled, continue medication, Seasonal allergies and is doing well on their medication , and Vitamin D deficiency and supplemented.           Answers submitted by the patient for this visit:  Other (Submitted on 3/26/2024)  Please describe your symptoms.: 6 month check up  Have you had these symptoms before?: Yes  How long have you been having these symptoms?: Greater than 2 weeks  Please list any medications you are currently taking for this condition.: ?  Please describe any probable cause for these symptoms. : ?  Primary Reason for Visit (Submitted on 3/26/2024)  What is the primary reason for your visit?: Other

## 2024-04-02 NOTE — PROGRESS NOTES
The ABCs of the Annual Wellness Visit  Subsequent Medicare Wellness Visit    Subjective      Marisol Carrillo is a 54 y.o. female who presents for a Subsequent Medicare Wellness Visit.    The following portions of the patient's history were reviewed and   updated as appropriate: allergies, current medications, past family history, past medical history, past social history, past surgical history, and problem list.    Compared to one year ago, the patient feels her physical   health is worse.    Compared to one year ago, the patient feels her mental   health is worse.    Recent Hospitalizations:  She was admitted within the past 365 days at [facility].       Current Medical Providers:  Patient Care Team:  Tomeka Linares PA-C as PCP - General (Family Medicine)  Tomeka Linares PA-C as Physician Assistant (Family Medicine)  Rakesh Dumas MD as Surgeon (Neurosurgery)  Jam Ballard MD as Surgeon (General Surgery)  Tomeka Linares PA-C as Referring Physician (Family Medicine)  Serina Gill MD as Consulting Physician (Hematology and Oncology)  Gwen Saldana MD as Consulting Physician (Hematology and Oncology)  Shayne Neves MD as Consulting Physician (Urology)  Kirsten Yanez MD as Consulting Physician (Gastroenterology)  Kalia Chavez/MD Renaldo as Surgeon (Orthopedic Surgery)  Abdon Burns II, MD as Consulting Physician (Psychiatry)    Outpatient Medications Prior to Visit   Medication Sig Dispense Refill    amlodipine-olmesartan (RICK) 10-40 MG per tablet Take 1 tablet by mouth Daily. for blood pressure. 90 tablet 1    atorvastatin (LIPITOR) 40 MG tablet Take 1 tablet by mouth Every Night. For cholesterol 90 tablet 3    azelastine (ASTEPRO) 0.15 % solution nasal spray 1 spray into the nostril(s) as directed by provider Daily. 30 mL 0    carvedilol (COREG) 12.5 MG tablet Take 1 tablet by mouth 2 (Two) Times a Day With Meals. For blood pressure 180 tablet 1    cholecalciferol  (VITAMIN D3) 1000 units tablet Take 1 tablet by mouth Daily.      fluticasone (FLONASE) 50 MCG/ACT nasal spray 2 sprays into the nostril(s) as directed by provider Daily. 16 g 0    folic acid (FOLVITE) 1 MG tablet Take 1 tablet by mouth Daily. 90 tablet 3    levothyroxine (Synthroid) 88 MCG tablet Take 1 tablet by mouth Daily. For thyroid; give generic 90 tablet 3    LORazepam (Ativan) 1 MG tablet 1/2-1 PO every 8 hours as needed panic attacks 10 tablet 0    methylPREDNISolone (MEDROL) 4 MG dose pack Take as directed on package instructions. 21 tablet 0    nystatin (MYCOSTATIN) 423112 UNIT/GM powder Apply  topically to the appropriate area as directed 4 (Four) Times a Day. PRN yeast rash 60 g 11    omeprazole (priLOSEC) 40 MG capsule 1 PO BID For GERD 180 capsule 3    promethazine-dextromethorphan (PROMETHAZINE-DM) 6.25-15 MG/5ML syrup Take 5 mL by mouth 4 (Four) Times a Day As Needed for Cough. 180 mL 0    Semaglutide,0.25 or 0.5MG/DOS, (OZEMPIC) 2 MG/1.5ML solution pen-injector INJECT 0.25ML (25 UNITS ON INSULIN SYRINGE) INTO SKIN ONCE WEEKLY FOR FOUR WEEKS      sertraline (ZOLOFT) 100 MG tablet TAKE 2 TABLETS BY MOUTH EVERY DAY FOR ANXIETY AND DEPRESSION      tiZANidine (ZANAFLEX) 4 MG tablet Take 1 tablet by mouth Every 8 (Eight) Hours As Needed.      vitamin B-12 (CYANOCOBALAMIN) 1000 MCG tablet Take 1 tablet by mouth Daily.      hydrOXYzine (ATARAX) 25 MG tablet TAKE 1 TABLET BY MOUTH EVERY 6 (SIX) HOURS AS NEEDED FOR ANXIETY FOR UP TO 5 DAYS. (Patient not taking: Reported on 4/2/2024)       No facility-administered medications prior to visit.       No opioid medication identified on active medication list. I have reviewed chart for other potential  high risk medication/s and harmful drug interactions in the elderly.        Aspirin is not on active medication list.  Aspirin use is not indicated based on review of current medical condition/s. Risk of harm outweighs potential benefits.  .    Patient Active  "Problem List   Diagnosis    Essential familial hypercholesterolemia    Hypertension    Hypothyroidism    Body mass index (BMI) of 60.0-69.9 in adult    Impaired fasting glucose    Vitamin D deficiency    Lumbosacral radiculopathy    Gastroesophageal reflux disease    Constipation    Diarrhea    Fatigue    Heartburn    Lumbar disc herniation with radiculopathy    Pain in extremity    Anxiety    Depression    DDD (degenerative disc disease), lumbar    Spinal headache    Chronic bilateral low back pain with bilateral sciatica    Disc disease, degenerative, lumbar or lumbosacral    Primary localized osteoarthritis of left knee    History of lumbar fusion    Lumbar spondylolysis    Chronic pain of left knee    S/P total prosthetic knee replacement not using cement, left    History of removal of laparoscopic gastric banding device    Incisional pain    Morbid (severe) obesity due to excess calories    Nausea    Medrano's esophagus without dysplasia    Gastroesophageal reflux disease with esophagitis    Postlaminectomy syndrome, lumbar region    Urinary urgency    Arthritis    Hypercholesterolemia    Posttraumatic stress disorder    Arthritis of knee    Type I or II open fracture of right ankle, initial encounter    History of pulmonary embolism    Bimalleolar fracture of right ankle    Panic attack     Advance Care Planning   Advance Care Planning     Advance Directive is not on file.  ACP discussion was held with the patient during this visit. Patient does not have an advance directive, declines further assistance.     Objective    Vitals:    04/02/24 0840   BP: 104/86   Pulse: 78   Resp: 16   Temp: 97.7 °F (36.5 °C)   SpO2: 96%   Weight: (!) 150 kg (330 lb)   Height: 162.6 cm (64\")   PainSc:   8   PainLoc: Ankle  Comment: Ankle and back pain     Estimated body mass index is 56.64 kg/m² as calculated from the following:    Height as of this encounter: 162.6 cm (64\").    Weight as of this encounter: 150 kg (330 lb).     "       Does the patient have evidence of cognitive impairment?   No    Lab Results   Component Value Date    CHLPL 151 2024    TRIG 122 2024    HDL 57 2024    LDL 72 2024    VLDL 22 2024    HGBA1C 6.2 (H) 2024          HEALTH RISK ASSESSMENT    Smoking Status:  Social History     Tobacco Use   Smoking Status Never   Smokeless Tobacco Never     Alcohol Consumption:  Social History     Substance and Sexual Activity   Alcohol Use Not Currently    Comment: Do not drink alcohol     Fall Risk Screen:    CHINYERE Fall Risk Assessment was completed, and patient is at MODERATE risk for falls. Assessment completed on:2024    Depression Screenin/2/2024     8:00 AM   PHQ-2/PHQ-9 Depression Screening   Little Interest or Pleasure in Doing Things 3-->nearly every day   Feeling Down, Depressed or Hopeless 3-->nearly every day   Trouble Falling or Staying Asleep, or Sleeping Too Much 3-->nearly every day   Feeling Tired or Having Little Energy 3-->nearly every day   Poor Appetite or Overeating 2-->more than half the days   Feeling Bad about Yourself - or that You are a Failure or Have Let Yourself or Your Family Down 3-->nearly every day   Trouble Concentrating on Things, Such as Reading the Newspaper or Watching Television 3-->nearly every day   Moving or Speaking So Slowly that Other People Could Have Noticed? Or the Opposite - Being So Fidgety 2-->more than half the days   Thoughts that You Would be Better Off Dead or of Hurting Yourself in Some Way 0-->not at all   PHQ-9: Brief Depression Severity Measure Score 22   If You Checked Off Any Problems, How Difficult Have These Problems Made It For You to Do Your Work, Take Care of Things at Home, or Get Along with Other People? very difficult       Health Habits and Functional and Cognitive Screenin/2/2024     8:00 AM   Functional & Cognitive Status   Do you have difficulty preparing food and eating? No   Do you have difficulty  bathing yourself, getting dressed or grooming yourself? Yes   Do you have difficulty using the toilet? No   Do you have difficulty moving around from place to place? Yes   Do you have trouble with steps or getting out of a bed or a chair? Yes   Current Diet Well Balanced Diet   Dental Exam Up to date   Eye Exam Up to date   Exercise (times per week) 0 times per week   Current Exercises Include No Regular Exercise   Do you need help using the phone?  No   Are you deaf or do you have serious difficulty hearing?  No   Do you need help to go to places out of walking distance? No   Do you need help shopping? Yes   Do you need help preparing meals?  No   Do you need help with housework?  Yes   Do you need help with laundry? Yes   Do you need help taking your medications? No   Do you need help managing money? No   Do you ever drive or ride in a car without wearing a seat belt? No   Have you felt unusual stress, anger or loneliness in the last month? Yes   Who do you live with? Spouse   If you need help, do you have trouble finding someone available to you? No   Have you been bothered in the last four weeks by sexual problems? No   Do you have difficulty concentrating, remembering or making decisions? Yes       Age-appropriate Screening Schedule:  Refer to the list below for future screening recommendations based on patient's age, sex and/or medical conditions. Orders for these recommended tests are listed in the plan section. The patient has been provided with a written plan.    Health Maintenance   Topic Date Due    HEPATITIS C SCREENING  Never done    PAP SMEAR  09/10/2019    TDAP/TD VACCINES (2 - Td or Tdap) 07/15/2020    ANNUAL WELLNESS VISIT  10/18/2020    COLORECTAL CANCER SCREENING  10/18/2023    ZOSTER VACCINE (2 of 2) 12/07/2023    INFLUENZA VACCINE  08/01/2024    MAMMOGRAM  12/04/2024    BMI FOLLOWUP  03/06/2025    LIPID PANEL  03/28/2025    GASTROSCOPY (EGD)  10/18/2025    COVID-19 Vaccine  Completed     Pneumococcal Vaccine 0-64  Aged Out                  CMS Preventative Services Quick Reference  Risk Factors Identified During Encounter:    Fall Risk-High or Moderate: Discussed Fall Prevention in the home    The above risks/problems have been discussed with the patient.  Pertinent information has been shared with the patient in the After Visit Summary.    There are no diagnoses linked to this encounter.    Follow Up:   Next Medicare Wellness visit to be scheduled in 1 year.      An After Visit Summary and PPPS were made available to the patient.            Answers submitted by the patient for this visit:  Other (Submitted on 3/26/2024)  Please describe your symptoms.: 6 month check up  Have you had these symptoms before?: Yes  How long have you been having these symptoms?: Greater than 2 weeks  Please list any medications you are currently taking for this condition.: ?  Please describe any probable cause for these symptoms. : ?  Primary Reason for Visit (Submitted on 3/26/2024)  What is the primary reason for your visit?: Other

## 2024-05-14 NOTE — PROGRESS NOTES
"Subjective   Patient ID: Marisol Carrillo is a 54 y.o. female is here today for follow-up.    It has been close to 9 years since I fuse this patient at L5-S1.  She still has persistent back pain and bilateral sciatica, but the main issue still tend to be her right ankle.  She got another opinion and it is down to either getting an ankle replacement or being fused.  She is trying to decide that.  I gave her the names of some other foot and ankle orthopedic surgeons that she did see for an opinion other than the orthopedic surgeons at [facility] where she has been going.  She still has the back and bilateral sciatica.  It has not changed.  She still using her walker.  I will continue to follow her for considerations of a spinal cord stimulator at some point.  Or adjacent level disease at L4-L5 if that occurs.  I will see her in 9 months with x-rays.        History of Present Illness    The following portions of the patient's history were reviewed and updated as appropriate: allergies, current medications, past family history, past medical history, past social history, past surgical history, and problem list.    Review of Systems   Constitutional:  Negative for fever.   Musculoskeletal:  Positive for back pain and gait problem.   Neurological:  Negative for weakness and numbness.   All other systems reviewed and are negative.          Objective     Vitals:    05/20/24 0955   BP: 134/76   BP Location: Left arm   Patient Position: Sitting   Cuff Size: Adult   Resp: 20   Weight: (!) 150 kg (330 lb)   Height: 162.6 cm (64.02\")   PainSc:   7     Body mass index is 56.62 kg/m².    Tobacco Use: Low Risk  (5/20/2024)    Patient History     Smoking Tobacco Use: Never     Smokeless Tobacco Use: Never     Passive Exposure: Not on file          Physical Exam  Constitutional:       Appearance: She is well-developed.   HENT:      Head: Normocephalic and atraumatic.   Eyes:      Extraocular Movements: EOM normal.      Conjunctiva/sclera: " Conjunctivae normal.      Pupils: Pupils are equal, round, and reactive to light.   Neck:      Vascular: No carotid bruit.   Neurological:      Mental Status: She is oriented to person, place, and time.      Coordination: Finger-Nose-Finger Test and Heel to Shin Test normal.      Gait: Gait is intact.      Deep Tendon Reflexes:      Reflex Scores:       Tricep reflexes are 2+ on the right side and 2+ on the left side.       Bicep reflexes are 2+ on the right side and 2+ on the left side.       Brachioradialis reflexes are 2+ on the right side and 2+ on the left side.       Patellar reflexes are 2+ on the right side and 2+ on the left side.       Achilles reflexes are 2+ on the right side and 2+ on the left side.  Psychiatric:         Speech: Speech normal.       Neurologic Exam     Mental Status   Oriented to person, place, and time.   Registration of memory: Good recent and remote memory.   Attention: normal. Concentration: normal.   Speech: speech is normal   Level of consciousness: alert  Knowledge: consistent with education.     Cranial Nerves     CN II   Visual fields full to confrontation.   Visual acuity: normal    CN III, IV, VI   Pupils are equal, round, and reactive to light.  Extraocular motions are normal.     CN V   Facial sensation intact.   Right corneal reflex: normal  Left corneal reflex: normal    CN VII   Facial expression full, symmetric.   Right facial weakness: none  Left facial weakness: none    CN VIII   Hearing: intact    CN IX, X   Palate: symmetric    CN XI   Right sternocleidomastoid strength: normal  Left sternocleidomastoid strength: normal    CN XII   Tongue: not atrophic  Tongue deviation: none    Motor Exam   Muscle bulk: normal  Right arm tone: normal  Left arm tone: normal  Right leg tone: normal  Left leg tone: normal    Strength   Strength 5/5 except as noted.   Right iliopsoas: 4/5  Left iliopsoas: 4/5  Right quadriceps: 4/5  Left quadriceps: 4/5  Right hamstrin/5  Left  hamstrin/5  Right glutei: 4/5  Left glutei: 4/5  Right anterior tibial: 4/5  Left anterior tibial: 4/5  Right posterior tibial: 4/5  Left posterior tibial: 4/5  Right peroneal: 4/5  Left peroneal: 4/5  Right gastroc: 4/5  Left gastroc: 4/5    Sensory Exam   Light touch normal.     Gait, Coordination, and Reflexes     Gait  Gait: normal    Coordination   Finger to nose coordination: normal  Heel to shin coordination: normal    Reflexes   Right brachioradialis: 2+  Left brachioradialis: 2+  Right biceps: 2+  Left biceps: 2+  Right triceps: 2+  Left triceps: 2+  Right patellar: 2+  Left patellar: 2+  Right achilles: 2+  Left achilles: 2+  Right : 2+  Left : 2+          Assessment & Plan   Independent Review of Radiographic Studies:      I personally reviewed the images from the following studies.    I reviewed the MRI done on 3/29/2022 as well as the x-rays.  The fusion site is stable with no problems with the hardware.  There seems to be a little bit of progression of the stenosis at L4-L5 compared to 2018 but it is fairly stable compared to 2020.  Agree with the report.     Medical Decision Making:      Continue working with the orthopedic surgeons on her right ankle and make a decision about whether or not she will be fused versus an ankle replacement.  The situation with the back is still on hold for now.  Will see her in 9 months with x-rays.  If the situation worsens in terms of her sciatica, she will let us know.        Diagnoses and all orders for this visit:    1. Postlaminectomy syndrome, lumbar region (Primary)    2. History of lumbar fusion  -     XR Spine Lumbar Complete With Flex & Ext; Future    3. DDD (degenerative disc disease), lumbar      Return in about 9 months (around 2025) for Face-to-face.

## 2024-05-20 ENCOUNTER — OFFICE VISIT (OUTPATIENT)
Dept: NEUROSURGERY | Facility: CLINIC | Age: 54
End: 2024-05-20
Payer: COMMERCIAL

## 2024-05-20 VITALS
HEIGHT: 64 IN | RESPIRATION RATE: 20 BRPM | SYSTOLIC BLOOD PRESSURE: 134 MMHG | WEIGHT: 293 LBS | DIASTOLIC BLOOD PRESSURE: 76 MMHG | BODY MASS INDEX: 50.02 KG/M2

## 2024-05-20 DIAGNOSIS — Z98.1 HISTORY OF LUMBAR FUSION: ICD-10-CM

## 2024-05-20 DIAGNOSIS — M51.36 DDD (DEGENERATIVE DISC DISEASE), LUMBAR: ICD-10-CM

## 2024-05-20 DIAGNOSIS — M96.1 POSTLAMINECTOMY SYNDROME, LUMBAR REGION: Primary | ICD-10-CM

## 2024-05-20 PROCEDURE — 99213 OFFICE O/P EST LOW 20 MIN: CPT | Performed by: NEUROLOGICAL SURGERY

## 2024-05-23 ENCOUNTER — PATIENT MESSAGE (OUTPATIENT)
Dept: FAMILY MEDICINE CLINIC | Facility: CLINIC | Age: 54
End: 2024-05-23
Payer: MEDICARE

## 2024-05-23 RX ORDER — SEMAGLUTIDE 1 MG/.5ML
1 INJECTION, SOLUTION SUBCUTANEOUS WEEKLY
Qty: 2 ML | Refills: 5 | Status: SHIPPED | OUTPATIENT
Start: 2024-05-23

## 2024-05-23 NOTE — TELEPHONE ENCOUNTER
From: Marisol Carrillo  To: Tomeka Linares  Sent: 5/23/2024 3:32 PM EDT  Subject: Semaglutide increase    I have been taking .5 ml semaglutide weekly for about a month. I have lost 15 lbs in 10 weeks. I am ready to increase my weekly dose. Can you send in the next increase dose prescription to [facility] please?  Thank you!

## 2024-07-25 LAB
25(OH)D3+25(OH)D2 SERPL-MCNC: 35.5 NG/ML (ref 30–100)
ALBUMIN SERPL-MCNC: 4 G/DL (ref 3.5–5.2)
ALBUMIN/GLOB SERPL: 1.4 G/DL
ALP SERPL-CCNC: 108 U/L (ref 39–117)
ALT SERPL-CCNC: 14 U/L (ref 1–33)
APPEARANCE UR: CLEAR
AST SERPL-CCNC: 17 U/L (ref 1–32)
BACTERIA #/AREA URNS HPF: NORMAL /HPF
BASOPHILS # BLD AUTO: 0.06 10*3/MM3 (ref 0–0.2)
BASOPHILS NFR BLD AUTO: 0.7 % (ref 0–1.5)
BILIRUB SERPL-MCNC: 0.3 MG/DL (ref 0–1.2)
BILIRUB UR QL STRIP: NEGATIVE
BUN SERPL-MCNC: 17 MG/DL (ref 6–20)
BUN/CREAT SERPL: 21 (ref 7–25)
CALCIUM SERPL-MCNC: 9.5 MG/DL (ref 8.6–10.5)
CASTS URNS MICRO: NORMAL
CHLORIDE SERPL-SCNC: 104 MMOL/L (ref 98–107)
CHOLEST SERPL-MCNC: 174 MG/DL (ref 0–200)
CO2 SERPL-SCNC: 25.8 MMOL/L (ref 22–29)
COLOR UR: ABNORMAL
CREAT SERPL-MCNC: 0.81 MG/DL (ref 0.57–1)
EGFRCR SERPLBLD CKD-EPI 2021: 86.9 ML/MIN/1.73
EOSINOPHIL # BLD AUTO: 0.25 10*3/MM3 (ref 0–0.4)
EOSINOPHIL NFR BLD AUTO: 2.9 % (ref 0.3–6.2)
EPI CELLS #/AREA URNS HPF: NORMAL /HPF
ERYTHROCYTE [DISTWIDTH] IN BLOOD BY AUTOMATED COUNT: 15.8 % (ref 12.3–15.4)
FERRITIN SERPL-MCNC: 29 NG/ML (ref 13–150)
FOLATE SERPL-MCNC: 9.27 NG/ML (ref 4.78–24.2)
GLOBULIN SER CALC-MCNC: 2.8 GM/DL
GLUCOSE SERPL-MCNC: 98 MG/DL (ref 65–99)
GLUCOSE UR QL STRIP: NEGATIVE
HBA1C MFR BLD: 5.7 % (ref 4.8–5.6)
HCT VFR BLD AUTO: 40.5 % (ref 34–46.6)
HDLC SERPL-MCNC: 59 MG/DL (ref 40–60)
HGB BLD-MCNC: 13.1 G/DL (ref 12–15.9)
HGB UR QL STRIP: NEGATIVE
IMM GRANULOCYTES # BLD AUTO: 0.01 10*3/MM3 (ref 0–0.05)
IMM GRANULOCYTES NFR BLD AUTO: 0.1 % (ref 0–0.5)
IRON SATN MFR SERPL: 13 % (ref 20–50)
IRON SERPL-MCNC: 59 MCG/DL (ref 37–145)
KETONES UR QL STRIP: NEGATIVE
LDLC SERPL CALC-MCNC: 97 MG/DL (ref 0–100)
LEUKOCYTE ESTERASE UR QL STRIP: ABNORMAL
LYMPHOCYTES # BLD AUTO: 1.93 10*3/MM3 (ref 0.7–3.1)
LYMPHOCYTES NFR BLD AUTO: 22.7 % (ref 19.6–45.3)
MAGNESIUM SERPL-MCNC: 2.2 MG/DL (ref 1.6–2.6)
MCH RBC QN AUTO: 28.7 PG (ref 26.6–33)
MCHC RBC AUTO-ENTMCNC: 32.3 G/DL (ref 31.5–35.7)
MCV RBC AUTO: 88.6 FL (ref 79–97)
MONOCYTES # BLD AUTO: 0.7 10*3/MM3 (ref 0.1–0.9)
MONOCYTES NFR BLD AUTO: 8.2 % (ref 5–12)
NEUTROPHILS # BLD AUTO: 5.56 10*3/MM3 (ref 1.7–7)
NEUTROPHILS NFR BLD AUTO: 65.4 % (ref 42.7–76)
NITRITE UR QL STRIP: NEGATIVE
NRBC BLD AUTO-RTO: 0 /100 WBC (ref 0–0.2)
PH UR STRIP: 6 [PH] (ref 5–8)
PLATELET # BLD AUTO: 301 10*3/MM3 (ref 140–450)
POTASSIUM SERPL-SCNC: 4.5 MMOL/L (ref 3.5–5.2)
PROT SERPL-MCNC: 6.8 G/DL (ref 6–8.5)
PROT UR QL STRIP: NEGATIVE
RBC # BLD AUTO: 4.57 10*6/MM3 (ref 3.77–5.28)
RBC #/AREA URNS HPF: NORMAL /HPF
SODIUM SERPL-SCNC: 140 MMOL/L (ref 136–145)
SP GR UR STRIP: 1.02 (ref 1–1.03)
T4 FREE SERPL-MCNC: 1.31 NG/DL (ref 0.92–1.68)
TIBC SERPL-MCNC: 459 MCG/DL
TRIGL SERPL-MCNC: 98 MG/DL (ref 0–150)
TSH SERPL DL<=0.005 MIU/L-ACNC: 2.73 UIU/ML (ref 0.27–4.2)
UIBC SERPL-MCNC: 400 MCG/DL (ref 112–346)
UROBILINOGEN UR STRIP-MCNC: ABNORMAL MG/DL
VIT B12 SERPL-MCNC: >2000 PG/ML (ref 211–946)
VLDLC SERPL CALC-MCNC: 18 MG/DL (ref 5–40)
WBC # BLD AUTO: 8.51 10*3/MM3 (ref 3.4–10.8)
WBC #/AREA URNS HPF: NORMAL /HPF

## 2024-07-26 ENCOUNTER — OFFICE VISIT (OUTPATIENT)
Dept: FAMILY MEDICINE CLINIC | Facility: CLINIC | Age: 54
End: 2024-07-26
Payer: COMMERCIAL

## 2024-07-26 VITALS
OXYGEN SATURATION: 96 % | HEART RATE: 90 BPM | BODY MASS INDEX: 50.02 KG/M2 | SYSTOLIC BLOOD PRESSURE: 124 MMHG | RESPIRATION RATE: 16 BRPM | HEIGHT: 64 IN | TEMPERATURE: 97.3 F | WEIGHT: 293 LBS | DIASTOLIC BLOOD PRESSURE: 86 MMHG

## 2024-07-26 DIAGNOSIS — E03.9 ACQUIRED HYPOTHYROIDISM: Chronic | ICD-10-CM

## 2024-07-26 DIAGNOSIS — Z01.818 PREOPERATIVE CLEARANCE: Primary | ICD-10-CM

## 2024-07-26 DIAGNOSIS — F33.0 MILD EPISODE OF RECURRENT MAJOR DEPRESSIVE DISORDER: Chronic | ICD-10-CM

## 2024-07-26 DIAGNOSIS — E78.00 HYPERCHOLESTEROLEMIA: Chronic | ICD-10-CM

## 2024-07-26 DIAGNOSIS — R73.01 IMPAIRED FASTING GLUCOSE: Chronic | ICD-10-CM

## 2024-07-26 DIAGNOSIS — F41.9 ANXIETY: Chronic | ICD-10-CM

## 2024-07-26 DIAGNOSIS — K21.9 GASTROESOPHAGEAL REFLUX DISEASE WITHOUT ESOPHAGITIS: Chronic | ICD-10-CM

## 2024-07-26 DIAGNOSIS — I10 PRIMARY HYPERTENSION: Chronic | ICD-10-CM

## 2024-07-26 DIAGNOSIS — E55.9 VITAMIN D DEFICIENCY: Chronic | ICD-10-CM

## 2024-07-26 PROCEDURE — 93000 ELECTROCARDIOGRAM COMPLETE: CPT | Performed by: PHYSICIAN ASSISTANT

## 2024-07-26 PROCEDURE — 99214 OFFICE O/P EST MOD 30 MIN: CPT | Performed by: PHYSICIAN ASSISTANT

## 2024-07-26 RX ORDER — CARVEDILOL 12.5 MG/1
12.5 TABLET ORAL 2 TIMES DAILY WITH MEALS
Qty: 180 TABLET | Refills: 1 | Status: SHIPPED | OUTPATIENT
Start: 2024-07-26

## 2024-07-26 RX ORDER — OMEPRAZOLE 40 MG/1
CAPSULE, DELAYED RELEASE ORAL
Qty: 180 CAPSULE | Refills: 3 | Status: SHIPPED | OUTPATIENT
Start: 2024-07-26

## 2024-07-26 RX ORDER — AMLODIPINE AND OLMESARTAN MEDOXOMIL 10; 40 MG/1; MG/1
1 TABLET ORAL DAILY
Qty: 90 TABLET | Refills: 1 | Status: SHIPPED | OUTPATIENT
Start: 2024-07-26

## 2024-07-26 NOTE — PROGRESS NOTES
Procedure     ECG 12 Lead    Date/Time: 7/26/2024 11:51 AM  Performed by: Tomeka Linares PA-C    Authorized by: Tomeka Linares PA-C  Comparison: compared with previous ECG   Rhythm: sinus arrhythmia  Rate: normal  BPM: 77  Conduction: conduction normal  ST Segments: ST segments normal  T Waves: T waves normal  QRS axis: normal  Other: no other findings    Clinical impression: normal ECG  Comments: EKG Interpretation Report    Heart rate:    77 beats/min, LA interval:  136 msec, QRS duration:  86 msec  QTu:412 msec, QTc:  466 msec               Answers submitted by the patient for this visit:  Other (Submitted on 7/24/2024)  Please describe your symptoms.: Rt ankle not healing  Have you had these symptoms before?: Yes  How long have you been having these symptoms?: Greater than 2 weeks  Please list any medications you are currently taking for this condition.: Meloxican  Please describe any probable cause for these symptoms. : Broken ankle  Primary Reason for Visit (Submitted on 7/24/2024)  What is the primary reason for your visit?: Other

## 2024-07-26 NOTE — PROGRESS NOTES
"Subjective   Marisol Carrillo is a 54 y.o. female.     History of Present Illness    Since the last visit, she has overall felt tired.  She has Primary Hypertension and well controlled on current medication, Impaired fasting glucose and will monitor labs to watch for DMII, GERD controlled on PPI Rx, Hyperlipidemia with goals met with current Rx, Hypothyroidism well controlled on current medication and will continue Rx, and Vitamin D deficiency and labs are at goal >30 ng/mL.  she has been compliant with current medications have reviewed them.  The patient denies medication side effects.  Will refill medications. /86   Pulse 90   Temp 97.3 °F (36.3 °C)   Resp 16   Ht 162.6 cm (64.02\")   Wt (!) 144 kg (318 lb)   LMP  (LMP Unknown)   SpO2 96%   BMI 54.55 kg/m² .      I am seeing her today for preop clearance and also following up on labs in our last visit from 4-24 remain controlled on her medications goals were met with mixed hyperlipidemia, GERD, hypothyroidism, continued on Zoloft for anxiety and depression and Ativan for panic attacks.  Increased her semaglutide for treatment of obesity and this medication will have to be held at least a week before surgery.  Also noted   down 20lbs since March---started semaglutide  She is on iron  Had work up 2019 hematologist after Dx PE----noted from post surgical reason---Provoked----did f/u CTA chest 2019 and no further work up recommended---d/t provoked VTE  She does have low iron stores. ----did had EGD-----she is on iron BID:   Last visit with Dr. Cancino  neurosurgeon was 5/17/2023 noting she is postlaminectomy syndrome, lumbar region--and history of lumbar fusion   PRN Ativan for panic attacks worked.  I added last time  Was seen by neurosurgery Dr. Dumas postlaminectomy follow-up no changes were made to medications and any further spine interventions on hold until she has her ankle replacement with orders for follow-up in 9 months with x-rays.  Had " lapband removed 10-18-22 DR Hartman  Results for orders placed or performed in visit on 07/24/24   Comprehensive Metabolic Panel   Result Value Ref Range    Glucose 98 65 - 99 mg/dL    BUN 17 6 - 20 mg/dL    Creatinine 0.81 0.57 - 1.00 mg/dL    EGFR Result 86.9 >60.0 mL/min/1.73    BUN/Creatinine Ratio 21.0 7.0 - 25.0    Sodium 140 136 - 145 mmol/L    Potassium 4.5 3.5 - 5.2 mmol/L    Chloride 104 98 - 107 mmol/L    Total CO2 25.8 22.0 - 29.0 mmol/L    Calcium 9.5 8.6 - 10.5 mg/dL    Total Protein 6.8 6.0 - 8.5 g/dL    Albumin 4.0 3.5 - 5.2 g/dL    Globulin 2.8 gm/dL    A/G Ratio 1.4 g/dL    Total Bilirubin 0.3 0.0 - 1.2 mg/dL    Alkaline Phosphatase 108 39 - 117 U/L    AST (SGOT) 17 1 - 32 U/L    ALT (SGPT) 14 1 - 33 U/L   Microscopic Examination -   Result Value Ref Range    WBC, UA 0-2 /HPF    RBC, UA 0-2 /HPF    Epithelial Cells (non renal) 0-2 /HPF    Cast Type Comment     Bacteria, UA Comment None Seen /HPF   Lipid Panel   Result Value Ref Range    Total Cholesterol 174 0 - 200 mg/dL    Triglycerides 98 0 - 150 mg/dL    HDL Cholesterol 59 40 - 60 mg/dL    VLDL Cholesterol Miguel Ángel 18 5 - 40 mg/dL    LDL Chol Calc (NIH) 97 0 - 100 mg/dL   Iron Profile   Result Value Ref Range    TIBC 459 mcg/dL    UIBC 400 (H) 112 - 346 mcg/dL    Iron 59 37 - 145 mcg/dL    Iron Saturation 13 (L) 20 - 50 %   Hemoglobin A1c   Result Value Ref Range    Hemoglobin A1C 5.70 (H) 4.80 - 5.60 %   T4, Free   Result Value Ref Range    Free T4 1.31 0.92 - 1.68 ng/dL   Folate   Result Value Ref Range    Folate 9.27 4.78 - 24.20 ng/mL   TSH   Result Value Ref Range    TSH 2.730 0.270 - 4.200 uIU/mL   Vitamin D,25-Hydroxy   Result Value Ref Range    25 Hydroxy, Vitamin D 35.5 30.0 - 100.0 ng/ml   Vitamin B12   Result Value Ref Range    Vitamin B-12 >2000 (H) 211 - 946 pg/mL   Magnesium   Result Value Ref Range    Magnesium 2.2 1.6 - 2.6 mg/dL   Ferritin   Result Value Ref Range    Ferritin 29.00 13.00 - 150.00 ng/mL   CBC & Differential   Result  Value Ref Range    WBC 8.51 3.40 - 10.80 10*3/mm3    RBC 4.57 3.77 - 5.28 10*6/mm3    Hemoglobin 13.1 12.0 - 15.9 g/dL    Hematocrit 40.5 34.0 - 46.6 %    MCV 88.6 79.0 - 97.0 fL    MCH 28.7 26.6 - 33.0 pg    MCHC 32.3 31.5 - 35.7 g/dL    RDW 15.8 (H) 12.3 - 15.4 %    Platelets 301 140 - 450 10*3/mm3    Neutrophil Rel % 65.4 42.7 - 76.0 %    Lymphocyte Rel % 22.7 19.6 - 45.3 %    Monocyte Rel % 8.2 5.0 - 12.0 %    Eosinophil Rel % 2.9 0.3 - 6.2 %    Basophil Rel % 0.7 0.0 - 1.5 %    Neutrophils Absolute 5.56 1.70 - 7.00 10*3/mm3    Lymphocytes Absolute 1.93 0.70 - 3.10 10*3/mm3    Monocytes Absolute 0.70 0.10 - 0.90 10*3/mm3    Eosinophils Absolute 0.25 0.00 - 0.40 10*3/mm3    Basophils Absolute 0.06 0.00 - 0.20 10*3/mm3    Immature Granulocyte Rel % 0.1 0.0 - 0.5 %    Immature Grans Absolute 0.01 0.00 - 0.05 10*3/mm3    nRBC 0.0 0.0 - 0.2 /100 WBC   Urinalysis With Microscopic -   Result Value Ref Range    Specific Gravity, UA 1.021 1.005 - 1.030    pH, UA 6.0 5.0 - 8.0    Color, UA See below: (A)     Appearance, UA Clear Clear    Leukocytes, UA See below: (A) Negative    Protein Negative Negative    Glucose, UA Negative Negative    Ketones Negative Negative    Blood, UA Negative Negative    Bilirubin, UA Negative Negative    Urobilinogen, UA Comment     Nitrite, UA Negative Negative   Thyroid labs remain at goal and continue levothyroxine.  Hemoglobin in baselines and blood count in baselines.  Serum iron level did improve from last labs.  TIBC remains mildly elevated and saturation of iron is low we will continue iron supplement.  Continue to monitor with labs every 6 months.  Vitamin levels were all at goal and continue taking.  Hemoglobin A1c decreased from 6.2% to 5.7%.  Excellent job.  Negative urine.  Normal kidney and liver functions. Other labs are in range.    She has been seeing Ortho and has surgical intervention for right ankle ordered for 9/3/2024 by Dr. Carrero        I have an appointment with  you on August 13 for check up. I have lab orders which I will have that done before I see you. I just found out I am having an ankle fusion on my right ankle on September 3 with Dr Hari Jiménez with [facility]. I have attached the surgery clearance for you to fill out which I will bring with me when I see you. I am not sure what tests that you would request me have prior to surgery. On February 27 this year, I had a panic attack and had chest xray and EKG and other tests. Are those tests too old? I tried to get a sooner appointment with you but nothing available. I’m concerned any testing you request would not be able to be done in time for September 3.   The following portions of the patient's history were reviewed and updated as appropriate: allergies, current medications, past family history, past medical history, past social history, past surgical history, and problem list.    Review of Systems   Constitutional:  Negative for diaphoresis.   HENT:  Negative for nosebleeds and trouble swallowing.    Eyes:  Negative for blurred vision and visual disturbance.   Respiratory:  Negative for choking.    Gastrointestinal:  Negative for blood in stool.   Musculoskeletal:  Positive for arthralgias.   Allergic/Immunologic: Negative for immunocompromised state.   Neurological:  Negative for facial asymmetry and speech difficulty.   Psychiatric/Behavioral:  Negative for self-injury and suicidal ideas.        Objective   Physical Exam  Vitals and nursing note reviewed.   Constitutional:       General: She is not in acute distress.     Appearance: She is well-developed. She is obese. She is not ill-appearing or toxic-appearing.   HENT:      Head: Normocephalic.      Right Ear: External ear normal.      Left Ear: External ear normal.      Nose: Nose normal.      Mouth/Throat:      Pharynx: Oropharynx is clear.   Eyes:      General: No scleral icterus.     Conjunctiva/sclera: Conjunctivae normal.      Pupils: Pupils are equal, round,  and reactive to light.   Neck:      Thyroid: No thyromegaly.   Cardiovascular:      Rate and Rhythm: Normal rate and regular rhythm.      Heart sounds: Normal heart sounds. No murmur heard.  Pulmonary:      Effort: Pulmonary effort is normal. No respiratory distress.      Breath sounds: Normal breath sounds. No rales.   Musculoskeletal:         General: No deformity. Normal range of motion.      Cervical back: Normal range of motion and neck supple.      Right lower leg: Edema present.      Left lower leg: Edema present.      Comments: R>L   Skin:     General: Skin is warm and dry.      Findings: No rash.   Neurological:      General: No focal deficit present.      Mental Status: She is alert and oriented to person, place, and time. Mental status is at baseline.   Psychiatric:         Behavior: Behavior normal.         Thought Content: Thought content normal.         Judgment: Judgment normal.           Assessment & Plan   Diagnoses and all orders for this visit:    1. Preoperative clearance (Primary)  -     ECG 12 Lead    2. Primary hypertension    3. Hypercholesterolemia    4. Impaired fasting glucose    5. Gastroesophageal reflux disease without esophagitis    6. Anxiety    7. Mild episode of recurrent major depressive disorder    8. Acquired hypothyroidism    9. Vitamin D deficiency    Other orders  -     amlodipine-olmesartan (RICK) 10-40 MG per tablet; Take 1 tablet by mouth Daily. for blood pressure.  Dispense: 90 tablet; Refill: 1  -     carvedilol (COREG) 12.5 MG tablet; Take 1 tablet by mouth 2 (Two) Times a Day With Meals. For blood pressure  Dispense: 180 tablet; Refill: 1  -     omeprazole (priLOSEC) 40 MG capsule; 1 PO BID For GERD  Dispense: 180 capsule; Refill: 3        Plan, Marisol Carrillo, was seen today.  she was seen for HTN and continue medication, Imparied fasting glucose and plan follow up labs, diet, and exercise, GERD and will continue on PPI medication, Hyperlipidemia and will continue  current medication, Hypothyroidism well controlled, continue medication, and Vitamin D deficiency and supplemented.  Patient will have to hold her semaglutide at least a week prior to surgery due concern for increased risk of aspiration.  Continue iron to maintain iron stores.  Hemoglobin in range.  Hemoglobin A1c much improved on semaglutide.  Can reduce oral B12 to twice weekly  Past hx VTE---do recommend prophylactic anticoagulation after surgery and Ortho aware  Prophylacus           Answers submitted by the patient for this visit:  Other (Submitted on 7/24/2024)  Please describe your symptoms.: Rt ankle not healing  Have you had these symptoms before?: Yes  How long have you been having these symptoms?: Greater than 2 weeks  Please list any medications you are currently taking for this condition.: Meloxican  Please describe any probable cause for these symptoms. : Broken ankle  Primary Reason for Visit (Submitted on 7/24/2024)  What is the primary reason for your visit?: Other

## 2024-07-30 ENCOUNTER — HOSPITAL ENCOUNTER (EMERGENCY)
Facility: HOSPITAL | Age: 54
Discharge: HOME | End: 2024-07-30
Attending: STUDENT IN AN ORGANIZED HEALTH CARE EDUCATION/TRAINING PROGRAM
Payer: COMMERCIAL

## 2024-07-30 ENCOUNTER — APPOINTMENT (OUTPATIENT)
Dept: GENERAL RADIOLOGY | Facility: HOSPITAL | Age: 54
End: 2024-07-30
Payer: MEDICARE

## 2024-07-30 VITALS
BODY MASS INDEX: 50.02 KG/M2 | WEIGHT: 293 LBS | OXYGEN SATURATION: 94 % | HEART RATE: 88 BPM | DIASTOLIC BLOOD PRESSURE: 86 MMHG | TEMPERATURE: 99.4 F | HEIGHT: 64 IN | RESPIRATION RATE: 18 BRPM | SYSTOLIC BLOOD PRESSURE: 146 MMHG

## 2024-07-30 DIAGNOSIS — S39.012A ACUTE MYOFASCIAL STRAIN OF LUMBAR REGION, INITIAL ENCOUNTER: Primary | ICD-10-CM

## 2024-07-30 DIAGNOSIS — R93.7 ABNORMAL X-RAY OF LUMBAR SPINE: ICD-10-CM

## 2024-07-30 PROCEDURE — 25010000002 KETOROLAC TROMETHAMINE PER 15 MG: Performed by: PHYSICIAN ASSISTANT

## 2024-07-30 PROCEDURE — 96372 THER/PROPH/DIAG INJ SC/IM: CPT

## 2024-07-30 PROCEDURE — 99283 EMERGENCY DEPT VISIT LOW MDM: CPT

## 2024-07-30 PROCEDURE — 72110 X-RAY EXAM L-2 SPINE 4/>VWS: CPT

## 2024-07-30 RX ORDER — METHOCARBAMOL 750 MG/1
750 TABLET, FILM COATED ORAL ONCE
Status: COMPLETED | OUTPATIENT
Start: 2024-07-30 | End: 2024-07-30

## 2024-07-30 RX ORDER — NAPROXEN 500 MG/1
500 TABLET ORAL 2 TIMES DAILY PRN
Qty: 15 TABLET | Refills: 0 | Status: SHIPPED | OUTPATIENT
Start: 2024-07-30

## 2024-07-30 RX ORDER — METHOCARBAMOL 750 MG/1
750 TABLET, FILM COATED ORAL 3 TIMES DAILY PRN
Qty: 15 TABLET | Refills: 0 | Status: SHIPPED | OUTPATIENT
Start: 2024-07-30

## 2024-07-30 RX ORDER — KETOROLAC TROMETHAMINE 30 MG/ML
30 INJECTION, SOLUTION INTRAMUSCULAR; INTRAVENOUS ONCE
Status: COMPLETED | OUTPATIENT
Start: 2024-07-30 | End: 2024-07-30

## 2024-07-30 RX ORDER — OXYCODONE HYDROCHLORIDE AND ACETAMINOPHEN 5; 325 MG/1; MG/1
1 TABLET ORAL ONCE
Status: COMPLETED | OUTPATIENT
Start: 2024-07-30 | End: 2024-07-30

## 2024-07-30 RX ORDER — LIDOCAINE 50 MG/G
1 PATCH TOPICAL EVERY 24 HOURS
Qty: 6 PATCH | Refills: 0 | Status: SHIPPED | OUTPATIENT
Start: 2024-07-30

## 2024-07-30 RX ORDER — LIDOCAINE 4 G/G
1 PATCH TOPICAL ONCE
Status: DISCONTINUED | OUTPATIENT
Start: 2024-07-30 | End: 2024-07-30 | Stop reason: HOSPADM

## 2024-07-30 RX ADMIN — KETOROLAC TROMETHAMINE 30 MG: 30 INJECTION, SOLUTION INTRAMUSCULAR at 18:47

## 2024-07-30 RX ADMIN — LIDOCAINE 1 PATCH: 4 PATCH TOPICAL at 18:45

## 2024-07-30 RX ADMIN — METHOCARBAMOL TABLETS 750 MG: 750 TABLET, COATED ORAL at 18:45

## 2024-07-30 RX ADMIN — OXYCODONE HYDROCHLORIDE AND ACETAMINOPHEN 1 TABLET: 5; 325 TABLET ORAL at 19:59
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
## 2024-08-06 ENCOUNTER — OFFICE VISIT (OUTPATIENT)
Dept: FAMILY MEDICINE CLINIC | Facility: CLINIC | Age: 54
End: 2024-08-06
Payer: COMMERCIAL

## 2024-08-06 VITALS
OXYGEN SATURATION: 97 % | BODY MASS INDEX: 50.02 KG/M2 | HEIGHT: 64 IN | TEMPERATURE: 98.2 F | WEIGHT: 293 LBS | DIASTOLIC BLOOD PRESSURE: 76 MMHG | HEART RATE: 100 BPM | SYSTOLIC BLOOD PRESSURE: 140 MMHG

## 2024-08-06 DIAGNOSIS — F41.9 ACUTE ANXIETY: ICD-10-CM
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
## 2024-08-06 PROCEDURE — 99213 OFFICE O/P EST LOW 20 MIN: CPT | Performed by: PHYSICIAN ASSISTANT

## 2024-08-06 RX ORDER — TIZANIDINE 4 MG/1
TABLET ORAL
Qty: 60 TABLET | Refills: 5 | Status: SHIPPED | OUTPATIENT
Start: 2024-08-06

## 2024-08-06 RX ORDER — TIZANIDINE 4 MG/1
4 TABLET ORAL EVERY 12 HOURS PRN
COMMUNITY
Start: 2024-07-31 | End: 2024-08-06 | Stop reason: SDUPTHER

## 2024-08-06 RX ORDER — HYDROXYZINE HYDROCHLORIDE 10 MG/1
TABLET, FILM COATED ORAL
Qty: 60 TABLET | Refills: 5 | Status: SHIPPED | OUTPATIENT
Start: 2024-08-06

## 2024-09-30 RX ORDER — HYDROXYZINE HYDROCHLORIDE 10 MG/1
TABLET, FILM COATED ORAL
Qty: 60 TABLET | Refills: 5 | Status: SHIPPED | OUTPATIENT
Start: 2024-09-30

## 2024-10-26 RX ORDER — CARVEDILOL 12.5 MG/1
12.5 TABLET ORAL 2 TIMES DAILY WITH MEALS
Qty: 180 TABLET | Refills: 1 | Status: SHIPPED | OUTPATIENT
Start: 2024-10-26

## 2024-11-25 RX ORDER — NYSTATIN 100000 [USP'U]/G
POWDER TOPICAL 4 TIMES DAILY
Qty: 60 G | Refills: 11 | Status: SHIPPED | OUTPATIENT
Start: 2024-11-25

## 2024-11-25 RX ORDER — HYDROXYZINE HYDROCHLORIDE 10 MG/1
TABLET, FILM COATED ORAL
Qty: 60 TABLET | Refills: 5 | Status: SHIPPED | OUTPATIENT
Start: 2024-11-25

## 2024-12-11 ENCOUNTER — TELEPHONE (OUTPATIENT)
Dept: NEUROSURGERY | Facility: CLINIC | Age: 54
End: 2024-12-11

## 2025-01-16 RX ORDER — HYDROXYZINE HYDROCHLORIDE 10 MG/1
TABLET, FILM COATED ORAL
Qty: 60 TABLET | Refills: 5 | Status: SHIPPED | OUTPATIENT
Start: 2025-01-16

## 2025-01-21 NOTE — PROGRESS NOTES
Subjective   Patient ID: Marisol Carrillo is a 55 y.o. female is here today for follow-up regarding her XR Spine Lumbar     Is been about 9-month since have seen her in about 10 years since she underwent an L5-S1 fusion.  She eventually did get her right foot and ankle fused by Dr. Carrero and seems to be recovering well from that.  She is no longer nonweightbearing is starting physical therapy.  She still continues to have her back pain and bilateral sciatica, worse on the left.  But we have been letting her deal with all of her other orthopedic problems before considering the spinal cord stimulator which we had discussed a few years ago.  The knees are actually doing fairly well.  She had been reoperated on by Dr. Chavez and seems to be doing well from that.  Will see her again in 9 months and we can decide what to do about her low back, it would require getting another MRI since the last 1 is done in 2023.      History of Present Illness    The following portions of the patient's history were reviewed and updated as appropriate: allergies, current medications, past family history, past medical history, past social history, past surgical history, and problem list.    Review of Systems   Constitutional:  Negative for fever.   Musculoskeletal:  Positive for back pain and gait problem.   Neurological:  Positive for weakness. Negative for numbness.   All other systems reviewed and are negative.      Objective   Physical Exam  Constitutional:       General: She is awake.      Appearance: She is well-developed.   HENT:      Head: Normocephalic and atraumatic.   Eyes:      General: Lids are normal.      Extraocular Movements: Extraocular movements intact.      Conjunctiva/sclera: Conjunctivae normal.      Pupils: Pupils are equal, round, and reactive to light.   Neck:      Vascular: No carotid bruit.   Neurological:      Mental Status: She is alert.      Coordination: Coordination is intact.      Deep Tendon  Reflexes:      Reflex Scores:       Tricep reflexes are 2+ on the right side and 2+ on the left side.       Bicep reflexes are 2+ on the right side and 2+ on the left side.       Brachioradialis reflexes are 2+ on the right side and 2+ on the left side.       Patellar reflexes are 2+ on the right side and 2+ on the left side.       Achilles reflexes are 2+ on the right side and 2+ on the left side.  Psychiatric:         Speech: Speech normal.       Neurological Exam  Mental Status  Awake and alert. Oriented only to person, place, time and situation. Recent and remote memory are intact. Speech is normal. Language is fluent with no aphasia. Attention and concentration are normal. Fund of knowledge is appropriate for level of education.    Cranial Nerves  CN II: Visual acuity is normal. Visual fields full to confrontation.  CN III, IV, VI: Extraocular movements intact bilaterally. Normal lids and orbits bilaterally. Pupils equal round and reactive to light bilaterally.  CN V: Facial sensation is normal.  CN VII: Full and symmetric facial movement.  CN IX, X: Palate elevates symmetrically. Normal gag reflex.  CN XI: Shoulder shrug strength is normal.  CN XII: Tongue midline without atrophy or fasciculations.    Motor  Normal muscle bulk throughout. Normal muscle tone.                                               Right                     Left  Rhomboids                            5                          5  Infraspinatus                          5                          5  Supraspinatus                       5                          5  Deltoid                                   5                          5   Biceps                                   5                          5  Brachioradialis                      5                          5   Triceps                                  5                          5   Pronator                                5                          5   Supinator                               5                           5   Wrist flexor                            5                          5   Wrist extensor                       5                          5   Finger flexor                          5                          5   Finger extensor                     5                          5   Interossei                              5                          5   Abductor pollicis brevis         5                          5   Flexor pollicis brevis             5                          5   Opponens pollicis                 5                          5  Extensor digitorum               5                          5  Abductor digiti minimi           5                          5   Abdominal                            5                          5  Glutei                                    5                          5  Hip abductor                         5                          5  Hip adductor                         5                          5   Iliopsoas                               5                          5   Quadriceps                           4                          4   Hamstring                             5                          5   Gastrocnemius                     4                           4   Anterior tibialis                      4                          4   Posterior tibialis                    4                          4   Peroneal                               4                          4  Ankle dorsiflexor                   5                          5  Ankle plantar flexor              5                           5  Extensor hallucis longus      5                           5    Sensory  Light touch is normal in upper and lower extremities. Proprioception is normal in upper and lower extremities.     Reflexes                                            Right                      Left  Brachioradialis                    2+                         2+  Biceps                                  2+                         2+  Triceps                                2+                         2+  Finger flex                           2+                         2+  Hamstring                            2+                         2+  Patellar                                2+                         2+  Achilles                                2+                         2+    Coordination    Finger-to-nose, rapid alternating movements and heel-to-shin normal bilaterally without dysmetria.    Gait  Casual gait is normal including stance, stride, and arm swing.Normal toe walking. Normal heel walking. Normal tandem gait.       Assessment & Plan   Independent Review of Radiographic Studies:      X-rays done on 2/26/2025 show good positioning of the L5-S1 hardware and cage without any evidence of breakage or malalignment.    Medical Decision Making:      She will focus on recovery from her foot and ankle surgery and move forward with physical therapy.  I will see her in 9 months.  If she wants to [place] back and start dealing with the low back again between now and then she will let us know and we can order a new MRI with and without contrast before that next visit.    Diagnoses and all orders for this visit:    1. Postlaminectomy syndrome, lumbar region (Primary)    2. History of lumbar fusion    3. Degeneration of intervertebral disc of lumbar region with discogenic back pain and lower extremity pain      Return in about 9 months (around 11/26/2025) for face to face .

## 2025-02-05 RX ORDER — FLUTICASONE PROPIONATE 50 MCG
SPRAY, SUSPENSION (ML) NASAL
Qty: 16 G | Refills: 0 | Status: SHIPPED | OUTPATIENT
Start: 2025-02-05

## 2025-02-11 ENCOUNTER — TELEPHONE (OUTPATIENT)
Dept: NEUROSURGERY | Facility: CLINIC | Age: 55
End: 2025-02-11
Payer: MEDICARE

## 2025-02-11 NOTE — TELEPHONE ENCOUNTER
I have spoken to the patient to let her know when she comes to her appointment we will do her xray here in the office.

## 2025-02-11 NOTE — TELEPHONE ENCOUNTER
PT CALLED IN STATED SHE SPOKE WITH RADIOLOGY AND THEY HAVE HER SLATED FOR XRAYS IN MAY ONE YEAR FROM HER LAST APPT WITH DR. WHITAKER. THE FOLLOW UP FROM THAT APPT WAS SCHEDULED 9 MONTHS AND IS SLATED FOR 02/26/25 AND XRAY IS A PREREQUISITE. ORDERS FOR XRAY NEED TO BE UPDATED TO ALIGN WITH APPT. PLEASE ADVISE    Marisol Carrillo   [phone]   AVAILABLE ANYTIME  PLEASE [employer] OR [employer] MESSAGE

## 2025-02-26 ENCOUNTER — OFFICE VISIT (OUTPATIENT)
Dept: NEUROSURGERY | Facility: CLINIC | Age: 55
End: 2025-02-26
Payer: COMMERCIAL

## 2025-02-26 VITALS
SYSTOLIC BLOOD PRESSURE: 132 MMHG | RESPIRATION RATE: 20 BRPM | BODY MASS INDEX: 50.02 KG/M2 | HEIGHT: 64 IN | DIASTOLIC BLOOD PRESSURE: 74 MMHG | WEIGHT: 293 LBS

## 2025-02-26 DIAGNOSIS — M51.362 DEGENERATION OF INTERVERTEBRAL DISC OF LUMBAR REGION WITH DISCOGENIC BACK PAIN AND LOWER EXTREMITY PAIN: ICD-10-CM

## 2025-02-26 DIAGNOSIS — Z98.1 HISTORY OF LUMBAR FUSION: ICD-10-CM

## 2025-02-26 DIAGNOSIS — M96.1 POSTLAMINECTOMY SYNDROME, LUMBAR REGION: Primary | ICD-10-CM

## 2025-02-26 PROCEDURE — 99213 OFFICE O/P EST LOW 20 MIN: CPT | Performed by: NEUROLOGICAL SURGERY

## 2025-03-06 RX ORDER — FLUTICASONE PROPIONATE 50 MCG
SPRAY, SUSPENSION (ML) NASAL
Qty: 16 G | Refills: 11 | Status: SHIPPED | OUTPATIENT
Start: 2025-03-06

## 2025-03-19 RX ORDER — HYDROXYZINE HYDROCHLORIDE 10 MG/1
TABLET, FILM COATED ORAL
Qty: 60 TABLET | Refills: 5 | Status: SHIPPED | OUTPATIENT
Start: 2025-03-19

## 2025-05-12 RX ORDER — HYDROXYZINE HYDROCHLORIDE 10 MG/1
TABLET, FILM COATED ORAL
Qty: 60 TABLET | Refills: 6 | Status: SHIPPED | OUTPATIENT
Start: 2025-05-12

## 2025-06-12 RX ORDER — SERTRALINE HYDROCHLORIDE 100 MG/1
TABLET, FILM COATED ORAL
Qty: 180 TABLET | Refills: 3 | Status: SHIPPED | OUTPATIENT
Start: 2025-06-12

## 2025-06-23 ENCOUNTER — OFFICE VISIT (OUTPATIENT)
Dept: FAMILY MEDICINE CLINIC | Facility: CLINIC | Age: 55
End: 2025-06-23
Payer: COMMERCIAL

## 2025-06-23 VITALS
DIASTOLIC BLOOD PRESSURE: 76 MMHG | OXYGEN SATURATION: 100 % | SYSTOLIC BLOOD PRESSURE: 122 MMHG | TEMPERATURE: 98.1 F | HEIGHT: 64 IN | BODY MASS INDEX: 50.02 KG/M2 | WEIGHT: 293 LBS | HEART RATE: 86 BPM

## 2025-06-23 DIAGNOSIS — F33.0 MILD EPISODE OF RECURRENT MAJOR DEPRESSIVE DISORDER: ICD-10-CM

## 2025-06-23 DIAGNOSIS — R73.01 IMPAIRED FASTING GLUCOSE: ICD-10-CM

## 2025-06-23 DIAGNOSIS — Z96.652: ICD-10-CM

## 2025-06-23 DIAGNOSIS — F41.1 GENERALIZED ANXIETY DISORDER: ICD-10-CM

## 2025-06-23 DIAGNOSIS — K22.70 BARRETT'S ESOPHAGUS WITHOUT DYSPLASIA: ICD-10-CM

## 2025-06-23 DIAGNOSIS — K21.9 GASTROESOPHAGEAL REFLUX DISEASE WITHOUT ESOPHAGITIS: ICD-10-CM

## 2025-06-23 DIAGNOSIS — E66.01 MORBID (SEVERE) OBESITY DUE TO EXCESS CALORIES: ICD-10-CM

## 2025-06-23 DIAGNOSIS — M43.06 LUMBAR SPONDYLOLYSIS: ICD-10-CM

## 2025-06-23 DIAGNOSIS — Z12.31 ENCOUNTER FOR SCREENING MAMMOGRAM FOR BREAST CANCER: ICD-10-CM

## 2025-06-23 DIAGNOSIS — E78.2 HYPERLIPIDEMIA, MIXED: ICD-10-CM

## 2025-06-23 DIAGNOSIS — E55.9 VITAMIN D DEFICIENCY: ICD-10-CM

## 2025-06-23 DIAGNOSIS — E03.9 ACQUIRED HYPOTHYROIDISM: ICD-10-CM

## 2025-06-23 DIAGNOSIS — I10 PRIMARY HYPERTENSION: Primary | ICD-10-CM

## 2025-06-23 DIAGNOSIS — F41.9 ANXIETY: ICD-10-CM

## 2025-06-23 PROCEDURE — 90677 PCV20 VACCINE IM: CPT | Performed by: PHYSICIAN ASSISTANT

## 2025-06-23 PROCEDURE — 99214 OFFICE O/P EST MOD 30 MIN: CPT | Performed by: PHYSICIAN ASSISTANT

## 2025-06-23 PROCEDURE — 90471 IMMUNIZATION ADMIN: CPT | Performed by: PHYSICIAN ASSISTANT

## 2025-06-23 PROCEDURE — 90472 IMMUNIZATION ADMIN EACH ADD: CPT | Performed by: PHYSICIAN ASSISTANT

## 2025-06-23 PROCEDURE — 90715 TDAP VACCINE 7 YRS/> IM: CPT | Performed by: PHYSICIAN ASSISTANT

## 2025-06-23 RX ORDER — CARVEDILOL 12.5 MG/1
12.5 TABLET ORAL 2 TIMES DAILY WITH MEALS
Qty: 180 TABLET | Refills: 1 | Status: SHIPPED | OUTPATIENT
Start: 2025-06-23

## 2025-06-23 RX ORDER — ATORVASTATIN CALCIUM 40 MG/1
40 TABLET, FILM COATED ORAL NIGHTLY
Qty: 90 TABLET | Refills: 3 | Status: SHIPPED | OUTPATIENT
Start: 2025-06-23

## 2025-06-23 RX ORDER — AMLODIPINE AND OLMESARTAN MEDOXOMIL 10; 40 MG/1; MG/1
1 TABLET ORAL DAILY
Qty: 90 TABLET | Refills: 1 | Status: SHIPPED | OUTPATIENT
Start: 2025-06-23

## 2025-06-23 RX ORDER — OMEPRAZOLE 40 MG/1
CAPSULE, DELAYED RELEASE ORAL
Qty: 180 CAPSULE | Refills: 3 | Status: SHIPPED | OUTPATIENT
Start: 2025-06-23

## 2025-06-23 NOTE — PROGRESS NOTES
Subjective   Marisol Carrillo is a 55 y.o. female.     History of Present Illness    Since the last visit, she has overall felt tired.  She has Primary Hypertension and well controlled on current medication, Impaired fasting glucose and will monitor labs to watch for DMII, GERD controlled on PPI Rx, Vitamin D deficiency and will update labs for continued management, and acquired hypothyroidism and has not been taking her levothyroxine.  Also has mixed hyperlipidemia and has not been taking her 40 mg atorvastatin....  she has been compliant with current medications have reviewed them.  The patient denies medication side effects.  Will refill medications. LMP  (LMP Unknown) .    Last labs were 7/24/2024  Negative urine studies 8/27/2024    Results for orders placed or performed in visit on 07/24/24   Comprehensive Metabolic Panel    Collection Time: 07/24/24  9:08 AM   Result Value Ref Range    Glucose 98 65 - 99 mg/dL    BUN 17 6 - 20 mg/dL    Creatinine 0.81 0.57 - 1.00 mg/dL    EGFR Result 86.9 >60.0 mL/min/1.73    BUN/Creatinine Ratio 21.0 7.0 - 25.0    Sodium 140 136 - 145 mmol/L    Potassium 4.5 3.5 - 5.2 mmol/L    Chloride 104 98 - 107 mmol/L    Total CO2 25.8 22.0 - 29.0 mmol/L    Calcium 9.5 8.6 - 10.5 mg/dL    Total Protein 6.8 6.0 - 8.5 g/dL    Albumin 4.0 3.5 - 5.2 g/dL    Globulin 2.8 gm/dL    A/G Ratio 1.4 g/dL    Total Bilirubin 0.3 0.0 - 1.2 mg/dL    Alkaline Phosphatase 108 39 - 117 U/L    AST (SGOT) 17 1 - 32 U/L    ALT (SGPT) 14 1 - 33 U/L   Microscopic Examination -    Collection Time: 07/24/24  9:08 AM   Result Value Ref Range    WBC, UA 0-2 /HPF    RBC, UA 0-2 /HPF    Epithelial Cells (non renal) 0-2 /HPF    Cast Type Comment     Bacteria, UA Comment None Seen /HPF   Lipid Panel    Collection Time: 07/24/24  9:08 AM   Result Value Ref Range    Total Cholesterol 174 0 - 200 mg/dL    Triglycerides 98 0 - 150 mg/dL    HDL Cholesterol 59 40 - 60 mg/dL    VLDL Cholesterol Miguel Ángel 18 5 - 40 mg/dL    LDL  Chol Calc (NIH) 97 0 - 100 mg/dL   Iron Profile    Collection Time: 07/24/24  9:08 AM   Result Value Ref Range    TIBC 459 mcg/dL    UIBC 400 (H) 112 - 346 mcg/dL    Iron 59 37 - 145 mcg/dL    Iron Saturation 13 (L) 20 - 50 %   Hemoglobin A1c    Collection Time: 07/24/24  9:08 AM   Result Value Ref Range    Hemoglobin A1C 5.70 (H) 4.80 - 5.60 %   T4, Free    Collection Time: 07/24/24  9:08 AM   Result Value Ref Range    Free T4 1.31 0.92 - 1.68 ng/dL   Folate    Collection Time: 07/24/24  9:08 AM   Result Value Ref Range    Folate 9.27 4.78 - 24.20 ng/mL   TSH    Collection Time: 07/24/24  9:08 AM   Result Value Ref Range    TSH 2.730 0.270 - 4.200 uIU/mL   Vitamin D,25-Hydroxy    Collection Time: 07/24/24  9:08 AM   Result Value Ref Range    25 Hydroxy, Vitamin D 35.5 30.0 - 100.0 ng/ml   Vitamin B12    Collection Time: 07/24/24  9:08 AM   Result Value Ref Range    Vitamin B-12 >2000 (H) 211 - 946 pg/mL   Magnesium    Collection Time: 07/24/24  9:08 AM   Result Value Ref Range    Magnesium 2.2 1.6 - 2.6 mg/dL   Ferritin    Collection Time: 07/24/24  9:08 AM   Result Value Ref Range    Ferritin 29.00 13.00 - 150.00 ng/mL   CBC & Differential    Collection Time: 07/24/24  9:08 AM   Result Value Ref Range    WBC 8.51 3.40 - 10.80 10*3/mm3    RBC 4.57 3.77 - 5.28 10*6/mm3    Hemoglobin 13.1 12.0 - 15.9 g/dL    Hematocrit 40.5 34.0 - 46.6 %    MCV 88.6 79.0 - 97.0 fL    MCH 28.7 26.6 - 33.0 pg    MCHC 32.3 31.5 - 35.7 g/dL    RDW 15.8 (H) 12.3 - 15.4 %    Platelets 301 140 - 450 10*3/mm3    Neutrophil Rel % 65.4 42.7 - 76.0 %    Lymphocyte Rel % 22.7 19.6 - 45.3 %    Monocyte Rel % 8.2 5.0 - 12.0 %    Eosinophil Rel % 2.9 0.3 - 6.2 %    Basophil Rel % 0.7 0.0 - 1.5 %    Neutrophils Absolute 5.56 1.70 - 7.00 10*3/mm3    Lymphocytes Absolute 1.93 0.70 - 3.10 10*3/mm3    Monocytes Absolute 0.70 0.10 - 0.90 10*3/mm3    Eosinophils Absolute 0.25 0.00 - 0.40 10*3/mm3    Basophils Absolute 0.06 0.00 - 0.20 10*3/mm3    Immature  Granulocyte Rel % 0.1 0.0 - 0.5 %    Immature Grans Absolute 0.01 0.00 - 0.05 10*3/mm3    nRBC 0.0 0.0 - 0.2 /100 WBC   Urinalysis With Microscopic -    Collection Time: 07/24/24  9:08 AM   Result Value Ref Range    Specific Gravity, UA 1.021 1.005 - 1.030    pH, UA 6.0 5.0 - 8.0    Color, UA See below: (A)     Appearance, UA Clear Clear    Leukocytes, UA See below: (A) Negative    Protein Negative Negative    Glucose, UA Negative Negative    Ketones Negative Negative    Blood, UA Negative Negative    Bilirubin, UA Negative Negative    Urobilinogen, UA Comment     Nitrite, UA Negative Negative     Patient needs to see me every 6 months.  Last visit for following up on medications was 7/26/2024.  Also noted that time she was on semaglutide and had lost 20 pounds  So we will note some important medical history:  She is on iron  Had work up 2019 hematologist after Dx PE----noted from post surgical reason---Provoked----did f/u CTA chest 2019 and no further work up recommended---d/t provoked VTE  She does have low iron stores. ----did had EGD-----she is on iron BID:   Last visit with Dr. Cancino  neurosurgeon 2/26/2025 was  noting she is postlaminectomy syndrome, lumbar region--and history of lumbar fusion   Them removed Dr. Hartman 10/18/2022 bariatric surgeon.  Saw ortho Dr. Jiménez history of arthrodesis ankle right.    EGD udpated 10/18/22 during which gastritis was noted, no signs of barrets despite hisotry, h.pylori testing was negative.    does best on Zoloft for anxiety and depression.  Has been to psych.  Intol Reluxi   she tried Lexapro or Celexa. Failed Cymbalta,Prozac. Has already been on Buspar first; already tried Hydroxyzine    Patient had Cardiolite stress test 3/22/2019 noting no myocardial ischemia.   Preop EKG 7/26/2024 normal  Seeing ENT Dr. Billy  in past for ear pressure    In PT   Does chair exercise  Weight is up---  The following portions of the patient's history were reviewed and updated  as appropriate: allergies, current medications, past family history, past medical history, past social history, past surgical history, and problem list.    Review of Systems   Constitutional:  Positive for fatigue. Negative for chills, diaphoresis and fever.   HENT:  Negative for congestion, sore throat and swollen glands.    Respiratory:  Negative for cough.    Cardiovascular:  Negative for chest pain.   Gastrointestinal:  Negative for abdominal pain, nausea and vomiting.   Genitourinary:  Negative for dysuria.   Musculoskeletal:  Positive for myalgias. Negative for neck pain.   Skin:  Negative for rash.   Neurological:  Positive for weakness. Negative for numbness.       Objective   Physical Exam  Vitals and nursing note reviewed.   Constitutional:       General: She is not in acute distress.     Appearance: She is well-developed. She is obese. She is not ill-appearing or toxic-appearing.   HENT:      Head: Normocephalic.      Right Ear: External ear normal.      Left Ear: External ear normal.      Nose: Nose normal.      Mouth/Throat:      Pharynx: Oropharynx is clear.   Eyes:      General: No scleral icterus.     Conjunctiva/sclera: Conjunctivae normal.      Pupils: Pupils are equal, round, and reactive to light.   Neck:      Thyroid: No thyromegaly.   Cardiovascular:      Rate and Rhythm: Normal rate and regular rhythm.      Pulses: Normal pulses.      Heart sounds: Normal heart sounds. No murmur heard.  Pulmonary:      Effort: Pulmonary effort is normal. No respiratory distress.      Breath sounds: Normal breath sounds. No rales.   Musculoskeletal:         General: No deformity. Normal range of motion.      Cervical back: Normal range of motion and neck supple.      Right lower leg: Edema present.      Left lower leg: Edema present.      Comments: R>L   Skin:     General: Skin is warm and dry.      Findings: No rash.   Neurological:      General: No focal deficit present.      Mental Status: She is alert and  oriented to person, place, and time. Mental status is at baseline.   Psychiatric:         Behavior: Behavior normal.         Thought Content: Thought content normal.         Judgment: Judgment normal.           Assessment & Plan   Diagnoses and all orders for this visit:    1. Primary hypertension (Primary)    2. Anxiety    3. Gastroesophageal reflux disease without esophagitis    4. Vitamin D deficiency    5. Hyperlipidemia, mixed    6. Morbid (severe) obesity due to excess calories    7. Lumbar spondylolysis    8. S/P total prosthetic knee replacement not using cement, left    9. Acquired hypothyroidism    10. Mild episode of recurrent major depressive disorder    11. Generalized anxiety disorder    12. Medrano's esophagus without dysplasia  Comments:  advise f/u GI    Other orders  -     Pneumococcal Conjugate Vaccine 20-Valent (<18 yrs)  -     Tdap Vaccine Greater Than or Equal To 6yo IM      Plan, Marisol Carrillo, was seen today.  she was seen for HTN and continue medication, Imparied fasting glucose and plan follow up labs, diet, and exercise, GERD and will continue on PPI medication, Seasonal allergies and is doing well on their medication , Vitamin D deficiency and will update labs , and acquired hypothyroidism not currently taking levothyroxine and mixed hyperlipidemia not taking atorvastatin.  Advise starting the semaglutide compound at 0.25 weekly and have small frequent meals and watch for nausea vomiting diarrhea and the 3% risk of pancreatitis  She is due for colon cancer screening and defers for now  Consider pap!!!!  I will update her Tdap today and Prevnar 20 pneumonia vaccine----also check to see if your insurance pays for the Shingrix vaccine for shingles prevention  She has hx Medrano's--and recommend consult visit with gastroenterology to update colon cancer screening and possible EGD  Has not been taking levothyroxine for hypothyroidism or atorvastatin for her mixed hyperlipidemia  Updating  all labs  Followed by Dr. Dumas lumbar DDD prior surgery  Also followed by Ortho Dr. Carrero status post ankle fusion right and still in physical therapy\  Weight up  I have her on nystatin for yeast rash works well as needed  Takes Zanaflex for muscle spasms works well as needed  Taking D  Cont Zoloft for anxiety and depression and does help  ---wait on thyroid Rx

## 2025-06-24 ENCOUNTER — RESULTS FOLLOW-UP (OUTPATIENT)
Dept: FAMILY MEDICINE CLINIC | Facility: CLINIC | Age: 55
End: 2025-06-24
Payer: MEDICARE

## 2025-06-24 LAB
25(OH)D3+25(OH)D2 SERPL-MCNC: 22.4 NG/ML (ref 30–100)
ALBUMIN SERPL-MCNC: 4.3 G/DL (ref 3.8–4.9)
ALP SERPL-CCNC: 124 IU/L (ref 44–121)
ALT SERPL-CCNC: 15 IU/L (ref 0–32)
APPEARANCE UR: CLEAR
AST SERPL-CCNC: 23 IU/L (ref 0–40)
BACTERIA #/AREA URNS HPF: ABNORMAL /[HPF]
BASOPHILS # BLD AUTO: 0.1 X10E3/UL (ref 0–0.2)
BASOPHILS NFR BLD AUTO: 1 %
BILIRUB SERPL-MCNC: 0.2 MG/DL (ref 0–1.2)
BILIRUB UR QL STRIP: NEGATIVE
BUN SERPL-MCNC: 17 MG/DL (ref 6–24)
BUN/CREAT SERPL: 18 (ref 9–23)
CALCIUM SERPL-MCNC: 9.5 MG/DL (ref 8.7–10.2)
CASTS URNS QL MICRO: ABNORMAL /LPF
CHLORIDE SERPL-SCNC: 106 MMOL/L (ref 96–106)
CHOLEST SERPL-MCNC: 227 MG/DL (ref 100–199)
CO2 SERPL-SCNC: 21 MMOL/L (ref 20–29)
COLOR UR: YELLOW
CREAT SERPL-MCNC: 0.94 MG/DL (ref 0.57–1)
CRYSTALS URNS MICRO: ABNORMAL
EGFRCR SERPLBLD CKD-EPI 2021: 72 ML/MIN/1.73
EOSINOPHIL # BLD AUTO: 0.3 X10E3/UL (ref 0–0.4)
EOSINOPHIL NFR BLD AUTO: 4 %
EPI CELLS #/AREA URNS HPF: ABNORMAL /HPF (ref 0–10)
ERYTHROCYTE [DISTWIDTH] IN BLOOD BY AUTOMATED COUNT: 15.7 % (ref 11.7–15.4)
FOLATE SERPL-MCNC: 5.9 NG/ML
GLOBULIN SER CALC-MCNC: 3.2 G/DL (ref 1.5–4.5)
GLUCOSE SERPL-MCNC: 109 MG/DL (ref 70–99)
GLUCOSE UR QL STRIP: NEGATIVE
HBA1C MFR BLD: 5.8 % (ref 4.8–5.6)
HCT VFR BLD AUTO: 40 % (ref 34–46.6)
HDLC SERPL-MCNC: 64 MG/DL
HGB BLD-MCNC: 12.3 G/DL (ref 11.1–15.9)
HGB UR QL STRIP: NEGATIVE
IMM GRANULOCYTES # BLD AUTO: 0 X10E3/UL (ref 0–0.1)
IMM GRANULOCYTES NFR BLD AUTO: 0 %
KETONES UR QL STRIP: NEGATIVE
LDLC SERPL CALC-MCNC: 140 MG/DL (ref 0–99)
LEUKOCYTE ESTERASE UR QL STRIP: NEGATIVE
LYMPHOCYTES # BLD AUTO: 1.9 X10E3/UL (ref 0.7–3.1)
LYMPHOCYTES NFR BLD AUTO: 23 %
MAGNESIUM SERPL-MCNC: 2.2 MG/DL (ref 1.6–2.3)
MCH RBC QN AUTO: 26.3 PG (ref 26.6–33)
MCHC RBC AUTO-ENTMCNC: 30.8 G/DL (ref 31.5–35.7)
MCV RBC AUTO: 86 FL (ref 79–97)
MICRO URNS: NORMAL
MICRO URNS: NORMAL
MONOCYTES # BLD AUTO: 0.6 X10E3/UL (ref 0.1–0.9)
MONOCYTES NFR BLD AUTO: 7 %
NEUTROPHILS # BLD AUTO: 5.4 X10E3/UL (ref 1.4–7)
NEUTROPHILS NFR BLD AUTO: 65 %
NITRITE UR QL STRIP: NEGATIVE
PH UR STRIP: 5.5 [PH] (ref 5–7.5)
PLATELET # BLD AUTO: 353 X10E3/UL (ref 150–450)
POTASSIUM SERPL-SCNC: 4.2 MMOL/L (ref 3.5–5.2)
PROT SERPL-MCNC: 7.5 G/DL (ref 6–8.5)
PROT UR QL STRIP: NORMAL
RBC # BLD AUTO: 4.68 X10E6/UL (ref 3.77–5.28)
RBC #/AREA URNS HPF: ABNORMAL /HPF (ref 0–2)
SODIUM SERPL-SCNC: 145 MMOL/L (ref 134–144)
SP GR UR STRIP: 1.03 (ref 1–1.03)
T3FREE SERPL-MCNC: 2.6 PG/ML (ref 2–4.4)
T4 FREE SERPL-MCNC: 1.06 NG/DL (ref 0.82–1.77)
TRIGL SERPL-MCNC: 131 MG/DL (ref 0–149)
TSH SERPL DL<=0.005 MIU/L-ACNC: 3.8 UIU/ML (ref 0.45–4.5)
UNIDENT CRYS URNS QL MICRO: PRESENT
UROBILINOGEN UR STRIP-MCNC: 0.2 MG/DL (ref 0.2–1)
VIT B12 SERPL-MCNC: 560 PG/ML (ref 232–1245)
VLDLC SERPL CALC-MCNC: 23 MG/DL (ref 5–40)
WBC # BLD AUTO: 8.3 X10E3/UL (ref 3.4–10.8)
WBC #/AREA URNS HPF: ABNORMAL /HPF (ref 0–5)

## 2025-07-18 RX ORDER — HYDROXYZINE HYDROCHLORIDE 10 MG/1
TABLET, FILM COATED ORAL
Qty: 60 TABLET | Refills: 6 | Status: SHIPPED | OUTPATIENT
Start: 2025-07-18

## (undated) DEVICE — SUT VIC 0 CT1 CR8 18IN J840D

## (undated) DEVICE — 3M™ STERI-STRIP™ REINFORCED ADHESIVE SKIN CLOSURES, R1547, 1/2 IN X 4 IN (12 MM X 100 MM), 6 STRIPS/ENVELOPE: Brand: 3M™ STERI-STRIP™

## (undated) DEVICE — MINI ENDOCUT SCISSOR TIP, DISPOSABLE: Brand: RENEW

## (undated) DEVICE — ANTIBACTERIAL UNDYED BRAIDED (POLYGLACTIN 910), SYNTHETIC ABSORBABLE SUTURE: Brand: COATED VICRYL

## (undated) DEVICE — TBG PENCL TELESCP MEGADYNE SMOKE EVAC 10FT

## (undated) DEVICE — GLV SURG TRIUMPH CLASSIC PF LTX 6.5 STRL

## (undated) DEVICE — DECANT BG O JET

## (undated) DEVICE — PAD,ABDOMINAL,8"X10",ST,LF: Brand: MEDLINE

## (undated) DEVICE — ENCORE® LATEX ORTHO SIZE 8.5, STERILE LATEX POWDER-FREE SURGICAL GLOVE: Brand: ENCORE

## (undated) DEVICE — APPL CHLORAPREP W/TINT 26ML ORNG

## (undated) DEVICE — LAPAROSCOPIC GAS CONDITIONING DEVICE.: Brand: INSUFLOW

## (undated) DEVICE — DRSNG WND GZ CURAD OIL EMULSION 3X8IN LF STRL 1PK

## (undated) DEVICE — KT ORCA ORCAPOD DISP STRL

## (undated) DEVICE — CANN O2 ETCO2 FITS ALL CONN CO2 SMPL A/ 7IN DISP LF

## (undated) DEVICE — SYR LUERLOK 30CC

## (undated) DEVICE — 3M™ IOBAN™ 2 ANTIMICROBIAL INCISE DRAPE 6650EZ: Brand: IOBAN™ 2

## (undated) DEVICE — SPNG GZ WOVN 4X4IN 12PLY 10/BX STRL

## (undated) DEVICE — LN SMPL CO2 SHTRM SD STREAM W/M LUER

## (undated) DEVICE — KT LINEN QUICKSQUITE SAHARA STD DRW/LIFT 60X78IN

## (undated) DEVICE — PK BARIATRIC 10 40 70

## (undated) DEVICE — P.F.C. DRILL BIT AND STEINMAN PIN PACKET (1 UNIT) .125IN DIA 5IN LGTH: Brand: P.F.C.

## (undated) DEVICE — GLV SURG BIOGEL LTX PF 8 1/2

## (undated) DEVICE — BNDG ESMARK STRL 6INX12FT LF

## (undated) DEVICE — T4 ZIPPER TOGA, (L/XL)

## (undated) DEVICE — STPLR SKIN VISISTAT WD 35CT

## (undated) DEVICE — THE TORRENT IRRIGATION SCOPE CONNECTOR IS USED WITH THE TORRENT IRRIGATION TUBING TO PROVIDE IRRIGATION FLUIDS SUCH AS STERILE WATER DURING GASTROINTESTINAL ENDOSCOPIC PROCEDURES WHEN USED IN CONJUNCTION WITH AN IRRIGATION PUMP (OR ELECTROSURGICAL UNIT).: Brand: TORRENT

## (undated) DEVICE — PREP SOL POVIDONE/IODINE BT 4OZ

## (undated) DEVICE — ENDOPATH XCEL WITH OPTIVIEW TECHNOLOGY BLADELESS TROCARS WITH STABILITY SLEEVES: Brand: ENDOPATH XCEL OPTIVIEW

## (undated) DEVICE — 1010 S-DRAPE TOWEL DRAPE 10/BX: Brand: STERI-DRAPE™

## (undated) DEVICE — NDL SPINE 22G 31/2IN BLK

## (undated) DEVICE — DUAL CUT SAGITTAL BLADE

## (undated) DEVICE — GLV SURG BIOGEL LTX PF 7

## (undated) DEVICE — GLV SURG SENSICARE MICRO PF LF 6 STRL

## (undated) DEVICE — CANN NASL CO2 TRULINK W/O2 A/

## (undated) DEVICE — GLV SURG SENSICARE GREEN W/ALOE PF LF 8 STRL

## (undated) DEVICE — DRSNG ADAPTIC 3X8

## (undated) DEVICE — 3M™ STERI-STRIP™ COMPOUND BENZOIN TINCTURE 40 BAGS/CARTON 4 CARTONS/CASE C1544: Brand: 3M™ STERI-STRIP™

## (undated) DEVICE — UNDERCAST PADDING: Brand: DEROYAL

## (undated) DEVICE — ADAPT CLN BIOGUARD AIR/H2O DISP

## (undated) DEVICE — BNDG ELAS ELITE V/CLOSE 6IN 5YD LF STRL

## (undated) DEVICE — APPL CHLORAPREP HI/LITE 26ML ORNG

## (undated) DEVICE — SENSR O2 OXIMAX FNGR A/ 18IN NONSTR

## (undated) DEVICE — BITEBLOCK OMNI BLOC

## (undated) DEVICE — DRSNG PAD ABD 8X10IN STRL

## (undated) DEVICE — DRAPE,U/ SHT,SPLIT,PLAS,STERIL: Brand: MEDLINE

## (undated) DEVICE — BNDG ELAS ELITE V/CLOSE 4IN 5YD LF STRL

## (undated) DEVICE — GLV SURG BIOGEL LTX PF 6 1/2

## (undated) DEVICE — PATIENT RETURN ELECTRODE, SINGLE-USE, CONTACT QUALITY MONITORING, ADULT, WITH 9FT CORD, FOR PATIENTS WEIGING OVER 33LBS. (15KG): Brand: MEGADYNE

## (undated) DEVICE — 2, DISPOSABLE SUCTION/IRRIGATOR WITH DISPOSABLE TIP: Brand: STRYKEFLOW

## (undated) DEVICE — GLV SURG TRIUMPH CLASSIC PF LTX 8.5 STRL

## (undated) DEVICE — SOL NACL 0.9PCT 1000ML

## (undated) DEVICE — SUT VIC 2/0 SH 27IN

## (undated) DEVICE — ENDOPATH XCEL BLADELESS TROCARS WITH STABILITY SLEEVES: Brand: ENDOPATH XCEL

## (undated) DEVICE — OVERDRILL AO, DIA3.5MM X 122MM: Brand: VARIAX

## (undated) DEVICE — DRP C/ARM 41X74IN

## (undated) DEVICE — PENCL E/S HNDSWCH ROCKR CB

## (undated) DEVICE — ADHS SKIN DERMABOND TOP ADVANCED

## (undated) DEVICE — DRSNG WND BORDR/ADHS NONADHR/GZ LF 2X2IN STRL

## (undated) DEVICE — TROCAR: Brand: KII OPTICAL ACCESS SYSTEM

## (undated) DEVICE — ENCORE® LATEX ORTHO SIZE 8, STERILE LATEX POWDER-FREE SURGICAL GLOVE: Brand: ENCORE

## (undated) DEVICE — ENDOPATH XCEL UNIVERSAL TROCAR STABLILITY SLEEVES: Brand: ENDOPATH XCEL

## (undated) DEVICE — SUT MNCRYL 4/0 PS2 18 IN

## (undated) DEVICE — FRCP BX RADJAW4 NDL 2.8 240CM LG OG BX40

## (undated) DEVICE — GLV SURG SIGNATURE ESSENTIAL PF LTX SZ8.5

## (undated) DEVICE — PK KN TOTL 40

## (undated) DEVICE — Device: Brand: DEFENDO AIR/WATER/SUCTION AND BIOPSY VALVE

## (undated) DEVICE — DISPOSABLE MONOPOLAR ENDOSCOPIC CORD 10 FT. (3M): Brand: KIRWAN

## (undated) DEVICE — 2108 SERIES SAGITTAL BLADE, NO OFFSET  (12.4 X 1.19 X 82.1MM)

## (undated) DEVICE — GLV SURG SENSICARE MICRO PF LF 8.5 STRL

## (undated) DEVICE — SYR CONTRL LUERLOK 10CC

## (undated) DEVICE — DRSNG GZ PETROLTM XEROFORM CURAD 1X8IN STRL

## (undated) DEVICE — MSK PROC CURAPLEX O2 2/ADAPT 7FT

## (undated) DEVICE — UNTHREADED GUIDE WIRE: Brand: FIXOS

## (undated) DEVICE — GLV SURG BIOGEL LTX PF 8

## (undated) DEVICE — T-DRAPE,EXTREMITY,STERILE: Brand: MEDLINE

## (undated) DEVICE — PAD GRND REM POLYHESIVE A/ DISP

## (undated) DEVICE — TRAP FLD MINIVAC MEGADYNE 100ML

## (undated) DEVICE — OSC GEN LAPAROSCOPY: Brand: MEDLINE INDUSTRIES, INC.

## (undated) DEVICE — SUT ETHIB 0 CT1 CR8 18IN CX21D

## (undated) DEVICE — DISPOSABLE TOURNIQUET CUFF SINGLE BLADDER, SINGLE PORT AND QUICK CONNECT CONNECTOR: Brand: COLOR CUFF

## (undated) DEVICE — TUBING, SUCTION, 1/4" X 10', STRAIGHT: Brand: MEDLINE

## (undated) DEVICE — POOLE SUCTION HANDLE: Brand: CARDINAL HEALTH

## (undated) DEVICE — DRAPE,REIN 53X77,STERILE: Brand: MEDLINE

## (undated) DEVICE — ENCORE® LATEX ORTHO SIZE 6.5, STERILE LATEX POWDER-FREE SURGICAL GLOVE: Brand: ENCORE

## (undated) DEVICE — DRILL BIT, AO DIA2.6MM X 135MM, SCALED: Brand: VARIAX

## (undated) DEVICE — TOWEL,OR,DSP,ST,BLUE,STD,4/PK,20PK/CS: Brand: MEDLINE

## (undated) DEVICE — PK ORTHO MAJ 40

## (undated) DEVICE — TBG 02 CRUSH RESIST LF CLR 7FT

## (undated) DEVICE — UNDYED BRAIDED (POLYGLACTIN 910), SYNTHETIC ABSORBABLE SUTURE: Brand: COATED VICRYL

## (undated) DEVICE — GLV SURG TRIUMPH CLASSIC PF LTX 8 STRL

## (undated) DEVICE — GLV SURG SENSICARE PI PF LF 8.5 GRN STRL

## (undated) DEVICE — GLV SURG PREMIERPRO ORTHO LTX PF SZ8 BRN

## (undated) DEVICE — STRAP STIRUP SLP RNG 19X3.5IN DISP